# Patient Record
Sex: FEMALE | Race: WHITE | NOT HISPANIC OR LATINO
[De-identification: names, ages, dates, MRNs, and addresses within clinical notes are randomized per-mention and may not be internally consistent; named-entity substitution may affect disease eponyms.]

---

## 2017-06-10 ENCOUNTER — APPOINTMENT (OUTPATIENT)
Dept: MRI IMAGING | Facility: HOSPITAL | Age: 82
End: 2017-06-10

## 2017-06-10 ENCOUNTER — OUTPATIENT (OUTPATIENT)
Dept: OUTPATIENT SERVICES | Facility: HOSPITAL | Age: 82
LOS: 1 days | End: 2017-06-10
Payer: MEDICARE

## 2017-06-10 DIAGNOSIS — M43.16 SPONDYLOLISTHESIS, LUMBAR REGION: ICD-10-CM

## 2017-06-10 DIAGNOSIS — M48.06 SPINAL STENOSIS, LUMBAR REGION: ICD-10-CM

## 2017-06-10 DIAGNOSIS — M51.26 OTHER INTERVERTEBRAL DISC DISPLACEMENT, LUMBAR REGION: ICD-10-CM

## 2017-06-10 DIAGNOSIS — M47.816 SPONDYLOSIS WITHOUT MYELOPATHY OR RADICULOPATHY, LUMBAR REGION: ICD-10-CM

## 2017-06-10 PROCEDURE — 72148 MRI LUMBAR SPINE W/O DYE: CPT

## 2017-06-15 ENCOUNTER — OUTPATIENT (OUTPATIENT)
Dept: OUTPATIENT SERVICES | Facility: HOSPITAL | Age: 82
LOS: 1 days | End: 2017-06-15
Payer: MEDICARE

## 2017-06-15 DIAGNOSIS — M54.16 RADICULOPATHY, LUMBAR REGION: ICD-10-CM

## 2017-06-15 PROCEDURE — 77003 FLUOROGUIDE FOR SPINE INJECT: CPT

## 2017-06-15 PROCEDURE — 62323 NJX INTERLAMINAR LMBR/SAC: CPT

## 2017-09-11 ENCOUNTER — APPOINTMENT (OUTPATIENT)
Dept: SURGERY | Facility: CLINIC | Age: 82
End: 2017-09-11

## 2018-04-30 ENCOUNTER — APPOINTMENT (OUTPATIENT)
Dept: RADIOLOGY | Facility: HOSPITAL | Age: 83
End: 2018-04-30
Payer: MEDICARE

## 2018-04-30 ENCOUNTER — OUTPATIENT (OUTPATIENT)
Dept: OUTPATIENT SERVICES | Facility: HOSPITAL | Age: 83
LOS: 1 days | End: 2018-04-30
Payer: MEDICARE

## 2018-04-30 DIAGNOSIS — R10.9 UNSPECIFIED ABDOMINAL PAIN: ICD-10-CM

## 2018-04-30 DIAGNOSIS — Z00.8 ENCOUNTER FOR OTHER GENERAL EXAMINATION: ICD-10-CM

## 2018-04-30 DIAGNOSIS — Z96.643 PRESENCE OF ARTIFICIAL HIP JOINT, BILATERAL: ICD-10-CM

## 2018-04-30 PROCEDURE — 72170 X-RAY EXAM OF PELVIS: CPT | Mod: 26

## 2018-04-30 PROCEDURE — 72170 X-RAY EXAM OF PELVIS: CPT

## 2018-04-30 PROCEDURE — 74018 RADEX ABDOMEN 1 VIEW: CPT

## 2018-04-30 PROCEDURE — 74018 RADEX ABDOMEN 1 VIEW: CPT | Mod: 26

## 2019-03-14 ENCOUNTER — OUTPATIENT (OUTPATIENT)
Dept: OUTPATIENT SERVICES | Facility: HOSPITAL | Age: 84
LOS: 1 days | End: 2019-03-14
Payer: COMMERCIAL

## 2019-03-14 ENCOUNTER — APPOINTMENT (OUTPATIENT)
Dept: MRI IMAGING | Facility: CLINIC | Age: 84
End: 2019-03-14

## 2019-03-14 DIAGNOSIS — Z00.8 ENCOUNTER FOR OTHER GENERAL EXAMINATION: ICD-10-CM

## 2019-03-14 PROCEDURE — 72148 MRI LUMBAR SPINE W/O DYE: CPT | Mod: 26

## 2019-03-14 PROCEDURE — 72148 MRI LUMBAR SPINE W/O DYE: CPT

## 2019-04-02 ENCOUNTER — OUTPATIENT (OUTPATIENT)
Dept: OUTPATIENT SERVICES | Facility: HOSPITAL | Age: 84
LOS: 1 days | End: 2019-04-02
Payer: MEDICARE

## 2019-04-02 DIAGNOSIS — M54.16 RADICULOPATHY, LUMBAR REGION: ICD-10-CM

## 2019-04-02 PROCEDURE — 77003 FLUOROGUIDE FOR SPINE INJECT: CPT

## 2019-04-02 PROCEDURE — 62323 NJX INTERLAMINAR LMBR/SAC: CPT

## 2019-05-24 ENCOUNTER — EMERGENCY (EMERGENCY)
Facility: HOSPITAL | Age: 84
LOS: 1 days | Discharge: HOME | End: 2019-05-24
Attending: EMERGENCY MEDICINE | Admitting: EMERGENCY MEDICINE
Payer: MEDICARE

## 2019-05-24 VITALS
TEMPERATURE: 98 F | HEART RATE: 58 BPM | WEIGHT: 143.08 LBS | RESPIRATION RATE: 17 BRPM | OXYGEN SATURATION: 96 % | DIASTOLIC BLOOD PRESSURE: 77 MMHG | SYSTOLIC BLOOD PRESSURE: 143 MMHG

## 2019-05-24 DIAGNOSIS — M25.559 PAIN IN UNSPECIFIED HIP: ICD-10-CM

## 2019-05-24 DIAGNOSIS — Z96.641 PRESENCE OF RIGHT ARTIFICIAL HIP JOINT: Chronic | ICD-10-CM

## 2019-05-24 DIAGNOSIS — Z87.39 PERSONAL HISTORY OF OTHER DISEASES OF THE MUSCULOSKELETAL SYSTEM AND CONNECTIVE TISSUE: Chronic | ICD-10-CM

## 2019-05-24 PROCEDURE — 73502 X-RAY EXAM HIP UNI 2-3 VIEWS: CPT | Mod: 26,RT

## 2019-05-24 PROCEDURE — 73502 X-RAY EXAM HIP UNI 2-3 VIEWS: CPT

## 2019-05-24 PROCEDURE — 99283 EMERGENCY DEPT VISIT LOW MDM: CPT

## 2019-05-24 PROCEDURE — 96372 THER/PROPH/DIAG INJ SC/IM: CPT

## 2019-05-24 PROCEDURE — 99283 EMERGENCY DEPT VISIT LOW MDM: CPT | Mod: 25

## 2019-05-24 RX ORDER — METHOCARBAMOL 500 MG/1
750 TABLET, FILM COATED ORAL ONCE
Refills: 0 | Status: COMPLETED | OUTPATIENT
Start: 2019-05-24 | End: 2019-05-24

## 2019-05-24 RX ORDER — DEXAMETHASONE 0.5 MG/5ML
10 ELIXIR ORAL ONCE
Refills: 0 | Status: COMPLETED | OUTPATIENT
Start: 2019-05-24 | End: 2019-05-24

## 2019-05-24 RX ORDER — METHOCARBAMOL 500 MG/1
1 TABLET, FILM COATED ORAL
Qty: 14 | Refills: 0
Start: 2019-05-24 | End: 2019-05-30

## 2019-05-24 RX ADMIN — METHOCARBAMOL 750 MILLIGRAM(S): 500 TABLET, FILM COATED ORAL at 15:51

## 2019-05-24 RX ADMIN — Medication 10 MILLIGRAM(S): at 15:50

## 2019-05-24 NOTE — ED ADULT NURSE NOTE - CHPI ED NUR SYMPTOMS NEG
no stiffness/no back pain/no bruising/no deformity/no numbness/no weakness/no fever/no abrasion/no tingling

## 2019-05-24 NOTE — ED ADULT TRIAGE NOTE - CHIEF COMPLAINT QUOTE
pt presents to ED with c/o  right hip pain x2 weeks. Pt denies injury to hip and states " I woke up 2 weeks ago and suddenly developed this pain"

## 2019-05-24 NOTE — ED PROVIDER NOTE - PHYSICAL EXAMINATION
AAOx3  Abd: soft, NT/ND, no rebound or guarding, NCVAT  Extremity: +mild right hip TTP, +SLR   Neuro: patient moving all extremity equally, no focal neuro deficits noted  +hip pain with standing AAOx3  Abd: soft, NT/ND, no rebound or guarding, NCVAT  Extremity: +mild right hip TTP, +right SLR, no bruising or rash noted  Neuro: patient moving all extremity equally, no focal neuro deficits noted  +hip pain with standing

## 2019-05-24 NOTE — ED PROVIDER NOTE - OBJECTIVE STATEMENT
84 y/o F with PMH of Afib on Eliquis s/p cardiac ablation, CHF, HLD, spinal stenosis, left breast cancer s/p double mastectomy 86 y/o F with PMH of Afib on Eliquis s/p cardiac ablation, CHF, HLD, spinal stenosis, left breast cancer s/p double mastectomy presents to the ED with c/o sharp right hip pain shooting down the posterior thigh x 2 weeks, worse with ambulation and movement of the hip. Denies fall, direct injury, paresthesias, motor/sensory deficits, bowel/bladder incontinence, n/v, rash, f/c or all other complaints

## 2019-05-24 NOTE — ED ADULT NURSE NOTE - OBJECTIVE STATEMENT
Pt c/o right hip pain x2 weeks worse with ambulation that radiates down the right lateral and posterior leg described as shooting. Pt denies any fall or trauma. Pt states she is s/p hip replacement 15 years ago. Pt also states that imaging showed she had nerve damage on the hip. Pt states her pain management doctor, Dr. Villalobos told her to go to the ED. Pt sees pain management for spinal stenosis and manages pain with Tylenol. Pt states she tool Tylenol 650mg and "half of my friends Vicodin" with no relief. Pt presents with cane and states that she normally ambulates without any assistance.

## 2019-05-24 NOTE — ED ADULT NURSE NOTE - NSIMPLEMENTINTERV_GEN_ALL_ED
Implemented All Universal Safety Interventions:  [place] to call system. Call bell, personal items and telephone within reach. Instruct patient to call for assistance. Room bathroom lighting operational. Non-slip footwear when patient is off stretcher. Physically safe environment: no spills, clutter or unnecessary equipment. Stretcher in lowest position, wheels locked, appropriate side rails in place.

## 2019-05-24 NOTE — ED PROVIDER NOTE - ATTENDING CONTRIBUTION TO CARE
Dao with SAI Orozco. 84 y/o F with PMH of Afib on Eliquis s/p cardiac ablation, CHF, HLD, spinal stenosis, left breast cancer s/p double mastectomy presents to the ED with c/o sharp right hip pain shooting down the posterior thigh x 2 weeks, worse with ambulation and movement of the hip. Denies fall, direct injury, paresthesias, motor/sensory deficits, bowel/bladder incontinence, n/v, rash, f/c or all other complaints. On exam, to the right buttock in the sciatic notch, there is point tenderness. Notable with change in position from seated to standing and vice versa. She has lidocaine patch in place. She took tylenol already. We will supplement meds in the ED (cannot take NSAIDs due to usage of blood thinners) - we will administer decadron and muscle relaxer.   Pt ambulating with more comfort. She is ready for discharge. She walked the ED without difficulty. Her son is here to bring her home. All questions answered.   I performed a face to face bedside interview with patient regarding history of present illness, review of symptoms and past medical history. I completed an independent physical exam.  I have discussed the patient's plan of care with Physician Assistant (PA). I agree with note as stated above, having amended the EMR as needed to reflect my findings.   This includes History of Present Illness, HIV, Past Medical/Surgical/Family/Social History, Allergies and Home Medications, Review of Systems, Physical Exam, and any Progress Notes during the time I functioned as the attending physician for this patient.

## 2019-05-24 NOTE — ED PROVIDER NOTE - PROGRESS NOTE DETAILS
SAI Mcgovern: patient able to ambulate now, reports her pain is improved with meds. she is stable for d/c

## 2019-05-24 NOTE — ED PROVIDER NOTE - NSFOLLOWUPINSTRUCTIONS_ED_ALL_ED_FT
Follow up with your primary care physician within 1 week.    Take the prescribed medications as directed. Continue applying lidocaine patches to your back. Apply warm compresses to the affected area    Stay hydrated    Return to the ER if your symptoms worsen, high fevers, severe pain or for any other medical emergencies

## 2019-05-28 DIAGNOSIS — Z90.13 ACQUIRED ABSENCE OF BILATERAL BREASTS AND NIPPLES: ICD-10-CM

## 2019-05-30 ENCOUNTER — APPOINTMENT (OUTPATIENT)
Dept: ORTHOPEDIC SURGERY | Facility: CLINIC | Age: 84
End: 2019-05-30
Payer: MEDICARE

## 2019-05-30 VITALS — HEIGHT: 63 IN | WEIGHT: 142 LBS | BODY MASS INDEX: 25.16 KG/M2

## 2019-05-30 DIAGNOSIS — M48.062 SPINAL STENOSIS, LUMBAR REGION WITH NEUROGENIC CLAUDICATION: ICD-10-CM

## 2019-05-30 PROCEDURE — 99204 OFFICE O/P NEW MOD 45 MIN: CPT

## 2019-06-12 PROBLEM — I50.9 HEART FAILURE, UNSPECIFIED: Chronic | Status: ACTIVE | Noted: 2019-05-24

## 2019-06-21 ENCOUNTER — OUTPATIENT (OUTPATIENT)
Dept: OUTPATIENT SERVICES | Facility: HOSPITAL | Age: 84
LOS: 1 days | End: 2019-06-21
Payer: MEDICARE

## 2019-06-21 ENCOUNTER — APPOINTMENT (OUTPATIENT)
Dept: MRI IMAGING | Facility: CLINIC | Age: 84
End: 2019-06-21
Payer: MEDICARE

## 2019-06-21 DIAGNOSIS — Z96.641 PRESENCE OF RIGHT ARTIFICIAL HIP JOINT: Chronic | ICD-10-CM

## 2019-06-21 DIAGNOSIS — Z90.13 ACQUIRED ABSENCE OF BILATERAL BREASTS AND NIPPLES: ICD-10-CM

## 2019-06-21 DIAGNOSIS — Z87.39 PERSONAL HISTORY OF OTHER DISEASES OF THE MUSCULOSKELETAL SYSTEM AND CONNECTIVE TISSUE: Chronic | ICD-10-CM

## 2019-06-21 PROCEDURE — C8908: CPT

## 2019-06-21 PROCEDURE — C8937: CPT

## 2019-06-21 PROCEDURE — 77049 MRI BREAST C-+ W/CAD BI: CPT | Mod: 26

## 2019-06-21 PROCEDURE — A9585: CPT

## 2019-07-01 ENCOUNTER — OUTPATIENT (OUTPATIENT)
Dept: OUTPATIENT SERVICES | Facility: HOSPITAL | Age: 84
LOS: 1 days | End: 2019-07-01
Payer: MEDICARE

## 2019-07-01 ENCOUNTER — RESULT REVIEW (OUTPATIENT)
Age: 84
End: 2019-07-01

## 2019-07-01 ENCOUNTER — APPOINTMENT (OUTPATIENT)
Dept: ULTRASOUND IMAGING | Facility: CLINIC | Age: 84
End: 2019-07-01
Payer: MEDICARE

## 2019-07-01 DIAGNOSIS — Z00.8 ENCOUNTER FOR OTHER GENERAL EXAMINATION: ICD-10-CM

## 2019-07-01 DIAGNOSIS — Z96.641 PRESENCE OF RIGHT ARTIFICIAL HIP JOINT: Chronic | ICD-10-CM

## 2019-07-01 DIAGNOSIS — Z87.39 PERSONAL HISTORY OF OTHER DISEASES OF THE MUSCULOSKELETAL SYSTEM AND CONNECTIVE TISSUE: Chronic | ICD-10-CM

## 2019-07-01 PROCEDURE — 20206 BIOPSY MUSCLE PERQ NEEDLE: CPT

## 2019-07-01 PROCEDURE — 88360 TUMOR IMMUNOHISTOCHEM/MANUAL: CPT | Mod: 26

## 2019-07-01 PROCEDURE — 88305 TISSUE EXAM BY PATHOLOGIST: CPT | Mod: 26

## 2019-07-01 PROCEDURE — 77065 DX MAMMO INCL CAD UNI: CPT | Mod: 26,LT

## 2019-07-01 PROCEDURE — 10035 PLMT SFT TISS LOCLZJ DEV 1ST: CPT

## 2019-07-01 PROCEDURE — 19083 BX BREAST 1ST LESION US IMAG: CPT | Mod: LT

## 2019-07-01 PROCEDURE — 88360 TUMOR IMMUNOHISTOCHEM/MANUAL: CPT

## 2019-07-01 PROCEDURE — 76942 ECHO GUIDE FOR BIOPSY: CPT | Mod: XU

## 2019-07-01 PROCEDURE — 77065 DX MAMMO INCL CAD UNI: CPT

## 2019-07-01 PROCEDURE — 88342 IMHCHEM/IMCYTCHM 1ST ANTB: CPT | Mod: 26,59

## 2019-07-01 PROCEDURE — 88305 TISSUE EXAM BY PATHOLOGIST: CPT

## 2019-07-01 PROCEDURE — A4648: CPT

## 2019-07-01 PROCEDURE — 88341 IMHCHEM/IMCYTCHM EA ADD ANTB: CPT

## 2019-07-01 PROCEDURE — 19083 BX BREAST 1ST LESION US IMAG: CPT

## 2019-07-01 PROCEDURE — 88341 IMHCHEM/IMCYTCHM EA ADD ANTB: CPT | Mod: 26,59

## 2019-07-02 ENCOUNTER — TRANSCRIPTION ENCOUNTER (OUTPATIENT)
Age: 84
End: 2019-07-02

## 2019-07-03 LAB — SURGICAL PATHOLOGY STUDY: SIGNIFICANT CHANGE UP

## 2019-07-05 ENCOUNTER — OUTPATIENT (OUTPATIENT)
Dept: OUTPATIENT SERVICES | Facility: HOSPITAL | Age: 84
LOS: 1 days | End: 2019-07-05
Payer: MEDICARE

## 2019-07-05 ENCOUNTER — APPOINTMENT (OUTPATIENT)
Dept: NUCLEAR MEDICINE | Facility: CLINIC | Age: 84
End: 2019-07-05

## 2019-07-05 ENCOUNTER — APPOINTMENT (OUTPATIENT)
Dept: NUCLEAR MEDICINE | Facility: CLINIC | Age: 84
End: 2019-07-05
Payer: MEDICARE

## 2019-07-05 DIAGNOSIS — Z00.8 ENCOUNTER FOR OTHER GENERAL EXAMINATION: ICD-10-CM

## 2019-07-05 DIAGNOSIS — Z87.39 PERSONAL HISTORY OF OTHER DISEASES OF THE MUSCULOSKELETAL SYSTEM AND CONNECTIVE TISSUE: Chronic | ICD-10-CM

## 2019-07-05 DIAGNOSIS — Z96.641 PRESENCE OF RIGHT ARTIFICIAL HIP JOINT: Chronic | ICD-10-CM

## 2019-07-05 PROCEDURE — 78815 PET IMAGE W/CT SKULL-THIGH: CPT

## 2019-07-05 PROCEDURE — 78815 PET IMAGE W/CT SKULL-THIGH: CPT | Mod: 26,PI

## 2019-07-05 PROCEDURE — A9552: CPT

## 2019-07-08 ENCOUNTER — APPOINTMENT (OUTPATIENT)
Dept: SURGERY | Facility: CLINIC | Age: 84
End: 2019-07-08

## 2019-07-08 ENCOUNTER — APPOINTMENT (OUTPATIENT)
Dept: SURGICAL ONCOLOGY | Facility: CLINIC | Age: 84
End: 2019-07-08
Payer: MEDICARE

## 2019-07-08 VITALS
HEART RATE: 59 BPM | OXYGEN SATURATION: 97 % | BODY MASS INDEX: 24.45 KG/M2 | WEIGHT: 138 LBS | HEIGHT: 63 IN | DIASTOLIC BLOOD PRESSURE: 74 MMHG | SYSTOLIC BLOOD PRESSURE: 134 MMHG

## 2019-07-08 PROCEDURE — 99205 OFFICE O/P NEW HI 60 MIN: CPT

## 2019-07-08 NOTE — CONSULT LETTER
[Dear  ___] : Dear  [unfilled], [Consult Letter:] : I had the pleasure of evaluating your patient, [unfilled]. [Please see my note below.] : Please see my note below. [FreeTextEntry2] : Magan Carey MD [Sincerely,] : Sincerely, [Consult Closing:] : Thank you very much for allowing me to participate in the care of this patient.  If you have any questions, please do not hesitate to contact me. [FreeTextEntry3] : Carlos Pittman M.D.\par

## 2019-07-08 NOTE — HISTORY OF PRESENT ILLNESS
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 She had reconstruction after both mastectomies. She recently had some concern that there was silicone leaking from her left implant and ultimately was referred for an MRI by Dr. Magan Carey's office. A suspicious mass was noted in the left axilla which was biopsied and identified for differentiated invasive carcinoma with lobular features. The tumor was triple negative. A PET scan was performed which did not definitively identify any metastatic disease. The mass measures approximately 5 cm by MRI.

## 2019-07-08 NOTE — PHYSICAL EXAM
[Normal] : supple, no neck mass and thyroid not enlarged [Normal Supraclavicular Lymph Nodes] : normal supraclavicular lymph nodes [Normal Axillary Lymph Nodes] : normal axillary lymph nodes [Normal] : oriented to person, place and time, with appropriate affect [FreeTextEntry1] : GM was present [de-identified] : Normal S1, S2. Regular Rate and rhythm [de-identified] : Status post bilateral mastectomy with reconstruction. No evidence of recurrence on the anterior chest wall of the implant. Left axillary tail however has approximately 4 cm vaguely bordered mass which is partially fixed to the chest wall. No other axillary adenopathy can be discerned.

## 2019-07-08 NOTE — ASSESSMENT
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 Imaging suggests that this may be fixed to the pectoralis muscle of the left chest wall.\par PET scan fails to identify any definitive metastatic disease.\par \par Plan: Will review the imaging studies including the MRI and PET scan.\par \par Patient will also need preoperative medical oncology consultation.\par \par Due to the presence of the implant and the tumor in proximity to the pectoralis covering the implant patient will be referred back to Dr. Carey as well.

## 2019-07-09 ENCOUNTER — OTHER (OUTPATIENT)
Age: 84
End: 2019-07-09

## 2019-07-10 ENCOUNTER — APPOINTMENT (OUTPATIENT)
Dept: PLASTIC SURGERY | Facility: CLINIC | Age: 84
End: 2019-07-10

## 2019-07-11 ENCOUNTER — OUTPATIENT (OUTPATIENT)
Dept: OUTPATIENT SERVICES | Facility: HOSPITAL | Age: 84
LOS: 1 days | Discharge: HOME | End: 2019-07-11

## 2019-07-11 DIAGNOSIS — C50.919 MALIGNANT NEOPLASM OF UNSPECIFIED SITE OF UNSPECIFIED FEMALE BREAST: ICD-10-CM

## 2019-07-11 DIAGNOSIS — Z96.641 PRESENCE OF RIGHT ARTIFICIAL HIP JOINT: Chronic | ICD-10-CM

## 2019-07-11 DIAGNOSIS — Z87.39 PERSONAL HISTORY OF OTHER DISEASES OF THE MUSCULOSKELETAL SYSTEM AND CONNECTIVE TISSUE: Chronic | ICD-10-CM

## 2019-07-16 ENCOUNTER — APPOINTMENT (OUTPATIENT)
Dept: HEMATOLOGY ONCOLOGY | Facility: CLINIC | Age: 84
End: 2019-07-16
Payer: MEDICARE

## 2019-07-16 VITALS
HEIGHT: 61.81 IN | RESPIRATION RATE: 16 BRPM | WEIGHT: 140.21 LBS | BODY MASS INDEX: 25.8 KG/M2 | TEMPERATURE: 98 F | SYSTOLIC BLOOD PRESSURE: 146 MMHG | OXYGEN SATURATION: 97 % | HEART RATE: 59 BPM | DIASTOLIC BLOOD PRESSURE: 78 MMHG

## 2019-07-16 PROCEDURE — 99205 OFFICE O/P NEW HI 60 MIN: CPT

## 2019-07-23 ENCOUNTER — OTHER (OUTPATIENT)
Age: 84
End: 2019-07-23

## 2019-07-24 ENCOUNTER — APPOINTMENT (OUTPATIENT)
Dept: RADIOLOGY | Facility: CLINIC | Age: 84
End: 2019-07-24

## 2019-07-26 ENCOUNTER — OUTPATIENT (OUTPATIENT)
Dept: OUTPATIENT SERVICES | Facility: HOSPITAL | Age: 84
LOS: 1 days | End: 2019-07-26
Payer: MEDICARE

## 2019-07-26 ENCOUNTER — APPOINTMENT (OUTPATIENT)
Dept: RADIOLOGY | Facility: CLINIC | Age: 84
End: 2019-07-26

## 2019-07-26 DIAGNOSIS — Z96.641 PRESENCE OF RIGHT ARTIFICIAL HIP JOINT: Chronic | ICD-10-CM

## 2019-07-26 DIAGNOSIS — Z87.39 PERSONAL HISTORY OF OTHER DISEASES OF THE MUSCULOSKELETAL SYSTEM AND CONNECTIVE TISSUE: Chronic | ICD-10-CM

## 2019-07-26 DIAGNOSIS — Z00.8 ENCOUNTER FOR OTHER GENERAL EXAMINATION: ICD-10-CM

## 2019-07-26 PROCEDURE — 77080 DXA BONE DENSITY AXIAL: CPT

## 2019-07-26 PROCEDURE — 77080 DXA BONE DENSITY AXIAL: CPT | Mod: 26

## 2019-09-26 ENCOUNTER — OUTPATIENT (OUTPATIENT)
Dept: OUTPATIENT SERVICES | Facility: HOSPITAL | Age: 84
LOS: 1 days | Discharge: HOME | End: 2019-09-26

## 2019-09-26 DIAGNOSIS — C50.919 MALIGNANT NEOPLASM OF UNSPECIFIED SITE OF UNSPECIFIED FEMALE BREAST: ICD-10-CM

## 2019-09-26 DIAGNOSIS — Z87.39 PERSONAL HISTORY OF OTHER DISEASES OF THE MUSCULOSKELETAL SYSTEM AND CONNECTIVE TISSUE: Chronic | ICD-10-CM

## 2019-09-26 DIAGNOSIS — Z96.641 PRESENCE OF RIGHT ARTIFICIAL HIP JOINT: Chronic | ICD-10-CM

## 2019-09-27 ENCOUNTER — APPOINTMENT (OUTPATIENT)
Dept: HEMATOLOGY ONCOLOGY | Facility: CLINIC | Age: 84
End: 2019-09-27
Payer: MEDICARE

## 2019-09-27 VITALS
HEART RATE: 65 BPM | TEMPERATURE: 97.7 F | OXYGEN SATURATION: 96 % | BODY MASS INDEX: 26.09 KG/M2 | DIASTOLIC BLOOD PRESSURE: 66 MMHG | RESPIRATION RATE: 16 BRPM | SYSTOLIC BLOOD PRESSURE: 153 MMHG | WEIGHT: 141.75 LBS

## 2019-09-27 PROCEDURE — 99214 OFFICE O/P EST MOD 30 MIN: CPT

## 2019-10-14 NOTE — HISTORY OF PRESENT ILLNESS
[Date: ____________] : Patient's last distress assessment performed on [unfilled]. [0 - No Distress] : Distress Level: 0 [de-identified] : The patient has a history of right breast cancer in 1965 for which she underwent what is described as a "Jeanette mastectomy" by Dr. Olivo at [facility] with "0/19 axillary lymph nodes" per patient's verbal report and followed expectantly.  She was well until 2008 at which time she was found to have a left breast multifocal DCIS per her description for which she underwent a left modified radical mastectomy at [facility] under the care Dr. Yu with a finding of "3 spots of DCIS, and a sentinel lymph node negative" per patient's verbal report; I do not have a copy of that report for my review.  The patient reports having undergone bilateral breast reconstruction at the same time to include what she describes as right "right cadaver tissue" in addition to an implant in the right breast, as well as a left breast implant.\par \par She was then well until approximately 1-2/19 when she describes transient pinching on her left reconstructed breast, centrally and predominantly to the lateral aspect.  She chose to follow this and then start to question whether she was "losing volume."  Through a friend/networking, she contacted Dr. Magan Carey who recommended a bilateral breast MRI, which was performed at [facility] on June 21, 2019 with a finding of a suspicious 5 cm irregular mass in the left axilla as well as further suspicious lymphadenopathy seen posterior to the mass with second-look ultrasound and core biopsy recommended as well as a PET-CT scan.  The patient went on to have an ultrasound of the left axilla with an ultrasound-guided core biopsy, with a finding of a heterogeneous 4.5 cm mass seen corresponding to the finding seen on the breast MRI with abnormal lymph nodes seen posterior to the mass measuring 8 mm with an ultrasound-guided core biopsy on that same date demonstrating invasive poorly differentiated mammary carcinoma with lobular phenotype, Viridiana score 8/9, with invasive tumor measuring at least 8 mm on the core biopsy specimen, ER negative, CT negative, HER-2/jeni negative, and a comment that "no focal residual lymphoid like tissue (no germinal center noted) identified; the lesion may represent a completely effaced lymph node or brisk lymphocytic response to tumor; clinical pathologic-radiologic correlation recommended”.  The patient then went on to see Dr. Carlos Pittman and had a PET-CT scan performed on July 5, 2019 with a finding of a mildly avid focus in the left thyroid lobe extending towards the isthmus, a 1.3 cm non-avid left thyroid lobe nodule; in the left axilla, there was left axillary soft tissue mass with irregular margins containing a biopsy clip measuring 3 x 2 cm with several adjacent subcentimeter left axillary lymph nodes measuring up to 1 cm; bilateral hip arthroplasties with heterogeneous FDG avidity adjacent to the proximal portions bilaterally on the right with an SUV of 13.8 on the left and SUV of 9.4 with a comment, this area is limited in evaluation due to beam hardening artifact; there were degenerative changes in the lower spine with mild areas of FDG avidity, multilevel, non-FDG avid compression fracture of L4 unchanged since 06/2017.\par \par I saw her in initial consultation on 716/19 and subsequently had requested a review of the pathology and it turned our it was ER+ / CT neg. We reviewed her scans at tumor board and the group agreed it was unresectable at this time. I called the pt and informed her of the above change in the pathology and that I recommended neoadjuvant anti-estrogen therapy with an aromatase inhibitor such as anastrozle. She agreed to proceed and started taking anastrozole in the very first days of 7/19. \par  [de-identified] : Here for f/u.\par \par In the interim she was seen at [facility] by a medical oncologist for a second opinion (someone who apparently knew me well but she  did not remember her  name) who agreed with this approach per pt. I do not have a copy of that report for my review. She notes a good appetite, stable weigh and excellent performance status. She denies any anastrozole related side effects. She believes the left axilla has improved.

## 2019-10-14 NOTE — PHYSICAL EXAM
[Fully active, able to carry on all pre-disease performance without restriction] : Status 0 - Fully active, able to carry on all pre-disease performance without restriction [Normal] : affect appropriate [de-identified] : B/L breasts s/p MRM with recnstruction, no masses. b/L ax neg

## 2019-11-13 ENCOUNTER — OUTPATIENT (OUTPATIENT)
Dept: OUTPATIENT SERVICES | Facility: HOSPITAL | Age: 84
LOS: 1 days | Discharge: HOME | End: 2019-11-13

## 2019-11-13 DIAGNOSIS — C50.919 MALIGNANT NEOPLASM OF UNSPECIFIED SITE OF UNSPECIFIED FEMALE BREAST: ICD-10-CM

## 2019-11-13 DIAGNOSIS — Z87.39 PERSONAL HISTORY OF OTHER DISEASES OF THE MUSCULOSKELETAL SYSTEM AND CONNECTIVE TISSUE: Chronic | ICD-10-CM

## 2019-11-13 DIAGNOSIS — Z96.641 PRESENCE OF RIGHT ARTIFICIAL HIP JOINT: Chronic | ICD-10-CM

## 2019-11-20 ENCOUNTER — APPOINTMENT (OUTPATIENT)
Dept: INFUSION THERAPY | Facility: HOSPITAL | Age: 84
End: 2019-11-20

## 2019-11-20 ENCOUNTER — APPOINTMENT (OUTPATIENT)
Dept: HEMATOLOGY ONCOLOGY | Facility: CLINIC | Age: 84
End: 2019-11-20
Payer: MEDICARE

## 2019-11-20 VITALS
TEMPERATURE: 97.6 F | WEIGHT: 141.98 LBS | DIASTOLIC BLOOD PRESSURE: 70 MMHG | BODY MASS INDEX: 26.13 KG/M2 | OXYGEN SATURATION: 98 % | SYSTOLIC BLOOD PRESSURE: 121 MMHG | RESPIRATION RATE: 16 BRPM | HEART RATE: 66 BPM

## 2019-11-20 PROCEDURE — 99214 OFFICE O/P EST MOD 30 MIN: CPT

## 2019-11-21 NOTE — HISTORY OF PRESENT ILLNESS
[Date: ____________] : Patient's last distress assessment performed on [unfilled]. [0 - No Distress] : Distress Level: 0 [de-identified] : The patient has a history of right breast cancer in 1965 for which she underwent what is described as a "Jeanette mastectomy" by Dr. Olivo at [facility] with "0/19 axillary lymph nodes" per patient's verbal report and followed expectantly.  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 Yu with a finding of "3 spots of DCIS, and a sentinel lymph node negative" per patient's verbal report; I do not have a copy of that report for my review.  The patient reports having undergone bilateral breast reconstruction at the same time to include what she describes as right "right cadaver tissue" in addition to an implant in the right breast, as well as a left breast implant.\par \par She was then well until approximately 1-2/19 when she describes transient pinching on her left reconstructed breast, centrally and predominantly to the lateral aspect.  She chose to follow this and then start to question whether she was "losing volume."  Through a friend/networking, she contacted Dr. Magan Carey who recommended a bilateral breast MRI, which was performed at [facility] on June 21, 2019 with a finding of a suspicious 5 cm irregular mass in the left axilla as well as further suspicious lymphadenopathy seen posterior to the mass with second-look ultrasound and core biopsy recommended as well as a PET-CT scan.  The patient went on to have an ultrasound of the left axilla with an ultrasound-guided core biopsy, with a finding of a heterogeneous 4.5 cm mass seen corresponding to the finding seen on the breast MRI with abnormal lymph nodes seen posterior to the mass measuring 8 mm with an ultrasound-guided core biopsy on that same date demonstrating invasive poorly differentiated mammary carcinoma with lobular phenotype, Viridiana score 8/9, with invasive tumor measuring at least 8 mm on the core biopsy specimen, ER negative, OR negative, HER-2/jeni negative, and a comment that "no focal residual lymphoid like tissue (no germinal center noted) identified; the lesion may represent a completely effaced lymph node or brisk lymphocytic response to tumor; clinical pathologic-radiologic correlation recommended”.  The patient then went on to see Dr. Carlos Pittman and had a PET-CT scan performed on July 5, 2019 with a finding of a mildly avid focus in the left thyroid lobe extending towards the isthmus, a 1.3 cm non-avid left thyroid lobe nodule; in the left axilla, there was left axillary soft tissue mass with irregular margins containing a biopsy clip measuring 3 x 2 cm with several adjacent subcentimeter left axillary lymph nodes measuring up to 1 cm; bilateral hip arthroplasties with heterogeneous FDG avidity adjacent to the proximal portions bilaterally on the right with an SUV of 13.8 on the left and SUV of 9.4 with a comment, this area is limited in evaluation due to beam hardening artifact; there were degenerative changes in the lower spine with mild areas of FDG avidity, multilevel, non-FDG avid compression fracture of L4 unchanged since 06/2017.\par \par I saw her in initial consultation on 716/19 and subsequently had requested a review of the pathology and it turned our it was ER+ / OR neg. We reviewed her scans at tumor board and the group agreed it was unresectable at this time. I called the pt and informed her of the above change in the pathology and that I recommended neoadjuvant anti-estrogen therapy with an aromatase inhibitor such as anastrozole. She agreed to proceed and started taking anastrozole in the very first days of 7/19. \par  [de-identified] : Here for f/u.\par \par She notes a good appetite, stable weigh and excellent performance status. She denies any anastrozole related side effects. She believes the left axilla has improved.

## 2019-11-21 NOTE — PHYSICAL EXAM
[Fully active, able to carry on all pre-disease performance without restriction] : Status 0 - Fully active, able to carry on all pre-disease performance without restriction [Normal] : affect appropriate [de-identified] : B/L breasts s/p MRM with recnstruction, no masses.r axill a neg. Left ax with vague density medially softer and with insdistinct margins

## 2019-11-27 ENCOUNTER — APPOINTMENT (OUTPATIENT)
Dept: ULTRASOUND IMAGING | Facility: HOSPITAL | Age: 84
End: 2019-11-27
Payer: MEDICARE

## 2019-11-27 ENCOUNTER — OUTPATIENT (OUTPATIENT)
Dept: OUTPATIENT SERVICES | Facility: HOSPITAL | Age: 84
LOS: 1 days | End: 2019-11-27
Payer: MEDICARE

## 2019-11-27 DIAGNOSIS — Z00.8 ENCOUNTER FOR OTHER GENERAL EXAMINATION: ICD-10-CM

## 2019-11-27 DIAGNOSIS — Z96.641 PRESENCE OF RIGHT ARTIFICIAL HIP JOINT: Chronic | ICD-10-CM

## 2019-11-27 DIAGNOSIS — Z87.39 PERSONAL HISTORY OF OTHER DISEASES OF THE MUSCULOSKELETAL SYSTEM AND CONNECTIVE TISSUE: Chronic | ICD-10-CM

## 2019-11-27 PROCEDURE — 76642 ULTRASOUND BREAST LIMITED: CPT

## 2019-11-27 PROCEDURE — 76642 ULTRASOUND BREAST LIMITED: CPT | Mod: 26,LT

## 2020-02-26 ENCOUNTER — OUTPATIENT (OUTPATIENT)
Dept: OUTPATIENT SERVICES | Facility: HOSPITAL | Age: 85
LOS: 1 days | Discharge: HOME | End: 2020-02-26

## 2020-02-26 DIAGNOSIS — Z87.39 PERSONAL HISTORY OF OTHER DISEASES OF THE MUSCULOSKELETAL SYSTEM AND CONNECTIVE TISSUE: Chronic | ICD-10-CM

## 2020-02-26 DIAGNOSIS — Z96.641 PRESENCE OF RIGHT ARTIFICIAL HIP JOINT: Chronic | ICD-10-CM

## 2020-02-26 DIAGNOSIS — C50.919 MALIGNANT NEOPLASM OF UNSPECIFIED SITE OF UNSPECIFIED FEMALE BREAST: ICD-10-CM

## 2020-02-28 ENCOUNTER — APPOINTMENT (OUTPATIENT)
Dept: MRI IMAGING | Facility: HOSPITAL | Age: 85
End: 2020-02-28

## 2020-03-04 ENCOUNTER — APPOINTMENT (OUTPATIENT)
Dept: HEMATOLOGY ONCOLOGY | Facility: CLINIC | Age: 85
End: 2020-03-04
Payer: MEDICARE

## 2020-03-04 VITALS
TEMPERATURE: 97.9 F | WEIGHT: 145.48 LBS | HEART RATE: 61 BPM | DIASTOLIC BLOOD PRESSURE: 71 MMHG | OXYGEN SATURATION: 97 % | SYSTOLIC BLOOD PRESSURE: 152 MMHG | BODY MASS INDEX: 26.78 KG/M2 | RESPIRATION RATE: 18 BRPM

## 2020-03-04 PROCEDURE — 99214 OFFICE O/P EST MOD 30 MIN: CPT

## 2020-03-05 NOTE — PHYSICAL EXAM
[Fully active, able to carry on all pre-disease performance without restriction] : Status 0 - Fully active, able to carry on all pre-disease performance without restriction [Normal] : affect appropriate [de-identified] : B/L breasts s/p MRM with recnstruction, no masses.r axilla neg. Left ax neg

## 2020-03-05 NOTE — HISTORY OF PRESENT ILLNESS
[Date: ____________] : Patient's last distress assessment performed on [unfilled]. [0 - No Distress] : Distress Level: 0 [de-identified] : The patient has a history of right breast cancer in 1965 for which she underwent what is described as a "[place]ette mastectomy" by Dr. Olivo at [facility] with "0/19 axillary lymph nodes" per patient's verbal report and followed expectantly.  She was well until 2008 at which time she was found to have a left breast multifocal DCIS per her description for which she underwent a left modified radical mastectomy at [facility] under the care Dr. Yu with a finding of "3 spots of DCIS, and a sentinel lymph node negative" per patient's verbal report; I do not have a copy of that report for my review.  The patient reports having undergone bilateral breast reconstruction at the same time to include what she describes as right "right cadaver tissue" in addition to an implant in the right breast, as well as a left breast implant.\par \par She was then well until approximately 1-2/19 when she describes transient pinching on her left reconstructed breast, centrally and predominantly to the lateral aspect.  She chose to follow this and then start to question whether she was "losing volume."  Through a friend/networking, she contacted Dr. Magan Carey who recommended a bilateral breast MRI, which was performed at [facility] on June 21, 2019 with a finding of a suspicious 5 cm irregular mass in the left axilla as well as further suspicious lymphadenopathy seen posterior to the mass with second-look ultrasound and core biopsy recommended as well as a PET-CT scan.  The patient went on to have an ultrasound of the left axilla with an ultrasound-guided core biopsy, with a finding of a heterogeneous 4.5 cm mass seen corresponding to the finding seen on the breast MRI with abnormal lymph nodes seen posterior to the mass measuring 8 mm with an ultrasound-guided core biopsy on that same date demonstrating invasive poorly differentiated mammary carcinoma with lobular phenotype, Viridiana score 8/9, with invasive tumor measuring at least 8 mm on the core biopsy specimen, ER negative, AK negative, HER-2/jeni negative, and a comment that "no focal residual lymphoid like tissue (no germinal center noted) identified; the lesion may represent a completely effaced lymph node or brisk lymphocytic response to tumor; clinical pathologic-radiologic correlation recommended”.  The patient then went on to see Dr. Carlos Pittman and had a PET-CT scan performed on July 5, 2019 with a finding of a mildly avid focus in the left thyroid lobe extending towards the isthmus, a 1.3 cm non-avid left thyroid lobe nodule; in the left axilla, there was left axillary soft tissue mass with irregular margins containing a biopsy clip measuring 3 x 2 cm with several adjacent subcentimeter left axillary lymph nodes measuring up to 1 cm; bilateral hip arthroplasties with heterogeneous FDG avidity adjacent to the proximal portions bilaterally on the right with an SUV of 13.8 on the left and SUV of 9.4 with a comment, this area is limited in evaluation due to beam hardening artifact; there were degenerative changes in the lower spine with mild areas of FDG avidity, multilevel, non-FDG avid compression fracture of L4 unchanged since 06/2017.\par \par I saw her in initial consultation on 716/19 and subsequently had requested a review of the pathology and it turned our it was ER+ / AK neg. We reviewed her scans at tumor board and the group agreed it was unresectable at this time. I called the pt and informed her of the above change in the pathology and that I recommended neoadjuvant anti-estrogen therapy with an aromatase inhibitor such as anastrozole. She agreed to proceed and started taking anastrozole in the very first days of 7/19. \par  [de-identified] : Seen in 11/19. Had evidence of response in her left ax mass. \par \par Today informed me she stopped taking anstrozole end 8/19 secondary to concerns re bone toxicity. She has been seeing an "MD PhD" on [place] who has been infusing "ozone therapy in an electrolyte bag" IV, B17, other "anti-inflammatory herbs". She declined to provide his name as " Rather not say as he does not want people looking over his back". She denies any specific toxicity, notes a good appetite stable weight and excellent performance status. She notes she has been responding.  \par \par

## 2020-07-06 ENCOUNTER — APPOINTMENT (OUTPATIENT)
Dept: SURGICAL ONCOLOGY | Facility: CLINIC | Age: 85
End: 2020-07-06
Payer: MEDICARE

## 2020-07-06 VITALS
WEIGHT: 142 LBS | SYSTOLIC BLOOD PRESSURE: 147 MMHG | HEART RATE: 58 BPM | DIASTOLIC BLOOD PRESSURE: 88 MMHG | BODY MASS INDEX: 26.13 KG/M2 | HEIGHT: 62 IN | OXYGEN SATURATION: 94 %

## 2020-07-06 VITALS — TEMPERATURE: 97 F

## 2020-07-06 PROCEDURE — 99214 OFFICE O/P EST MOD 30 MIN: CPT

## 2020-07-06 NOTE — ASSESSMENT
[FreeTextEntry1] : Impression:\par Status post bilateral mastectomy with reconstruction. \par No evidence of recurrence on the anterior chest wall of bilateral implants. \par No suspicious lesions on exam. \par  \par \par Plan:\par Breast MRI with contrast to evaluate  axillary recurrence and response\par RTO pending above\par

## 2020-07-06 NOTE — ADDENDUM
[FreeTextEntry1] : I, Charles Mart, acted soley as a scribe for Dr. Carlos Pittman on this date 07/06/2020.

## 2020-07-06 NOTE — PHYSICAL EXAM
[Normal Supraclavicular Lymph Nodes] : normal supraclavicular lymph nodes [Normal] : grossly intact [Normal Axillary Lymph Nodes] : normal axillary lymph nodes [FreeTextEntry1] : ICharles was present for the physical exam.  [de-identified] : Normal S1, S2. Regular rate and rhythm.  [de-identified] : Status post bilateral mastectomy. No evidence of recurrence on the anterior chest wall of both implants. No other axillary adenopathy can be discerned. [de-identified] : Clear breath sounds bilaterally, normal respiratory effort.

## 2020-07-27 ENCOUNTER — OUTPATIENT (OUTPATIENT)
Dept: OUTPATIENT SERVICES | Facility: HOSPITAL | Age: 85
LOS: 1 days | End: 2020-07-27
Payer: MEDICARE

## 2020-07-27 ENCOUNTER — RESULT REVIEW (OUTPATIENT)
Age: 85
End: 2020-07-27

## 2020-07-27 ENCOUNTER — APPOINTMENT (OUTPATIENT)
Dept: MRI IMAGING | Facility: CLINIC | Age: 85
End: 2020-07-27
Payer: MEDICARE

## 2020-07-27 DIAGNOSIS — R59.0 LOCALIZED ENLARGED LYMPH NODES: ICD-10-CM

## 2020-07-27 DIAGNOSIS — Z87.39 PERSONAL HISTORY OF OTHER DISEASES OF THE MUSCULOSKELETAL SYSTEM AND CONNECTIVE TISSUE: Chronic | ICD-10-CM

## 2020-07-27 DIAGNOSIS — Z96.641 PRESENCE OF RIGHT ARTIFICIAL HIP JOINT: Chronic | ICD-10-CM

## 2020-07-27 PROCEDURE — 77049 MRI BREAST C-+ W/CAD BI: CPT | Mod: 26

## 2020-07-27 PROCEDURE — A9585: CPT

## 2020-07-27 PROCEDURE — C8908: CPT

## 2020-07-27 PROCEDURE — C8937: CPT

## 2020-07-31 ENCOUNTER — OUTPATIENT (OUTPATIENT)
Dept: OUTPATIENT SERVICES | Facility: HOSPITAL | Age: 85
LOS: 1 days | Discharge: HOME | End: 2020-07-31

## 2020-07-31 DIAGNOSIS — Z96.641 PRESENCE OF RIGHT ARTIFICIAL HIP JOINT: Chronic | ICD-10-CM

## 2020-07-31 DIAGNOSIS — C50.919 MALIGNANT NEOPLASM OF UNSPECIFIED SITE OF UNSPECIFIED FEMALE BREAST: ICD-10-CM

## 2020-07-31 DIAGNOSIS — Z87.39 PERSONAL HISTORY OF OTHER DISEASES OF THE MUSCULOSKELETAL SYSTEM AND CONNECTIVE TISSUE: Chronic | ICD-10-CM

## 2020-08-02 ENCOUNTER — RECORD ABSTRACTING (OUTPATIENT)
Age: 85
End: 2020-08-02

## 2020-08-02 NOTE — HISTORY OF PRESENT ILLNESS
[FreeTextEntry1] : Thank you for referring this 86-year-old\par \par (History of atrial fibrillation on Eliquis)\par \par TTE (7/25/2019) showed mild LVH, mildly enlarged LA; mild MR; mild-moderate TR, mild PI.

## 2020-08-04 ENCOUNTER — APPOINTMENT (OUTPATIENT)
Dept: HEMATOLOGY ONCOLOGY | Facility: CLINIC | Age: 85
End: 2020-08-04
Payer: MEDICARE

## 2020-08-04 VITALS
HEART RATE: 61 BPM | OXYGEN SATURATION: 97 % | BODY MASS INDEX: 26.45 KG/M2 | WEIGHT: 144.62 LBS | DIASTOLIC BLOOD PRESSURE: 76 MMHG | SYSTOLIC BLOOD PRESSURE: 151 MMHG | RESPIRATION RATE: 16 BRPM | TEMPERATURE: 98.1 F

## 2020-08-04 PROCEDURE — 99215 OFFICE O/P EST HI 40 MIN: CPT

## 2020-08-04 RX ORDER — LOSARTAN POTASSIUM 50 MG/1
50 TABLET, FILM COATED ORAL
Refills: 0 | Status: DISCONTINUED | COMMUNITY
End: 2020-08-04

## 2020-08-05 NOTE — HISTORY OF PRESENT ILLNESS
[Date: ____________] : Patient's last distress assessment performed on [unfilled]. [0 - No Distress] : Distress Level: 0 [de-identified] : The patient has a history of right breast cancer in 1965 for which she underwent what is described as a "Jeanette mastectomy" by Dr. Olivo at [facility] with "0/19 axillary lymph nodes" per patient's verbal report and followed expectantly.  She was well until 2008 at which time she was found to have a left breast multifocal DCIS per her description for which she underwent a left modified radical mastectomy at [facility] under the care Dr. Yu with a finding of "3 spots of DCIS, and a sentinel lymph node negative" per patient's verbal report; I do not have a copy of that report for my review.  The patient reports having undergone bilateral breast reconstruction at the same time to include what she describes as right "right cadaver tissue" in addition to an implant in the right breast, as well as a left breast implant.\par \par She was then well until approximately 1-2/19 when she describes transient pinching on her left reconstructed breast, centrally and predominantly to the lateral aspect.  She chose to follow this and then start to question whether she was "losing volume."  Through a friend/networking, she contacted Dr. Magan Carey who recommended a bilateral breast MRI, which was performed at [facility] on June 21, 2019 with a finding of a suspicious 5 cm irregular mass in the left axilla as well as further suspicious lymphadenopathy seen posterior to the mass with second-look ultrasound and core biopsy recommended as well as a PET-CT scan.  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  The patient then went on to see Dr. Carlos Pittman and had a PET-CT scan performed on July 5, 2019 with a finding of a mildly avid focus in the left thyroid lobe extending towards the isthmus, a 1.3 cm non-avid left thyroid lobe nodule; in the left axilla, there was left axillary soft tissue mass with irregular margins containing a biopsy clip measuring 3 x 2 cm with several adjacent subcentimeter left axillary lymph nodes measuring up to 1 cm; bilateral hip arthroplasties with heterogeneous FDG avidity adjacent to the proximal portions bilaterally on the right with an SUV of 13.8 on the left and SUV of 9.4 with a comment, this area is limited in evaluation due to beam hardening artifact; there were degenerative changes in the lower spine with mild areas of FDG avidity, multilevel, non-FDG avid compression fracture of L4 unchanged since 06/2017.\par \par I saw her in initial consultation on 716/19 and subsequently had requested a review of the pathology and it turned our it was ER+ / GA neg. We reviewed her scans at tumor board and the group agreed it was unresectable at this time. I called the pt and informed her of the above change in the pathology and that I recommended neoadjuvant anti-estrogen therapy with an aromatase inhibitor such as anastrozole. She agreed to proceed and started taking anastrozole in the very first days of 7/19. \par \par Seen in 11/19. Had evidence of response in her left ax mass. \par \par Seen 3/20 and she informed me she stopped taking anstrozole end 8/19 secondary to concerns re bone toxicity. She had been seeing an "MD PhD" on [place] who had been infusing "ozone therapy in an electrolyte bag" IV, B17, other anti-inflammatory herbs. She declined to provide his name as " rather not say as he does not want people looking over his back". On exam she had what seemed like a further response. I recommended she re-consult / see Dr. Pittman to consider potential surgery vs XRT as primary local treatment. I advised that as she has been under the care of another physician that she follow up with him. I offered her to see me  as needed in the future. She was agreeable.\par \par In the interim she saw Dr. Pittman 7/6/20. WHen questioned why she did not do so sooner she did not have an answer to provide. Dr Pittman sent her for a B/L breast MRI 7/27/20 which showed :\par \par In the left axilla, again noted is an irregularly-shaped enhancing mass compatible with the known recurrent malignancy. Susceptibility artifact is noted within the mass, a biopsy marker. Overall, the mass measures approximately 6.9 cm AP by 3.9 TV x 6.2 cm CC. The mass appears more draped along the implant with extension along its superior lateral margin. The previously noted additional axillary lymph nodes medial to the mass appear relatively stable in size.\par \par No right axillary lymphadenopathy. No internal mammary lymphadenopathy.\par \par IMPRESSION:\par 1.  The biopsy-proven recurrent malignancy in the left axillary region has increased in size since the prior breast MRI and prior ultrasound, with maximal dimension now measuring 6.9 cm AP.\par 2.  No other suspicious enhancement in either breast.\par 3.  Silicone implants are intact.\par \par The pt now reports she stopped getting ozone therapy in 3/20. \par \par She otherwise notes a good appetite stable weight and excellent performance status. \par  [de-identified] : Seen today for further evaluation and treatment recommendations. \par \par TodayShe denies any specific toxicity, notes a good appetite stable weight and excellent performance status. She notes she has been responding.  \par \par

## 2020-08-05 NOTE — PHYSICAL EXAM
[Fully active, able to carry on all pre-disease performance without restriction] : Status 0 - Fully active, able to carry on all pre-disease performance without restriction [Normal] : affect appropriate [de-identified] : B/L breasts s/p MRM with reconstruction, no masses.R axilla neg. Left ax with an approx 5 cm palp mass fixed to adjacent chest wall with vague thickening extending in LUOQ immed adjacent to not  dto the implant

## 2020-08-12 ENCOUNTER — OUTPATIENT (OUTPATIENT)
Dept: OUTPATIENT SERVICES | Facility: HOSPITAL | Age: 85
LOS: 1 days | End: 2020-08-12
Payer: MEDICARE

## 2020-08-12 ENCOUNTER — APPOINTMENT (OUTPATIENT)
Dept: NUCLEAR MEDICINE | Facility: CLINIC | Age: 85
End: 2020-08-12
Payer: MEDICARE

## 2020-08-12 DIAGNOSIS — Z96.641 PRESENCE OF RIGHT ARTIFICIAL HIP JOINT: Chronic | ICD-10-CM

## 2020-08-12 DIAGNOSIS — C50.919 MALIGNANT NEOPLASM OF UNSPECIFIED SITE OF UNSPECIFIED FEMALE BREAST: ICD-10-CM

## 2020-08-12 DIAGNOSIS — Z87.39 PERSONAL HISTORY OF OTHER DISEASES OF THE MUSCULOSKELETAL SYSTEM AND CONNECTIVE TISSUE: Chronic | ICD-10-CM

## 2020-08-12 PROCEDURE — 78815 PET IMAGE W/CT SKULL-THIGH: CPT | Mod: 26,PS

## 2020-08-12 PROCEDURE — A9552: CPT

## 2020-08-12 PROCEDURE — 78815 PET IMAGE W/CT SKULL-THIGH: CPT

## 2020-08-29 NOTE — HISTORY OF PRESENT ILLNESS
[de-identified] : Patient is an 86-year-old woman who underwent a [place] mastectomy in 1965 for an invasive cancer and subsequent to that in 2008 patient underwent a second mastectomy for ductal carcinoma in situ. She had reconstruction after both mastectomies. She had some concern that there was silicone leaking from her left implant and ultimately was referred for an MRI by Dr. Magan Caery's office. A suspicious mass was noted in the left axilla which was biopsied and identified for differentiated invasive carcinoma with lobular features. The tumor was triple negative. A PET scan was performed which did not definitively identify any metastatic disease. The mass measures approximately 5 cm by MRI.  She had a PET-CT scan performed on July 5, 2019 with a finding of a mildly avid focus in the left thyroid lobe extending towards the isthmus, a 1.3 cm non-avid left thyroid lobe nodule; in the left axilla, there was left axillary soft tissue mass with irregular margins containing a biopsy clip measuring 3 x 2 cm with several adjacent subcentimeter left axillary lymph nodes measuring up to 1 cm.   Review of the pathology found that her tumor was ER+ / NH neg.  Review of her scans and discussion at tumor board determined it was unresectable at the time. Neoadjuvant anti-estrogen therapy with an aromatase inhibitor anastrozole was recommended by Dr. Valentin at which time she agreed to proceed in July 2019.  She ceased use of the medication in Aug 2019 and has began seeing another specialist for med/onc and is utilizing ozone therapy.\par \par Today the pt was without complaints. Denies palpable breast masses, nipple discharge, skin changes, inversion or breast pain. Denies constitutional symptoms. 
Opt out

## 2020-10-10 ENCOUNTER — OUTPATIENT (OUTPATIENT)
Dept: OUTPATIENT SERVICES | Facility: HOSPITAL | Age: 85
LOS: 1 days | Discharge: HOME | End: 2020-10-10

## 2020-10-10 DIAGNOSIS — Z87.39 PERSONAL HISTORY OF OTHER DISEASES OF THE MUSCULOSKELETAL SYSTEM AND CONNECTIVE TISSUE: Chronic | ICD-10-CM

## 2020-10-10 DIAGNOSIS — Z96.641 PRESENCE OF RIGHT ARTIFICIAL HIP JOINT: Chronic | ICD-10-CM

## 2020-10-10 DIAGNOSIS — C50.919 MALIGNANT NEOPLASM OF UNSPECIFIED SITE OF UNSPECIFIED FEMALE BREAST: ICD-10-CM

## 2020-10-14 ENCOUNTER — APPOINTMENT (OUTPATIENT)
Dept: HEMATOLOGY ONCOLOGY | Facility: CLINIC | Age: 85
End: 2020-10-14
Payer: MEDICARE

## 2020-10-14 ENCOUNTER — RESULT REVIEW (OUTPATIENT)
Age: 85
End: 2020-10-14

## 2020-10-14 VITALS
TEMPERATURE: 98.2 F | RESPIRATION RATE: 15 BRPM | HEIGHT: 61.97 IN | SYSTOLIC BLOOD PRESSURE: 137 MMHG | BODY MASS INDEX: 26.57 KG/M2 | WEIGHT: 144.4 LBS | HEART RATE: 61 BPM | OXYGEN SATURATION: 97 % | DIASTOLIC BLOOD PRESSURE: 77 MMHG

## 2020-10-14 LAB
BASOPHILS # BLD AUTO: 0.04 K/UL — SIGNIFICANT CHANGE UP (ref 0–0.2)
BASOPHILS NFR BLD AUTO: 0.4 % — SIGNIFICANT CHANGE UP (ref 0–2)
EOSINOPHIL # BLD AUTO: 0.17 K/UL — SIGNIFICANT CHANGE UP (ref 0–0.5)
EOSINOPHIL NFR BLD AUTO: 1.9 % — SIGNIFICANT CHANGE UP (ref 0–6)
HCT VFR BLD CALC: 33.4 % — LOW (ref 34.5–45)
HGB BLD-MCNC: 10.8 G/DL — LOW (ref 11.5–15.5)
IMM GRANULOCYTES NFR BLD AUTO: 0.3 % — SIGNIFICANT CHANGE UP (ref 0–1.5)
LYMPHOCYTES # BLD AUTO: 1.41 K/UL — SIGNIFICANT CHANGE UP (ref 1–3.3)
LYMPHOCYTES # BLD AUTO: 15.6 % — SIGNIFICANT CHANGE UP (ref 13–44)
MCHC RBC-ENTMCNC: 27.6 PG — SIGNIFICANT CHANGE UP (ref 27–34)
MCHC RBC-ENTMCNC: 32.3 G/DL — SIGNIFICANT CHANGE UP (ref 32–36)
MCV RBC AUTO: 85.4 FL — SIGNIFICANT CHANGE UP (ref 80–100)
MONOCYTES # BLD AUTO: 0.54 K/UL — SIGNIFICANT CHANGE UP (ref 0–0.9)
MONOCYTES NFR BLD AUTO: 6 % — SIGNIFICANT CHANGE UP (ref 2–14)
NEUTROPHILS # BLD AUTO: 6.87 K/UL — SIGNIFICANT CHANGE UP (ref 1.8–7.4)
NEUTROPHILS NFR BLD AUTO: 75.8 % — SIGNIFICANT CHANGE UP (ref 43–77)
NRBC # BLD: 0 /100 WBCS — SIGNIFICANT CHANGE UP (ref 0–0)
PLATELET # BLD AUTO: 216 K/UL — SIGNIFICANT CHANGE UP (ref 150–400)
RBC # BLD: 3.91 M/UL — SIGNIFICANT CHANGE UP (ref 3.8–5.2)
RBC # FLD: 16.7 % — HIGH (ref 10.3–14.5)
WBC # BLD: 9.06 K/UL — SIGNIFICANT CHANGE UP (ref 3.8–10.5)
WBC # FLD AUTO: 9.06 K/UL — SIGNIFICANT CHANGE UP (ref 3.8–10.5)

## 2020-10-14 PROCEDURE — 99214 OFFICE O/P EST MOD 30 MIN: CPT

## 2020-10-15 LAB
ALBUMIN SERPL ELPH-MCNC: 4.3 G/DL
ALP BLD-CCNC: 68 U/L
ALT SERPL-CCNC: 26 U/L
ANION GAP SERPL CALC-SCNC: 11 MMOL/L
AST SERPL-CCNC: 29 U/L
BILIRUB SERPL-MCNC: 0.3 MG/DL
BUN SERPL-MCNC: 33 MG/DL
CALCIUM SERPL-MCNC: 9.2 MG/DL
CHLORIDE SERPL-SCNC: 107 MMOL/L
CO2 SERPL-SCNC: 23 MMOL/L
CREAT SERPL-MCNC: 0.96 MG/DL
GLUCOSE SERPL-MCNC: 88 MG/DL
POTASSIUM SERPL-SCNC: 4.7 MMOL/L
PROT SERPL-MCNC: 6.8 G/DL
SODIUM SERPL-SCNC: 142 MMOL/L

## 2020-10-16 ENCOUNTER — APPOINTMENT (OUTPATIENT)
Dept: NEUROLOGY | Facility: CLINIC | Age: 85
End: 2020-10-16

## 2020-10-19 ENCOUNTER — APPOINTMENT (OUTPATIENT)
Dept: FAMILY MEDICINE | Facility: CLINIC | Age: 85
End: 2020-10-19
Payer: MEDICARE

## 2020-10-19 VITALS
DIASTOLIC BLOOD PRESSURE: 65 MMHG | RESPIRATION RATE: 20 BRPM | BODY MASS INDEX: 25.95 KG/M2 | HEART RATE: 68 BPM | HEIGHT: 62 IN | WEIGHT: 141 LBS | SYSTOLIC BLOOD PRESSURE: 130 MMHG

## 2020-10-19 DIAGNOSIS — Z00.00 ENCOUNTER FOR GENERAL ADULT MEDICAL EXAMINATION W/OUT ABNORMAL FINDINGS: ICD-10-CM

## 2020-10-19 PROCEDURE — G0008: CPT

## 2020-10-19 PROCEDURE — 90686 IIV4 VACC NO PRSV 0.5 ML IM: CPT

## 2020-10-19 PROCEDURE — 99205 OFFICE O/P NEW HI 60 MIN: CPT | Mod: 25

## 2020-10-19 NOTE — HISTORY OF PRESENT ILLNESS
[FreeTextEntry1] : Requests initial visit [de-identified] : Presents for initial visit to establish this office as PCP.  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  Reviewed history and medication - note following with Heme-Onc and Cardiology (Dr. Kingston Mcgee).  Reviewed screening and immunizations - due for current flu vaccine - pt is in agreement.

## 2020-10-19 NOTE — COUNSELING
[de-identified] : Healthy eating and activities [Good understanding] : Patient has a good understanding of lifestyle changes and steps needed to achieve self management goal [None] : None

## 2020-10-19 NOTE — HEALTH RISK ASSESSMENT
[Yes] : Yes [Monthly or less (1 pt)] : Monthly or less (1 point) [Never (0 pts)] : Never (0 points) [1 or 2 (0 pts)] : 1 or 2 (0 points) [No] : In the past 12 months have you used drugs other than those required for medical reasons? No [No falls in past year] : Patient reported no falls in the past year [0] : 2) Feeling down, depressed, or hopeless: Not at all (0) [Fully functional (bathing, dressing, toileting, transferring, walking, feeding)] : Fully functional (bathing, dressing, toileting, transferring, walking, feeding) [Fully functional (using the telephone, shopping, preparing meals, housekeeping, doing laundry, using] : Fully functional and needs no help or supervision to perform IADLs (using the telephone, shopping, preparing meals, housekeeping, doing laundry, using transportation, managing medications and managing finances) [With Patient/Caregiver] : With Patient/Caregiver [] : No [YearQuit] : 1975 [Audit-CScore] : 1 [AdvancecareDate] : 10/20 [FreeTextEntry4] : to check records

## 2020-10-19 NOTE — ASSESSMENT
[FreeTextEntry1] : Initial visit reveals patient to be hemodynamically stable with acceptable BP\par Cardiac status stable with no evidence of decompensation\par Lab profiles drawn in office and sent\par Flu vaccine given L deltoid

## 2020-10-19 NOTE — PHYSICAL EXAM
[No Acute Distress] : no acute distress [Normal Posterior Cervical Nodes] : no posterior cervical lymphadenopathy [Normal Anterior Cervical Nodes] : no anterior cervical lymphadenopathy [Normal] : no rash [Coordination Grossly Intact] : coordination grossly intact [No Focal Deficits] : no focal deficits [Normal Gait] : normal gait [Alert and Oriented x3] : oriented to person, place, and time

## 2020-10-20 LAB
ALBUMIN SERPL ELPH-MCNC: 4.2 G/DL
ALP BLD-CCNC: 71 U/L
ALT SERPL-CCNC: 29 U/L
ANION GAP SERPL CALC-SCNC: 13 MMOL/L
AST SERPL-CCNC: 36 U/L
BASOPHILS # BLD AUTO: 0.05 K/UL
BASOPHILS NFR BLD AUTO: 0.7 %
BILIRUB SERPL-MCNC: 0.2 MG/DL
BUN SERPL-MCNC: 37 MG/DL
CALCIUM SERPL-MCNC: 9.7 MG/DL
CHLORIDE SERPL-SCNC: 107 MMOL/L
CHOLEST SERPL-MCNC: 264 MG/DL
CHOLEST/HDLC SERPL: 2.8 RATIO
CO2 SERPL-SCNC: 23 MMOL/L
CREAT SERPL-MCNC: 1.14 MG/DL
EOSINOPHIL # BLD AUTO: 0.17 K/UL
EOSINOPHIL NFR BLD AUTO: 2.3 %
ESTIMATED AVERAGE GLUCOSE: 111 MG/DL
FOLATE SERPL-MCNC: >20 NG/ML
GLUCOSE SERPL-MCNC: 128 MG/DL
HBA1C MFR BLD HPLC: 5.5 %
HCT VFR BLD CALC: 33.7 %
HDLC SERPL-MCNC: 95 MG/DL
HGB BLD-MCNC: 10.3 G/DL
IMM GRANULOCYTES NFR BLD AUTO: 0.3 %
LDLC SERPL CALC-MCNC: 149 MG/DL
LYMPHOCYTES # BLD AUTO: 1.2 K/UL
LYMPHOCYTES NFR BLD AUTO: 16 %
MAN DIFF?: NORMAL
MCHC RBC-ENTMCNC: 27.3 PG
MCHC RBC-ENTMCNC: 30.6 GM/DL
MCV RBC AUTO: 89.4 FL
MONOCYTES # BLD AUTO: 0.45 K/UL
MONOCYTES NFR BLD AUTO: 6 %
NEUTROPHILS # BLD AUTO: 5.59 K/UL
NEUTROPHILS NFR BLD AUTO: 74.7 %
PLATELET # BLD AUTO: 242 K/UL
POTASSIUM SERPL-SCNC: 4.6 MMOL/L
PROT SERPL-MCNC: 6.6 G/DL
RBC # BLD: 3.77 M/UL
RBC # FLD: 17.1 %
SODIUM SERPL-SCNC: 143 MMOL/L
T4 FREE SERPL-MCNC: 1.2 NG/DL
TRIGL SERPL-MCNC: 101 MG/DL
TSH SERPL-ACNC: 0.63 UIU/ML
VIT B12 SERPL-MCNC: >2000 PG/ML
WBC # FLD AUTO: 7.48 K/UL

## 2020-10-26 ENCOUNTER — OUTPATIENT (OUTPATIENT)
Dept: OUTPATIENT SERVICES | Facility: HOSPITAL | Age: 85
LOS: 1 days | End: 2020-10-26
Payer: MEDICARE

## 2020-10-26 ENCOUNTER — RESULT REVIEW (OUTPATIENT)
Age: 85
End: 2020-10-26

## 2020-10-26 ENCOUNTER — APPOINTMENT (OUTPATIENT)
Dept: MRI IMAGING | Facility: CLINIC | Age: 85
End: 2020-10-26
Payer: MEDICARE

## 2020-10-26 DIAGNOSIS — Z87.39 PERSONAL HISTORY OF OTHER DISEASES OF THE MUSCULOSKELETAL SYSTEM AND CONNECTIVE TISSUE: Chronic | ICD-10-CM

## 2020-10-26 DIAGNOSIS — C50.919 MALIGNANT NEOPLASM OF UNSPECIFIED SITE OF UNSPECIFIED FEMALE BREAST: ICD-10-CM

## 2020-10-26 DIAGNOSIS — Z96.641 PRESENCE OF RIGHT ARTIFICIAL HIP JOINT: Chronic | ICD-10-CM

## 2020-10-26 PROCEDURE — 77049 MRI BREAST C-+ W/CAD BI: CPT | Mod: 26

## 2020-10-26 PROCEDURE — C8908: CPT

## 2020-10-26 PROCEDURE — A9585: CPT

## 2020-10-26 PROCEDURE — C8937: CPT

## 2020-11-06 ENCOUNTER — APPOINTMENT (OUTPATIENT)
Dept: NUCLEAR MEDICINE | Facility: CLINIC | Age: 85
End: 2020-11-06
Payer: MEDICARE

## 2020-11-06 ENCOUNTER — OUTPATIENT (OUTPATIENT)
Dept: OUTPATIENT SERVICES | Facility: HOSPITAL | Age: 85
LOS: 1 days | End: 2020-11-06
Payer: MEDICARE

## 2020-11-06 DIAGNOSIS — Z96.641 PRESENCE OF RIGHT ARTIFICIAL HIP JOINT: Chronic | ICD-10-CM

## 2020-11-06 DIAGNOSIS — Z87.39 PERSONAL HISTORY OF OTHER DISEASES OF THE MUSCULOSKELETAL SYSTEM AND CONNECTIVE TISSUE: Chronic | ICD-10-CM

## 2020-11-06 DIAGNOSIS — C50.919 MALIGNANT NEOPLASM OF UNSPECIFIED SITE OF UNSPECIFIED FEMALE BREAST: ICD-10-CM

## 2020-11-06 PROCEDURE — 78815 PET IMAGE W/CT SKULL-THIGH: CPT

## 2020-11-06 PROCEDURE — A9552: CPT

## 2020-11-06 PROCEDURE — 78815 PET IMAGE W/CT SKULL-THIGH: CPT | Mod: 26,PS

## 2020-11-16 ENCOUNTER — OUTPATIENT (OUTPATIENT)
Dept: OUTPATIENT SERVICES | Facility: HOSPITAL | Age: 85
LOS: 1 days | Discharge: HOME | End: 2020-11-16

## 2020-11-16 ENCOUNTER — LABORATORY RESULT (OUTPATIENT)
Age: 85
End: 2020-11-16

## 2020-11-16 ENCOUNTER — APPOINTMENT (OUTPATIENT)
Dept: INFUSION THERAPY | Facility: HOSPITAL | Age: 85
End: 2020-11-16

## 2020-11-16 DIAGNOSIS — C50.919 MALIGNANT NEOPLASM OF UNSPECIFIED SITE OF UNSPECIFIED FEMALE BREAST: ICD-10-CM

## 2020-11-16 DIAGNOSIS — Z87.39 PERSONAL HISTORY OF OTHER DISEASES OF THE MUSCULOSKELETAL SYSTEM AND CONNECTIVE TISSUE: Chronic | ICD-10-CM

## 2020-11-16 DIAGNOSIS — Z96.641 PRESENCE OF RIGHT ARTIFICIAL HIP JOINT: Chronic | ICD-10-CM

## 2020-11-24 ENCOUNTER — NON-APPOINTMENT (OUTPATIENT)
Age: 85
End: 2020-11-24

## 2020-11-24 ENCOUNTER — APPOINTMENT (OUTPATIENT)
Dept: INFUSION THERAPY | Facility: HOSPITAL | Age: 85
End: 2020-11-24

## 2020-11-24 ENCOUNTER — APPOINTMENT (OUTPATIENT)
Dept: HEMATOLOGY ONCOLOGY | Facility: CLINIC | Age: 85
End: 2020-11-24
Payer: MEDICARE

## 2020-11-24 VITALS
DIASTOLIC BLOOD PRESSURE: 61 MMHG | RESPIRATION RATE: 17 BRPM | SYSTOLIC BLOOD PRESSURE: 119 MMHG | OXYGEN SATURATION: 98 % | WEIGHT: 144.4 LBS | TEMPERATURE: 96.9 F | HEART RATE: 69 BPM | HEIGHT: 61.89 IN | BODY MASS INDEX: 26.57 KG/M2

## 2020-11-24 PROCEDURE — 99214 OFFICE O/P EST MOD 30 MIN: CPT

## 2020-11-25 NOTE — PHYSICAL EXAM
[Restricted in physically strenuous activity but ambulatory and able to carry out work of a light or sedentary nature] : Status 1- Restricted in physically strenuous activity but ambulatory and able to carry out work of a light or sedentary nature, e.g., light house work, office work [Normal] : affect appropriate [de-identified] : B/L breasts s/p MRM with reconstruction, Rt; no palpable masses in breast or axilla. Left: no palpable masses in breast or axilla

## 2020-11-25 NOTE — HISTORY OF PRESENT ILLNESS
[Date: ____________] : Patient's last distress assessment performed on [unfilled]. [0 - No Distress] : Distress Level: 0 [de-identified] : The patient has a history of right breast cancer in 1965 for which she underwent what is described as a "Jeanette mastectomy" by Dr. Olivo at [facility] with "0/19 axillary lymph nodes" per patient's verbal report and followed expectantly.  She was well until 2008 at which time she was found to have a left breast multifocal DCIS per her description for which she underwent a left modified radical mastectomy at [facility] under the care Dr. Yu with a finding of "3 spots of DCIS, and a sentinel lymph node negative" per patient's verbal report; I do not have a copy of that report for my review.  The patient reports having undergone bilateral breast reconstruction at the same time to include what she describes as right "right cadaver tissue" in addition to an implant in the right breast, as well as a left breast implant.\par \par She was then well until approximately 1-2/19 when she describes transient pinching on her left reconstructed breast, centrally and predominantly to the lateral aspect.  She chose to follow this and then start to question whether she was "losing volume."  Through a friend/networking, she contacted Dr. Magan Carey who recommended a bilateral breast MRI, which was performed at [facility] on June 21, 2019 with a finding of a suspicious 5 cm irregular mass in the left axilla as well as further suspicious lymphadenopathy seen posterior to the mass with second-look ultrasound and core biopsy recommended as well as a PET-CT scan.  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  The patient then went on to see Dr. Carlos Pittman and had a PET-CT scan performed on July 5, 2019 with a finding of a mildly avid focus in the left thyroid lobe extending towards the isthmus, a 1.3 cm non-avid left thyroid lobe nodule; in the left axilla, there was left axillary soft tissue mass with irregular margins containing a biopsy clip measuring 3 x 2 cm with several adjacent subcentimeter left axillary lymph nodes measuring up to 1 cm; bilateral hip arthroplasties with heterogeneous FDG avidity adjacent to the proximal portions bilaterally on the right with an SUV of 13.8 on the left and SUV of 9.4 with a comment, this area is limited in evaluation due to beam hardening artifact; there were degenerative changes in the lower spine with mild areas of FDG avidity, multilevel, non-FDG avid compression fracture of L4 unchanged since 06/2017.\par \par I saw her in initial consultation on 716/19 and subsequently had requested a review of the pathology and it turned our it was ER+ / CA neg. We reviewed her scans at tumor board and the group agreed it was unresectable at this time. I called the pt and informed her of the above change in the pathology and that I recommended neoadjuvant anti-estrogen therapy with an aromatase inhibitor such as anastrozole. She agreed to proceed and started taking anastrozole in the very first days of 7/19. \par \par Seen in 11/19. Had evidence of response in her left ax mass. \par \par Seen 3/20 and she informed me she stopped taking anstrozole end 8/19 secondary to concerns re bone toxicity. She had been seeing an "MD PhD" on [place] who had been infusing "ozone therapy in an electrolyte bag" IV, B17, other anti-inflammatory herbs. She declined to provide his name as " rather not say as he does not want people looking over his back". On exam she had what seemed like a further response. I recommended she re-consult / see Dr. Pittman to consider potential surgery vs XRT as primary local treatment. I advised that as she has been under the care of another physician that she follow up with him. I offered her to see me  as needed in the future. She was agreeable.\par \par In the interim she saw Dr. Pittman 7/6/20. WHen questioned why she did not do so sooner she did not have an answer to provide. Dr Pittman sent her for a B/L breast MRI 7/27/20 which showed :\par \par In the left axilla, again noted is an irregularly-shaped enhancing mass compatible with the known recurrent malignancy. Susceptibility artifact is noted within the mass, a biopsy marker. Overall, the mass measures approximately 6.9 cm AP by 3.9 TV x 6.2 cm CC. The mass appears more draped along the implant with extension along its superior lateral margin. The previously noted additional axillary lymph nodes medial to the mass appear relatively stable in size.\par \par No right axillary lymphadenopathy. No internal mammary lymphadenopathy.\par \par IMPRESSION:\par 1.  The biopsy-proven recurrent malignancy in the left axillary region has increased in size since the prior breast MRI and prior ultrasound, with maximal dimension now measuring 6.9 cm AP.\par 2.  No other suspicious enhancement in either breast.\par 3.  Silicone implants are intact.\par \par The pt now reports she stopped getting ozone therapy in 3/20. \par \par She progressed after she stopped anastrozole and ozone therapy. \par Restarted anastrozole in early August as well as ozone/vitamin/mineral therapy. \par \par Repeat MRI 10/27/20 revealed POD in L breast / axillary / CW mass. PET CT did not show any distant mets. \par  [de-identified] : Interim scans showed POD in the L breast / axillary / CW mass and PET CT w/o any distant mets. \par \par I spoke with the pt by phone in the interim and recommended switching to fulvestrant. Here today to start it.\par \par Cont to deny all complaints noting a good appetite stable weight and excellent performance status.  \par \par MRI breast 10/26/20\par IMPRESSION: Patient is status post bilateral mastectomy with silicone implant reconstruction. Silicone implants are intact.\par Continued enlargement of the recurrent biopsy-proven left axillary recurrent carcinoma biopsied in 2019 -extending to the lateral aspect of the left silicone implant(  post systemic therapy) -the mass now measuring 7.4 cm on the current study  larger compared to measurement of 6.9 cm on prior MR dated 7/27/2020.\par The mass was also reported to be larger on PET/CT dated 8/12/2020.\par New left axillary lymph node was reported on the PET CT study and there was enlargement of the soft tissue mass in the left axilla.\par Mass is significantly larger compared to prior measurement of approximately 5 cm on 6/24/2019.\par \par PET CT 11/6/20\par Since PET/CT August 12, 2020:\par \par 1.  Overall unchanged size and FDG avidity of left axillary soft tissue mass and adjacent left axillary lymph node consistent with known recurrent disease.\par \par 2.  Bilateral periprosthetic hip joint FDG avidity, decreased on the right and unchanged on the left. Suggest further evaluation to evaluate for loosening or infection if this is a clinical concern.\par \par 3.  Unchanged FDG avid left thyroid nodule.\par \par 4.  New trace pericardial effusion.\par

## 2020-12-07 ENCOUNTER — APPOINTMENT (OUTPATIENT)
Dept: FAMILY MEDICINE | Facility: CLINIC | Age: 85
End: 2020-12-07
Payer: MEDICARE

## 2020-12-07 VITALS
SYSTOLIC BLOOD PRESSURE: 138 MMHG | BODY MASS INDEX: 26.68 KG/M2 | HEART RATE: 68 BPM | DIASTOLIC BLOOD PRESSURE: 75 MMHG | WEIGHT: 145 LBS | HEIGHT: 61.89 IN | RESPIRATION RATE: 20 BRPM

## 2020-12-07 DIAGNOSIS — L30.9 DERMATITIS, UNSPECIFIED: ICD-10-CM

## 2020-12-07 DIAGNOSIS — I48.92 UNSPECIFIED ATRIAL FLUTTER: ICD-10-CM

## 2020-12-07 PROCEDURE — 99214 OFFICE O/P EST MOD 30 MIN: CPT

## 2020-12-07 RX ORDER — ANASTROZOLE TABLETS 1 MG/1
1 TABLET ORAL DAILY
Qty: 90 | Refills: 3 | Status: DISCONTINUED | COMMUNITY
Start: 2020-08-14 | End: 2020-12-07

## 2020-12-07 NOTE — PHYSICAL EXAM
[No Acute Distress] : no acute distress [Normal] : normal rate, regular rhythm, normal S1 and S2 and no murmur heard [No Edema] : there was no peripheral edema [Soft] : abdomen soft [Non Tender] : non-tender [Muscle Spasms, Bilateral] : bilateral muscle spasms [Coordination Grossly Intact] : coordination grossly intact [No Focal Deficits] : no focal deficits [Normal Gait] : normal gait [Alert and Oriented x3] : oriented to person, place, and time

## 2020-12-07 NOTE — ASSESSMENT
[FreeTextEntry1] : Hemodynamically stable\par Cardiac status stable with rhythm clinically regular\par Suboptimal BP control - will stop Nifedipine and start Lisinopril and observe - plan F/U 2 weeks\par Musculoskeletal findings consistent with history - continue judicious use of medication

## 2020-12-07 NOTE — HISTORY OF PRESENT ILLNESS
[de-identified] : Presents for BP check and general follow-up.  Reviewed prior MRI studies demonstrating significant spinal stenosis -  uses Percocet very sparingly to maintain daily functioning - renewed at pt's request.  Also has noted her BP to be elevated -  has been on Nifedipine for an extended period of time - does not appear to be as effective as in the past;  had an adverse reaction to increasing this medication in the past.  Otherwise denies CP, palpitations.

## 2020-12-08 ENCOUNTER — APPOINTMENT (OUTPATIENT)
Dept: INFUSION THERAPY | Facility: HOSPITAL | Age: 85
End: 2020-12-08

## 2020-12-14 RX ORDER — LISINOPRIL 20 MG/1
20 TABLET ORAL
Qty: 90 | Refills: 3 | Status: DISCONTINUED | COMMUNITY
Start: 2020-12-07 | End: 2020-12-14

## 2020-12-18 ENCOUNTER — OUTPATIENT (OUTPATIENT)
Dept: OUTPATIENT SERVICES | Facility: HOSPITAL | Age: 85
LOS: 1 days | Discharge: HOME | End: 2020-12-18

## 2020-12-18 DIAGNOSIS — C50.919 MALIGNANT NEOPLASM OF UNSPECIFIED SITE OF UNSPECIFIED FEMALE BREAST: ICD-10-CM

## 2020-12-18 DIAGNOSIS — Z96.641 PRESENCE OF RIGHT ARTIFICIAL HIP JOINT: Chronic | ICD-10-CM

## 2020-12-18 DIAGNOSIS — Z87.39 PERSONAL HISTORY OF OTHER DISEASES OF THE MUSCULOSKELETAL SYSTEM AND CONNECTIVE TISSUE: Chronic | ICD-10-CM

## 2020-12-22 ENCOUNTER — APPOINTMENT (OUTPATIENT)
Dept: HEMATOLOGY ONCOLOGY | Facility: CLINIC | Age: 85
End: 2020-12-22
Payer: MEDICARE

## 2020-12-22 ENCOUNTER — APPOINTMENT (OUTPATIENT)
Dept: INFUSION THERAPY | Facility: HOSPITAL | Age: 85
End: 2020-12-22

## 2020-12-22 VITALS
SYSTOLIC BLOOD PRESSURE: 150 MMHG | HEIGHT: 61.89 IN | WEIGHT: 145.51 LBS | HEART RATE: 67 BPM | RESPIRATION RATE: 18 BRPM | BODY MASS INDEX: 26.78 KG/M2 | DIASTOLIC BLOOD PRESSURE: 75 MMHG | TEMPERATURE: 97.2 F | OXYGEN SATURATION: 98 %

## 2020-12-22 PROCEDURE — 99214 OFFICE O/P EST MOD 30 MIN: CPT

## 2020-12-22 RX ORDER — HYDROCHLOROTHIAZIDE 25 MG/1
25 TABLET ORAL
Qty: 30 | Refills: 0 | Status: DISCONTINUED | COMMUNITY
Start: 2020-07-17

## 2020-12-22 RX ORDER — AMOXICILLIN 500 MG/1
500 CAPSULE ORAL
Qty: 40 | Refills: 0 | Status: DISCONTINUED | COMMUNITY
Start: 2020-07-09

## 2020-12-22 RX ORDER — NIFEDIPINE 30 MG/1
30 TABLET, FILM COATED, EXTENDED RELEASE ORAL
Qty: 180 | Refills: 0 | Status: DISCONTINUED | COMMUNITY
Start: 2020-10-01

## 2020-12-23 NOTE — HISTORY OF PRESENT ILLNESS
[Date: ____________] : Patient's last distress assessment performed on [unfilled]. [0 - No Distress] : Distress Level: 0 [de-identified] : The patient has a history of right breast cancer in 1965 for which she underwent what is described as a "Jeanette mastectomy" by Dr. Olivo at [facility] with "0/19 axillary lymph nodes" per patient's verbal report and followed expectantly.  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 Yu with a finding of "3 spots of DCIS, and a sentinel lymph node negative" per patient's verbal report; I do not have a copy of that report for my review.  The patient reports having undergone bilateral breast reconstruction at the same time to include what she describes as right "right cadaver tissue" in addition to an implant in the right breast, as well as a left breast implant.\par \par She was then well until approximately 1-2/19 when she describes transient pinching on her left reconstructed breast, centrally and predominantly to the lateral aspect.  She chose to follow this and then start to question whether she was "losing volume."  Through a friend/networking, she contacted Dr. Magan Carey who recommended a bilateral breast MRI, which was performed at [facility] on June 21, 2019 with a finding of a suspicious 5 cm irregular mass in the left axilla as well as further suspicious lymphadenopathy seen posterior to the mass with second-look ultrasound and core biopsy recommended as well as a PET-CT scan.  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  The patient then went on to see Dr. Carlos Pittman and had a PET-CT scan performed on July 5, 2019 with a finding of a mildly avid focus in the left thyroid lobe extending towards the isthmus, a 1.3 cm non-avid left thyroid lobe nodule; in the left axilla, there was left axillary soft tissue mass with irregular margins containing a biopsy clip measuring 3 x 2 cm with several adjacent subcentimeter left axillary lymph nodes measuring up to 1 cm; bilateral hip arthroplasties with heterogeneous FDG avidity adjacent to the proximal portions bilaterally on the right with an SUV of 13.8 on the left and SUV of 9.4 with a comment, this area is limited in evaluation due to beam hardening artifact; there were degenerative changes in the lower spine with mild areas of FDG avidity, multilevel, non-FDG avid compression fracture of L4 unchanged since 06/2017.\par \par I saw her in initial consultation on 716/19 and subsequently had requested a review of the pathology and it turned our it was ER+ / NJ neg. We reviewed her scans at tumor board and the group agreed it was unresectable at this time. I called the pt and informed her of the above change in the pathology and that I recommended neoadjuvant anti-estrogen therapy with an aromatase inhibitor such as anastrozole. She agreed to proceed and started taking anastrozole in the very first days of 7/19. \par \par Seen in 11/19. Had evidence of response in her left ax mass. \par \par Seen 3/20 and she informed me she stopped taking anstrozole end 8/19 secondary to concerns re bone toxicity. She had been seeing an "MD PhD" on [place] who had been infusing "ozone therapy in an electrolyte bag" IV, B17, other anti-inflammatory herbs. She declined to provide his name as " rather not say as he does not want people looking over his back". On exam she had what seemed like a further response. I recommended she re-consult / see Dr. Pittman to consider potential surgery vs XRT as primary local treatment. I advised that as she has been under the care of another physician that she follow up with him. I offered her to see me  as needed in the future. She was agreeable.\par \par In the interim she saw Dr. Pittman 7/6/20. WHen questioned why she did not do so sooner she did not have an answer to provide. Dr Pittman sent her for a B/L breast MRI 7/27/20 which showed :\par \par In the left axilla, again noted is an irregularly-shaped enhancing mass compatible with the known recurrent malignancy. Susceptibility artifact is noted within the mass, a biopsy marker. Overall, the mass measures approximately 6.9 cm AP by 3.9 TV x 6.2 cm CC. The mass appears more draped along the implant with extension along its superior lateral margin. The previously noted additional axillary lymph nodes medial to the mass appear relatively stable in size.\par \par No right axillary lymphadenopathy. No internal mammary lymphadenopathy.\par \par IMPRESSION:\par 1.  The biopsy-proven recurrent malignancy in the left axillary region has increased in size since the prior breast MRI and prior ultrasound, with maximal dimension now measuring 6.9 cm AP.\par 2.  No other suspicious enhancement in either breast.\par 3.  Silicone implants are intact.\par \par The pt now reports she stopped getting ozone therapy in 3/20. \par \par She progressed after she stopped anastrozole and ozone therapy. \par Restarted anastrozole in early August as well as ozone/vitamin/mineral therapy. \par \par Repeat MRI 10/27/20 revealed POD in L breast / axillary / CW mass. PET CT did not show any distant mets. \par  [de-identified] : Started fulvestrant on 11/24/20.\par \par She denies any treatment relate side effects. \par \par Cont to deny all complaints noting a good appetite stable weight and excellent performance status.  \par \par In the interim her BP increased and her PCP is managing / changing her meds. She is asking whether fulvestrant can be responsible and I noted shh has had similar elevated BPs before fulvestrant as well, so less likely.\par \par MRI breast 10/26/20\par IMPRESSION: Patient is status post bilateral mastectomy with silicone implant reconstruction. Silicone implants are intact.\par Continued enlargement of the recurrent biopsy-proven left axillary recurrent carcinoma biopsied in 2019 -extending to the lateral aspect of the left silicone implant(  post systemic therapy) -the mass now measuring 7.4 cm on the current study  larger compared to measurement of 6.9 cm on prior MR dated 7/27/2020.\par The mass was also reported to be larger on PET/CT dated 8/12/2020.\par New left axillary lymph node was reported on the PET CT study and there was enlargement of the soft tissue mass in the left axilla.\par Mass is significantly larger compared to prior measurement of approximately 5 cm on 6/24/2019.\par \par PET CT 11/6/20\par Since PET/CT August 12, 2020:\par \par 1.  Overall unchanged size and FDG avidity of left axillary soft tissue mass and adjacent left axillary lymph node consistent with known recurrent disease.\par \par 2.  Bilateral periprosthetic hip joint FDG avidity, decreased on the right and unchanged on the left. Suggest further evaluation to evaluate for loosening or infection if this is a clinical concern.\par \par 3.  Unchanged FDG avid left thyroid nodule.\par \par 4.  New trace pericardial effusion.\par

## 2020-12-23 NOTE — PHYSICAL EXAM
[Restricted in physically strenuous activity but ambulatory and able to carry out work of a light or sedentary nature] : Status 1- Restricted in physically strenuous activity but ambulatory and able to carry out work of a light or sedentary nature, e.g., light house work, office work [Normal] : affect appropriate [de-identified] : B/L breasts s/p MRM with reconstruction, Rt; no palpable masses in breast or axilla. Left: no palpable masses in breast or axilla

## 2021-01-05 ENCOUNTER — APPOINTMENT (OUTPATIENT)
Dept: FAMILY MEDICINE | Facility: CLINIC | Age: 86
End: 2021-01-05
Payer: MEDICARE

## 2021-01-05 VITALS — HEART RATE: 68 BPM | DIASTOLIC BLOOD PRESSURE: 70 MMHG | SYSTOLIC BLOOD PRESSURE: 142 MMHG | RESPIRATION RATE: 20 BRPM

## 2021-01-05 PROCEDURE — 99213 OFFICE O/P EST LOW 20 MIN: CPT

## 2021-01-05 NOTE — ASSESSMENT
[FreeTextEntry1] : BP remains suboptimal - will increase Amlodipine to 10mg daily; plan F/U 2-3 weeks

## 2021-01-05 NOTE — PHYSICAL EXAM
[No Acute Distress] : no acute distress [Supple] : supple [No Edema] : there was no peripheral edema [Normal] : no carotid or abdominal bruits heard, no varicosities, pedal pulses are present, no peripheral edema, no extremity clubbing or cyanosis and no palpable aorta [Soft] : abdomen soft [Non Tender] : non-tender [No Focal Deficits] : no focal deficits [Alert and Oriented x3] : oriented to person, place, and time

## 2021-01-05 NOTE — HISTORY OF PRESENT ILLNESS
[de-identified] : Presents for BP re-check.  Appears to be tolerating Amlodipine (unable to tolerate Lisinopril) but BPs remain in the 150/80 - range.

## 2021-01-13 ENCOUNTER — OUTPATIENT (OUTPATIENT)
Dept: OUTPATIENT SERVICES | Facility: HOSPITAL | Age: 86
LOS: 1 days | Discharge: HOME | End: 2021-01-13

## 2021-01-13 DIAGNOSIS — Z87.39 PERSONAL HISTORY OF OTHER DISEASES OF THE MUSCULOSKELETAL SYSTEM AND CONNECTIVE TISSUE: Chronic | ICD-10-CM

## 2021-01-13 DIAGNOSIS — C50.919 MALIGNANT NEOPLASM OF UNSPECIFIED SITE OF UNSPECIFIED FEMALE BREAST: ICD-10-CM

## 2021-01-13 DIAGNOSIS — Z96.641 PRESENCE OF RIGHT ARTIFICIAL HIP JOINT: Chronic | ICD-10-CM

## 2021-01-19 ENCOUNTER — APPOINTMENT (OUTPATIENT)
Dept: HEMATOLOGY ONCOLOGY | Facility: CLINIC | Age: 86
End: 2021-01-19
Payer: MEDICARE

## 2021-01-19 ENCOUNTER — APPOINTMENT (OUTPATIENT)
Dept: INFUSION THERAPY | Facility: HOSPITAL | Age: 86
End: 2021-01-19

## 2021-01-19 VITALS
TEMPERATURE: 96.8 F | WEIGHT: 150.55 LBS | HEART RATE: 61 BPM | BODY MASS INDEX: 27.64 KG/M2 | DIASTOLIC BLOOD PRESSURE: 71 MMHG | SYSTOLIC BLOOD PRESSURE: 149 MMHG | OXYGEN SATURATION: 95 % | RESPIRATION RATE: 17 BRPM

## 2021-01-19 PROCEDURE — 99213 OFFICE O/P EST LOW 20 MIN: CPT

## 2021-01-19 RX ORDER — FLUTICASONE FUROATE AND VILANTEROL TRIFENATATE 50; 25 UG/1; UG/1
POWDER RESPIRATORY (INHALATION)
Refills: 0 | Status: DISCONTINUED | COMMUNITY
End: 2021-01-19

## 2021-01-19 RX ORDER — MECLIZINE HYDROCHLORIDE 25 MG/1
25 TABLET ORAL 3 TIMES DAILY
Qty: 30 | Refills: 1 | Status: DISCONTINUED | COMMUNITY
Start: 2020-09-01 | End: 2021-01-19

## 2021-01-21 NOTE — PHYSICAL EXAM
[Restricted in physically strenuous activity but ambulatory and able to carry out work of a light or sedentary nature] : Status 1- Restricted in physically strenuous activity but ambulatory and able to carry out work of a light or sedentary nature, e.g., light house work, office work [Normal] : affect appropriate [de-identified] : B/L breasts s/p MRM with reconstruction, Rt; no palpable masses in breast or axilla. Left: no palpable masses in breast, however ,vague "puffinesS" in the LOQ without any discrete palpable masses.

## 2021-01-21 NOTE — HISTORY OF PRESENT ILLNESS
[Date: ____________] : Patient's last distress assessment performed on [unfilled]. [0 - No Distress] : Distress Level: 0 [de-identified] : The patient has a history of right breast cancer in 1965 for which she underwent what is described as a "Jeanette mastectomy" by Dr. Olivo at [facility] with "0/19 axillary lymph nodes" per patient's verbal report and followed expectantly.  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 Yu with a finding of "3 spots of DCIS, and a sentinel lymph node negative" per patient's verbal report; I do not have a copy of that report for my review.  The patient reports having undergone bilateral breast reconstruction at the same time to include what she describes as right "right cadaver tissue" in addition to an implant in the right breast, as well as a left breast implant.\par \par She was then well until approximately 1-2/19 when she describes transient pinching on her left reconstructed breast, centrally and predominantly to the lateral aspect.  She chose to follow this and then start to question whether she was "losing volume."  Through a friend/networking, she contacted Dr. Magan Carey who recommended a bilateral breast MRI, which was performed at [facility] on June 21, 2019 with a finding of a suspicious 5 cm irregular mass in the left axilla as well as further suspicious lymphadenopathy seen posterior to the mass with second-look ultrasound and core biopsy recommended as well as a PET-CT scan.  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  The patient then went on to see Dr. Carlos Pittman and had a PET-CT scan performed on July 5, 2019 with a finding of a mildly avid focus in the left thyroid lobe extending towards the isthmus, a 1.3 cm non-avid left thyroid lobe nodule; in the left axilla, there was left axillary soft tissue mass with irregular margins containing a biopsy clip measuring 3 x 2 cm with several adjacent subcentimeter left axillary lymph nodes measuring up to 1 cm; bilateral hip arthroplasties with heterogeneous FDG avidity adjacent to the proximal portions bilaterally on the right with an SUV of 13.8 on the left and SUV of 9.4 with a comment, this area is limited in evaluation due to beam hardening artifact; there were degenerative changes in the lower spine with mild areas of FDG avidity, multilevel, non-FDG avid compression fracture of L4 unchanged since 06/2017.\par \par I saw her in initial consultation on 716/19 and subsequently had requested a review of the pathology and it turned our it was ER+ / SD neg. We reviewed her scans at tumor board and the group agreed it was unresectable at this time. I called the pt and informed her of the above change in the pathology and that I recommended neoadjuvant anti-estrogen therapy with an aromatase inhibitor such as anastrozole. She agreed to proceed and started taking anastrozole in the very first days of 7/19. \par \par Seen in 11/19. Had evidence of response in her left ax mass. \par \par Seen 3/20 and she informed me she stopped taking anstrozole end 8/19 secondary to concerns re bone toxicity. She had been seeing an "MD PhD" on [place] who had been infusing "ozone therapy in an electrolyte bag" IV, B17, other anti-inflammatory herbs. She declined to provide his name as " rather not say as he does not want people looking over his back". On exam she had what seemed like a further response. I recommended she re-consult / see Dr. Pittman to consider potential surgery vs XRT as primary local treatment. I advised that as she has been under the care of another physician that she follow up with him. I offered her to see me  as needed in the future. She was agreeable.\par \par In the interim she saw Dr. Pittman 7/6/20. WHen questioned why she did not do so sooner she did not have an answer to provide. Dr Pittman sent her for a B/L breast MRI 7/27/20 which showed :\par \par In the left axilla, again noted is an irregularly-shaped enhancing mass compatible with the known recurrent malignancy. Susceptibility artifact is noted within the mass, a biopsy marker. Overall, the mass measures approximately 6.9 cm AP by 3.9 TV x 6.2 cm CC. The mass appears more draped along the implant with extension along its superior lateral margin. The previously noted additional axillary lymph nodes medial to the mass appear relatively stable in size.\par \par No right axillary lymphadenopathy. No internal mammary lymphadenopathy.\par \par IMPRESSION:\par 1.  The biopsy-proven recurrent malignancy in the left axillary region has increased in size since the prior breast MRI and prior ultrasound, with maximal dimension now measuring 6.9 cm AP.\par 2.  No other suspicious enhancement in either breast.\par 3.  Silicone implants are intact.\par \par The pt now reports she stopped getting ozone therapy in 3/20. \par \par She progressed after she stopped anastrozole and ozone therapy. \par Restarted anastrozole in early August as well as ozone/vitamin/mineral therapy. \par \par Repeat MRI 10/27/20 revealed POD in L breast / axillary / CW mass. PET CT did not show any distant mets. \par  [de-identified] : Started fulvestrant on 11/24/20.\par Tolerating treatment well without any treatment related side effects.\par Notes a good appetite, stable weight and an excellent performance status.\par \par \par MRI breast 10/26/20\par IMPRESSION: Patient is status post bilateral mastectomy with silicone implant reconstruction. Silicone implants are intact.\par Continued enlargement of the recurrent biopsy-proven left axillary recurrent carcinoma biopsied in 2019 -extending to the lateral aspect of the left silicone implant(  post systemic therapy) -the mass now measuring 7.4 cm on the current study  larger compared to measurement of 6.9 cm on prior MR dated 7/27/2020.\par The mass was also reported to be larger on PET/CT dated 8/12/2020.\par New left axillary lymph node was reported on the PET CT study and there was enlargement of the soft tissue mass in the left axilla.\par Mass is significantly larger compared to prior measurement of approximately 5 cm on 6/24/2019.\par \par PET CT 11/6/20\par Since PET/CT August 12, 2020:\par \par 1.  Overall unchanged size and FDG avidity of left axillary soft tissue mass and adjacent left axillary lymph node consistent with known recurrent disease.\par \par 2.  Bilateral periprosthetic hip joint FDG avidity, decreased on the right and unchanged on the left. Suggest further evaluation to evaluate for loosening or infection if this is a clinical concern.\par \par 3.  Unchanged FDG avid left thyroid nodule.\par \par 4.  New trace pericardial effusion.\par

## 2021-01-25 ENCOUNTER — APPOINTMENT (OUTPATIENT)
Dept: FAMILY MEDICINE | Facility: CLINIC | Age: 86
End: 2021-01-25
Payer: MEDICARE

## 2021-01-25 VITALS — RESPIRATION RATE: 20 BRPM | DIASTOLIC BLOOD PRESSURE: 68 MMHG | SYSTOLIC BLOOD PRESSURE: 132 MMHG | HEART RATE: 68 BPM

## 2021-01-25 PROCEDURE — 99213 OFFICE O/P EST LOW 20 MIN: CPT

## 2021-01-25 RX ORDER — AMLODIPINE BESYLATE 5 MG/1
5 TABLET ORAL
Qty: 30 | Refills: 0 | Status: DISCONTINUED | COMMUNITY
Start: 2021-01-10 | End: 2021-01-25

## 2021-02-01 ENCOUNTER — RX RENEWAL (OUTPATIENT)
Age: 86
End: 2021-02-01

## 2021-02-05 NOTE — HISTORY OF PRESENT ILLNESS
[Date: ____________] : Patient's last distress assessment performed on [unfilled]. [0 - No Distress] : Distress Level: 0 [de-identified] : The patient has a history of right breast cancer in 1965 for which she underwent what is described as a "Jeanette mastectomy" by Dr. Olivo at [facility] with "0/19 axillary lymph nodes" per patient's verbal report and followed expectantly.  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 Yu with a finding of "3 spots of DCIS, and a sentinel lymph node negative" per patient's verbal report; I do not have a copy of that report for my review.  The patient reports having undergone bilateral breast reconstruction at the same time to include what she describes as right "right cadaver tissue" in addition to an implant in the right breast, as well as a left breast implant.\par \par She was then well until approximately 1-2/19 when she describes transient pinching on her left reconstructed breast, centrally and predominantly to the lateral aspect.  She chose to follow this and then start to question whether she was "losing volume."  Through a friend/networking, she contacted Dr. Magan Carey who recommended a bilateral breast MRI, which was performed at [facility] on June 21, 2019 with a finding of a suspicious 5 cm irregular mass in the left axilla as well as further suspicious lymphadenopathy seen posterior to the mass with second-look ultrasound and core biopsy recommended as well as a PET-CT scan.  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  The patient then went on to see Dr. Carlos Pittman and had a PET-CT scan performed on July 5, 2019 with a finding of a mildly avid focus in the left thyroid lobe extending towards the isthmus, a 1.3 cm non-avid left thyroid lobe nodule; in the left axilla, there was left axillary soft tissue mass with irregular margins containing a biopsy clip measuring 3 x 2 cm with several adjacent subcentimeter left axillary lymph nodes measuring up to 1 cm; bilateral hip arthroplasties with heterogeneous FDG avidity adjacent to the proximal portions bilaterally on the right with an SUV of 13.8 on the left and SUV of 9.4 with a comment, this area is limited in evaluation due to beam hardening artifact; there were degenerative changes in the lower spine with mild areas of FDG avidity, multilevel, non-FDG avid compression fracture of L4 unchanged since 06/2017.\par \par I saw her in initial consultation on 716/19 and subsequently had requested a review of the pathology and it turned our it was ER+ / WI neg. We reviewed her scans at tumor board and the group agreed it was unresectable at this time. I called the pt and informed her of the above change in the pathology and that I recommended neoadjuvant anti-estrogen therapy with an aromatase inhibitor such as anastrozole. She agreed to proceed and started taking anastrozole in the very first days of 7/19. \par \par Seen in 11/19. Had evidence of response in her left ax mass. \par \par Seen 3/20 and she informed me she stopped taking anstrozole end 8/19 secondary to concerns re bone toxicity. She had been seeing an "MD PhD" on [place] who had been infusing "ozone therapy in an electrolyte bag" IV, B17, other anti-inflammatory herbs. She declined to provide his name as " rather not say as he does not want people looking over his back". On exam she had what seemed like a further response. I recommended she re-consult / see Dr. Pittman to consider potential surgery vs XRT as primary local treatment. I advised that as she has been under the care of another physician that she follow up with him. I offered her to see me  as needed in the future. She was agreeable.\par \par In the interim she saw Dr. Pitmtan 7/6/20. WHen questioned why she did not do so sooner she did not have an answer to provide. Dr Pittman sent her for a B/L breast MRI 7/27/20 which showed :\par \par In the left axilla, again noted is an irregularly-shaped enhancing mass compatible with the known recurrent malignancy. Susceptibility artifact is noted within the mass, a biopsy marker. Overall, the mass measures approximately 6.9 cm AP by 3.9 TV x 6.2 cm CC. The mass appears more draped along the implant with extension along its superior lateral margin. The previously noted additional axillary lymph nodes medial to the mass appear relatively stable in size.\par \par No right axillary lymphadenopathy. No internal mammary lymphadenopathy.\par \par IMPRESSION:\par 1.  The biopsy-proven recurrent malignancy in the left axillary region has increased in size since the prior breast MRI and prior ultrasound, with maximal dimension now measuring 6.9 cm AP.\par 2.  No other suspicious enhancement in either breast.\par 3.  Silicone implants are intact.\par \par The pt now reports she stopped getting ozone therapy in 3/20. \par \par She progressed after she stopped anastrozole and ozone therapy. \par Restarted anastrozole in early August as well as ozon/vitamin/mineral therapy. \par  [de-identified] : Seen today for further evaluation and treatment recommendations. She restarted anastrozole as well as ozon/mineral/vitamin infusion in early August.\par She reported that she had dizziness for about 2 weeks after she started anastrozole but it resolved now. \par Today, She denies dizziness/lightheadedness, headache, pain out of ordinal, appettie change, weight loss, nausea/vomiting, or fever/chills. \par Feels fine. \par \par

## 2021-02-05 NOTE — PHYSICAL EXAM
[Normal] : grossly intact [Restricted in physically strenuous activity but ambulatory and able to carry out work of a light or sedentary nature] : Status 1- Restricted in physically strenuous activity but ambulatory and able to carry out work of a light or sedentary nature, e.g., light house work, office work [de-identified] : B/L breasts s/p MRM with reconstruction, Rt; no palpable masses in breast or axilla. Left: no palpable masses in breast or axilla

## 2021-02-10 ENCOUNTER — OUTPATIENT (OUTPATIENT)
Dept: OUTPATIENT SERVICES | Facility: HOSPITAL | Age: 86
LOS: 1 days | Discharge: HOME | End: 2021-02-10

## 2021-02-10 DIAGNOSIS — Z87.39 PERSONAL HISTORY OF OTHER DISEASES OF THE MUSCULOSKELETAL SYSTEM AND CONNECTIVE TISSUE: Chronic | ICD-10-CM

## 2021-02-10 DIAGNOSIS — C50.919 MALIGNANT NEOPLASM OF UNSPECIFIED SITE OF UNSPECIFIED FEMALE BREAST: ICD-10-CM

## 2021-02-10 DIAGNOSIS — Z96.641 PRESENCE OF RIGHT ARTIFICIAL HIP JOINT: Chronic | ICD-10-CM

## 2021-02-16 ENCOUNTER — APPOINTMENT (OUTPATIENT)
Dept: INFUSION THERAPY | Facility: HOSPITAL | Age: 86
End: 2021-02-16

## 2021-03-11 ENCOUNTER — OUTPATIENT (OUTPATIENT)
Dept: OUTPATIENT SERVICES | Facility: HOSPITAL | Age: 86
LOS: 1 days | End: 2021-03-11
Payer: MEDICARE

## 2021-03-11 ENCOUNTER — APPOINTMENT (OUTPATIENT)
Dept: RADIOLOGY | Facility: HOSPITAL | Age: 86
End: 2021-03-11
Payer: MEDICARE

## 2021-03-11 ENCOUNTER — OUTPATIENT (OUTPATIENT)
Dept: OUTPATIENT SERVICES | Facility: HOSPITAL | Age: 86
LOS: 1 days | Discharge: HOME | End: 2021-03-11

## 2021-03-11 DIAGNOSIS — Z96.641 PRESENCE OF RIGHT ARTIFICIAL HIP JOINT: Chronic | ICD-10-CM

## 2021-03-11 DIAGNOSIS — C50.919 MALIGNANT NEOPLASM OF UNSPECIFIED SITE OF UNSPECIFIED FEMALE BREAST: ICD-10-CM

## 2021-03-11 DIAGNOSIS — Z87.39 PERSONAL HISTORY OF OTHER DISEASES OF THE MUSCULOSKELETAL SYSTEM AND CONNECTIVE TISSUE: Chronic | ICD-10-CM

## 2021-03-11 DIAGNOSIS — Z00.8 ENCOUNTER FOR OTHER GENERAL EXAMINATION: ICD-10-CM

## 2021-03-11 PROCEDURE — 73562 X-RAY EXAM OF KNEE 3: CPT | Mod: 26,RT

## 2021-03-11 PROCEDURE — 73562 X-RAY EXAM OF KNEE 3: CPT

## 2021-03-16 ENCOUNTER — APPOINTMENT (OUTPATIENT)
Dept: HEMATOLOGY ONCOLOGY | Facility: CLINIC | Age: 86
End: 2021-03-16
Payer: MEDICARE

## 2021-03-16 ENCOUNTER — APPOINTMENT (OUTPATIENT)
Dept: INFUSION THERAPY | Facility: HOSPITAL | Age: 86
End: 2021-03-16

## 2021-03-16 VITALS
RESPIRATION RATE: 17 BRPM | HEART RATE: 72 BPM | HEIGHT: 61.89 IN | DIASTOLIC BLOOD PRESSURE: 72 MMHG | TEMPERATURE: 97 F | SYSTOLIC BLOOD PRESSURE: 158 MMHG | OXYGEN SATURATION: 98 % | BODY MASS INDEX: 26.88 KG/M2 | WEIGHT: 146.06 LBS

## 2021-03-16 DIAGNOSIS — M54.9 DORSALGIA, UNSPECIFIED: ICD-10-CM

## 2021-03-16 PROCEDURE — 99214 OFFICE O/P EST MOD 30 MIN: CPT

## 2021-03-18 PROBLEM — M54.9 UPPER BACK PAIN: Status: ACTIVE | Noted: 2021-03-18

## 2021-03-18 NOTE — PHYSICAL EXAM
[Restricted in physically strenuous activity but ambulatory and able to carry out work of a light or sedentary nature] : Status 1- Restricted in physically strenuous activity but ambulatory and able to carry out work of a light or sedentary nature, e.g., light house work, office work [Normal] : affect appropriate [de-identified] : B/L breasts s/p MRM with reconstruction, Rt; no palpable masses in breast or axilla. Left: no palpable masses in breast, however ,vague "puffines" in the LOQ without any discrete palpable masses. B/L ax neg

## 2021-03-18 NOTE — HISTORY OF PRESENT ILLNESS
[Date: ____________] : Patient's last distress assessment performed on [unfilled]. [0 - No Distress] : Distress Level: 0 [de-identified] : The patient has a history of right breast cancer in 1965 for which she underwent what is described as a "Jeanette mastectomy" by Dr. Olivo at [facility] with "0/19 axillary lymph nodes" per patient's verbal report and followed expectantly.  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 Yu with a finding of "3 spots of DCIS, and a sentinel lymph node negative" per patient's verbal report; I do not have a copy of that report for my review.  The patient reports having undergone bilateral breast reconstruction at the same time to include what she describes as right "right cadaver tissue" in addition to an implant in the right breast, as well as a left breast implant.\par \par She was then well until approximately 1-2/19 when she describes transient pinching on her left reconstructed breast, centrally and predominantly to the lateral aspect.  She chose to follow this and then start to question whether she was "losing volume."  Through a friend/networking, she contacted Dr. Magan Carey who recommended a bilateral breast MRI, which was performed at [facility] on June 21, 2019 with a finding of a suspicious 5 cm irregular mass in the left axilla as well as further suspicious lymphadenopathy seen posterior to the mass with second-look ultrasound and core biopsy recommended as well as a PET-CT scan.  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  The patient then went on to see Dr. Carlos Pittman and had a PET-CT scan performed on July 5, 2019 with a finding of a mildly avid focus in the left thyroid lobe extending towards the isthmus, a 1.3 cm non-avid left thyroid lobe nodule; in the left axilla, there was left axillary soft tissue mass with irregular margins containing a biopsy clip measuring 3 x 2 cm with several adjacent subcentimeter left axillary lymph nodes measuring up to 1 cm; bilateral hip arthroplasties with heterogeneous FDG avidity adjacent to the proximal portions bilaterally on the right with an SUV of 13.8 on the left and SUV of 9.4 with a comment, this area is limited in evaluation due to beam hardening artifact; there were degenerative changes in the lower spine with mild areas of FDG avidity, multilevel, non-FDG avid compression fracture of L4 unchanged since 06/2017.\par \par I saw her in initial consultation on 716/19 and subsequently had requested a review of the pathology and it turned our it was ER+ / WI neg. We reviewed her scans at tumor board and the group agreed it was unresectable at this time. I called the pt and informed her of the above change in the pathology and that I recommended neoadjuvant anti-estrogen therapy with an aromatase inhibitor such as anastrozole. She agreed to proceed and started taking anastrozole in the very first days of 7/19. \par \par Seen in 11/19. Had evidence of response in her left ax mass. \par \par Seen 3/20 and she informed me she stopped taking anstrozole end 8/19 secondary to concerns re bone toxicity. She had been seeing an "MD PhD" on [place] who had been infusing "ozone therapy in an electrolyte bag" IV, B17, other anti-inflammatory herbs. She declined to provide his name as " rather not say as he does not want people looking over his back". On exam she had what seemed like a further response. I recommended she re-consult / see Dr. Pittman to consider potential surgery vs XRT as primary local treatment. I advised that as she has been under the care of another physician that she follow up with him. I offered her to see me  as needed in the future. She was agreeable.\par \par In the interim she saw Dr. Pittman 7/6/20. WHen questioned why she did not do so sooner she did not have an answer to provide. Dr Pittman sent her for a B/L breast MRI 7/27/20 which showed :\par \par In the left axilla, again noted is an irregularly-shaped enhancing mass compatible with the known recurrent malignancy. Susceptibility artifact is noted within the mass, a biopsy marker. Overall, the mass measures approximately 6.9 cm AP by 3.9 TV x 6.2 cm CC. The mass appears more draped along the implant with extension along its superior lateral margin. The previously noted additional axillary lymph nodes medial to the mass appear relatively stable in size.\par \par No right axillary lymphadenopathy. No internal mammary lymphadenopathy.\par \par IMPRESSION:\par 1.  The biopsy-proven recurrent malignancy in the left axillary region has increased in size since the prior breast MRI and prior ultrasound, with maximal dimension now measuring 6.9 cm AP.\par 2.  No other suspicious enhancement in either breast.\par 3.  Silicone implants are intact.\par \par The pt now reports she stopped getting ozone therapy in 3/20. \par \par She progressed after she stopped anastrozole and ozone therapy. \par Restarted anastrozole in early August as well as ozone/vitamin/mineral therapy. \par \par Repeat MRI 10/27/20 revealed POD in L breast / axillary / CW mass. PET CT did not show any distant mets. \par  [de-identified] : Started fulvestrant on 11/24/20.\par \par In the interim pt c/o "pain in all my joints and upper back" for the past 4 weeks. Saw her PCP and referred to Dr. Villalobos (pain management) who provided gabapentin. She has a h/o spinal stenosis of her lower back with chronic low grade pain there w/o change but now has upper back pain.  She also describes cramping of her hands and feet. She notes that she is not sure if this is fulvestrant related or whether it can be from amlodipine which she started in 12/20. \par \par \par MRI breast 10/26/20\par IMPRESSION: Patient is status post bilateral mastectomy with silicone implant reconstruction. Silicone implants are intact.\par Continued enlargement of the recurrent biopsy-proven left axillary recurrent carcinoma biopsied in 2019 -extending to the lateral aspect of the left silicone implant(  post systemic therapy) -the mass now measuring 7.4 cm on the current study  larger compared to measurement of 6.9 cm on prior MR dated 7/27/2020.\par The mass was also reported to be larger on PET/CT dated 8/12/2020.\par New left axillary lymph node was reported on the PET CT study and there was enlargement of the soft tissue mass in the left axilla.\par Mass is significantly larger compared to prior measurement of approximately 5 cm on 6/24/2019.\par \par PET CT 11/6/20\par Since PET/CT August 12, 2020:\par \par 1.  Overall unchanged size and FDG avidity of left axillary soft tissue mass and adjacent left axillary lymph node consistent with known recurrent disease.\par \par 2.  Bilateral periprosthetic hip joint FDG avidity, decreased on the right and unchanged on the left. Suggest further evaluation to evaluate for loosening or infection if this is a clinical concern.\par \par 3.  Unchanged FDG avid left thyroid nodule.\par \par 4.  New trace pericardial effusion.\par

## 2021-03-18 NOTE — REASON FOR VISIT
[Follow-Up Visit] : a follow-up [Pre-Treatment Visit] : a pre-treatment [FreeTextEntry2] : Breast Cancer

## 2021-03-18 NOTE — REVIEW OF SYSTEMS
[Negative] : Endocrine [FreeTextEntry2] : as above [FreeTextEntry9] : as above [de-identified] : as above

## 2021-03-20 ENCOUNTER — RESULT REVIEW (OUTPATIENT)
Age: 86
End: 2021-03-20

## 2021-03-29 ENCOUNTER — OUTPATIENT (OUTPATIENT)
Dept: OUTPATIENT SERVICES | Facility: HOSPITAL | Age: 86
LOS: 1 days | End: 2021-03-29
Payer: MEDICARE

## 2021-03-29 ENCOUNTER — APPOINTMENT (OUTPATIENT)
Dept: MRI IMAGING | Facility: IMAGING CENTER | Age: 86
End: 2021-03-29
Payer: MEDICARE

## 2021-03-29 ENCOUNTER — APPOINTMENT (OUTPATIENT)
Dept: CT IMAGING | Facility: IMAGING CENTER | Age: 86
End: 2021-03-29
Payer: MEDICARE

## 2021-03-29 DIAGNOSIS — C50.919 MALIGNANT NEOPLASM OF UNSPECIFIED SITE OF UNSPECIFIED FEMALE BREAST: ICD-10-CM

## 2021-03-29 DIAGNOSIS — Z87.39 PERSONAL HISTORY OF OTHER DISEASES OF THE MUSCULOSKELETAL SYSTEM AND CONNECTIVE TISSUE: Chronic | ICD-10-CM

## 2021-03-29 DIAGNOSIS — Z96.641 PRESENCE OF RIGHT ARTIFICIAL HIP JOINT: Chronic | ICD-10-CM

## 2021-03-29 PROCEDURE — 74177 CT ABD & PELVIS W/CONTRAST: CPT | Mod: 26,MG

## 2021-03-29 PROCEDURE — 71260 CT THORAX DX C+: CPT | Mod: 26,MG

## 2021-03-29 PROCEDURE — G1004: CPT

## 2021-03-29 PROCEDURE — 71260 CT THORAX DX C+: CPT

## 2021-03-29 PROCEDURE — 77049 MRI BREAST C-+ W/CAD BI: CPT | Mod: 26,MG

## 2021-03-29 PROCEDURE — C8937: CPT

## 2021-03-29 PROCEDURE — 74177 CT ABD & PELVIS W/CONTRAST: CPT

## 2021-03-29 PROCEDURE — A9585: CPT

## 2021-03-29 PROCEDURE — C8908: CPT

## 2021-04-01 ENCOUNTER — RESULT REVIEW (OUTPATIENT)
Age: 86
End: 2021-04-01

## 2021-04-01 ENCOUNTER — APPOINTMENT (OUTPATIENT)
Dept: NUCLEAR MEDICINE | Facility: IMAGING CENTER | Age: 86
End: 2021-04-01
Payer: MEDICARE

## 2021-04-01 ENCOUNTER — OUTPATIENT (OUTPATIENT)
Dept: OUTPATIENT SERVICES | Facility: HOSPITAL | Age: 86
LOS: 1 days | End: 2021-04-01
Payer: MEDICARE

## 2021-04-01 DIAGNOSIS — C50.919 MALIGNANT NEOPLASM OF UNSPECIFIED SITE OF UNSPECIFIED FEMALE BREAST: ICD-10-CM

## 2021-04-01 DIAGNOSIS — Z87.39 PERSONAL HISTORY OF OTHER DISEASES OF THE MUSCULOSKELETAL SYSTEM AND CONNECTIVE TISSUE: Chronic | ICD-10-CM

## 2021-04-01 DIAGNOSIS — M25.50 PAIN IN UNSPECIFIED JOINT: ICD-10-CM

## 2021-04-01 DIAGNOSIS — Z96.641 PRESENCE OF RIGHT ARTIFICIAL HIP JOINT: Chronic | ICD-10-CM

## 2021-04-01 PROCEDURE — G1004: CPT

## 2021-04-01 PROCEDURE — A9561: CPT

## 2021-04-01 PROCEDURE — 78830 RP LOCLZJ TUM SPECT W/CT 1: CPT

## 2021-04-01 PROCEDURE — 78306 BONE IMAGING WHOLE BODY: CPT | Mod: 26,MG

## 2021-04-01 PROCEDURE — 78830 RP LOCLZJ TUM SPECT W/CT 1: CPT | Mod: 26

## 2021-04-01 PROCEDURE — 78306 BONE IMAGING WHOLE BODY: CPT

## 2021-04-05 ENCOUNTER — APPOINTMENT (OUTPATIENT)
Dept: RHEUMATOLOGY | Facility: CLINIC | Age: 86
End: 2021-04-05

## 2021-04-06 ENCOUNTER — APPOINTMENT (OUTPATIENT)
Dept: INFUSION THERAPY | Facility: HOSPITAL | Age: 86
End: 2021-04-06

## 2021-04-06 ENCOUNTER — APPOINTMENT (OUTPATIENT)
Dept: HEMATOLOGY ONCOLOGY | Facility: CLINIC | Age: 86
End: 2021-04-06
Payer: MEDICARE

## 2021-04-06 VITALS
RESPIRATION RATE: 17 BRPM | WEIGHT: 146.39 LBS | HEART RATE: 68 BPM | OXYGEN SATURATION: 97 % | TEMPERATURE: 97.7 F | DIASTOLIC BLOOD PRESSURE: 75 MMHG | SYSTOLIC BLOOD PRESSURE: 158 MMHG | HEIGHT: 61.89 IN | BODY MASS INDEX: 26.94 KG/M2

## 2021-04-06 PROCEDURE — 99214 OFFICE O/P EST MOD 30 MIN: CPT

## 2021-04-06 RX ORDER — GABAPENTIN 100 MG/1
100 CAPSULE ORAL
Qty: 200 | Refills: 0 | Status: DISCONTINUED | COMMUNITY
Start: 2021-03-12 | End: 2021-04-06

## 2021-04-06 NOTE — HISTORY OF PRESENT ILLNESS
[Date: ____________] : Patient's last distress assessment performed on [unfilled]. [0 - No Distress] : Distress Level: 0 [de-identified] : The patient has a history of right breast cancer in 1965 for which she underwent what is described as a "Jeanette mastectomy" by Dr. Olivo at [facility] with "0/19 axillary lymph nodes" per patient's verbal report and followed expectantly.  She was well until 2008 at which time she was found to have a left breast multifocal DCIS per her description for which she underwent a left modified radical mastectomy at [facility] [place] under the care Dr. Yu with a finding of "3 spots of DCIS, and a sentinel lymph node negative" per patient's verbal report; I do not have a copy of that report for my review.  The patient reports having undergone bilateral breast reconstruction at the same time to include what she describes as right "right cadaver tissue" in addition to an implant in the right breast, as well as a left breast implant.\par \par She was then well until approximately 1-2/19 when she describes transient pinching on her left reconstructed breast, centrally and predominantly to the lateral aspect.  She chose to follow this and then start to question whether she was "losing volume."  Through a friend/networking, she contacted Dr. Magan Carey who recommended a bilateral breast MRI, which was performed at [facility] on June 21, 2019 with a finding of a suspicious 5 cm irregular mass in the left axilla as well as further suspicious lymphadenopathy seen posterior to the mass with second-look ultrasound and core biopsy recommended as well as a PET-CT scan.  The patient went on to have an ultrasound of the left axilla with an ultrasound-guided core biopsy, with a finding of a heterogeneous 4.5 cm mass seen corresponding to the finding seen on the breast MRI with abnormal lymph nodes seen posterior to the mass measuring 8 mm with an ultrasound-guided core biopsy on that same date demonstrating invasive poorly differentiated mammary carcinoma with lobular phenotype, Viridiana score 8/9, with invasive tumor measuring at least 8 mm on the core biopsy specimen, ER negative, VT negative, HER-2/jeni negative, and a comment that "no focal residual lymphoid like tissue (no germinal center noted) identified; the lesion may represent a completely effaced lymph node or brisk lymphocytic response to tumor; clinical pathologic-radiologic correlation recommended”.  The patient then went on to see Dr. Carlos Pittman and had a PET-CT scan performed on July 5, 2019 with a finding of a mildly avid focus in the left thyroid lobe extending towards the isthmus, a 1.3 cm non-avid left thyroid lobe nodule; in the left axilla, there was left axillary soft tissue mass with irregular margins containing a biopsy clip measuring 3 x 2 cm with several adjacent subcentimeter left axillary lymph nodes measuring up to 1 cm; bilateral hip arthroplasties with heterogeneous FDG avidity adjacent to the proximal portions bilaterally on the right with an SUV of 13.8 on the left and SUV of 9.4 with a comment, this area is limited in evaluation due to beam hardening artifact; there were degenerative changes in the lower spine with mild areas of FDG avidity, multilevel, non-FDG avid compression fracture of L4 unchanged since 06/2017.\par \par I saw her in initial consultation on 716/19 and subsequently had requested a review of the pathology and it turned our it was ER+ / VT neg. We reviewed her scans at tumor board and the group agreed it was unresectable at this time. I called the pt and informed her of the above change in the pathology and that I recommended neoadjuvant anti-estrogen therapy with an aromatase inhibitor such as anastrozole. She agreed to proceed and started taking anastrozole in the very first days of 7/19. \par \par Seen in 11/19. Had evidence of response in her left ax mass. \par \par Seen 3/20 and she informed me she stopped taking anstrozole end 8/19 secondary to concerns re bone toxicity. She had been seeing an "MD PhD" on [place] who had been infusing "ozone therapy in an electrolyte bag" IV, B17, other anti-inflammatory herbs. She declined to provide his name as " rather not say as he does not want people looking over his back". On exam she had what seemed like a further response. I recommended she re-consult / see Dr. Pittman to consider potential surgery vs XRT as primary local treatment. I advised that as she has been under the care of another physician that she follow up with him. I offered her to see me  as needed in the future. She was agreeable.\par \par In the interim she saw Dr. Pittman 7/6/20. WHen questioned why she did not do so sooner she did not have an answer to provide. Dr Pittman sent her for a B/L breast MRI 7/27/20 which showed :\par \par In the left axilla, again noted is an irregularly-shaped enhancing mass compatible with the known recurrent malignancy. Susceptibility artifact is noted within the mass, a biopsy marker. Overall, the mass measures approximately 6.9 cm AP by 3.9 TV x 6.2 cm CC. The mass appears more draped along the implant with extension along its superior lateral margin. The previously noted additional axillary lymph nodes medial to the mass appear relatively stable in size.\par \par No right axillary lymphadenopathy. No internal mammary lymphadenopathy.\par \par IMPRESSION:\par 1.  The biopsy-proven recurrent malignancy in the left axillary region has increased in size since the prior breast MRI and prior ultrasound, with maximal dimension now measuring 6.9 cm AP.\par 2.  No other suspicious enhancement in either breast.\par 3.  Silicone implants are intact.\par \par The pt now reports she stopped getting ozone therapy in 3/20. \par \par She progressed after she stopped anastrozole and ozone therapy. \par Restarted anastrozole in early August as well as ozone/vitamin/mineral therapy. \par \par Repeat MRI 10/27/20 revealed POD in L breast / axillary / CW mass. PET CT did not show any distant mets. \par  [de-identified] : Started fulvestrant on 11/24/20.\par \par  is here for a f/up and a pre-treatment visit. At the time of her last visit she had c/o significant generalized arthralgias. we had discussed obtaining f/up scans (which she has had, results noted below), and potentially holding the fulvestrant if the arthralgias are bieng caused by the drug. In the interim she has followed up with her rhuematologist, gabapentin was discontinued and she was started on prednisone 5mg daily on 3/31/21 with a significant relief in symptoms.\par No other issues. Notes a good appetite, stable weight and a stable performance status.\par \par Breast MR 3/29/21\par Stable left axillary mass and enlarged left axillary lymph node\par CT CAP 3/31/21\par  An ill-defined left axillary mass adjacent to a biopsy clip is again noted, measuring approximately 3.4 x 3.4 cm, previously 3.6 x 3.3 cm on 11/6/2020. An adjacent left axillary lymph node measures 1.5 x 1.2 cm (2:16), previously 1.4 x 1.2 cm. Multiple hypodense nodules in both lobes of the thyroid gland and in the thyroid isthmus, similar in appearance to the prior PET/CT dated 11/6/2020. 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

## 2021-04-06 NOTE — REVIEW OF SYSTEMS
[Negative] : Endocrine [FreeTextEntry2] : as above [FreeTextEntry9] : as above [de-identified] : as above

## 2021-04-13 ENCOUNTER — APPOINTMENT (OUTPATIENT)
Dept: INFUSION THERAPY | Facility: HOSPITAL | Age: 86
End: 2021-04-13

## 2021-04-17 ENCOUNTER — INPATIENT (INPATIENT)
Facility: HOSPITAL | Age: 86
LOS: 0 days | Discharge: HOME | DRG: 293 | End: 2021-04-18
Attending: INTERNAL MEDICINE | Admitting: INTERNAL MEDICINE
Payer: COMMERCIAL

## 2021-04-17 VITALS
SYSTOLIC BLOOD PRESSURE: 150 MMHG | RESPIRATION RATE: 24 BRPM | TEMPERATURE: 98 F | DIASTOLIC BLOOD PRESSURE: 70 MMHG | HEIGHT: 61 IN | HEART RATE: 72 BPM | WEIGHT: 139.99 LBS | OXYGEN SATURATION: 94 %

## 2021-04-17 DIAGNOSIS — A41.9 SEPSIS, UNSPECIFIED ORGANISM: ICD-10-CM

## 2021-04-17 DIAGNOSIS — Z96.641 PRESENCE OF RIGHT ARTIFICIAL HIP JOINT: Chronic | ICD-10-CM

## 2021-04-17 DIAGNOSIS — Z98.890 OTHER SPECIFIED POSTPROCEDURAL STATES: Chronic | ICD-10-CM

## 2021-04-17 DIAGNOSIS — Z96.649 PRESENCE OF UNSPECIFIED ARTIFICIAL HIP JOINT: Chronic | ICD-10-CM

## 2021-04-17 DIAGNOSIS — Z87.39 PERSONAL HISTORY OF OTHER DISEASES OF THE MUSCULOSKELETAL SYSTEM AND CONNECTIVE TISSUE: Chronic | ICD-10-CM

## 2021-04-17 DIAGNOSIS — Z90.49 ACQUIRED ABSENCE OF OTHER SPECIFIED PARTS OF DIGESTIVE TRACT: Chronic | ICD-10-CM

## 2021-04-17 LAB
ALBUMIN SERPL ELPH-MCNC: 3 G/DL — LOW (ref 3.3–5)
ALBUMIN SERPL ELPH-MCNC: 3 G/DL — LOW (ref 3.3–5)
ALP SERPL-CCNC: 76 U/L — SIGNIFICANT CHANGE UP (ref 40–120)
ALP SERPL-CCNC: 82 U/L — SIGNIFICANT CHANGE UP (ref 40–120)
ALT FLD-CCNC: 28 U/L — SIGNIFICANT CHANGE UP (ref 10–45)
ALT FLD-CCNC: 31 U/L — SIGNIFICANT CHANGE UP (ref 10–45)
ANION GAP SERPL CALC-SCNC: 12 MMOL/L — SIGNIFICANT CHANGE UP (ref 5–17)
ANION GAP SERPL CALC-SCNC: 9 MMOL/L — SIGNIFICANT CHANGE UP (ref 5–17)
APPEARANCE UR: ABNORMAL
APTT BLD: 25.4 SEC — LOW (ref 27.5–35.5)
AST SERPL-CCNC: 22 U/L — SIGNIFICANT CHANGE UP (ref 10–40)
AST SERPL-CCNC: 31 U/L — SIGNIFICANT CHANGE UP (ref 10–40)
BASOPHILS # BLD AUTO: 0.03 K/UL — SIGNIFICANT CHANGE UP (ref 0–0.2)
BASOPHILS # BLD AUTO: 0.04 K/UL — SIGNIFICANT CHANGE UP (ref 0–0.2)
BASOPHILS NFR BLD AUTO: 0.2 % — SIGNIFICANT CHANGE UP (ref 0–2)
BASOPHILS NFR BLD AUTO: 0.3 % — SIGNIFICANT CHANGE UP (ref 0–2)
BILIRUB SERPL-MCNC: 0.4 MG/DL — SIGNIFICANT CHANGE UP (ref 0.2–1.2)
BILIRUB SERPL-MCNC: 0.5 MG/DL — SIGNIFICANT CHANGE UP (ref 0.2–1.2)
BILIRUB UR-MCNC: NEGATIVE — SIGNIFICANT CHANGE UP
BUN SERPL-MCNC: 34 MG/DL — HIGH (ref 7–23)
BUN SERPL-MCNC: 38 MG/DL — HIGH (ref 7–23)
CALCIUM SERPL-MCNC: 8.7 MG/DL — SIGNIFICANT CHANGE UP (ref 8.4–10.5)
CALCIUM SERPL-MCNC: 9.3 MG/DL — SIGNIFICANT CHANGE UP (ref 8.4–10.5)
CHLORIDE SERPL-SCNC: 105 MMOL/L — SIGNIFICANT CHANGE UP (ref 96–108)
CHLORIDE SERPL-SCNC: 105 MMOL/L — SIGNIFICANT CHANGE UP (ref 96–108)
CK SERPL-CCNC: 75 U/L — SIGNIFICANT CHANGE UP (ref 25–170)
CK SERPL-CCNC: 89 U/L — SIGNIFICANT CHANGE UP (ref 25–170)
CO2 SERPL-SCNC: 24 MMOL/L — SIGNIFICANT CHANGE UP (ref 22–31)
CO2 SERPL-SCNC: 25 MMOL/L — SIGNIFICANT CHANGE UP (ref 22–31)
COLOR SPEC: YELLOW — SIGNIFICANT CHANGE UP
CREAT SERPL-MCNC: 1.17 MG/DL — SIGNIFICANT CHANGE UP (ref 0.5–1.3)
CREAT SERPL-MCNC: 1.26 MG/DL — SIGNIFICANT CHANGE UP (ref 0.5–1.3)
CRP SERPL-MCNC: 39 MG/L — HIGH
DIFF PNL FLD: ABNORMAL
EOSINOPHIL # BLD AUTO: 0.09 K/UL — SIGNIFICANT CHANGE UP (ref 0–0.5)
EOSINOPHIL # BLD AUTO: 0.32 K/UL — SIGNIFICANT CHANGE UP (ref 0–0.5)
EOSINOPHIL NFR BLD AUTO: 0.6 % — SIGNIFICANT CHANGE UP (ref 0–6)
EOSINOPHIL NFR BLD AUTO: 2 % — SIGNIFICANT CHANGE UP (ref 0–6)
EPI CELLS # UR: SIGNIFICANT CHANGE UP
GLUCOSE SERPL-MCNC: 117 MG/DL — HIGH (ref 70–99)
GLUCOSE SERPL-MCNC: 120 MG/DL — HIGH (ref 70–99)
GLUCOSE UR QL: NEGATIVE — SIGNIFICANT CHANGE UP
HCT VFR BLD CALC: 29.8 % — LOW (ref 34.5–45)
HCT VFR BLD CALC: 32.4 % — LOW (ref 34.5–45)
HGB BLD-MCNC: 10.3 G/DL — LOW (ref 11.5–15.5)
HGB BLD-MCNC: 9.8 G/DL — LOW (ref 11.5–15.5)
IMM GRANULOCYTES NFR BLD AUTO: 0.5 % — SIGNIFICANT CHANGE UP (ref 0–1.5)
IMM GRANULOCYTES NFR BLD AUTO: 0.6 % — SIGNIFICANT CHANGE UP (ref 0–1.5)
INR BLD: 1.01 RATIO — SIGNIFICANT CHANGE UP (ref 0.88–1.16)
KETONES UR-MCNC: NEGATIVE — SIGNIFICANT CHANGE UP
LACTATE SERPL-SCNC: 1.6 MMOL/L — SIGNIFICANT CHANGE UP (ref 0.7–2)
LEUKOCYTE ESTERASE UR-ACNC: NEGATIVE — SIGNIFICANT CHANGE UP
LIDOCAIN IGE QN: 106 U/L — SIGNIFICANT CHANGE UP (ref 73–393)
LYMPHOCYTES # BLD AUTO: 0.8 K/UL — LOW (ref 1–3.3)
LYMPHOCYTES # BLD AUTO: 1.15 K/UL — SIGNIFICANT CHANGE UP (ref 1–3.3)
LYMPHOCYTES # BLD AUTO: 5.1 % — LOW (ref 13–44)
LYMPHOCYTES # BLD AUTO: 8.1 % — LOW (ref 13–44)
MAGNESIUM SERPL-MCNC: 2.1 MG/DL — SIGNIFICANT CHANGE UP (ref 1.6–2.6)
MCHC RBC-ENTMCNC: 27.7 PG — SIGNIFICANT CHANGE UP (ref 27–34)
MCHC RBC-ENTMCNC: 27.8 PG — SIGNIFICANT CHANGE UP (ref 27–34)
MCHC RBC-ENTMCNC: 31.8 GM/DL — LOW (ref 32–36)
MCHC RBC-ENTMCNC: 32.9 GM/DL — SIGNIFICANT CHANGE UP (ref 32–36)
MCV RBC AUTO: 84.4 FL — SIGNIFICANT CHANGE UP (ref 80–100)
MCV RBC AUTO: 87.1 FL — SIGNIFICANT CHANGE UP (ref 80–100)
MONOCYTES # BLD AUTO: 0.45 K/UL — SIGNIFICANT CHANGE UP (ref 0–0.9)
MONOCYTES # BLD AUTO: 0.46 K/UL — SIGNIFICANT CHANGE UP (ref 0–0.9)
MONOCYTES NFR BLD AUTO: 2.9 % — SIGNIFICANT CHANGE UP (ref 2–14)
MONOCYTES NFR BLD AUTO: 3.2 % — SIGNIFICANT CHANGE UP (ref 2–14)
NEUTROPHILS # BLD AUTO: 12.37 K/UL — HIGH (ref 1.8–7.4)
NEUTROPHILS # BLD AUTO: 13.96 K/UL — HIGH (ref 1.8–7.4)
NEUTROPHILS NFR BLD AUTO: 87.4 % — HIGH (ref 43–77)
NEUTROPHILS NFR BLD AUTO: 89.1 % — HIGH (ref 43–77)
NITRITE UR-MCNC: NEGATIVE — SIGNIFICANT CHANGE UP
NRBC # BLD: 0 /100 WBCS — SIGNIFICANT CHANGE UP (ref 0–0)
NRBC # BLD: 0 /100 WBCS — SIGNIFICANT CHANGE UP (ref 0–0)
NT-PROBNP SERPL-SCNC: 1000 PG/ML — HIGH (ref 0–300)
NT-PROBNP SERPL-SCNC: 1325 PG/ML — HIGH (ref 0–300)
PH UR: 5 — SIGNIFICANT CHANGE UP (ref 5–8)
PLATELET # BLD AUTO: 197 K/UL — SIGNIFICANT CHANGE UP (ref 150–400)
PLATELET # BLD AUTO: 207 K/UL — SIGNIFICANT CHANGE UP (ref 150–400)
POTASSIUM SERPL-MCNC: 3.8 MMOL/L — SIGNIFICANT CHANGE UP (ref 3.5–5.3)
POTASSIUM SERPL-MCNC: 4.2 MMOL/L — SIGNIFICANT CHANGE UP (ref 3.5–5.3)
POTASSIUM SERPL-SCNC: 3.8 MMOL/L — SIGNIFICANT CHANGE UP (ref 3.5–5.3)
POTASSIUM SERPL-SCNC: 4.2 MMOL/L — SIGNIFICANT CHANGE UP (ref 3.5–5.3)
PROCALCITONIN SERPL-MCNC: 0.04 NG/ML — SIGNIFICANT CHANGE UP
PROCALCITONIN SERPL-MCNC: 0.18 NG/ML — HIGH
PROT SERPL-MCNC: 6.7 G/DL — SIGNIFICANT CHANGE UP (ref 6–8.3)
PROT SERPL-MCNC: 6.7 G/DL — SIGNIFICANT CHANGE UP (ref 6–8.3)
PROT UR-MCNC: 100
PROTHROM AB SERPL-ACNC: 12.2 SEC — SIGNIFICANT CHANGE UP (ref 10.6–13.6)
RBC # BLD: 3.53 M/UL — LOW (ref 3.8–5.2)
RBC # BLD: 3.72 M/UL — LOW (ref 3.8–5.2)
RBC # FLD: 17.1 % — HIGH (ref 10.3–14.5)
RBC # FLD: 17.1 % — HIGH (ref 10.3–14.5)
RBC CASTS # UR COMP ASSIST: SIGNIFICANT CHANGE UP /HPF (ref 0–4)
SARS-COV-2 RNA SPEC QL NAA+PROBE: SIGNIFICANT CHANGE UP
SODIUM SERPL-SCNC: 139 MMOL/L — SIGNIFICANT CHANGE UP (ref 135–145)
SODIUM SERPL-SCNC: 141 MMOL/L — SIGNIFICANT CHANGE UP (ref 135–145)
SP GR SPEC: 1.01 — SIGNIFICANT CHANGE UP (ref 1.01–1.02)
TROPONIN I SERPL-MCNC: 0.02 NG/ML — SIGNIFICANT CHANGE UP (ref 0.02–0.06)
TROPONIN I SERPL-MCNC: 0.04 NG/ML — SIGNIFICANT CHANGE UP (ref 0.02–0.06)
TROPONIN I SERPL-MCNC: <.017 NG/ML — LOW (ref 0.02–0.06)
TSH SERPL-MCNC: 0.19 UIU/ML — LOW (ref 0.27–4.2)
UROBILINOGEN FLD QL: NEGATIVE — SIGNIFICANT CHANGE UP
WBC # BLD: 14.16 K/UL — HIGH (ref 3.8–10.5)
WBC # BLD: 15.67 K/UL — HIGH (ref 3.8–10.5)
WBC # FLD AUTO: 14.16 K/UL — HIGH (ref 3.8–10.5)
WBC # FLD AUTO: 15.67 K/UL — HIGH (ref 3.8–10.5)
WBC UR QL: SIGNIFICANT CHANGE UP /HPF (ref 0–5)

## 2021-04-17 PROCEDURE — 99222 1ST HOSP IP/OBS MODERATE 55: CPT

## 2021-04-17 PROCEDURE — 93010 ELECTROCARDIOGRAM REPORT: CPT

## 2021-04-17 PROCEDURE — 99223 1ST HOSP IP/OBS HIGH 75: CPT

## 2021-04-17 PROCEDURE — 93306 TTE W/DOPPLER COMPLETE: CPT | Mod: 26

## 2021-04-17 PROCEDURE — 71275 CT ANGIOGRAPHY CHEST: CPT | Mod: 26,MA

## 2021-04-17 PROCEDURE — 73562 X-RAY EXAM OF KNEE 3: CPT | Mod: 26,LT

## 2021-04-17 PROCEDURE — 99285 EMERGENCY DEPT VISIT HI MDM: CPT | Mod: CS

## 2021-04-17 PROCEDURE — 71045 X-RAY EXAM CHEST 1 VIEW: CPT | Mod: 26

## 2021-04-17 RX ORDER — ACETAMINOPHEN 500 MG
650 TABLET ORAL EVERY 6 HOURS
Refills: 0 | Status: DISCONTINUED | OUTPATIENT
Start: 2021-04-17 | End: 2021-04-17

## 2021-04-17 RX ORDER — APIXABAN 2.5 MG/1
2.5 TABLET, FILM COATED ORAL EVERY 12 HOURS
Refills: 0 | Status: DISCONTINUED | OUTPATIENT
Start: 2021-04-17 | End: 2021-04-18

## 2021-04-17 RX ORDER — TRAMADOL HYDROCHLORIDE 50 MG/1
50 TABLET ORAL EVERY 6 HOURS
Refills: 0 | Status: DISCONTINUED | OUTPATIENT
Start: 2021-04-17 | End: 2021-04-18

## 2021-04-17 RX ORDER — ASPIRIN/CALCIUM CARB/MAGNESIUM 324 MG
162 TABLET ORAL ONCE
Refills: 0 | Status: COMPLETED | OUTPATIENT
Start: 2021-04-17 | End: 2021-04-17

## 2021-04-17 RX ORDER — AMLODIPINE BESYLATE 2.5 MG/1
10 TABLET ORAL DAILY
Refills: 0 | Status: DISCONTINUED | OUTPATIENT
Start: 2021-04-17 | End: 2021-04-18

## 2021-04-17 RX ORDER — CEFTRIAXONE 500 MG/1
1000 INJECTION, POWDER, FOR SOLUTION INTRAMUSCULAR; INTRAVENOUS ONCE
Refills: 0 | Status: COMPLETED | OUTPATIENT
Start: 2021-04-17 | End: 2021-04-17

## 2021-04-17 RX ORDER — AZITHROMYCIN 500 MG/1
500 TABLET, FILM COATED ORAL ONCE
Refills: 0 | Status: COMPLETED | OUTPATIENT
Start: 2021-04-17 | End: 2021-04-17

## 2021-04-17 RX ORDER — FUROSEMIDE 40 MG
20 TABLET ORAL ONCE
Refills: 0 | Status: COMPLETED | OUTPATIENT
Start: 2021-04-17 | End: 2021-04-17

## 2021-04-17 RX ORDER — ASPIRIN/CALCIUM CARB/MAGNESIUM 324 MG
81 TABLET ORAL DAILY
Refills: 0 | Status: DISCONTINUED | OUTPATIENT
Start: 2021-04-17 | End: 2021-04-18

## 2021-04-17 RX ORDER — TRAMADOL HYDROCHLORIDE 50 MG/1
25 TABLET ORAL EVERY 6 HOURS
Refills: 0 | Status: DISCONTINUED | OUTPATIENT
Start: 2021-04-17 | End: 2021-04-18

## 2021-04-17 RX ORDER — ACETAMINOPHEN 500 MG
975 TABLET ORAL EVERY 8 HOURS
Refills: 0 | Status: DISCONTINUED | OUTPATIENT
Start: 2021-04-17 | End: 2021-04-18

## 2021-04-17 RX ORDER — FUROSEMIDE 40 MG
20 TABLET ORAL DAILY
Refills: 0 | Status: DISCONTINUED | OUTPATIENT
Start: 2021-04-18 | End: 2021-04-18

## 2021-04-17 RX ORDER — PANTOPRAZOLE SODIUM 20 MG/1
40 TABLET, DELAYED RELEASE ORAL
Refills: 0 | Status: DISCONTINUED | OUTPATIENT
Start: 2021-04-17 | End: 2021-04-18

## 2021-04-17 RX ADMIN — Medication 81 MILLIGRAM(S): at 12:09

## 2021-04-17 RX ADMIN — CEFTRIAXONE 1000 MILLIGRAM(S): 500 INJECTION, POWDER, FOR SOLUTION INTRAMUSCULAR; INTRAVENOUS at 03:00

## 2021-04-17 RX ADMIN — Medication 5 MILLIGRAM(S): at 17:51

## 2021-04-17 RX ADMIN — Medication 20 MILLIGRAM(S): at 02:13

## 2021-04-17 RX ADMIN — APIXABAN 2.5 MILLIGRAM(S): 2.5 TABLET, FILM COATED ORAL at 05:58

## 2021-04-17 RX ADMIN — AMLODIPINE BESYLATE 10 MILLIGRAM(S): 2.5 TABLET ORAL at 05:58

## 2021-04-17 RX ADMIN — PANTOPRAZOLE SODIUM 40 MILLIGRAM(S): 20 TABLET, DELAYED RELEASE ORAL at 06:01

## 2021-04-17 RX ADMIN — Medication 975 MILLIGRAM(S): at 22:30

## 2021-04-17 RX ADMIN — Medication 162 MILLIGRAM(S): at 01:15

## 2021-04-17 RX ADMIN — APIXABAN 2.5 MILLIGRAM(S): 2.5 TABLET, FILM COATED ORAL at 17:51

## 2021-04-17 RX ADMIN — Medication 5 MILLIGRAM(S): at 05:57

## 2021-04-17 RX ADMIN — AZITHROMYCIN 255 MILLIGRAM(S): 500 TABLET, FILM COATED ORAL at 03:23

## 2021-04-17 RX ADMIN — CEFTRIAXONE 100 MILLIGRAM(S): 500 INJECTION, POWDER, FOR SOLUTION INTRAMUSCULAR; INTRAVENOUS at 02:13

## 2021-04-17 NOTE — ED PROVIDER NOTE - PMH
CHF (congestive heart failure)    Malignant neoplasm of female breast, unspecified estrogen receptor status, unspecified laterality, unspecified site of breast

## 2021-04-17 NOTE — PATIENT PROFILE ADULT - NS PRO AD PATIENT TYPE ON CHART
Do Not Resuscitate (DNR)
Comment: -definitely improved at least 75%....bx showed PIH w/o indication as to trigger/etio...? ddx includes mild contact derm/ LPPig....
Detail Level: Detailed

## 2021-04-17 NOTE — CONSULT NOTE ADULT - ASSESSMENT
iMP    elderly woman with chf due to diastolic dysfunction    could be exacerbated by prednisone    suggest    continue iv lasix 40 mg today  repeat cxr tomw    dc O2 and repeat O2 SAT with ambulation 1/2 hour later    should be on lasix chronically for the time being    would continue amlodipine for now although consideration could be given by her primary cardiologist to switch to ace or arb

## 2021-04-17 NOTE — H&P ADULT - ASSESSMENT
87F hx of HTN, HLD, afib s/p ablation 2016, CHF, hx of breast cancer s/p remote bl mastectomy with recurrent breast cancer in L axilla pw SOB, CP, rigors with acute CHF    #CHF with chest pain  s/p asa and IV Lasix in ED.   CTA chest neg for PE.   trend trops, serial EKGs, ECHO, cardiology consult.   cont asa, eliquis and amlodipine for now.   had 'hives' with all the statins in the past.   weights, strict I/Os    #Recurrent breast cancer  cont Prednisone 5mg bid as part of tmt for 'side effects of Fulvestrant'    #DVT/GI proph  on ELiquis, PPI.    #GOC  Pt wants at least '3mins of CPR' and wants intubation for any reversible condition.     Wants friend Roma Jama [phone] to be updated daily.  87F hx of HTN, HLD, afib s/p ablation 2016, CHF, hx of breast cancer s/p remote bl mastectomy with recurrent breast cancer in L axilla pw SOB, CP, rigors with acute CHF    #CHF with chest pain  s/p asa and IV Lasix in ED.   CTA chest neg for PE.   trend trops, serial EKGs, ECHO, cardiology consult.   cont asa, eliquis and amlodipine for now.   had 'hives' with all the statins in the past.   weights, strict I/Os    #Leukocytosis  s/p Ceftriaxone and azithromycin in ED  hold additional antibxs for now as no evidence for PNA or UTI with neg procalcitonin.   may be sec to steroid use.     #Recurrent breast cancer  cont Prednisone 5mg bid as part of tmt for 'side effects of Fulvestrant'    #DVT/GI proph  on ELiquis, PPI.    #GOC  Pt wants at least '3mins of CPR' and wants intubation for any reversible condition.     Wants friend Roma Jama [phone] to be updated daily.  87F hx of HTN, HLD, afib s/p ablation 2016, CHF, hx of breast cancer s/p remote bl mastectomy with recurrent breast cancer in L axilla pw SOB, CP, rigors with acute CHF    #CHF with chest pain  s/p asa and IV Lasix in ED.   CTA chest neg for PE.   trend trops, serial EKGs, ECHO, cardiology consult.   cont asa, eliquis and amlodipine for now.   had 'hives' with all the statins in the past.   weights, strict I/Os    #Leukocytosis  s/p Ceftriaxone and azithromycin in ED  hold additional antibxs for now as no evidence for PNA or UTI with neg procalcitonin.   may be sec to steroid use.     #L knee pain   follow up official XRay report for L knee and if neg PT consult.     #Recurrent breast cancer  cont Prednisone 5mg bid as part of tmt for 'side effects of Fulvestrant'    #DVT/GI proph  on ELiquis, PPI.    #GOC  Pt wants at least '3mins of CPR' and wants intubation for any reversible condition.     Wants friend Roma Jama [phone] to be updated daily.

## 2021-04-17 NOTE — ED ADULT NURSE NOTE - PSH
History of hip replacement, total, right    History of spinal stenosis     H/O lithotripsy    History of appendectomy  age 18 as per patient  History of hip replacement, total, right    History of spinal stenosis

## 2021-04-17 NOTE — CONSULT NOTE ADULT - SUBJECTIVE AND OBJECTIVE BOX
Chief Complaint:  sob    HPI:87 yr old woman with hx of locally metastatic breast ca htn hld hx of chf remote hx of smoking who presents after a fall with sob and rigorss . patients regular cardiologist is dr john sousa . she states that he has told her she has no significant blocked arteries but has chf. she has been on amlodipine and eliquis     PMH:   CHF (congestive heart failure)    Malignant neoplasm of female breast, unspecified estrogen receptor status, unspecified laterality, unspecified site of breast    Atrial fibrillation, unspecified type    Kidney stone      PSH:   History of hip replacement, total, right    History of spinal stenosis    H/O lithotripsy    History of appendectomy    Status post THR (total hip replacement)      Family History:  FAMILY HISTORY:  Family history of CHF (congestive heart failure)        Social History:  Smokinppd x25 yrs stopped over 20 yrs ago  Alcohol:rare  Drugs:no    Allergies:  LAI-2 inhibitors (Unknown)  Lipitor (Unknown)  Originally Entered as [Unknown] reaction to [RS] (Unknown)  statins (Unknown)  sulfa drugs (Unknown)  Zocor (Unknown)      Medications:  acetaminophen   Tablet .. 650 milliGRAM(s) Oral every 6 hours PRN  amLODIPine   Tablet 10 milliGRAM(s) Oral daily  apixaban 2.5 milliGRAM(s) Oral every 12 hours  aspirin  chewable 81 milliGRAM(s) Oral daily  pantoprazole    Tablet 40 milliGRAM(s) Oral before breakfast  predniSONE   Tablet 5 milliGRAM(s) Oral two times a day      REVIEW OF SYSTEMS:  CONSTITUTIONAL: No fever, weight loss, or fatigue  EYES: No eye pain, visual disturbances, or discharge  ENMT:  No difficulty hearing, tinnitus, vertigo; No sinus or throat pain  NECK: No pain or stiffness  BREASTS: No pain, masses, or nipple discharge  RESPIRATORY: see hpi  CARDIOVASCULAR: see hpi  GASTROINTESTINAL: No abdominal or epigastric pain. No nausea, vomiting, or hematemesis; No diarrhea or constipation. No melena or hematochezia.  GENITOURINARY: No dysuria, frequency, hematuria, or incontinence  NEUROLOGICAL: No headaches, memory loss, loss of strength, numbness, or tremors  SKIN: No itching, burning, rashes, or lesions   LYMPH NODES: No enlarged glands  ENDOCRINE: No heat or cold intolerance; No hair loss  MUSCULOSKELETAL: No joint pain or swelling; No muscle, back, or extremity pain  PSYCHIATRIC: No depression, anxiety, mood swings, or difficulty sleeping  HEME/LYMPH: No easy bruising, or bleeding gums  ALLERY AND IMMUNOLOGIC: No hives or eczema    Physical Exam:  T(C): 36.7 (21 @ 08:32), Max: 36.9 (21 @ 00:36)  HR: 60 (21 @ 08:32) (60 - 72)  BP: 146/76 (21 @ 08:32) (122/74 - 167/76)  RR: 18 (21 @ 08:32) (18 - 26)  SpO2: 96% (21 @ 08:32) (92% - 96%)  Wt(kg): --    GENERAL: NAD, well-groomed, well-developed  HEAD:  Atraumatic, Normocephalic  EYES: EOMI, conjunctiva and sclera clear  ENT: Moist mucous membranes,  NECK: Supple, No JVD, no bruits  CHEST/LUNG: Clear to percussion bilaterally; No rales, rhonchi, wheezing, or rubs  HEART: 2/6 late systolic murmur  ABDOMEN: Soft, Nontender, Nondistended; Bowel sounds present  EXTREMITIES:  2+ Peripheral Pulses, No clubbing, cyanosis, or edema  SKIN: No rashes or lesions  NERVOUS SYSTEM:  Alert & Oriented X3, Good concentration; Motor Strength 5/5 B/L upper and lower extremities; DTRs 2+ intact and symmetric    Cardiovascular Diagnostic Testing:  ECG:normal    Labs:                        9.8    15.67 )-----------( 197      ( 2021 06:00 )             29.8         141  |  105  |  34<H>  ----------------------------<  117<H>  4.2   |  24  |  1.26    Ca    8.7      2021 06:00  Mg     2.1         TPro  6.7  /  Alb  3.0<L>  /  TBili  0.5  /  DBili  x   /  AST  22  /  ALT  28  /  AlkPhos  76  -    PT/INR - ( 2021 01:10 )   PT: 12.2 sec;   INR: 1.01 ratio         PTT - ( 2021 01:10 )  PTT:25.4 sec  CARDIAC MARKERS ( 2021 06:00 )  .036 ng/mL / x     / 89 U/L / x     / x      CARDIAC MARKERS ( 2021 01:10 )  .024 ng/mL / x     / x     / x     / x          Serum Pro-Brain Natriuretic Peptide: 1325 pg/mL ( @ 06:00)  Serum Pro-Brain Natriuretic Peptide: 1000 pg/mL ( @ 01:10)    cxr-appearance of bilateral congestion    echo VERY SEVERE LVH NL EF          Imaging:

## 2021-04-17 NOTE — ED PROVIDER NOTE - SECONDARY DIAGNOSIS.
Pneumonia of right middle lobe due to infectious organism Congestive heart failure, unspecified HF chronicity, unspecified heart failure type

## 2021-04-17 NOTE — ED ADULT NURSE NOTE - PMH
CHF (congestive heart failure)    Malignant neoplasm of female breast, unspecified estrogen receptor status, unspecified laterality, unspecified site of breast     Atrial fibrillation, unspecified type  history of ablation in 2016; a fib gone a per patient  CHF (congestive heart failure)    Kidney stone    Malignant neoplasm of female breast, unspecified estrogen receptor status, unspecified laterality, unspecified site of breast

## 2021-04-17 NOTE — ED PROVIDER NOTE - CARE PLAN
Principal Discharge DX:	Sepsis  Secondary Diagnosis:	Pneumonia of right middle lobe due to infectious organism  Secondary Diagnosis:	Congestive heart failure, unspecified HF chronicity, unspecified heart failure type   Principal Discharge DX:	Acute on chronic congestive heart failure, unspecified heart failure type  Secondary Diagnosis:	Pneumonia of right middle lobe due to infectious organism

## 2021-04-17 NOTE — ED PROVIDER NOTE - OBJECTIVE STATEMENT
pt with pmh breast ca, CHF, c/o SOB, moderate, started about 11pm tonight, assoc c chills and shaking and nausea. denies fever.  assoc c mid chest pain, 2/10, pressure.  no cough, rhinorrhea, sore throat. took tylenol 650mg po at ~11pm.  pt tripped and fell 5pm today and landed on left knee, c/o severe L knee pain after. denies hurting chest/torso. did not hit head. no HA, neck pain.  had 2nd pfizer covid vaccine 2 wk ago.

## 2021-04-17 NOTE — H&P ADULT - NSICDXPASTSURGICALHX_GEN_ALL_CORE_FT
PAST SURGICAL HISTORY:  H/O lithotripsy     History of appendectomy age 18 as per patient    History of spinal stenosis cervical and lumbar    Status post THR (total hip replacement) bilateral

## 2021-04-17 NOTE — H&P ADULT - NSHPPHYSICALEXAM_GEN_ALL_CORE
Vital Signs Last 24 Hrs  T(C): 36.9 (17 Apr 2021 00:36), Max: 36.9 (17 Apr 2021 00:36)  T(F): 98.5 (17 Apr 2021 00:36), Max: 98.5 (17 Apr 2021 00:36)  HR: 72 (17 Apr 2021 00:36) (72 - 72)  BP: 150/70 (17 Apr 2021 00:36) (150/70 - 150/70)  BP(mean): --  RR: 24 (17 Apr 2021 00:36) (24 - 24)  SpO2: 94% (17 Apr 2021 00:36) (94% - 94%)  Daily Height in cm: 154.94 (17 Apr 2021 00:36)    Daily   CAPILLARY BLOOD GLUCOSE        I&O's Summary      GENERAL: NAD  HEAD:  Normocephalic  EYES: EOMI, PERRLA, conjunctiva and sclera clear  ENMT: No tonsillar erythema, exudates, or enlargement; Moist mucous membranes, No lesions  NECK: Supple, No JVD, no bruit, normal thyroid  NERVOUS SYSTEM:  Alert & Oriented X3, Good concentration; grossly  Motor Strength 5/5 B/L upper and lower extremities; DTRs 2+ intact and symmetric  CHEST/LUNG: trace crackles at bases, no rhonchi, wheezing, or rubs  HEART: Regular rate and rhythm; No murmurs, rubs, or gallops  ABDOMEN: Soft, Nontender, Nondistended; Bowel sounds present  EXTREMITIES:  2+ Peripheral Pulses, No clubbing, cyanosis, or edema. L anterior knee with mild abrasion and also some bruising and swelling in the infrapatellar region that is tender. decreased L knee flexion sec to pain. no overt effusion  LYMPH: No lymphadenopathy noted  SKIN: No rashes or lesions

## 2021-04-17 NOTE — ED ADULT NURSE NOTE - OBJECTIVE STATEMENT
Pt states she fell at 5:30pm, landed on left knee, hx of left knee replacement. No obvious sign of deformity. Left knee abrasion with mild redness. Pain is 10/10, worsens with ambulation.    Pt states she started to feel shaky and short of breath. Pt concerned.

## 2021-04-17 NOTE — ED PROVIDER NOTE - CARDIAC, MLM
Normal rate, regular rhythm.  Heart sounds S1, S2.  No murmurs, rubs or gallops. 2+ rad pulses. no leg edema

## 2021-04-17 NOTE — ED PROVIDER NOTE - CLINICAL SUMMARY MEDICAL DECISION MAKING FREE TEXT BOX
88 yo F c acute chills, sob, chest pain. mild hypoxia SpO2 88-93 ra.  partial ddx: CHF, ACS, PNA, PE, covid.  afebrile here but pt had tylenol 11pm (2hr ago).  will check labs, cxr, cta chest. give supplemental o2, asa. reassess 86 yo F h/o breast ca, chf, with acute chills, sob, chest pain. mild hypoxia SpO2 88-93 ra.  partial ddx: CHF, ACS, PNA, PE, covid.  afebrile here but pt had tylenol 11pm (2hr ago).  will check labs, cxr, cta chest. give supplemental o2, asa. reassess.  fall with L knee pain today. will check xr knee r/o fx, fall does not seem related to chills/sob.  no chest trauma.     pt c wbc 14, tachypnea.  cxr with some vasc congestion and possible RML infiltrate.  will treat for pna.  sepsis fluids held due to clinical acute chf.

## 2021-04-17 NOTE — H&P ADULT - HISTORY OF PRESENT ILLNESS
87F hx of HTN, HLD, afib s/p ablation 2016, CHF, hx of breast cancer s/p remote bl mastectomy with recurrent breast cancer in L axilla on [place] with side effect of diffuse joint pains which has resolved after starting on Prednisone 5mg bid this past month. She is presenting with SOB, CP. Pt related s/p mechanical fall in a friend's driveway earlier in the evening and landed on L knee (s/p LTKR) was subsequently able to bear weight on it but with significant discomfort. No other injuries. Around 10 PM while in bed developed acute onset of rigors associated with significant dyspnea and mild chest pressure 2/10. Initially felt it was secondary to 'reaction' to the persistent L knee pain but when sxs persisted, asked a friend to drive her to ED. Denied any recent fevers, chills, cough, NVD or dysuria.  In ED, afebrile P:72 BP: 150/70 sat 88-94% on RA. Labs: WBC: 14.2  BUN/cr: 38/1.17. lact: neg trop: 0.024, BNP: 1000 UA neg.  CTA chest with mild pulmonary edema and neg for PE.     s/p Pfz COVID vaccine x 2  Reported CHF has not been an issue s/p ablation. no hx of CAD/MI/CVA

## 2021-04-17 NOTE — ED ADULT NURSE NOTE - NSIMPLEMENTINTERV_GEN_ALL_ED
Implemented All Fall with Harm Risk Interventions:  [place] to call system. Call bell, personal items and telephone within reach. Instruct patient to call for assistance. Room bathroom lighting operational. Non-slip footwear when patient is off stretcher. Physically safe environment: no spills, clutter or unnecessary equipment. Stretcher in lowest position, wheels locked, appropriate side rails in place. Provide visual cue, wrist band, yellow gown, etc. Monitor gait and stability. Monitor for mental status changes and reorient to person, place, and time. Review medications for side effects contributing to fall risk. Reinforce activity limits and safety measures with patient and family. Provide visual clues: red socks.

## 2021-04-17 NOTE — ED PROVIDER NOTE - MUSCULOSKELETAL, MLM
no c/t/L spine ttp. pelvis stable. FROM bilat hips s pain. L knee no gross deformity. mild ant ttp with mild abrasions. pain c attempted flexion. 2+ dp. nl distal sensation and strength

## 2021-04-17 NOTE — H&P ADULT - NSICDXPASTMEDICALHX_GEN_ALL_CORE_FT
PAST MEDICAL HISTORY:  Atrial fibrillation, unspecified type history of ablation in 2016; a fib gone a per patient    CHF (congestive heart failure)     Kidney stone     Malignant neoplasm of female breast, unspecified estrogen receptor status, unspecified laterality, unspecified site of breast

## 2021-04-17 NOTE — H&P ADULT - NSHPREVIEWOFSYSTEMS_GEN_ALL_CORE
CONSTITUTIONAL: No fever, weight loss, or fatigue  EYES: No eye pain, visual disturbances, or discharge  ENMT:  No difficulty hearing, tinnitus, vertigo; No sinus or throat pain  NECK: No pain or stiffness  RESPIRATORY: No cough, wheezing, chills or hemoptysis; ++ shortness of breath  CARDIOVASCULAR: ++ chest pain, palpitations, dizziness, or leg swelling  GASTROINTESTINAL: No abdominal or epigastric pain. No nausea, vomiting, or hematemesis; No diarrhea or constipation. No melena or hematochezia.  GENITOURINARY: No dysuria, frequency, hematuria, or incontinence  NEUROLOGICAL: No headaches, memory loss, loss of strength, numbness, or tremors  SKIN: No itching, burning, rashes, or lesions   LYMPH NODES: No enlarged glands  ENDOCRINE: No heat or cold intolerance; No hair loss  MUSCULOSKELETAL: No joint pain or swelling; No muscle, back, or extremity pain  PSYCHIATRIC: No depression, anxiety, mood swings, or difficulty sleeping  HEME/LYMPH: No easy bruising, or bleeding gums  ALLERY AND IMMUNOLOGIC: No hives or eczema

## 2021-04-17 NOTE — CHART NOTE - NSCHARTNOTEFT_GEN_A_CORE
seen and examined  reported left knee pain. SOB getting better. no CP/palpitation/dizziness/cough/fever/chills. all other ROS neg.   exam: b/l basal crackles. 2/6 SM. no edema or leg TP                          9.8    15.67 )-----------( 197      ( 17 Apr 2021 06:00 )             29.8       04-17    141  |  105  |  34<H>  ----------------------------<  117<H>  4.2   |  24  |  1.26    Ca    8.7      17 Apr 2021 06:00  Mg     2.1     04-17    TPro  6.7  /  Alb  3.0<L>  /  TBili  0.5  /  DBili  x   /  AST  22  /  ALT  28  /  AlkPhos  76  04-17    CARDIAC MARKERS ( 17 Apr 2021 11:05 )  x     / x     / 75 U/L / x     / x      CARDIAC MARKERS ( 17 Apr 2021 06:00 )  .036 ng/mL / x     / 89 U/L / x     / x      CARDIAC MARKERS ( 17 Apr 2021 01:10 )  .024 ng/mL / x     / x     / x     / x            Procalcitonin, Serum (04.17.21 @ 06:00)    Procalcitonin, Serum: 0.18: Procalcitonin (PCT) Interpretation (ng/mL) - Diagnosis of systemic  bacterial infection/sepsis  PCT < 0.5: Systemic infection (sepsis) is not likely and risk for  progression to severe systemic infection is low. Local bacterial  infection is possible. If early sepsis is suspected clinically, PCT  should be re-assessed in 6-24 hours.  PCT >/= 0.5 but < 2.0: Systemic infection (sepsis) is possible, but other  conditions are known to elevate PCT as well. Moderate risk for  progression to severe systemic infection. The patient should be closely  monitored both clinically and by re-assessing PCT within 6-24 hours.  PCT >/= 2.0 but < 10.0: Systemic infection (sepsis) is likely, unless  other causes are known. High risk of progression to severe systemic  infection (severe sepsis/septic shock).  PCT >/= 10.0: Important systemic inflammatory response, almost  exclusively due to severe bacterial sepsis or septic shock. High  likelihood of severe sepsis or septic shock. ng/mL    < from: CT Angio Chest w/ IV Cont (04.17.21 @ 02:15) >    IMPRESSION:  No pulmonary embolism.    New interlobular septal thickening and patchy groundglass opacities likely representing mild pulmonary edema.    Mild increased size mediastinal lymph nodes as above.    < end of copied text >    a/p:  SOb due to CHF, acute on chronic diastolic.: c/w lasix. I and O. daily weight  Knee pain: f/u xray report. apparently does not looks like fracture. pain meds  c/w other rx  f/u TTE  possible DC tomorrow if cardio clears

## 2021-04-17 NOTE — H&P ADULT - NSHPLABSRESULTS_GEN_ALL_CORE
10.3   14.16 )-----------( 207      ( 2021 01:10 )             32.4           139  |  105  |  38<H>  ----------------------------<  120<H>  3.8   |  25  |  1.17    Ca    9.3      2021 01:10    TPro  6.7  /  Alb  3.0<L>  /  TBili  0.4  /  DBili  x   /  AST  31  /  ALT  31  /  AlkPhos  82      Lactate, Blood: 1.6 mmol/L ( @ 01:10)       LIVER FUNCTIONS - ( 2021 01:10 )  Alb: 3.0 g/dL / Pro: 6.7 g/dL / ALK PHOS: 82 U/L / ALT: 31 U/L / AST: 31 U/L / GGT: x             Lipase, Serum: 106 U/L (21 @ 01:10)    PT/INR - ( 2021 01:10 )   PT: 12.2 sec;   INR: 1.01 ratio         PTT - ( 2021 01:10 )  PTT:25.4 sec    CARDIAC MARKERS ( 2021 01:10 )  .024 ng/mL / x     / x     / x     / x          Serum Pro-Brain Natriuretic Peptide: 1000 pg/mL (21 @ 01:10)    Urinalysis Basic - ( 2021 02:30 )    Color: Yellow / Appearance: Slightly Turbid / S.015 / pH: x  Gluc: x / Ketone: Negative  / Bili: Negative / Urobili: Negative   Blood: x / Protein: 100 / Nitrite: Negative   Leuk Esterase: Negative / RBC: 0-4 /HPF / WBC 0-2 /HPF   Sq Epi: x / Non Sq Epi: Neg.-Few / Bacteria: x        EKG: NSR at 71bpm, LAD, no acute ST changes.       CXR: wet read some congestive changes     < from: CT Angio Chest w/ IV Cont (21 @ 02:15) >    IMPRESSION:  No pulmonary embolism.    New interlobular septal thickening and patchy groundglass opacities likely representing mild pulmonary edema.    Mild increased size mediastinal lymph nodes as above.    < end of copied text >

## 2021-04-18 ENCOUNTER — TRANSCRIPTION ENCOUNTER (OUTPATIENT)
Age: 86
End: 2021-04-18

## 2021-04-18 VITALS
OXYGEN SATURATION: 91 % | DIASTOLIC BLOOD PRESSURE: 68 MMHG | SYSTOLIC BLOOD PRESSURE: 120 MMHG | TEMPERATURE: 98 F | RESPIRATION RATE: 16 BRPM | HEART RATE: 63 BPM

## 2021-04-18 LAB
ANION GAP SERPL CALC-SCNC: 12 MMOL/L — SIGNIFICANT CHANGE UP (ref 5–17)
BASOPHILS # BLD AUTO: 0.05 K/UL — SIGNIFICANT CHANGE UP (ref 0–0.2)
BASOPHILS NFR BLD AUTO: 0.5 % — SIGNIFICANT CHANGE UP (ref 0–2)
BUN SERPL-MCNC: 25 MG/DL — HIGH (ref 7–23)
CALCIUM SERPL-MCNC: 9.2 MG/DL — SIGNIFICANT CHANGE UP (ref 8.4–10.5)
CHLORIDE SERPL-SCNC: 108 MMOL/L — SIGNIFICANT CHANGE UP (ref 96–108)
CO2 SERPL-SCNC: 23 MMOL/L — SIGNIFICANT CHANGE UP (ref 22–31)
COVID-19 SPIKE DOMAIN AB INTERP: POSITIVE
COVID-19 SPIKE DOMAIN ANTIBODY RESULT: >250 U/ML — HIGH
CREAT SERPL-MCNC: 1.08 MG/DL — SIGNIFICANT CHANGE UP (ref 0.5–1.3)
CULTURE RESULTS: SIGNIFICANT CHANGE UP
EOSINOPHIL # BLD AUTO: 0.6 K/UL — HIGH (ref 0–0.5)
EOSINOPHIL NFR BLD AUTO: 5.7 % — SIGNIFICANT CHANGE UP (ref 0–6)
GLUCOSE SERPL-MCNC: 107 MG/DL — HIGH (ref 70–99)
HCT VFR BLD CALC: 33.2 % — LOW (ref 34.5–45)
HGB BLD-MCNC: 10.7 G/DL — LOW (ref 11.5–15.5)
IMM GRANULOCYTES NFR BLD AUTO: 0.5 % — SIGNIFICANT CHANGE UP (ref 0–1.5)
LYMPHOCYTES # BLD AUTO: 1 K/UL — SIGNIFICANT CHANGE UP (ref 1–3.3)
LYMPHOCYTES # BLD AUTO: 9.5 % — LOW (ref 13–44)
MCHC RBC-ENTMCNC: 27.6 PG — SIGNIFICANT CHANGE UP (ref 27–34)
MCHC RBC-ENTMCNC: 32.2 GM/DL — SIGNIFICANT CHANGE UP (ref 32–36)
MCV RBC AUTO: 85.8 FL — SIGNIFICANT CHANGE UP (ref 80–100)
MONOCYTES # BLD AUTO: 0.35 K/UL — SIGNIFICANT CHANGE UP (ref 0–0.9)
MONOCYTES NFR BLD AUTO: 3.3 % — SIGNIFICANT CHANGE UP (ref 2–14)
NEUTROPHILS # BLD AUTO: 8.51 K/UL — HIGH (ref 1.8–7.4)
NEUTROPHILS NFR BLD AUTO: 80.5 % — HIGH (ref 43–77)
NRBC # BLD: 0 /100 WBCS — SIGNIFICANT CHANGE UP (ref 0–0)
PLATELET # BLD AUTO: 183 K/UL — SIGNIFICANT CHANGE UP (ref 150–400)
POTASSIUM SERPL-MCNC: 4.2 MMOL/L — SIGNIFICANT CHANGE UP (ref 3.5–5.3)
POTASSIUM SERPL-SCNC: 4.2 MMOL/L — SIGNIFICANT CHANGE UP (ref 3.5–5.3)
RBC # BLD: 3.87 M/UL — SIGNIFICANT CHANGE UP (ref 3.8–5.2)
RBC # FLD: 17 % — HIGH (ref 10.3–14.5)
SARS-COV-2 IGG+IGM SERPL QL IA: >250 U/ML — HIGH
SARS-COV-2 IGG+IGM SERPL QL IA: POSITIVE
SODIUM SERPL-SCNC: 143 MMOL/L — SIGNIFICANT CHANGE UP (ref 135–145)
SPECIMEN SOURCE: SIGNIFICANT CHANGE UP
WBC # BLD: 10.56 K/UL — HIGH (ref 3.8–10.5)
WBC # FLD AUTO: 10.56 K/UL — HIGH (ref 3.8–10.5)

## 2021-04-18 PROCEDURE — 83690 ASSAY OF LIPASE: CPT

## 2021-04-18 PROCEDURE — 36415 COLL VENOUS BLD VENIPUNCTURE: CPT

## 2021-04-18 PROCEDURE — 99232 SBSQ HOSP IP/OBS MODERATE 35: CPT

## 2021-04-18 PROCEDURE — 71045 X-RAY EXAM CHEST 1 VIEW: CPT

## 2021-04-18 PROCEDURE — 82550 ASSAY OF CK (CPK): CPT

## 2021-04-18 PROCEDURE — 83735 ASSAY OF MAGNESIUM: CPT

## 2021-04-18 PROCEDURE — 80048 BASIC METABOLIC PNL TOTAL CA: CPT

## 2021-04-18 PROCEDURE — 83605 ASSAY OF LACTIC ACID: CPT

## 2021-04-18 PROCEDURE — 84484 ASSAY OF TROPONIN QUANT: CPT

## 2021-04-18 PROCEDURE — 80053 COMPREHEN METABOLIC PANEL: CPT

## 2021-04-18 PROCEDURE — 85025 COMPLETE CBC W/AUTO DIFF WBC: CPT

## 2021-04-18 PROCEDURE — 85730 THROMBOPLASTIN TIME PARTIAL: CPT

## 2021-04-18 PROCEDURE — 87635 SARS-COV-2 COVID-19 AMP PRB: CPT

## 2021-04-18 PROCEDURE — 99285 EMERGENCY DEPT VISIT HI MDM: CPT | Mod: 25

## 2021-04-18 PROCEDURE — 71045 X-RAY EXAM CHEST 1 VIEW: CPT | Mod: 26

## 2021-04-18 PROCEDURE — 71275 CT ANGIOGRAPHY CHEST: CPT

## 2021-04-18 PROCEDURE — 99239 HOSP IP/OBS DSCHRG MGMT >30: CPT

## 2021-04-18 PROCEDURE — 96365 THER/PROPH/DIAG IV INF INIT: CPT

## 2021-04-18 PROCEDURE — 84443 ASSAY THYROID STIM HORMONE: CPT

## 2021-04-18 PROCEDURE — 86140 C-REACTIVE PROTEIN: CPT

## 2021-04-18 PROCEDURE — 85610 PROTHROMBIN TIME: CPT

## 2021-04-18 PROCEDURE — 93306 TTE W/DOPPLER COMPLETE: CPT

## 2021-04-18 PROCEDURE — 87040 BLOOD CULTURE FOR BACTERIA: CPT

## 2021-04-18 PROCEDURE — 93005 ELECTROCARDIOGRAM TRACING: CPT

## 2021-04-18 PROCEDURE — 86769 SARS-COV-2 COVID-19 ANTIBODY: CPT

## 2021-04-18 PROCEDURE — 96375 TX/PRO/DX INJ NEW DRUG ADDON: CPT

## 2021-04-18 PROCEDURE — 83880 ASSAY OF NATRIURETIC PEPTIDE: CPT

## 2021-04-18 PROCEDURE — 73562 X-RAY EXAM OF KNEE 3: CPT

## 2021-04-18 PROCEDURE — 84145 PROCALCITONIN (PCT): CPT

## 2021-04-18 PROCEDURE — 87086 URINE CULTURE/COLONY COUNT: CPT

## 2021-04-18 PROCEDURE — 81001 URINALYSIS AUTO W/SCOPE: CPT

## 2021-04-18 RX ORDER — FULVESTRANT 50 MG/ML
0 INJECTION INTRAMUSCULAR
Qty: 0 | Refills: 0 | DISCHARGE

## 2021-04-18 RX ORDER — ACETAMINOPHEN 500 MG
3 TABLET ORAL
Qty: 0 | Refills: 0 | DISCHARGE
Start: 2021-04-18

## 2021-04-18 RX ORDER — PANTOPRAZOLE SODIUM 20 MG/1
1 TABLET, DELAYED RELEASE ORAL
Qty: 30 | Refills: 0
Start: 2021-04-18 | End: 2021-05-17

## 2021-04-18 RX ORDER — LIDOCAINE 4 G/100G
1 CREAM TOPICAL
Qty: 10 | Refills: 0
Start: 2021-04-18

## 2021-04-18 RX ORDER — TRAMADOL HYDROCHLORIDE 50 MG/1
0.5 TABLET ORAL
Qty: 12 | Refills: 0
Start: 2021-04-18

## 2021-04-18 RX ORDER — FUROSEMIDE 40 MG
1 TABLET ORAL
Qty: 30 | Refills: 0
Start: 2021-04-18 | End: 2021-05-17

## 2021-04-18 RX ADMIN — PANTOPRAZOLE SODIUM 40 MILLIGRAM(S): 20 TABLET, DELAYED RELEASE ORAL at 06:49

## 2021-04-18 RX ADMIN — Medication 975 MILLIGRAM(S): at 15:06

## 2021-04-18 RX ADMIN — APIXABAN 2.5 MILLIGRAM(S): 2.5 TABLET, FILM COATED ORAL at 06:48

## 2021-04-18 RX ADMIN — Medication 20 MILLIGRAM(S): at 06:49

## 2021-04-18 RX ADMIN — Medication 975 MILLIGRAM(S): at 06:49

## 2021-04-18 RX ADMIN — AMLODIPINE BESYLATE 10 MILLIGRAM(S): 2.5 TABLET ORAL at 06:49

## 2021-04-18 RX ADMIN — Medication 5 MILLIGRAM(S): at 06:49

## 2021-04-18 RX ADMIN — Medication 81 MILLIGRAM(S): at 12:14

## 2021-04-18 NOTE — DISCHARGE NOTE PROVIDER - CARE PROVIDER_API CALL
Angelo Pang)  Medicine  [address], [address]  [place] [address]  Phone: [phone]  Fax: [phone]  Follow Up Time:     Aren Foley)  Cardiology; Internal Medicine  [address], [address]  [place] [address]  Phone: [phone]  Fax: [phone]  Follow Up Time:

## 2021-04-18 NOTE — PROGRESS NOTE ADULT - SUBJECTIVE AND OBJECTIVE BOX
Follow up for chf  SUBJ: patient feeling well. no sob with exertion  PMH  CHF (congestive heart failure)    Malignant neoplasm of female breast, unspecified estrogen receptor status, unspecified laterality, unspecified site of breast    Atrial fibrillation, unspecified type    Kidney stone        MEDICATIONS  (STANDING):  acetaminophen   Tablet .. 975 milliGRAM(s) Oral every 8 hours  amLODIPine   Tablet 10 milliGRAM(s) Oral daily  apixaban 2.5 milliGRAM(s) Oral every 12 hours  aspirin  chewable 81 milliGRAM(s) Oral daily  furosemide   Injectable 20 milliGRAM(s) IV Push daily  pantoprazole    Tablet 40 milliGRAM(s) Oral before breakfast  predniSONE   Tablet 5 milliGRAM(s) Oral two times a day    MEDICATIONS  (PRN):  traMADol 25 milliGRAM(s) Oral every 6 hours PRN Moderate Pain (4 - 6)  traMADol 50 milliGRAM(s) Oral every 6 hours PRN Severe Pain (7 - 10)        PHYSICAL EXAM:  Vital Signs Last 24 Hrs  T(C): 36.7 (18 Apr 2021 07:52), Max: 37.2 (17 Apr 2021 19:26)  T(F): 98 (18 Apr 2021 07:52), Max: 98.9 (17 Apr 2021 19:26)  HR: 61 (18 Apr 2021 07:52) (61 - 70)  BP: 117/67 (18 Apr 2021 07:52) (117/67 - 151/76)  BP(mean): --  RR: 17 (18 Apr 2021 07:52) (16 - 18)  SpO2: 94% (18 Apr 2021 09:15) (93% - 97%)    GENERAL: NAD, well-groomed, well-developed  HEAD:  Atraumatic, Normocephalic  EYES: EOMI, PERRLA, conjunctiva and sclera clear  ENT: Moist mucous membranes,  NECK: Supple, No JVD, no bruits  CHEST/LUNG: Clear to percussion bilaterally; No rales, rhonchi, wheezing, or rubs  HEART: Regular rate and rhythm; No murmurs, rubs, or gallops PMI non displaced.  ABDOMEN: Soft, Nontender, Nondistended; Bowel sounds present  EXTREMITIES:  2+ Peripheral Pulses, No clubbing, cyanosis, or edema  SKIN: No rashes or lesions  NERVOUS SYSTEM:  Alert & Oriented X3, Good concentration; Motor Strength 5/5 B/L upper and lower extremities; DTRs 2+ intact and symmetric      TELEMETRY:rsr        LABS:                        10.7   10.56 )-----------( 183      ( 18 Apr 2021 05:45 )             33.2     04-18    143  |  108  |  25<H>  ----------------------------<  107<H>  4.2   |  23  |  1.08    Ca    9.2      18 Apr 2021 05:45  Mg     2.1     04-17    TPro  6.7  /  Alb  3.0<L>  /  TBili  0.5  /  DBili  x   /  AST  22  /  ALT  28  /  AlkPhos  76  04-17    CARDIAC MARKERS ( 17 Apr 2021 11:05 )  <.017 ng/mL / x     / 75 U/L / x     / x      CARDIAC MARKERS ( 17 Apr 2021 06:00 )  .036 ng/mL / x     / 89 U/L / x     / x      CARDIAC MARKERS ( 17 Apr 2021 01:10 )  .024 ng/mL / x     / x     / x     / x          PT/INR - ( 17 Apr 2021 01:10 )   PT: 12.2 sec;   INR: 1.01 ratio         PTT - ( 17 Apr 2021 01:10 )  PTT:25.4 sec    I&O's Summary    BNP    RADIOLOGY & ADDITIONAL STUDIES:cxr- mild improvement in congestive picture          
Medicine Progress Note    Patient is a 87y old  Female who presents with a chief complaint of chest pain, SOB (2021 11:20)      SUBJECTIVE / OVERNIGHT EVENTS:  no complaints  wants to go home  SOB better    ADDITIONAL REVIEW OF SYSTEMS:  no cough/fever/chills/CP/sob/palpitation/dizziness/ abd pain/nausea/vomiting/diarrhea/constipation/headaches. all other ROS neg    MEDICATIONS  (STANDING):  acetaminophen   Tablet .. 975 milliGRAM(s) Oral every 8 hours  amLODIPine   Tablet 10 milliGRAM(s) Oral daily  apixaban 2.5 milliGRAM(s) Oral every 12 hours  aspirin  chewable 81 milliGRAM(s) Oral daily  furosemide   Injectable 20 milliGRAM(s) IV Push daily  pantoprazole    Tablet 40 milliGRAM(s) Oral before breakfast  predniSONE   Tablet 5 milliGRAM(s) Oral two times a day    MEDICATIONS  (PRN):  traMADol 25 milliGRAM(s) Oral every 6 hours PRN Moderate Pain (4 - 6)  traMADol 50 milliGRAM(s) Oral every 6 hours PRN Severe Pain (7 - 10)    CAPILLARY BLOOD GLUCOSE        I&O's Summary      PHYSICAL EXAM:  Vital Signs Last 24 Hrs  T(C): 36.7 (2021 07:52), Max: 37.2 (2021 19:26)  T(F): 98 (2021 07:52), Max: 98.9 (2021 19:26)  HR: 61 (2021 07:52) (61 - 70)  BP: 117/67 (2021 07:52) (117/67 - 151/76)  BP(mean): --  RR: 17 (2021 07:52) (16 - 18)  SpO2: 95% (2021 07:52) (93% - 97%)    GENERAL: Not in distress. Alert    HEENT:  MMM  NECK: Supple.    CARDIOVASCULAR: RRR S1, S2. No murmur/rubs/gallop  LUNGS: BLAE+, no rales, no wheezing, no rhonchi.    ABDOMEN: ND. Soft,  NT, no guarding / rebound / rigidity. BS normoactive. No CVA tenderness.    EXTREMITIES: no edema. no leg or calf TP.  SKIN: no rash.  NEUROLOGIC: AAO*3. grossly intact  PSYCHIATRIC: Calm.  No agitation.    LABS:                        10.7   10.56 )-----------( 183      ( 2021 05:45 )             33.2     04-18    143  |  108  |  25<H>  ----------------------------<  107<H>  4.2   |  23  |  1.08    Ca    9.2      2021 05:45  Mg     2.1     -17    TPro  6.7  /  Alb  3.0<L>  /  TBili  0.5  /  DBili  x   /  AST  22  /  ALT  28  /  AlkPhos  76  04-17    PT/INR - ( 2021 01:10 )   PT: 12.2 sec;   INR: 1.01 ratio         PTT - ( 2021 01:10 )  PTT:25.4 sec  CARDIAC MARKERS ( 2021 11:05 )  <.017 ng/mL / x     / 75 U/L / x     / x      CARDIAC MARKERS ( 2021 06:00 )  .036 ng/mL / x     / 89 U/L / x     / x      CARDIAC MARKERS ( 2021 01:10 )  .024 ng/mL / x     / x     / x     / x          Urinalysis Basic - ( 2021 02:30 )    Color: Yellow / Appearance: Slightly Turbid / S.015 / pH: x  Gluc: x / Ketone: Negative  / Bili: Negative / Urobili: Negative   Blood: x / Protein: 100 / Nitrite: Negative   Leuk Esterase: Negative / RBC: 0-4 /HPF / WBC 0-2 /HPF   Sq Epi: x / Non Sq Epi: Neg.-Few / Bacteria: x        Culture - Blood (collected 2021 01:10)  Source: .Blood Blood  Preliminary Report (2021 05:00):    No growth to date.    Culture - Blood (collected 2021 01:10)  Source: .Blood Blood  Preliminary Report (2021 05:00):    No growth to date.      COVID-19 PCR: NotDetec (2021 01:10)      RADIOLOGY & ADDITIONAL TESTS:  Imaging from Last 24 Hours:    < from: Xray Knee 3 Views, Left (21 @ 01:57) >  MPRESSION:  Knee prosthetic components in proper anatomical alignment.  No fracture, dislocation or joint effusion.    < end of copied text >      < from: CT Angio Chest w/ IV Cont (21 @ 02:15) >  IMPRESSION:  No pulmonary embolism.    New interlobular septal thickening and patchy groundglass opacities likely representing mild pulmonary edema.    Mild increased size mediastinal lymph nodes as above.    < end of copied text >      Electrocardiogram/QTc Interval:    COORDINATION OF CARE:  Care Discussed with Consultants/Other Providers:

## 2021-04-18 NOTE — DISCHARGE NOTE PROVIDER - NSDCFUADDAPPT_GEN_ALL_CORE_FT
We will call on Monday to make you a hospital follow-up appointment with Dr. Pang [phone] and will call you with the date and time.

## 2021-04-18 NOTE — DISCHARGE NOTE PROVIDER - HOSPITAL COURSE
87F hx of HTN, HLD, afib s/p ablation 2016, CHF, hx of breast cancer s/p remote bl mastectomy with recurrent breast cancer in L axilla pw SOB, CP, rigors with acute CHF    #CHF with chest pain  s/p asa and IV Lasix in ED.   CTA chest neg for PE.   trend trops, serial EKGs neg  ECHO, severe LVH. normal systolic function  cardiology consulted. suggested to stay one more day for further diuresis IV but patient wants to leave. will follow up with her cardiologist OP  cont asa, eliquis and amlodipine for now. DC with PO lasix. risk/benefit/alt discussed  had 'hives' with all the statins in the past.   weights, strict I/Os  CXr this am: [ prelim]: congestion. slightly better compared to 4/17. clinically better. O2 sat on RA during ambulation 91%. at rest 92-93%    #Leukocytosis  s/p Ceftriaxone and azithromycin in ED  hold additional antibxs for now as no evidence for PNA or UTI with neg procalcitonin.   may be sec to steroid use.     #L knee pain   xray knee: prosthesis intact. no fracture  - seen by PT: no skilled PT need    #Recurrent breast cancer  cont Prednisone 5mg bid as part of tmt for 'side effects of Fulvestrant'    #? sleep apnoea  - patient snores and work of breathing slightly increased during deep sleep. f/u with PCP and get sleep study to r/o sleep apnoea. patient was on O2 while sleeping in hospital. will check nocturnal O2 sat on RA during RA if stays    #DVT/GI proph  on ELiquis, PPI.    #GOC  Pt wants at least '3mins of CPR' and wants intubation for any reversible condition.     Dispo: cardiology cleared for DC with OP follow up as patient does not want to stay    updated friend Roma Jama [phone]    plan of care discussed with the patient. return precautions discussed. verbalized understanding.     Time spent 60 min    code: FUll    PCP:  Dr. Pang [ informed]

## 2021-04-18 NOTE — DISCHARGE NOTE NURSING/CASE MANAGEMENT/SOCIAL WORK - PATIENT PORTAL LINK FT
You can access the FollowMyHealth Patient Portal offered by [facility] by registering at the following website: [email or web address]. By joining [facility]’s FollowMyHealth portal, you will also be able to view your health information using other applications (apps) compatible with our system.

## 2021-04-18 NOTE — DISCHARGE NOTE NURSING/CASE MANAGEMENT/SOCIAL WORK - NSDCFUADDAPPT_GEN_ALL_CORE_FT
We will call on Monday to make you a hospital follow-up appointment with Dr. Pang [phone] and will call you with the date and time.

## 2021-04-18 NOTE — DISCHARGE NOTE PROVIDER - NSDCMRMEDTOKEN_GEN_ALL_CORE_FT
acetaminophen 325 mg oral tablet: 3 tab(s) orally every 8 hours  amLODIPine 10 mg oral tablet: 1 tab(s) orally once a day  calcium citrate: 50mg twice a day-liquid form  Co Q-10 100 mg oral capsule: 1 cap(s) orally once a day  Eliquis 2.5 mg oral tablet: 1 tab(s) orally 2 times a day  Lasix 40 mg oral tablet: 1 tab(s) orally once a day   Lidoderm 5% topical film: Apply topically to affected area [ knee] once a day   pantoprazole 40 mg oral delayed release tablet: 1 tab(s) orally once a day (before a meal)  predniSONE 5 mg oral tablet: orally 2 times a day  Probiotic Formula oral capsule: 1 cap(s) orally once a day  traMADol 50 mg oral tablet: 0.5-1 tab(s) orally every 8 hours as needed for knee pain MDD:3  Vitamin C: orally once a day

## 2021-04-18 NOTE — PHYSICAL THERAPY INITIAL EVALUATION ADULT - ADDITIONAL COMMENTS
pt reports she lives alone, completely independent with ADLs and mobility, no assistive devices, 1 step to enter home, none inside, has a son who lives nearby and checks in often

## 2021-04-18 NOTE — PHYSICAL THERAPY INITIAL EVALUATION ADULT - PERTINENT HX OF CURRENT PROBLEM, REHAB EVAL
pt is an 87 year old female with hx breast CA recurrent CA to left axilla, side effect of medications included diffuse joint pain, presenting with SOB, CP, s/p mechanical fall in friends driveway landing on her left knee, later that night developed acute onset of rigors with dyspnea and chest pressure, CTA (-) PE, left knee xrays (-)

## 2021-04-18 NOTE — DISCHARGE NOTE PROVIDER - NSDCFUSCHEDAPPT_GEN_ALL_CORE_FT
ROGE SAINI ; 05/04/2021 ; NPP [facility]  ROGE SAINI ; 05/04/2021 ; [facility]  ROGE SAINI ; 06/01/2021 ; [facility]  ROGE SAINI ; 06/01/2021 ; [facility]  ROGE SAINI ; 06/29/2021 ; [facility]  ROGE SAINI ; 06/29/2021 ; [facility]

## 2021-04-18 NOTE — DISCHARGE NOTE PROVIDER - NSDCCPCAREPLAN_GEN_ALL_CORE_FT
PRINCIPAL DISCHARGE DIAGNOSIS  Diagnosis: Acute on chronic congestive heart failure, unspecified heart failure type  Assessment and Plan of Treatment: diastolic CHF  continue lasix. water restriciton 1 liter per day including food, drinks. see  take weight daily, call cardiology office if weight gain more than 2 IB in 24 hrs or more than 5 IB in 1 week. lasix can cause electrolytes abnormality and kidney dysfunction. please have your electrolytes level [ Ksodium, potassium, creatinine] checked by primary MD in 2-3 days. see cardiology in 1 week. avoid strenuous activity including but not limited to gardening, heavy lifting until cleared by cardiology. prednisone can cause water retention and aggravtes heart failure. please discuss with your doctor tomorrow whether you can stop prednisone.      SECONDARY DISCHARGE DIAGNOSES  Diagnosis: Chest pain  Assessment and Plan of Treatment: all cardiac work up negative. see cardiology in 1 week. return to ER fi worsening or persistent chest pain, shortness or breath, worsenign leg swellign or any other concerning symptoms.    Diagnosis: At risk for sleep apnea  Assessment and Plan of Treatment: laboured breathing and snoring during deep sleep. could be due to CHF/central apnoea. please see primary MD and get sleep study to rule out sleep apnoe as it can have bad effect on heart.

## 2021-04-18 NOTE — PROGRESS NOTE ADULT - ASSESSMENT
Imp  Diastolic chf improved clinically and mild improvement radiographically      Patient advised to stay one more day but does not wish to do so    She is aware of the risks and benefits
87F hx of HTN, HLD, afib s/p ablation 2016, CHF, hx of breast cancer s/p remote bl mastectomy with recurrent breast cancer in L axilla pw SOB, CP, rigors with acute CHF    #CHF with chest pain  s/p asa and IV Lasix in ED.   CTA chest neg for PE.   trend trops, serial EKGs neg  ECHO, severe LVH. normal systolic function  cardiology consulted. suggested to stay one more day for further diuresis IV but patient wants to leave. will follow up with her cardiologist OP  cont asa, eliquis and amlodipine for now. DC with PO lasix. risk/benefit/alt discussed  had 'hives' with all the statins in the past.   weights, strict I/Os  CXr this am: [ prelim]: congestion. slightly better compared to 4/17. clinically better. O2 sat on RA during ambulation 91%. at rest 92-93%    #Leukocytosis  s/p Ceftriaxone and azithromycin in ED  hold additional antibxs for now as no evidence for PNA or UTI with neg procalcitonin.   may be sec to steroid use.     #L knee pain   xray knee: prosthesis intact. no fracture  - seen by PT: no skilled PT need    #Recurrent breast cancer  cont Prednisone 5mg bid as part of tmt for 'side effects of Fulvestrant'    #? sleep apnoea  - patient snores and work of breathing slightly increased during deep sleep. f/u with PCP and get sleep study to r/o sleep apnoea. patient was on O2 while sleeping in hospital. will check nocturnal O2 sat on RA during RA if stays    #DVT/GI proph  on ELiquis, PPI.    #GOC  Pt wants at least '3mins of CPR' and wants intubation for any reversible condition.     Dispo: possible DC today if cardio clears and patient does not want to stay    Wants friend Roma Jama [phone] to be updated daily.

## 2021-04-19 ENCOUNTER — NON-APPOINTMENT (OUTPATIENT)
Age: 86
End: 2021-04-19

## 2021-04-19 PROBLEM — C50.919 MALIGNANT NEOPLASM OF UNSPECIFIED SITE OF UNSPECIFIED FEMALE BREAST: Chronic | Status: ACTIVE | Noted: 2021-04-17

## 2021-04-19 PROBLEM — I48.91 UNSPECIFIED ATRIAL FIBRILLATION: Chronic | Status: ACTIVE | Noted: 2021-04-17

## 2021-04-19 PROBLEM — N20.0 CALCULUS OF KIDNEY: Chronic | Status: ACTIVE | Noted: 2021-04-17

## 2021-04-20 ENCOUNTER — APPOINTMENT (OUTPATIENT)
Dept: CARE COORDINATION | Facility: HOME HEALTH | Age: 86
End: 2021-04-20
Payer: MEDICARE

## 2021-04-20 VITALS
SYSTOLIC BLOOD PRESSURE: 118 MMHG | DIASTOLIC BLOOD PRESSURE: 60 MMHG | RESPIRATION RATE: 18 BRPM | HEART RATE: 66 BPM | OXYGEN SATURATION: 96 %

## 2021-04-20 PROCEDURE — 99496 TRANSJ CARE MGMT HIGH F2F 7D: CPT

## 2021-04-20 RX ORDER — ACETAMINOPHEN 325 MG/1
325 TABLET ORAL EVERY 8 HOURS
Refills: 0 | Status: ACTIVE | COMMUNITY
Start: 2021-04-20

## 2021-04-20 RX ORDER — CHOLECALCIFEROL (VITAMIN D3) 25 MCG
TABLET ORAL
Refills: 0 | Status: DISCONTINUED | COMMUNITY
End: 2021-04-20

## 2021-04-21 ENCOUNTER — APPOINTMENT (OUTPATIENT)
Dept: FAMILY MEDICINE | Facility: CLINIC | Age: 86
End: 2021-04-21
Payer: MEDICARE

## 2021-04-21 VITALS
SYSTOLIC BLOOD PRESSURE: 122 MMHG | HEART RATE: 68 BPM | WEIGHT: 146 LBS | HEIGHT: 62 IN | DIASTOLIC BLOOD PRESSURE: 75 MMHG | RESPIRATION RATE: 20 BRPM | BODY MASS INDEX: 26.87 KG/M2

## 2021-04-21 PROCEDURE — 36415 COLL VENOUS BLD VENIPUNCTURE: CPT

## 2021-04-21 PROCEDURE — 99496 TRANSJ CARE MGMT HIGH F2F 7D: CPT | Mod: 25

## 2021-04-21 NOTE — ASSESSMENT
[FreeTextEntry1] : S/P exacerbation of CHF; pt hemodynamically stable at this encounter, with no evidence of decompensation\par

## 2021-04-21 NOTE — PHYSICAL EXAM
[No Acute Distress] : no acute distress [Hepatojugular Reflux] : no sustained hepatojugular reflux [No Edema] : there was no peripheral edema [Normal] : soft, non-tender, non-distended, no masses palpated, no HSM and normal bowel sounds [Normal Posterior Cervical Nodes] : no posterior cervical lymphadenopathy [Normal Anterior Cervical Nodes] : no anterior cervical lymphadenopathy [Coordination Grossly Intact] : coordination grossly intact [No Focal Deficits] : no focal deficits [Normal Gait] : normal gait [Alert and Oriented x3] : oriented to person, place, and time

## 2021-04-22 LAB
ALBUMIN SERPL ELPH-MCNC: 3.9 G/DL
ALP BLD-CCNC: 71 U/L
ALT SERPL-CCNC: 20 U/L
ANION GAP SERPL CALC-SCNC: 15 MMOL/L
AST SERPL-CCNC: 29 U/L
BASOPHILS # BLD AUTO: 0.05 K/UL
BASOPHILS NFR BLD AUTO: 0.6 %
BILIRUB SERPL-MCNC: 0.3 MG/DL
BUN SERPL-MCNC: 47 MG/DL
CALCIUM SERPL-MCNC: 9.5 MG/DL
CHLORIDE SERPL-SCNC: 103 MMOL/L
CO2 SERPL-SCNC: 23 MMOL/L
CREAT SERPL-MCNC: 1.38 MG/DL
CULTURE RESULTS: SIGNIFICANT CHANGE UP
CULTURE RESULTS: SIGNIFICANT CHANGE UP
EOSINOPHIL # BLD AUTO: 0.17 K/UL
EOSINOPHIL NFR BLD AUTO: 2 %
GLUCOSE SERPL-MCNC: 109 MG/DL
HCT VFR BLD CALC: 33.8 %
HGB BLD-MCNC: 10.1 G/DL
IMM GRANULOCYTES NFR BLD AUTO: 0.6 %
LYMPHOCYTES # BLD AUTO: 0.79 K/UL
LYMPHOCYTES NFR BLD AUTO: 9.4 %
MAN DIFF?: NORMAL
MCHC RBC-ENTMCNC: 27.2 PG
MCHC RBC-ENTMCNC: 29.9 GM/DL
MCV RBC AUTO: 91.1 FL
MONOCYTES # BLD AUTO: 0.41 K/UL
MONOCYTES NFR BLD AUTO: 4.9 %
NEUTROPHILS # BLD AUTO: 6.89 K/UL
NEUTROPHILS NFR BLD AUTO: 82.5 %
PLATELET # BLD AUTO: 254 K/UL
POTASSIUM SERPL-SCNC: 4.6 MMOL/L
PROT SERPL-MCNC: 6.7 G/DL
RBC # BLD: 3.71 M/UL
RBC # FLD: 17.3 %
SODIUM SERPL-SCNC: 140 MMOL/L
SPECIMEN SOURCE: SIGNIFICANT CHANGE UP
SPECIMEN SOURCE: SIGNIFICANT CHANGE UP
WBC # FLD AUTO: 8.36 K/UL

## 2021-04-23 ENCOUNTER — APPOINTMENT (OUTPATIENT)
Dept: HEMATOLOGY ONCOLOGY | Facility: CLINIC | Age: 86
End: 2021-04-23
Payer: MEDICARE

## 2021-04-23 DIAGNOSIS — M25.50 PAIN IN UNSPECIFIED JOINT: ICD-10-CM

## 2021-04-23 PROCEDURE — 99441: CPT | Mod: 95

## 2021-04-24 ENCOUNTER — RESULT REVIEW (OUTPATIENT)
Age: 86
End: 2021-04-24

## 2021-04-26 ENCOUNTER — OUTPATIENT (OUTPATIENT)
Dept: OUTPATIENT SERVICES | Facility: HOSPITAL | Age: 86
LOS: 1 days | End: 2021-04-26
Payer: MEDICARE

## 2021-04-26 ENCOUNTER — APPOINTMENT (OUTPATIENT)
Dept: MRI IMAGING | Facility: IMAGING CENTER | Age: 86
End: 2021-04-26
Payer: MEDICARE

## 2021-04-26 DIAGNOSIS — Z98.890 OTHER SPECIFIED POSTPROCEDURAL STATES: Chronic | ICD-10-CM

## 2021-04-26 DIAGNOSIS — Z96.649 PRESENCE OF UNSPECIFIED ARTIFICIAL HIP JOINT: Chronic | ICD-10-CM

## 2021-04-26 DIAGNOSIS — Z90.49 ACQUIRED ABSENCE OF OTHER SPECIFIED PARTS OF DIGESTIVE TRACT: Chronic | ICD-10-CM

## 2021-04-26 DIAGNOSIS — Z87.39 PERSONAL HISTORY OF OTHER DISEASES OF THE MUSCULOSKELETAL SYSTEM AND CONNECTIVE TISSUE: Chronic | ICD-10-CM

## 2021-04-26 DIAGNOSIS — C50.919 MALIGNANT NEOPLASM OF UNSPECIFIED SITE OF UNSPECIFIED FEMALE BREAST: ICD-10-CM

## 2021-04-26 PROCEDURE — C8908: CPT

## 2021-04-26 PROCEDURE — C8937: CPT

## 2021-04-26 PROCEDURE — G1004: CPT

## 2021-04-26 PROCEDURE — 77049 MRI BREAST C-+ W/CAD BI: CPT | Mod: 26,MG

## 2021-04-26 PROCEDURE — A9585: CPT

## 2021-04-27 NOTE — REVIEW OF SYSTEMS
[Dyspnea on Exertion] : dyspnea on exertion [Negative] : Psychiatric [de-identified] : h/o intermittent vertigo

## 2021-04-27 NOTE — PLAN
[FreeTextEntry1] : A/P:\par \par 1. CHF\par s/p hospitalization for CHF exacerbation, s/p diuresis, with some improvement in her CXR. Recommended she stay an additional day, patient refused. \par Sent home on lasix 40mg daily (new med)\par F/U with cardiology Dr. Velarde and PCP Dr. Pang.\par Advise:\par Enforced with patient need for daily weights. Advised to call for an increase of greater than 2 lbs in a day or 5 pounds in a week.\par Adhere to low salt diet and educated patient on foods that should be  avoided such as processed or fast food.\par Limit fluids to 1 liter a day which is 4-5 glasses.\par Continue medications as ordered. \par Call for any worsening symptoms.\par \par 2. AFib:\par s/p ablation in 2016\par Con't eliquis\par Advise: Call for CP, palpitations, dizziness, SOB\par \par 3. Arthalgias\par 2/2 fulvestrant for recurrent BCA; improved on prednisone BID\par Tylenol, tramadol prn.\par \par 4. Recurrent Breast Cancer:\par Care per ONC.\par \par 5. L knee pain s/p fall:\par tylenol, tramadol prn\par

## 2021-04-27 NOTE — HISTORY OF PRESENT ILLNESS
[Post-hospitalization from ___ Hospital] : Post-hospitalization from [unfilled] Hospital [Admitted on: ___] : The patient was admitted on [unfilled] [Discharged on ___] : discharged on [unfilled] [Discharge Med List] : discharge medication list [Med Reconciliation] : medication reconciliation has been completed [Patient Contacted By: ____] : and contacted by [unfilled] [FreeTextEntry2] [facility]\par Discharge Note Provider [Charted Location:  TELE 0226 W1] [Authored: 18-Apr-2021 12:53]- for Visit: [id number], Complete, Entered, Signed in Full, General\par \par \par  Hospital Course:\par Discharge Date	18-Apr-2021\par Admission Date	17-Apr-2021 02:26\par Reason for Admission	chest pain, SOB\par Hospital Course	\par 87F hx of HTN, HLD, afib s/p ablation 2016, CHF, hx of breast cancer s/p remote bl mastectomy with recurrent breast cancer in L axilla pw SOB, CP, rigors with acute CHF\par \par #CHF with chest pain\par s/p asa and IV Lasix in ED. \par CTA chest neg for PE. \par trend trops, serial EKGs neg\par ECHO, severe LVH. normal systolic function\par cardiology consulted. suggested to stay one more day for further diuresis IV but patient wants to leave. will follow up with her cardiologist OP\par cont asa, eliquis and amlodipine for now. DC with PO lasix. risk/benefit/alt discussed\par had 'hives' with all the statins in the past. \par weights, strict I/Os\par CXr this am: [ prelim]: congestion. slightly better compared to 4/17. clinically better. O2 sat on RA during ambulation 91%. at rest 92-93%\par \par #Leukocytosis\par s/p Ceftriaxone and azithromycin in ED\par hold additional antibxs for now as no evidence for PNA or UTI with neg procalcitonin. \par may be sec to steroid use. \par \par #L knee pain \par xray knee: prosthesis intact. no fracture\par - seen by PT: no skilled PT need\par \par #Recurrent breast cancer\par cont Prednisone 5mg bid as part of tmt for 'side effects of Fulvestrant'\par \par #? sleep apnoea\par - patient snores and work of breathing slightly increased during deep sleep. f/u with PCP and get sleep study to r/o sleep apnoea. patient was on O2 while sleeping in hospital. will check nocturnal O2 sat on RA during RA if stays\par \par #DVT/GI proph\par on ELiquis, PPI.\par \par #GOC\par Pt wants at least '3mins of CPR' and wants intubation for any reversible condition. \par \par Dispo: cardiology cleared for DC with OP follow up as patient does not want to stay\par \par updated friend Roma Jama [phone]\par \par plan of care discussed with the patient. return precautions discussed. verbalized understanding. \par \par Time spent 60 min\par \par code: FUll\par \par PCP:  Dr. Pang [ informed]\par \par  Med Reconciliation:\par Medication Reconciliation Status	Admission Reconciliation is Completed\par Discharge Reconciliation is Completed\par Discharge Medications	acetaminophen 325 mg oral tablet: 3 tab(s) orally every 8 hours\par amLODIPine 10 mg oral tablet: 1 tab(s) orally once a day\par calcium citrate: 50mg twice a day-liquid form\par Co Q-10 100 mg oral capsule: 1 cap(s) orally once a day\par Eliquis 2.5 mg oral tablet: 1 tab(s) orally 2 times a day\par Lasix 40 mg oral tablet: 1 tab(s) orally once a day \par Lidoderm 5% topical film: Apply topically to affected area [ knee] once a day \par pantoprazole 40 mg oral delayed release tablet: 1 tab(s) orally once a day (before a meal)\par predniSONE 5 mg oral tablet: orally 2 times a day\par Probiotic Formula oral capsule: 1 cap(s) orally once a day\par traMADol 50 mg oral tablet: 0.5-1 tab(s) orally every 8 hours as needed for knee pain MDD:3\par Vitamin C: orally once a day\par ,\par ,\par \par  Care Plan/Procedures:\par Goal(s)	To get better and follow your care plan as instructed.\par Discharge Diagnoses, Assessment and Plan of Treatment	PRINCIPAL DISCHARGE DIAGNOSIS\par Diagnosis: Acute on chronic congestive heart failure, unspecified heart failure type\par Assessment and Plan of Treatment: diastolic CHF\par continue lasix. water restriciton 1 liter per day including food, drinks. see  take weight daily, call cardiology office if weight gain more than 2 IB in 24 hrs or more than 5 IB in 1 week. lasix can cause electrolytes abnormality and kidney dysfunction. please have your electrolytes level [ Ksodium, potassium, creatinine] checked by primary MD in 2-3 days. see cardiology in 1 week. avoid strenuous activity including but not limited to gardening, heavy lifting until cleared by cardiology. prednisone can cause water retention and aggravtes heart failure. please discuss with your doctor tomorrow whether you can stop prednisone.\par \par \par SECONDARY DISCHARGE DIAGNOSES\par Diagnosis: Chest pain\par Assessment and Plan of Treatment: all cardiac work up negative. see cardiology in 1 week. return to ER fi worsening or persistent chest pain, shortness or breath, worsenign leg swellign or any other concerning symptoms.\par \par Diagnosis: At risk for sleep apnea\par Assessment and Plan of Treatment: laboured breathing and snoring during deep sleep. could be due to CHF/central apnoea. please see primary MD and get sleep study to rule out sleep apnoe as it can have bad effect on heart.\par \par  Follow Up:\par Care Providers for Follow up (PCP/Outpatient Provider)	Angelo Pang)\[facility] Medicine  [facility]\par [address] [place] [address], [address]\[place] [address]\par Phone: [phone]\par Fax: [phone]\par Follow Up Time: \par \par Aren Foley)\[facility] Cardiology; Internal Medicine\par [address] [place] [address], [address]\[place] [address]\par Phone: [phone]\par Fax: [phone]\par Follow Up Time:\par Patient's Scheduled Appointments	ROGE SAINI ; 05/04/2021 ; [facility]\par ROGE SAINI ; 05/04/2021 ; [facility]\par ROGE SAINI ; 06/01/2021 ; NP [facility] ROGE SAINI ; 06/01/2021 ; [facility]\par ROGE SAINI ; 06/29/2021 ; NP [facility] ROGE SAINI ; 06/29/2021 ; [facility]\par Additional Scheduled Appointments	We will call on Monday to make you a hospital follow-up appointment with Dr. Pang [phone] and will call you with the date and time.\par Diet Instructions	DASH/TLC\par low fat\par Activity	No heavy lifting/straining\par Additional Instructions	fall precautions\par \par  Quality Measures:\par Does the patient have difficulty running errands alone like visiting a doctor’s office or shopping?	No\par Does the patient have difficulty climbing stairs?	No\par Patient Condition	Stable\par Hospice Patient	No\par Cognition: The patient has	No difficulties\par Does the patient have a principal diagnosis of ischemic stroke, hemorrhagic stroke, or TIA?	No\par Does the patient have a principal diagnosis of Acute Myocardial Infarction?	No\par Has the patient had a Percutaneous Coronary Intervention?	No\par Tobacco Usage Within the Last Year	No\par \par  Document Complete:\par Care Provider Seen in Hospital	Shelley Lai\par Physician Section Complete	This document is complete and the patient is ready for discharge.\par For questions about your prescriptions, please call:	[phone]\par Is this contact telephone number correct?	Yes\par \par \par \par Electronic Signatures:\par Shelley Lai)  (Signed 18-Apr-2021 13:39)\par 	Authored: Hospital Course, Med Reconciliation, Care Plan/Procedures, Follow Up, Quality Measures, Document Complete\par \par \par Last Updated: 18-Apr-2021 13:39 by Shelley Lai)\par \par \par

## 2021-04-27 NOTE — PHYSICAL EXAM
[No Acute Distress] : no acute distress [Well Nourished] : well nourished [Well Developed] : well developed [Well-Appearing] : well-appearing [Normal Sclera/Conjunctiva] : normal sclera/conjunctiva [PERRL] : pupils equal round and reactive to light [Normal Outer Ear/Nose] : the outer ears and nose were normal in appearance [Normal Oropharynx] : the oropharynx was normal [Supple] : supple [No Respiratory Distress] : no respiratory distress  [Clear to Auscultation] : lungs were clear to auscultation bilaterally [No Accessory Muscle Use] : no accessory muscle use [Normal Rate] : normal rate  [Regular Rhythm] : with a regular rhythm [Normal S1, S2] : normal S1 and S2 [Pedal Pulses Present] : the pedal pulses are present [No Edema] : there was no peripheral edema [No Extremity Clubbing/Cyanosis] : no extremity clubbing/cyanosis [Soft] : abdomen soft [Non Tender] : non-tender [Non-distended] : non-distended [Normal Bowel Sounds] : normal bowel sounds [No Spinal Tenderness] : no spinal tenderness [No Rash] : no rash [Normal Gait] : normal gait [Coordination Grossly Intact] : coordination grossly intact [No Focal Deficits] : no focal deficits [Normal Affect] : the affect was normal [Alert and Oriented x3] : oriented to person, place, and time [Normal Insight/Judgement] : insight and judgment were intact [de-identified] : o [de-identified] : L Knee mild edema s/p fall, resolving ecchymosis

## 2021-04-27 NOTE — ASSESSMENT
[FreeTextEntry1] : Pt is being seen after discharge home from [facility]. She was admitted on 04/17/2021 for CHF/CP and discharged home on 04/18/2021. \par \par CC:\par Pt is seen at home s/p recent admission for CHF. \par HPI:\par Pt Is a 88 y/o male enrolled in the [facility] program seen at home s/p a recent admission for CHF. Pt was contacted by TCM and med rec was done within 48 hours of DC.\par \par PMH: HTN, HLD, afib s/p ablation 2016, CHF, hx of breast cancer s/p remote bl mastectomy with recurrent breast cancer in L axilla on Fulvestrant with side effect of diffuse joint pains which has resolved after starting on Prednisone 5mg bid this past month.\par \par Presented to ED with c/o SOB, CP. Pt related s/p mechanical fall in a friend's driveway earlier in the evening and landed on L knee (s/p L TKR) was subsequently able to bear weight on it but with significant discomfort. No other injuries. Around 10 PM while in bed she developed acute onset of rigors associated with significant dyspnea and mild chest pressure 2/10. Initially felt it was secondary to 'reaction' to the persistent L knee pain but when sxs persisted, asked a friend to drive her to ED. Denied any recent fevers, chills, cough, NVD or dysuria.  In ED, afebrile P:72 BP: 150/70 sat 88-94% on RA. Labs: WBC: 14.2  BUN/cr: 38/1.17. lact: neg trop: 0.024, BNP: 1000 UA neg.  CTA chest with mild pulmonary edema and neg for PE. \par s/p Pfz COVID vaccine x 2\par Reported CHF has not been an issue s/p ablation. no hx of CAD/MI/CVA\par \par Upon examination A&O x 3 NAD. + [place] when answered door, recovered after resting a few minutes. Denies CP, SOB, headache, abd discomfort or difficulty with bowel or bladder. Reports feeling a little off balance and is using her cane, has h/o vertigo, had the same yesterday took a meclizine with some relief. States it is better than yesterday as is her breathing. Wt stable at 137 was 140 before hospitalization, wt stable, no s/s fluid overload, SOB is improving. No reports of palpitations since ablation for her AFib. Following up with onc for her recurrent breast CA. She has f/u appt with Dr. Pang tomorrow at noon. No HC services in the home

## 2021-04-29 ENCOUNTER — OUTPATIENT (OUTPATIENT)
Dept: OUTPATIENT SERVICES | Facility: HOSPITAL | Age: 86
LOS: 1 days | Discharge: HOME | End: 2021-04-29

## 2021-04-29 ENCOUNTER — APPOINTMENT (OUTPATIENT)
Dept: HEMATOLOGY ONCOLOGY | Facility: CLINIC | Age: 86
End: 2021-04-29
Payer: MEDICARE

## 2021-04-29 VITALS
SYSTOLIC BLOOD PRESSURE: 149 MMHG | HEART RATE: 80 BPM | OXYGEN SATURATION: 96 % | HEIGHT: 62.01 IN | BODY MASS INDEX: 26.92 KG/M2 | RESPIRATION RATE: 17 BRPM | DIASTOLIC BLOOD PRESSURE: 75 MMHG | TEMPERATURE: 97.7 F | WEIGHT: 148.15 LBS

## 2021-04-29 DIAGNOSIS — Z98.890 OTHER SPECIFIED POSTPROCEDURAL STATES: Chronic | ICD-10-CM

## 2021-04-29 DIAGNOSIS — Z90.49 ACQUIRED ABSENCE OF OTHER SPECIFIED PARTS OF DIGESTIVE TRACT: Chronic | ICD-10-CM

## 2021-04-29 DIAGNOSIS — C50.919 MALIGNANT NEOPLASM OF UNSPECIFIED SITE OF UNSPECIFIED FEMALE BREAST: ICD-10-CM

## 2021-04-29 DIAGNOSIS — L03.90 CELLULITIS, UNSPECIFIED: ICD-10-CM

## 2021-04-29 DIAGNOSIS — Z96.649 PRESENCE OF UNSPECIFIED ARTIFICIAL HIP JOINT: Chronic | ICD-10-CM

## 2021-04-29 DIAGNOSIS — Z87.39 PERSONAL HISTORY OF OTHER DISEASES OF THE MUSCULOSKELETAL SYSTEM AND CONNECTIVE TISSUE: Chronic | ICD-10-CM

## 2021-04-29 PROCEDURE — 99214 OFFICE O/P EST MOD 30 MIN: CPT

## 2021-05-04 ENCOUNTER — APPOINTMENT (OUTPATIENT)
Dept: HEMATOLOGY ONCOLOGY | Facility: CLINIC | Age: 86
End: 2021-05-04

## 2021-05-04 ENCOUNTER — APPOINTMENT (OUTPATIENT)
Dept: INFUSION THERAPY | Facility: HOSPITAL | Age: 86
End: 2021-05-04

## 2021-05-19 ENCOUNTER — EMERGENCY (EMERGENCY)
Facility: HOSPITAL | Age: 86
LOS: 1 days | Discharge: HOME | End: 2021-05-19
Attending: EMERGENCY MEDICINE | Admitting: EMERGENCY MEDICINE
Payer: MEDICARE

## 2021-05-19 VITALS
WEIGHT: 139.99 LBS | HEART RATE: 83 BPM | HEIGHT: 61 IN | SYSTOLIC BLOOD PRESSURE: 171 MMHG | RESPIRATION RATE: 18 BRPM | TEMPERATURE: 97 F | DIASTOLIC BLOOD PRESSURE: 69 MMHG | OXYGEN SATURATION: 98 %

## 2021-05-19 VITALS — TEMPERATURE: 99 F

## 2021-05-19 DIAGNOSIS — Z96.649 PRESENCE OF UNSPECIFIED ARTIFICIAL HIP JOINT: Chronic | ICD-10-CM

## 2021-05-19 DIAGNOSIS — Z98.890 OTHER SPECIFIED POSTPROCEDURAL STATES: Chronic | ICD-10-CM

## 2021-05-19 DIAGNOSIS — Z90.49 ACQUIRED ABSENCE OF OTHER SPECIFIED PARTS OF DIGESTIVE TRACT: Chronic | ICD-10-CM

## 2021-05-19 DIAGNOSIS — Z87.39 PERSONAL HISTORY OF OTHER DISEASES OF THE MUSCULOSKELETAL SYSTEM AND CONNECTIVE TISSUE: Chronic | ICD-10-CM

## 2021-05-19 LAB
ALBUMIN SERPL ELPH-MCNC: 3.2 G/DL — LOW (ref 3.3–5)
ALP SERPL-CCNC: 73 U/L — SIGNIFICANT CHANGE UP (ref 40–120)
ALT FLD-CCNC: 32 U/L — SIGNIFICANT CHANGE UP (ref 10–45)
ANION GAP SERPL CALC-SCNC: 11 MMOL/L — SIGNIFICANT CHANGE UP (ref 5–17)
APPEARANCE UR: CLEAR — SIGNIFICANT CHANGE UP
AST SERPL-CCNC: 48 U/L — HIGH (ref 10–40)
BASOPHILS # BLD AUTO: 0.03 K/UL — SIGNIFICANT CHANGE UP (ref 0–0.2)
BASOPHILS NFR BLD AUTO: 0.3 % — SIGNIFICANT CHANGE UP (ref 0–2)
BILIRUB SERPL-MCNC: 0.7 MG/DL — SIGNIFICANT CHANGE UP (ref 0.2–1.2)
BILIRUB UR-MCNC: NEGATIVE — SIGNIFICANT CHANGE UP
BUN SERPL-MCNC: 28 MG/DL — HIGH (ref 7–23)
CALCIUM SERPL-MCNC: 9.3 MG/DL — SIGNIFICANT CHANGE UP (ref 8.4–10.5)
CHLORIDE SERPL-SCNC: 108 MMOL/L — SIGNIFICANT CHANGE UP (ref 96–108)
CO2 SERPL-SCNC: 23 MMOL/L — SIGNIFICANT CHANGE UP (ref 22–31)
COLOR SPEC: YELLOW — SIGNIFICANT CHANGE UP
CREAT SERPL-MCNC: 1.03 MG/DL — SIGNIFICANT CHANGE UP (ref 0.5–1.3)
DIFF PNL FLD: NEGATIVE — SIGNIFICANT CHANGE UP
EOSINOPHIL # BLD AUTO: 0.12 K/UL — SIGNIFICANT CHANGE UP (ref 0–0.5)
EOSINOPHIL NFR BLD AUTO: 1.3 % — SIGNIFICANT CHANGE UP (ref 0–6)
GLUCOSE SERPL-MCNC: 95 MG/DL — SIGNIFICANT CHANGE UP (ref 70–99)
GLUCOSE UR QL: NEGATIVE — SIGNIFICANT CHANGE UP
HCT VFR BLD CALC: 31.6 % — LOW (ref 34.5–45)
HGB BLD-MCNC: 10.2 G/DL — LOW (ref 11.5–15.5)
IMM GRANULOCYTES NFR BLD AUTO: 0.3 % — SIGNIFICANT CHANGE UP (ref 0–1.5)
KETONES UR-MCNC: NEGATIVE — SIGNIFICANT CHANGE UP
LACTATE SERPL-SCNC: 1.5 MMOL/L — SIGNIFICANT CHANGE UP (ref 0.7–2)
LEUKOCYTE ESTERASE UR-ACNC: NEGATIVE — SIGNIFICANT CHANGE UP
LYMPHOCYTES # BLD AUTO: 0.9 K/UL — LOW (ref 1–3.3)
LYMPHOCYTES # BLD AUTO: 9.6 % — LOW (ref 13–44)
MCHC RBC-ENTMCNC: 28.5 PG — SIGNIFICANT CHANGE UP (ref 27–34)
MCHC RBC-ENTMCNC: 32.3 GM/DL — SIGNIFICANT CHANGE UP (ref 32–36)
MCV RBC AUTO: 88.3 FL — SIGNIFICANT CHANGE UP (ref 80–100)
MONOCYTES # BLD AUTO: 0.46 K/UL — SIGNIFICANT CHANGE UP (ref 0–0.9)
MONOCYTES NFR BLD AUTO: 4.9 % — SIGNIFICANT CHANGE UP (ref 2–14)
NEUTROPHILS # BLD AUTO: 7.84 K/UL — HIGH (ref 1.8–7.4)
NEUTROPHILS NFR BLD AUTO: 83.6 % — HIGH (ref 43–77)
NITRITE UR-MCNC: NEGATIVE — SIGNIFICANT CHANGE UP
NRBC # BLD: 0 /100 WBCS — SIGNIFICANT CHANGE UP (ref 0–0)
PH UR: 6 — SIGNIFICANT CHANGE UP (ref 5–8)
PLATELET # BLD AUTO: 210 K/UL — SIGNIFICANT CHANGE UP (ref 150–400)
POTASSIUM SERPL-MCNC: 4.8 MMOL/L — SIGNIFICANT CHANGE UP (ref 3.5–5.3)
POTASSIUM SERPL-SCNC: 4.8 MMOL/L — SIGNIFICANT CHANGE UP (ref 3.5–5.3)
PROT SERPL-MCNC: 6.9 G/DL — SIGNIFICANT CHANGE UP (ref 6–8.3)
PROT UR-MCNC: 30 MG/DL
RBC # BLD: 3.58 M/UL — LOW (ref 3.8–5.2)
RBC # FLD: 17.8 % — HIGH (ref 10.3–14.5)
SODIUM SERPL-SCNC: 142 MMOL/L — SIGNIFICANT CHANGE UP (ref 135–145)
SP GR SPEC: 1.01 — SIGNIFICANT CHANGE UP (ref 1.01–1.02)
UROBILINOGEN FLD QL: NEGATIVE — SIGNIFICANT CHANGE UP
WBC # BLD: 9.38 K/UL — SIGNIFICANT CHANGE UP (ref 3.8–10.5)
WBC # FLD AUTO: 9.38 K/UL — SIGNIFICANT CHANGE UP (ref 3.8–10.5)

## 2021-05-19 PROCEDURE — 36415 COLL VENOUS BLD VENIPUNCTURE: CPT

## 2021-05-19 PROCEDURE — 83605 ASSAY OF LACTIC ACID: CPT

## 2021-05-19 PROCEDURE — 99284 EMERGENCY DEPT VISIT MOD MDM: CPT

## 2021-05-19 PROCEDURE — 81001 URINALYSIS AUTO W/SCOPE: CPT

## 2021-05-19 PROCEDURE — 85025 COMPLETE CBC W/AUTO DIFF WBC: CPT

## 2021-05-19 PROCEDURE — 71045 X-RAY EXAM CHEST 1 VIEW: CPT

## 2021-05-19 PROCEDURE — 71045 X-RAY EXAM CHEST 1 VIEW: CPT | Mod: 26

## 2021-05-19 PROCEDURE — 87086 URINE CULTURE/COLONY COUNT: CPT

## 2021-05-19 PROCEDURE — 74177 CT ABD & PELVIS W/CONTRAST: CPT | Mod: 26,MA

## 2021-05-19 PROCEDURE — 99284 EMERGENCY DEPT VISIT MOD MDM: CPT | Mod: 25

## 2021-05-19 PROCEDURE — 80053 COMPREHEN METABOLIC PANEL: CPT

## 2021-05-19 PROCEDURE — 74177 CT ABD & PELVIS W/CONTRAST: CPT

## 2021-05-19 PROCEDURE — 87040 BLOOD CULTURE FOR BACTERIA: CPT

## 2021-05-19 PROCEDURE — 96360 HYDRATION IV INFUSION INIT: CPT

## 2021-05-19 RX ORDER — ACETAMINOPHEN 500 MG
650 TABLET ORAL ONCE
Refills: 0 | Status: COMPLETED | OUTPATIENT
Start: 2021-05-19 | End: 2021-05-19

## 2021-05-19 RX ORDER — SODIUM CHLORIDE 9 MG/ML
500 INJECTION INTRAMUSCULAR; INTRAVENOUS; SUBCUTANEOUS ONCE
Refills: 0 | Status: COMPLETED | OUTPATIENT
Start: 2021-05-19 | End: 2021-05-19

## 2021-05-19 RX ADMIN — Medication 650 MILLIGRAM(S): at 11:55

## 2021-05-19 RX ADMIN — SODIUM CHLORIDE 500 MILLILITER(S): 9 INJECTION INTRAMUSCULAR; INTRAVENOUS; SUBCUTANEOUS at 11:40

## 2021-05-19 RX ADMIN — SODIUM CHLORIDE 500 MILLILITER(S): 9 INJECTION INTRAMUSCULAR; INTRAVENOUS; SUBCUTANEOUS at 10:40

## 2021-05-19 NOTE — ED PROVIDER NOTE - OBJECTIVE STATEMENT
87F h/o HTN, HLD, Afib s/p ablation in 2016 on Eliquis, CHF, h/o breast ca, appendectomy in the past, p/w 2 day of 1 day of RLQ pain, chills. No nausea, vomiting, fevers, dysuria, hematuria, constipation or diarrhea.

## 2021-05-19 NOTE — ED PROVIDER NOTE - NSFOLLOWUPINSTRUCTIONS_ED_ALL_ED_FT
-Follow up with your doctor in 1-2 days. You have some kidney cysts that they can follow up on  -tylenol for pain  -Return to the ED for worsening pain, fever, vomiting, inability to walk or any concerns  **.  Abdominal Pain    AMBULATORY CARE:    Abdominal pain can be dull, achy, or sharp. You may have pain in one area of your abdomen, or in your entire abdomen. Your pain may be caused by a condition such as constipation, food sensitivity or poisoning, infection, or a blockage. Abdominal pain can also be from a hernia, appendicitis, or an ulcer. Liver, gallbladder, or kidney conditions can also cause abdominal pain. The cause of your abdominal pain may be unknown.    Seek care immediately if:   •You have new chest pain or shortness of breath.      •You have pulsing pain in your upper abdomen or lower back that suddenly becomes constant.      •Your pain is in the right lower abdominal area and worsens with movement.       •You have a fever over 100.4°F (38°C) or shaking chills.       •You are vomiting and cannot keep food or liquids down.       •Your pain does not improve or gets worse over the next 8 to 12 hours.       •You see blood in your vomit or bowel movements, or they look black and tarry.       •Your skin or the whites of your eyes turn yellow.       •You are a woman and have a large amount of vaginal bleeding that is not your monthly period.       Contact your healthcare provider if:   •You have pain in your lower back.       •You are a man and have pain in your testicles.      •You have pain when you urinate.       •You have questions or concerns about your condition or care.      Treatment for abdominal pain may include medicine to calm your stomach, prevent vomiting, or decrease pain.    Follow up with your healthcare provider as directed: Write down your questions so you remember to ask them during your visits.

## 2021-05-19 NOTE — ED PROVIDER NOTE - CLINICAL SUMMARY MEDICAL DECISION MAKING FREE TEXT BOX
Pt with PMHx as above p/w RLQ pain, will check rectal temp, labs, UA, CT a/p, reassess. Pt does not meet sepsis criteria at this time, will hold IVF given h/o CHF Pt with PMHx as above p/w RLQ pain, will check rectal temp, labs, UA, CT a/p, reassess. Pt does not meet sepsis criteria at this time, will hold IVF given h/o CHF    On reassessment pt doing well, results explained, pt states she is aware of lung scarring due to prior PNA

## 2021-05-19 NOTE — ED ADULT NURSE NOTE - PSH
H/O lithotripsy    History of appendectomy  age 18 as per patient  History of spinal stenosis  cervical and lumbar  Status post THR (total hip replacement)  bilateral

## 2021-05-19 NOTE — ED ADULT NURSE NOTE - PMH
Atrial fibrillation, unspecified type  history of ablation in 2016; a fib gone a per patient  CHF (congestive heart failure)    Kidney stone    Malignant neoplasm of female breast, unspecified estrogen receptor status, unspecified laterality, unspecified site of breast

## 2021-05-19 NOTE — ED PROVIDER NOTE - NSFOLLOWUPCLINICS_GEN_ALL_ED_FT
[facility]  Geriatrics  [address]  [place] [address]  Phone: [phone]  Fax:

## 2021-05-19 NOTE — ED ADULT NURSE NOTE - OBJECTIVE STATEMENT
Pt presents to ED from home for right lower quadrant pain since yesterday that radiates down the front of her right leg. Denies any injury to the area. Denies any n/v/d.

## 2021-05-19 NOTE — ED PROVIDER NOTE - PATIENT PORTAL LINK FT
You can access the FollowMyHealth Patient Portal offered by [facility] [place] by registering at the following website: [email or web address]. By joining "[employer]’s FollowMyHealth portal, you will also be able to view your health information using other applications (apps) compatible with our system.

## 2021-05-20 LAB
CULTURE RESULTS: SIGNIFICANT CHANGE UP
SPECIMEN SOURCE: SIGNIFICANT CHANGE UP

## 2021-05-24 LAB
CULTURE RESULTS: SIGNIFICANT CHANGE UP
CULTURE RESULTS: SIGNIFICANT CHANGE UP
SPECIMEN SOURCE: SIGNIFICANT CHANGE UP
SPECIMEN SOURCE: SIGNIFICANT CHANGE UP

## 2021-05-28 ENCOUNTER — OUTPATIENT (OUTPATIENT)
Dept: OUTPATIENT SERVICES | Facility: HOSPITAL | Age: 86
LOS: 1 days | Discharge: HOME | End: 2021-05-28

## 2021-05-28 DIAGNOSIS — Z98.890 OTHER SPECIFIED POSTPROCEDURAL STATES: Chronic | ICD-10-CM

## 2021-05-28 DIAGNOSIS — Z90.49 ACQUIRED ABSENCE OF OTHER SPECIFIED PARTS OF DIGESTIVE TRACT: Chronic | ICD-10-CM

## 2021-05-28 DIAGNOSIS — Z96.649 PRESENCE OF UNSPECIFIED ARTIFICIAL HIP JOINT: Chronic | ICD-10-CM

## 2021-05-28 DIAGNOSIS — C50.919 MALIGNANT NEOPLASM OF UNSPECIFIED SITE OF UNSPECIFIED FEMALE BREAST: ICD-10-CM

## 2021-05-28 DIAGNOSIS — Z87.39 PERSONAL HISTORY OF OTHER DISEASES OF THE MUSCULOSKELETAL SYSTEM AND CONNECTIVE TISSUE: Chronic | ICD-10-CM

## 2021-05-30 PROBLEM — L03.90 CELLULITIS: Status: ACTIVE | Noted: 2021-05-06

## 2021-05-30 NOTE — END OF VISIT
[Time Spent: ___ minutes] : I have spent [unfilled] minutes of time on the encounter. [FreeTextEntry3] : Seen & d.w .\par Patient being seen as per physician's primary plan of care.\par

## 2021-05-30 NOTE — HISTORY OF PRESENT ILLNESS
[Date: ____________] : Patient's last distress assessment performed on [unfilled]. [0 - No Distress] : Distress Level: 0 [de-identified] : The patient has a history of right breast cancer in 1965 for which she underwent what is described as a "Jeanette mastectomy" by Dr. Olivo at [facility] with "0/19 axillary lymph nodes" per patient's verbal report and followed expectantly.  She was well until 2008 at which time she was found to have a left breast multifocal DCIS per her description for which she underwent a left modified radical mastectomy at [facility] under the care Dr. Yu with a finding of "3 spots of DCIS, and a sentinel lymph node negative" per patient's verbal report; I do not have a copy of that report for my review.  The patient reports having undergone bilateral breast reconstruction at the same time to include what she describes as right "right cadaver tissue" in addition to an implant in the right breast, as well as a left breast implant.\par \par She was then well until approximately 1-2/19 when she describes transient pinching on her left reconstructed breast, centrally and predominantly to the lateral aspect.  She chose to follow this and then start to question whether she was "losing volume."  Through a friend/networking, she contacted Dr. Magan Carey who recommended a bilateral breast MRI, which was performed at [facility] on June 21, 2019 with a finding of a suspicious 5 cm irregular mass in the left axilla as well as further suspicious lymphadenopathy seen posterior to the mass with second-look ultrasound and core biopsy recommended as well as a PET-CT scan.  The patient went on to have an ultrasound of the left axilla with an ultrasound-guided core biopsy, with a finding of a heterogeneous 4.5 cm mass seen corresponding to the finding seen on the breast MRI with abnormal lymph nodes seen posterior to the mass measuring 8 mm with an ultrasound-guided core biopsy on that same date demonstrating invasive poorly differentiated mammary carcinoma with lobular phenotype, Viridiana score 8/9, with invasive tumor measuring at least 8 mm on the core biopsy specimen, ER negative, [place] negative, HER-2/jeni negative, and a comment that "no focal residual lymphoid like tissue (no germinal center noted) identified; the lesion may represent a completely effaced lymph node or brisk lymphocytic response to tumor; clinical pathologic-radiologic correlation recommended”.  The patient then went on to see Dr. Carlos Pittman and had a PET-CT scan performed on July 5, 2019 with a finding of a mildly avid focus in the left thyroid lobe extending towards the isthmus, a 1.3 cm non-avid left thyroid lobe nodule; in the left axilla, there was left axillary soft tissue mass with irregular margins containing a biopsy clip measuring 3 x 2 cm with several adjacent subcentimeter left axillary lymph nodes measuring up to 1 cm; bilateral hip arthroplasties with heterogeneous FDG avidity adjacent to the proximal portions bilaterally on the right with an SUV of 13.8 on the left and SUV of 9.4 with a comment, this area is limited in evaluation due to beam hardening artifact; there were degenerative changes in the lower spine with mild areas of FDG avidity, multilevel, non-FDG avid compression fracture of L4 unchanged since 06/2017.\par \par I saw her in initial consultation on 716/19 and subsequently had requested a review of the pathology and it turned our it was ER+ / [place] neg. We reviewed her scans at tumor board and the group agreed it was unresectable at this time. I called the pt and informed her of the above change in the pathology and that I recommended neoadjuvant anti-estrogen therapy with an aromatase inhibitor such as anastrozole. She agreed to proceed and started taking anastrozole in the very first days of 7/19. \par \par Seen in 11/19. Had evidence of response in her left ax mass. \par \par Seen 3/20 and she informed me she stopped taking anstrozole end 8/19 secondary to concerns re bone toxicity. She had been seeing an "MD PhD" on [place] who had been infusing "ozone therapy in an electrolyte bag" IV, B17, other anti-inflammatory herbs. She declined to provide his name as " rather not say as he does not want people looking over his back". On exam she had what seemed like a further response. I recommended she re-consult / see Dr. Pittman to consider potential surgery vs XRT as primary local treatment. I advised that as she has been under the care of another physician that she follow up with him. I offered her to see me  as needed in the future. She was agreeable.\par \par In the interim she saw Dr. Pittman 7/6/20. WHen questioned why she did not do so sooner she did not have an answer to provide. Dr Pittman sent her for a B/L breast MRI 7/27/20 which showed :\par \par In the left axilla, again noted is an irregularly-shaped enhancing mass compatible with the known recurrent malignancy. Susceptibility artifact is noted within the mass, a biopsy marker. Overall, the mass measures approximately 6.9 cm AP by 3.9 TV x 6.2 cm CC. The mass appears more draped along the implant with extension along its superior lateral margin. The previously noted additional axillary lymph nodes medial to the mass appear relatively stable in size.\par \par No right axillary lymphadenopathy. No internal mammary lymphadenopathy.\par \par IMPRESSION:\par 1.  The biopsy-proven recurrent malignancy in the left axillary region has increased in size since the prior breast MRI and prior ultrasound, with maximal dimension now measuring 6.9 cm AP.\par 2.  No other suspicious enhancement in either breast.\par 3.  Silicone implants are intact.\par \par The pt now reports she stopped getting ozone therapy in 3/20. \par \par She progressed after she stopped anastrozole and ozone therapy. \par Restarted anastrozole in early August as well as ozone/vitamin/mineral therapy. \par \par Repeat MRI 10/27/20 revealed POD in L breast / axillary / CW mass. PET CT did not show any distant mets. \par  [de-identified] : Started fulvestrant on 11/24/20.\par \par Patient with a history of breast cancer, local axillary recurrence in 11/2020 now on fulvestrant.\par Had b/l breast MRi in 3/2021, intact implants, and with stable disease. Admitted to [facility] for CHF exacerbation\par 4/17-4/18/21. in the last 7 days she has noticed that the L breast has changed in shape with inc discomfort. \par D/w case with ,  via email. Recommendation is to repeat breast MRi. Breast MRI repeated with stable findings. Symptoms continued to persist, she started on Amoxicillin 500mg  bid, which she had at her home, and presents today for an urgent follow up. Symptoms have significantly improved over the course of the last few days. Denies any fever or chills.\par \par Breast MR 3/29/21\par Stable left axillary mass and enlarged left axillary lymph node\par CT CAP 3/31/21\par  An ill-defined left axillary mass adjacent to a biopsy clip is again noted, measuring approximately 3.4 x 3.4 cm, previously 3.6 x 3.3 cm on 11/6/2020. An adjacent left axillary lymph node measures 1.5 x 1.2 cm (2:16), previously 1.4 x 1.2 cm. Multiple hypodense nodules in both lobes of the thyroid gland and in the thyroid isthmus, similar in appearance to the prior PET/CT dated 11/6/2020. 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

## 2021-05-30 NOTE — REVIEW OF SYSTEMS
[Negative] : Endocrine [FreeTextEntry2] : as above [FreeTextEntry9] : as above [de-identified] : as above [de-identified] : as above

## 2021-05-30 NOTE — PHYSICAL EXAM
[Restricted in physically strenuous activity but ambulatory and able to carry out work of a light or sedentary nature] : Status 1- Restricted in physically strenuous activity but ambulatory and able to carry out work of a light or sedentary nature, e.g., light house work, office work [Normal] : affect appropriate [de-identified] : + min erythema overlying the reconstructed L breast, non tender, no fluctuance or drainage. No warmth [de-identified] : noted int eh breast section

## 2021-06-01 ENCOUNTER — APPOINTMENT (OUTPATIENT)
Dept: INFUSION THERAPY | Facility: HOSPITAL | Age: 86
End: 2021-06-01

## 2021-06-01 ENCOUNTER — APPOINTMENT (OUTPATIENT)
Dept: HEMATOLOGY ONCOLOGY | Facility: CLINIC | Age: 86
End: 2021-06-01
Payer: MEDICARE

## 2021-06-01 VITALS
TEMPERATURE: 96.8 F | HEIGHT: 61.1 IN | BODY MASS INDEX: 27.6 KG/M2 | SYSTOLIC BLOOD PRESSURE: 139 MMHG | OXYGEN SATURATION: 98 % | WEIGHT: 146.16 LBS | DIASTOLIC BLOOD PRESSURE: 70 MMHG | HEART RATE: 57 BPM | RESPIRATION RATE: 15 BRPM

## 2021-06-01 PROCEDURE — 99214 OFFICE O/P EST MOD 30 MIN: CPT

## 2021-06-01 RX ORDER — AMOXICILLIN 500 MG/1
500 CAPSULE ORAL EVERY 8 HOURS
Qty: 10 | Refills: 0 | Status: DISCONTINUED | COMMUNITY
Start: 2021-04-29 | End: 2021-06-01

## 2021-06-01 RX ORDER — PANTOPRAZOLE 40 MG/1
40 TABLET, DELAYED RELEASE ORAL
Refills: 0 | Status: DISCONTINUED | COMMUNITY
Start: 2021-04-19 | End: 2021-06-01

## 2021-06-01 RX ORDER — TRAMADOL HYDROCHLORIDE 50 MG/1
50 TABLET, COATED ORAL EVERY 8 HOURS
Refills: 0 | Status: DISCONTINUED | COMMUNITY
Start: 2021-04-20 | End: 2021-06-01

## 2021-06-04 NOTE — HISTORY OF PRESENT ILLNESS
[Date: ____________] : Patient's last distress assessment performed on [unfilled]. [0 - No Distress] : Distress Level: 0 [de-identified] : The patient has a history of right breast cancer in 1965 for which she underwent what is described as a "Jeanette mastectomy" by Dr. Olivo at [facility] with "0/19 axillary lymph nodes" per patient's verbal report and followed expectantly.  She was well until 2008 at which time she was found to have a left breast multifocal DCIS per her description for which she underwent a left modified radical mastectomy at [facility] under the care Dr. Yu with a finding of "3 spots of DCIS, and a sentinel lymph node negative" per patient's verbal report; I do not have a copy of that report for my review.  The patient reports having undergone bilateral breast reconstruction at the same time to include what she describes as right "right cadaver tissue" in addition to an implant in the right breast, as well as a left breast implant.\par \par She was then well until approximately 1-2/19 when she describes transient pinching on her left reconstructed breast, centrally and predominantly to the lateral aspect.  She chose to follow this and then start to question whether she was "losing volume."  Through a friend/networking, she contacted Dr. Magan Carey who recommended a bilateral breast MRI, which was performed at [facility] on June 21, 2019 with a finding of a suspicious 5 cm irregular mass in the left axilla as well as further suspicious lymphadenopathy seen posterior to the mass with second-look ultrasound and core biopsy recommended as well as a PET-CT scan.  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  The patient then went on to see Dr. Carlos Pittman and had a PET-CT scan performed on July 5, 2019 with a finding of a mildly avid focus in the left thyroid lobe extending towards the isthmus, a 1.3 cm non-avid left thyroid lobe nodule; in the left axilla, there was left axillary soft tissue mass with irregular margins containing a biopsy clip measuring 3 x 2 cm with several adjacent subcentimeter left axillary lymph nodes measuring up to 1 cm; bilateral hip arthroplasties with heterogeneous FDG avidity adjacent to the proximal portions bilaterally on the right with an SUV of 13.8 on the left and SUV of 9.4 with a comment, this area is limited in evaluation due to beam hardening artifact; there were degenerative changes in the lower spine with mild areas of FDG avidity, multilevel, non-FDG avid compression fracture of L4 unchanged since 06/2017.\par \par I saw her in initial consultation on 716/19 and subsequently had requested a review of the pathology and it turned our it was ER+ / [place] neg. We reviewed her scans at tumor board and the group agreed it was unresectable at this time. I called the pt and informed her of the above change in the pathology and that I recommended neoadjuvant anti-estrogen therapy with an aromatase inhibitor such as anastrozole. She agreed to proceed and started taking anastrozole in the very first days of 7/19. \par \par Seen in 11/19. Had evidence of response in her left ax mass. \par \par Seen 3/20 and she informed me she stopped taking anstrozole end 8/19 secondary to concerns re bone toxicity. She had been seeing an "MD PhD" on [place] who had been infusing "ozone therapy in an electrolyte bag" IV, B17, other anti-inflammatory herbs. She declined to provide his name as " rather not say as he does not want people looking over his back". On exam she had what seemed like a further response. I recommended she re-consult / see Dr. Pittman to consider potential surgery vs XRT as primary local treatment. I advised that as she has been under the care of another physician that she follow up with him. I offered her to see me  as needed in the future. She was agreeable.\par \par In the interim she saw Dr. Pittman 7/6/20. WHen questioned why she did not do so sooner she did not have an answer to provide. Dr Pittman sent her for a B/L breast MRI 7/27/20 which showed :\par \par In the left axilla, again noted is an irregularly-shaped enhancing mass compatible with the known recurrent malignancy. Susceptibility artifact is noted within the mass, a biopsy marker. Overall, the mass measures approximately 6.9 cm AP by 3.9 TV x 6.2 cm CC. The mass appears more draped along the implant with extension along its superior lateral margin. The previously noted additional axillary lymph nodes medial to the mass appear relatively stable in size.\par \par No right axillary lymphadenopathy. No internal mammary lymphadenopathy.\par \par IMPRESSION:\par 1.  The biopsy-proven recurrent malignancy in the left axillary region has increased in size since the prior breast MRI and prior ultrasound, with maximal dimension now measuring 6.9 cm AP.\par 2.  No other suspicious enhancement in either breast.\par 3.  Silicone implants are intact.\par \par The pt now reports she stopped getting ozone therapy in 3/20. \par \par She progressed after she stopped anastrozole and ozone therapy. \par Restarted anastrozole in early August as well as ozone/vitamin/mineral therapy. \par \par Repeat MRI 10/27/20 revealed POD in L breast / axillary / CW mass. PET CT did not show any distant mets. \par  [de-identified] : Started fulvestrant on 11/24/20.\par \par Patient with a history of breast cancer, local axillary recurrence in 11/2020 now on fulvestrant.\par \par Had b/l breast MRi in 3/2021, intact implants, and with stable disease. Admitted to [facility] for CHF exacerbation 4/17-4/18/21. in the last 7 days she has noticed that the L breast has changed in shape with inc discomfort. D/w case with ,  via email. Recommendation was to repeat breast MRi. Breast MRI repeated with stable findings. Symptoms continued to persist, she started on Amoxicillin 500mg  bid, which she had at her home, and seen 4/29/21 with significantly improved over the course of the prior few days.  Today she remembered that she had her first Covid 19 vaccine in the L arm approx 3 days earlier of note.\par \par She had recent increased [place] and was seen by Dr Kingston Lee (cardiologist)  yesterday an dwas advised to restart taking furosemide  wich she has - some improvement by today but not back to her baseline. \par \par Her L breast pain has resolved / not recurred. \par \par She denies all new complaints, noting a good appetite, stable wt and performance status limited only by baseline [place].     \par \par Breast MR 3/29/21\par Stable left axillary mass and enlarged left axillary lymph node\par \par CT CAP 3/31/21\par  An ill-defined left axillary mass adjacent to a biopsy clip is again noted, measuring approximately 3.4 x 3.4 cm, previously 3.6 x 3.3 cm on 11/6/2020. An adjacent left axillary lymph node measures 1.5 x 1.2 cm (2:16), previously 1.4 x 1.2 cm. Multiple hypodense nodules in both lobes of the thyroid gland and in the thyroid isthmus, similar in appearance to the prior PET/CT dated 11/6/2020. 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

## 2021-06-04 NOTE — PHYSICAL EXAM
[Restricted in physically strenuous activity but ambulatory and able to carry out work of a light or sedentary nature] : Status 1- Restricted in physically strenuous activity but ambulatory and able to carry out work of a light or sedentary nature, e.g., light house work, office work [Normal] : affect appropriate [de-identified] : B/L breasts s/p MRM with reconstruction, Rt; no palpable masses in breast or axilla. Left: no palpable masses in breast, however ,vague "puffiness" in the LOQ without any discrete palpable masses an dw/o change. \par

## 2021-06-04 NOTE — REVIEW OF SYSTEMS
[Negative] : Endocrine [FreeTextEntry2] : as above [FreeTextEntry5] : as above [FreeTextEntry6] : as above [FreeTextEntry9] : as above

## 2021-06-24 ENCOUNTER — OUTPATIENT (OUTPATIENT)
Dept: OUTPATIENT SERVICES | Facility: HOSPITAL | Age: 86
LOS: 1 days | Discharge: HOME | End: 2021-06-24

## 2021-06-24 DIAGNOSIS — Z98.890 OTHER SPECIFIED POSTPROCEDURAL STATES: Chronic | ICD-10-CM

## 2021-06-24 DIAGNOSIS — Z96.649 PRESENCE OF UNSPECIFIED ARTIFICIAL HIP JOINT: Chronic | ICD-10-CM

## 2021-06-24 DIAGNOSIS — C50.919 MALIGNANT NEOPLASM OF UNSPECIFIED SITE OF UNSPECIFIED FEMALE BREAST: ICD-10-CM

## 2021-06-24 DIAGNOSIS — Z90.49 ACQUIRED ABSENCE OF OTHER SPECIFIED PARTS OF DIGESTIVE TRACT: Chronic | ICD-10-CM

## 2021-06-24 DIAGNOSIS — Z87.39 PERSONAL HISTORY OF OTHER DISEASES OF THE MUSCULOSKELETAL SYSTEM AND CONNECTIVE TISSUE: Chronic | ICD-10-CM

## 2021-06-29 ENCOUNTER — APPOINTMENT (OUTPATIENT)
Dept: HEMATOLOGY ONCOLOGY | Facility: CLINIC | Age: 86
End: 2021-06-29
Payer: MEDICARE

## 2021-06-29 ENCOUNTER — APPOINTMENT (OUTPATIENT)
Dept: INFUSION THERAPY | Facility: HOSPITAL | Age: 86
End: 2021-06-29

## 2021-06-29 VITALS
SYSTOLIC BLOOD PRESSURE: 127 MMHG | BODY MASS INDEX: 27.26 KG/M2 | RESPIRATION RATE: 16 BRPM | TEMPERATURE: 97 F | DIASTOLIC BLOOD PRESSURE: 68 MMHG | WEIGHT: 144.4 LBS | HEIGHT: 61.1 IN | OXYGEN SATURATION: 98 % | HEART RATE: 64 BPM

## 2021-06-29 DIAGNOSIS — M25.561 PAIN IN RIGHT KNEE: ICD-10-CM

## 2021-06-29 PROCEDURE — 99214 OFFICE O/P EST MOD 30 MIN: CPT

## 2021-06-29 NOTE — END OF VISIT
[Time Spent: ___ minutes] : I have spent [unfilled] minutes of time on the encounter. [FreeTextEntry3] : Patient being seen as per physician's primary plan of care.\par

## 2021-06-29 NOTE — PHYSICAL EXAM
[Restricted in physically strenuous activity but ambulatory and able to carry out work of a light or sedentary nature] : Status 1- Restricted in physically strenuous activity but ambulatory and able to carry out work of a light or sedentary nature, e.g., light house work, office work [Normal] : affect appropriate [de-identified] : B/L breasts s/p MRM with reconstruction, Rt; no palpable masses in breast or axilla. Left: no palpable masses in breast, however ,vague "puffiness" in the LOQ without any discrete palpable masses an dw/o change. \par

## 2021-06-29 NOTE — HISTORY OF PRESENT ILLNESS
[Date: ____________] : Patient's last distress assessment performed on [unfilled]. [0 - No Distress] : Distress Level: 0 [de-identified] : The patient has a history of right breast cancer in 1965 for which she underwent what is described as a "Jeanette mastectomy" by Dr. Olivo at [facility] with "0/19 axillary lymph nodes" per patient's verbal report and followed expectantly.  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 Yu with a finding of "3 spots of DCIS, and a sentinel lymph node negative" per patient's verbal report; I do not have a copy of that report for my review.  The patient reports having undergone bilateral breast reconstruction at the same time to include what she describes as right "right cadaver tissue" in addition to an implant in the right breast, as well as a left breast implant.\par \par She was then well until approximately 1-2/19 when she describes transient pinching on her left reconstructed breast, centrally and predominantly to the lateral aspect.  She chose to follow this and then start to question whether she was "losing volume."  Through a friend/networking, she contacted Dr. Magan Carey who recommended a bilateral breast MRI, which was performed at [facility] on June 21, 2019 with a finding of a suspicious 5 cm irregular mass in the left axilla as well as further suspicious lymphadenopathy seen posterior to the mass with second-look ultrasound and core biopsy recommended as well as a PET-CT scan.  The patient went on to have an ultrasound of the left axilla with an ultrasound-guided core biopsy, with a finding of a heterogeneous 4.5 cm mass seen corresponding to the finding seen on the breast MRI with abnormal lymph nodes seen posterior to the mass measuring 8 mm with an ultrasound-guided core biopsy on that same date demonstrating invasive poorly differentiated mammary carcinoma with lobular phenotype, Viridiana score 8/9, with invasive tumor measuring at least 8 mm on the core biopsy specimen, ER negative, AK negative, HER-2/jeni negative, and a comment that "no focal residual lymphoid like tissue (no germinal center noted) identified; the lesion may represent a completely effaced lymph node or brisk lymphocytic response to tumor; clinical pathologic-radiologic correlation recommended”.  The patient then went on to see Dr. Carlos Pittman and had a PET-CT scan performed on July 5, 2019 with a finding of a mildly avid focus in the left thyroid lobe extending towards the isthmus, a 1.3 cm non-avid left thyroid lobe nodule; in the left axilla, there was left axillary soft tissue mass with irregular margins containing a biopsy clip measuring 3 x 2 cm with several adjacent subcentimeter left axillary lymph nodes measuring up to 1 cm; bilateral hip arthroplasties with heterogeneous FDG avidity adjacent to the proximal portions bilaterally on the right with an SUV of 13.8 on the left and SUV of 9.4 with a comment, this area is limited in evaluation due to beam hardening artifact; there were degenerative changes in the lower spine with mild areas of FDG avidity, multilevel, non-FDG avid compression fracture of L4 unchanged since 06/2017.\par \par I saw her in initial consultation on 716/19 and subsequently had requested a review of the pathology and it turned our it was ER+ / AK neg. We reviewed her scans at tumor board and the group agreed it was unresectable at this time. I called the pt and informed her of the above change in the pathology and that I recommended neoadjuvant anti-estrogen therapy with an aromatase inhibitor such as anastrozole. She agreed to proceed and started taking anastrozole in the very first days of 7/19. \par \par Seen in 11/19. Had evidence of response in her left ax mass. \par \par Seen 3/20 and she informed me she stopped taking anstrozole end 8/19 secondary to concerns re bone toxicity. She had been seeing an "MD PhD" on [place] who had been infusing "ozone therapy in an electrolyte bag" IV, B17, other anti-inflammatory herbs. She declined to provide his name as " rather not say as he does not want people looking over his back". On exam she had what seemed like a further response. I recommended she re-consult / see Dr. Pittman to consider potential surgery vs XRT as primary local treatment. I advised that as she has been under the care of another physician that she follow up with him. I offered her to see me  as needed in the future. She was agreeable.\par \par In the interim she saw Dr. Pittman 7/6/20. WHen questioned why she did not do so sooner she did not have an answer to provide. Dr Pittman sent her for a B/L breast MRI 7/27/20 which showed :\par \par In the left axilla, again noted is an irregularly-shaped enhancing mass compatible with the known recurrent malignancy. Susceptibility artifact is noted within the mass, a biopsy marker. Overall, the mass measures approximately 6.9 cm AP by 3.9 TV x 6.2 cm CC. The mass appears more draped along the implant with extension along its superior lateral margin. The previously noted additional axillary lymph nodes medial to the mass appear relatively stable in size.\par \par No right axillary lymphadenopathy. No internal mammary lymphadenopathy.\par \par IMPRESSION:\par 1.  The biopsy-proven recurrent malignancy in the left axillary region has increased in size since the prior breast MRI and prior ultrasound, with maximal dimension now measuring 6.9 cm AP.\par 2.  No other suspicious enhancement in either breast.\par 3.  Silicone implants are intact.\par \par The pt now reports she stopped getting ozone therapy in 3/20. \par \par She progressed after she stopped anastrozole and ozone therapy. \par Restarted anastrozole in early August as well as ozone/vitamin/mineral therapy. \par \par Repeat MRI 10/27/20 revealed POD in L breast / axillary / CW mass. PET CT did not show any distant mets. \par  [de-identified] : Started fulvestrant on 11/24/20.\par Patient with a history of breast cancer, local axillary recurrence in 11/2020 now on fulvestrant.\par \par Tolerating fulvestrant well.\par Progressively worsening R knee pain, now using a rolling walker for ambulation. She has an appointment to see her rheumatologist this afternoon for what appears to be ?hyaluronic acid injection in her R knee.\par Other than the knee pain, she feels well. Notes a good appetite, stable weight and a stable performance status.\par \par Breast MR 3/29/21\par Stable left axillary mass and enlarged left axillary lymph node\par \par CT CAP 3/31/21\par  An ill-defined left axillary mass adjacent to a biopsy clip is again noted, measuring approximately 3.4 x 3.4 cm, previously 3.6 x 3.3 cm on 11/6/2020. An adjacent left axillary lymph node measures 1.5 x 1.2 cm (2:16), previously 1.4 x 1.2 cm. Multiple hypodense nodules in both lobes of the thyroid gland and in the thyroid isthmus, similar in appearance to the prior PET/CT dated 11/6/2020. 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

## 2021-07-12 ENCOUNTER — OUTPATIENT (OUTPATIENT)
Dept: OUTPATIENT SERVICES | Facility: HOSPITAL | Age: 86
LOS: 1 days | End: 2021-07-12
Payer: MEDICARE

## 2021-07-12 ENCOUNTER — APPOINTMENT (OUTPATIENT)
Dept: MRI IMAGING | Facility: HOSPITAL | Age: 86
End: 2021-07-12
Payer: MEDICARE

## 2021-07-12 DIAGNOSIS — Z00.8 ENCOUNTER FOR OTHER GENERAL EXAMINATION: ICD-10-CM

## 2021-07-12 DIAGNOSIS — Z87.39 PERSONAL HISTORY OF OTHER DISEASES OF THE MUSCULOSKELETAL SYSTEM AND CONNECTIVE TISSUE: Chronic | ICD-10-CM

## 2021-07-12 DIAGNOSIS — Z96.649 PRESENCE OF UNSPECIFIED ARTIFICIAL HIP JOINT: Chronic | ICD-10-CM

## 2021-07-12 DIAGNOSIS — Z98.890 OTHER SPECIFIED POSTPROCEDURAL STATES: Chronic | ICD-10-CM

## 2021-07-12 DIAGNOSIS — M23.321 OTHER MENISCUS DERANGEMENTS, POSTERIOR HORN OF MEDIAL MENISCUS, RIGHT KNEE: ICD-10-CM

## 2021-07-12 DIAGNOSIS — Z90.49 ACQUIRED ABSENCE OF OTHER SPECIFIED PARTS OF DIGESTIVE TRACT: Chronic | ICD-10-CM

## 2021-07-12 DIAGNOSIS — M17.11 UNILATERAL PRIMARY OSTEOARTHRITIS, RIGHT KNEE: ICD-10-CM

## 2021-07-12 DIAGNOSIS — M84.461A PATHOLOGICAL FRACTURE, RIGHT TIBIA, INITIAL ENCOUNTER FOR FRACTURE: ICD-10-CM

## 2021-07-12 PROCEDURE — 73721 MRI JNT OF LWR EXTRE W/O DYE: CPT | Mod: 26,RT,MH

## 2021-07-12 PROCEDURE — 73721 MRI JNT OF LWR EXTRE W/O DYE: CPT | Mod: MH

## 2021-07-24 ENCOUNTER — OUTPATIENT (OUTPATIENT)
Dept: OUTPATIENT SERVICES | Facility: HOSPITAL | Age: 86
LOS: 1 days | Discharge: HOME | End: 2021-07-24

## 2021-07-24 DIAGNOSIS — Z98.890 OTHER SPECIFIED POSTPROCEDURAL STATES: Chronic | ICD-10-CM

## 2021-07-24 DIAGNOSIS — Z87.39 PERSONAL HISTORY OF OTHER DISEASES OF THE MUSCULOSKELETAL SYSTEM AND CONNECTIVE TISSUE: Chronic | ICD-10-CM

## 2021-07-24 DIAGNOSIS — C50.919 MALIGNANT NEOPLASM OF UNSPECIFIED SITE OF UNSPECIFIED FEMALE BREAST: ICD-10-CM

## 2021-07-24 DIAGNOSIS — Z90.49 ACQUIRED ABSENCE OF OTHER SPECIFIED PARTS OF DIGESTIVE TRACT: Chronic | ICD-10-CM

## 2021-07-24 DIAGNOSIS — Z96.649 PRESENCE OF UNSPECIFIED ARTIFICIAL HIP JOINT: Chronic | ICD-10-CM

## 2021-07-27 ENCOUNTER — APPOINTMENT (OUTPATIENT)
Dept: INFUSION THERAPY | Facility: HOSPITAL | Age: 86
End: 2021-07-27

## 2021-07-29 ENCOUNTER — APPOINTMENT (OUTPATIENT)
Dept: ORTHOPEDIC SURGERY | Facility: CLINIC | Age: 86
End: 2021-07-29
Payer: MEDICARE

## 2021-07-29 ENCOUNTER — NON-APPOINTMENT (OUTPATIENT)
Age: 86
End: 2021-07-29

## 2021-07-29 VITALS — HEIGHT: 62 IN | BODY MASS INDEX: 25.76 KG/M2 | WEIGHT: 140 LBS

## 2021-07-29 PROCEDURE — 99214 OFFICE O/P EST MOD 30 MIN: CPT

## 2021-07-29 NOTE — DISCUSSION/SUMMARY
[de-identified] : The underlying pathophysiology was reviewed in great detail with the patient as well as the various treatment options, including ice, analgesics, NSAIDs, Physical therapy, steroid injections.\par \par MRI of the right knee was reviewed and discussed in great detail today. \par \par A prescription was provided for [place] Calcitonin. Patient was advised to spray one spray in each nostril, alternating nostrils each day for one month. Patient must consult with her oncologist for clearance prior to starting medication. \par \par A right knee medial  brace provided in the clinic today. Brace was fitted and patient was instructed on proper application and removal of brace.\par \par Activity modifications and restrictions were discussed. \par \par A home exercise sheet was given and discussed with the patient to follow\par \par I advised f/u with a bone endocrinologist for treatment of osteoporosis\par \par FU 4-6 weeks for repeat xrays of the right knee. \par \par All questions were answered, all alternatives discussed and the patient is in complete agreement with that plan. Follow-up appointment as instructed. Any issues and the patient will call or come in sooner.\par

## 2021-07-29 NOTE — HISTORY OF PRESENT ILLNESS
[de-identified] : ROGE SAINI is a 87 year female presenting to the office complaining of right knee pain. She  presents to the office ambulating with a cane. Patient reports pain began on 04/16/2021. She tripped and fell, landing on her right knee. She notes the pain was tolerable but slowly increased overtime. She saw her PCP who gave her a corticosteroid injection noting no relief in symptoms. She then underwent a MRI of the right knee on 07/12/2021 at [facility], [place] revealing Insufficiency fracture of the proximal medial tibial metaphysis.  Patient notes continued pain at this time. The patient describes the pain as a dull aching, and occasionally sharp pain localized to the medial aspect of her right knee that is intermittent in nature. Her   symptoms are exacerbated with any weightbearing on the leg.  Pain is alleviated with rest.  Patient denies instability, catching or locking of the knee. Patient is taking Tylenol for pain relief with mild to moderate  relief in symptoms. Of note patient has breast cancer. \par Patient denies any other complaints at this time.

## 2021-07-29 NOTE — CONSULT LETTER
[Dear  ___] : Dear  [unfilled], [Consult Letter:] : I had the pleasure of evaluating your patient, [unfilled]. [Please see my note below.] : Please see my note below. [Consult Closing:] : Thank you very much for allowing me to participate in the care of this patient.  If you have any questions, please do not hesitate to contact me. [Sincerely,] : Sincerely, [FreeTextEntry3] : Dr. Carlo Keller \par \par

## 2021-07-29 NOTE — PHYSICAL EXAM
[de-identified] : Right Lower Extremity\par o Knee :\par ¦ Inspection/Palpation : moderate medial tibial plateau tenderness to palpation, no swelling, no deformity\par ¦ Range of Motion : 0 - 125 degrees, no crepitus\par ¦ Stability : no valgus or varus instability present on provocative testing, Lachman’s Test (-)\par ¦ Strength : hip flexion 4/5 with pain. \par o Muscle Bulk : normal muscle bulk present\par o Skin : no erythema, no ecchymosis\par o Sensation : sensation to pin intact\par o Vascular Exam : no edema, no cyanosis, dorsalis pedis artery pulse 2+, posterior tibial artery pulse 2+\par \par Left Lower Extremity\par o Knee :\par ¦ Inspection/Palpation : no tenderness to palpation, no swelling, no deformity\par ¦ Range of Motion : 0 -130 degrees, no crepitus\par ¦ Stability : no valgus or varus instability present on provocative testing, Lachman’s Test (-)\par ¦ Strength : hip flexion 5/5\par o Muscle Bulk : normal muscle bulk present\par o Skin : no erythema, no ecchymosis\par o Sensation : sensation to pin intact\par o Vascular Exam : no edema, no cyanosis, dorsalis pedis artery pulse 2+, posterior tibial artery pulse 2+  [de-identified] : Patient comes to today's visit with outside imaging already performed. I reviewed the images in detail with the patient and discussed the findings as highlighted below.\par \par o MRI of the right knee performed on 07/12/2021 at [facility]: Impression: \par ¦ Insufficiency fracture of the proximal medial tibial metaphysis. 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 Tearing and degeneration of the body/posterior horn of the medial meniscus.\par

## 2021-08-19 ENCOUNTER — NON-APPOINTMENT (OUTPATIENT)
Age: 86
End: 2021-08-19

## 2021-08-20 ENCOUNTER — APPOINTMENT (OUTPATIENT)
Dept: HEMATOLOGY ONCOLOGY | Facility: CLINIC | Age: 86
End: 2021-08-20
Payer: MEDICARE

## 2021-08-20 PROCEDURE — 99212 OFFICE O/P EST SF 10 MIN: CPT | Mod: 95

## 2021-08-20 NOTE — HISTORY OF PRESENT ILLNESS
[Date: ____________] : Patient's last distress assessment performed on [unfilled]. [0 - No Distress] : Distress Level: 0 [de-identified] : The patient has a history of right breast cancer in 1965 for which she underwent what is described as a "Jeanette mastectomy" by Dr. Olivo at [facility] with "0/19 axillary lymph nodes" per patient's verbal report and followed expectantly.  She was well until 2008 at which time she was found to have a left breast multifocal DCIS per her description for which she underwent a left modified radical mastectomy at [facility] under the care Dr. Yu with a finding of "3 spots of DCIS, and a sentinel lymph node negative" per patient's verbal report; I do not have a copy of that report for my review.  The patient reports having undergone bilateral breast reconstruction at the same time to include what she describes as right "right cadaver tissue" in addition to an implant in the right breast, as well as a left breast implant.\par \par She was then well until approximately 1-2/19 when she describes transient pinching on her left reconstructed breast, centrally and predominantly to the lateral aspect.  She chose to follow this and then start to question whether she was "losing volume."  Through a friend/networking, she contacted Dr. Magan Carey who recommended a bilateral breast MRI, which was performed at [facility] on June 21, 2019 with a finding of a suspicious 5 cm irregular mass in the left axilla as well as further suspicious lymphadenopathy seen posterior to the mass with second-look ultrasound and core biopsy recommended as well as a PET-CT scan.  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  The patient then went on to see Dr. Carlos Pittman and had a PET-CT scan performed on July 5, 2019 with a finding of a mildly avid focus in the left thyroid lobe extending towards the isthmus, a 1.3 cm non-avid left thyroid lobe nodule; in the left axilla, there was left axillary soft tissue mass with irregular margins containing a biopsy clip measuring 3 x 2 cm with several adjacent subcentimeter left axillary lymph nodes measuring up to 1 cm; bilateral hip arthroplasties with heterogeneous FDG avidity adjacent to the proximal portions bilaterally on the right with an SUV of 13.8 on the left and SUV of 9.4 with a comment, this area is limited in evaluation due to beam hardening artifact; there were degenerative changes in the lower spine with mild areas of FDG avidity, multilevel, non-FDG avid compression fracture of L4 unchanged since 06/2017.\par \par I saw her in initial consultation on 716/19 and subsequently had requested a review of the pathology and it turned our it was ER+ / WA neg. We reviewed her scans at tumor board and the group agreed it was unresectable at this time. I called the pt and informed her of the above change in the pathology and that I recommended neoadjuvant anti-estrogen therapy with an aromatase inhibitor such as anastrozole. She agreed to proceed and started taking anastrozole in the very first days of 7/19. \par \par Seen in 11/19. Had evidence of response in her left ax mass. \par \par Seen 3/20 and she informed me she stopped taking anstrozole end 8/19 secondary to concerns re bone toxicity. She had been seeing an "MD PhD" on [place] who had been infusing "ozone therapy in an electrolyte bag" IV, B17, other anti-inflammatory herbs. She declined to provide his name as " rather not say as he does not want people looking over his back". On exam she had what seemed like a further response. I recommended she re-consult / see Dr. Pittman to consider potential surgery vs XRT as primary local treatment. I advised that as she has been under the care of another physician that she follow up with him. I offered her to see me  as needed in the future. She was agreeable.\par \par In the interim she saw Dr. Pittman 7/6/20. WHen questioned why she did not do so sooner she did not have an answer to provide. Dr Pittman sent her for a B/L breast MRI 7/27/20 which showed :\par \par In the left axilla, again noted is an irregularly-shaped enhancing mass compatible with the known recurrent malignancy. Susceptibility artifact is noted within the mass, a biopsy marker. Overall, the mass measures approximately 6.9 cm AP by 3.9 TV x 6.2 cm CC. The mass appears more draped along the implant with extension along its superior lateral margin. The previously noted additional axillary lymph nodes medial to the mass appear relatively stable in size.\par \par No right axillary lymphadenopathy. No internal mammary lymphadenopathy.\par \par IMPRESSION:\par 1.  The biopsy-proven recurrent malignancy in the left axillary region has increased in size since the prior breast MRI and prior ultrasound, with maximal dimension now measuring 6.9 cm AP.\par 2.  No other suspicious enhancement in either breast.\par 3.  Silicone implants are intact.\par \par The pt now reports she stopped getting ozone therapy in 3/20. \par \par She progressed after she stopped anastrozole and ozone therapy. \par Restarted anastrozole in early August as well as ozone/vitamin/mineral therapy. \par \par Repeat MRI 10/27/20 revealed POD in L breast / axillary / CW mass. PET CT did not show any distant mets. \par  [de-identified] : Started fulvestrant on 11/24/20.\par Patient with a history of breast cancer, local axillary recurrence in 11/2020 now on fulvestrant.\par \par Presents today c/o of discomfort, feeling "hot" in the superior portion of her L breast. She had symptoms with progressive cellulitis after her last covid vaccine requiring a course of antibiotics.  Present symptoms began on wednesday after receiving the Covid-19 booster. \par Tolerating fulvestrant well.\par Notes a good appetite, stable weight and a stable performance status.\par \par Breast MR 3/29/21\par Stable left axillary mass and enlarged left axillary lymph node\par \par CT CAP 3/31/21\par  An ill-defined left axillary mass adjacent to a biopsy clip is again noted, measuring approximately 3.4 x 3.4 cm, previously 3.6 x 3.3 cm on 11/6/2020. An adjacent left axillary lymph node measures 1.5 x 1.2 cm (2:16), previously 1.4 x 1.2 cm. Multiple hypodense nodules in both lobes of the thyroid gland and in the thyroid isthmus, similar in appearance to the prior PET/CT dated 11/6/2020. 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

## 2021-08-20 NOTE — END OF VISIT
[Time Spent: ___ minutes] : I have spent [unfilled] minutes of time on the encounter. [FreeTextEntry3] : Patient being seen as per physician's primary plan of care.\par d/w , covering MD for

## 2021-08-20 NOTE — PHYSICAL EXAM
[Restricted in physically strenuous activity but ambulatory and able to carry out work of a light or sedentary nature] : Status 1- Restricted in physically strenuous activity but ambulatory and able to carry out work of a light or sedentary nature, e.g., light house work, office work [Normal] : affect appropriate [de-identified] : breathing comfortabl [de-identified] : There appears to be some edema in the LUQ of the L breast, with min erythema

## 2021-08-20 NOTE — REASON FOR VISIT
[Follow-Up Visit] : a follow-up [Urgent Visit] : an urgent  [Home] : at home, [unfilled] , at the time of the visit. [Medical Office: ([facility])___] : at the medical office located in  [Verbal consent obtained from patient] : the patient, [unfilled] [FreeTextEntry2] : Breast Cancer

## 2021-08-21 ENCOUNTER — OUTPATIENT (OUTPATIENT)
Dept: OUTPATIENT SERVICES | Facility: HOSPITAL | Age: 86
LOS: 1 days | Discharge: HOME | End: 2021-08-21

## 2021-08-21 DIAGNOSIS — Z96.649 PRESENCE OF UNSPECIFIED ARTIFICIAL HIP JOINT: Chronic | ICD-10-CM

## 2021-08-21 DIAGNOSIS — Z98.890 OTHER SPECIFIED POSTPROCEDURAL STATES: Chronic | ICD-10-CM

## 2021-08-21 DIAGNOSIS — Z90.49 ACQUIRED ABSENCE OF OTHER SPECIFIED PARTS OF DIGESTIVE TRACT: Chronic | ICD-10-CM

## 2021-08-21 DIAGNOSIS — C50.919 MALIGNANT NEOPLASM OF UNSPECIFIED SITE OF UNSPECIFIED FEMALE BREAST: ICD-10-CM

## 2021-08-21 DIAGNOSIS — Z87.39 PERSONAL HISTORY OF OTHER DISEASES OF THE MUSCULOSKELETAL SYSTEM AND CONNECTIVE TISSUE: Chronic | ICD-10-CM

## 2021-08-24 ENCOUNTER — APPOINTMENT (OUTPATIENT)
Dept: HEMATOLOGY ONCOLOGY | Facility: CLINIC | Age: 86
End: 2021-08-24
Payer: MEDICARE

## 2021-08-24 ENCOUNTER — APPOINTMENT (OUTPATIENT)
Dept: INFUSION THERAPY | Facility: HOSPITAL | Age: 86
End: 2021-08-24

## 2021-08-24 VITALS
RESPIRATION RATE: 16 BRPM | HEIGHT: 62.01 IN | SYSTOLIC BLOOD PRESSURE: 128 MMHG | OXYGEN SATURATION: 99 % | BODY MASS INDEX: 25.84 KG/M2 | TEMPERATURE: 96.4 F | DIASTOLIC BLOOD PRESSURE: 70 MMHG | WEIGHT: 142.2 LBS | HEART RATE: 76 BPM

## 2021-08-24 DIAGNOSIS — Z71.89 OTHER SPECIFIED COUNSELING: ICD-10-CM

## 2021-08-24 PROCEDURE — 99214 OFFICE O/P EST MOD 30 MIN: CPT

## 2021-08-26 PROBLEM — Z71.89 GOALS OF CARE, COUNSELING/DISCUSSION: Status: ACTIVE | Noted: 2020-11-25

## 2021-08-26 NOTE — HISTORY OF PRESENT ILLNESS
[Date: ____________] : Patient's last distress assessment performed on [unfilled]. [0 - No Distress] : Distress Level: 0 [de-identified] : The patient has a history of right breast cancer in 1965 for which she underwent what is described as a "Jeanette mastectomy" by Dr. Olivo at [facility] with "0/19 axillary lymph nodes" per patient's verbal report and followed expectantly.  She was well until 2008 at which time she was found to have a left breast multifocal DCIS per her description for which she underwent a left modified radical mastectomy at [facility] under the care Dr. Yu with a finding of "3 spots of DCIS, and a sentinel lymph node negative" per patient's verbal report; I do not have a copy of that report for my review.  The patient reports having undergone bilateral breast reconstruction at the same time to include what she describes as right "right cadaver tissue" in addition to an implant in the right breast, as well as a left breast implant.\par \par She was then well until approximately 1-2/19 when she describes transient pinching on her left reconstructed breast, centrally and predominantly to the lateral aspect.  She chose to follow this and then start to question whether she was "losing volume."  Through a friend/networking, she contacted Dr. Magan Carey who recommended a bilateral breast MRI, which was performed at [facility] on June 21, 2019 with a finding of a suspicious 5 cm irregular mass in the left axilla as well as further suspicious lymphadenopathy seen posterior to the mass with second-look ultrasound and core biopsy recommended as well as a PET-CT scan.  The patient went on to have an ultrasound of the left axilla with an ultrasound-guided core biopsy, with a finding of a heterogeneous 4.5 cm mass seen corresponding to the finding seen on the breast MRI with abnormal lymph nodes seen posterior to the mass measuring 8 mm with an ultrasound-guided core biopsy on that same date demonstrating invasive poorly differentiated mammary carcinoma with lobular phenotype, Viridiana score 8/9, with invasive tumor measuring at least 8 mm on the core biopsy specimen, ER negative, ID negative, HER-2/jeni negative, and a comment that "no focal residual lymphoid like tissue (no germinal center noted) identified; the lesion may represent a completely effaced lymph node or brisk lymphocytic response to tumor; clinical pathologic-radiologic correlation recommended”.  The patient then went on to see Dr. Carlos Pittman and had a PET-CT scan performed on July 5, 2019 with a finding of a mildly avid focus in the left thyroid lobe extending towards the isthmus, a 1.3 cm non-avid left thyroid lobe nodule; in the left axilla, there was left axillary soft tissue mass with irregular margins containing a biopsy clip measuring 3 x 2 cm with several adjacent subcentimeter left axillary lymph nodes measuring up to 1 cm; bilateral hip arthroplasties with heterogeneous FDG avidity adjacent to the proximal portions bilaterally on the right with an SUV of 13.8 on the left and SUV of 9.4 with a comment, this area is limited in evaluation due to beam hardening artifact; there were degenerative changes in the lower spine with mild areas of FDG avidity, multilevel, non-FDG avid compression fracture of L4 unchanged since 06/2017.\par \par I saw her in initial consultation on 716/19 and subsequently had requested a review of the pathology and it turned our it was ER+ / ID neg. We reviewed her scans at tumor board and the group agreed it was unresectable at this time. I called the pt and informed her of the above change in the pathology and that I recommended neoadjuvant anti-estrogen therapy with an aromatase inhibitor such as anastrozole. She agreed to proceed and started taking anastrozole in the very first days of 7/19. \par \par Seen in 11/19. Had evidence of response in her left ax mass. \par \par Seen 3/20 and she informed me she stopped taking anstrozole end 8/19 secondary to concerns re bone toxicity. She had been seeing an "MD PhD" on [place] who had been infusing "ozone therapy in an electrolyte bag" IV, B17, other anti-inflammatory herbs. She declined to provide his name as " rather not say as he does not want people looking over his back". On exam she had what seemed like a further response. I recommended she re-consult / see Dr. Pittman to consider potential surgery vs XRT as primary local treatment. I advised that as she has been under the care of another physician that she follow up with him. I offered her to see me  as needed in the future. She was agreeable.\par \par In the interim she saw Dr. Pittman 7/6/20. WHen questioned why she did not do so sooner she did not have an answer to provide. Dr Pittman sent her for a B/L breast MRI 7/27/20 which showed :\par \par In the left axilla, again noted is an irregularly-shaped enhancing mass compatible with the known recurrent malignancy. Susceptibility artifact is noted within the mass, a biopsy marker. Overall, the mass measures approximately 6.9 cm AP by 3.9 TV x 6.2 cm CC. The mass appears more draped along the implant with extension along its superior lateral margin. The previously noted additional axillary lymph nodes medial to the mass appear relatively stable in size.\par \par No right axillary lymphadenopathy. No internal mammary lymphadenopathy.\par \par IMPRESSION:\par 1.  The biopsy-proven recurrent malignancy in the left axillary region has increased in size since the prior breast MRI and prior ultrasound, with maximal dimension now measuring 6.9 cm AP.\par 2.  No other suspicious enhancement in either breast.\par 3.  Silicone implants are intact.\par \par The pt now reports she stopped getting ozone therapy in 3/20. \par \par She progressed after she stopped anastrozole and ozone therapy. \par Restarted anastrozole in early August as well as ozone/vitamin/mineral therapy. \par \par Repeat MRI 10/27/20 revealed POD in L breast / axillary / CW mass. PET CT did not show any distant mets. \par  [de-identified] : Started fulvestrant on 11/24/20.\par Patient with a history of breast cancer, local axillary recurrence in 11/2020 now on fulvestrant.\par \par She denies all complaints noting a good appetite stable weight and excellent performance status.\par \par In 4/21 had c/o sharp pain L breast - on retrospect this was likely from a Covid vaccine shot - has resolved in the interim\par  \par CT CAP 5/19/21\par IMPRESSION:\par Lung base ground glass opacities, possibly infectious or inflammatory.\par Colonic diverticulosis without CT evidence of acute diverticulitis.\par Left renal pelvis calculus (1.8 cm) and mild left hydronephrosis, similar to prior.\par \par MRI breast 4/26/21\par IMPRESSION:\par *Markedly limited exam.\par \par 1.  No evidence of implant rupture.\par 2.  The known left axillary malignancy and adjacent lymph nodes cannot be evaluated on this exam, due to extensive artifact in the region.\par \par \par Breast MR 3/29/21\par Stable left axillary mass and enlarged left axillary lymph node\par \par CT CAP 3/31/21\par  An ill-defined left axillary mass adjacent to a biopsy clip is again noted, measuring approximately 3.4 x 3.4 cm, previously 3.6 x 3.3 cm on 11/6/2020. An adjacent left axillary lymph node measures 1.5 x 1.2 cm (2:16), previously 1.4 x 1.2 cm. Multiple hypodense nodules in both lobes of the thyroid gland and in the thyroid isthmus, similar in appearance to the prior PET/CT dated 11/6/2020. 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

## 2021-08-26 NOTE — PHYSICAL EXAM
[Restricted in physically strenuous activity but ambulatory and able to carry out work of a light or sedentary nature] : Status 1- Restricted in physically strenuous activity but ambulatory and able to carry out work of a light or sedentary nature, e.g., light house work, office work [Normal] : affect appropriate [de-identified] : B/L breasts s/p MRM with reconstruction, Rt; no palpable masses in breast or axilla. Left: no palpable masses in breast, however ,vague "puffiness" in the LOQ without any discrete palpable masses an dw/o change. \par

## 2021-09-09 ENCOUNTER — APPOINTMENT (OUTPATIENT)
Dept: ORTHOPEDIC SURGERY | Facility: CLINIC | Age: 86
End: 2021-09-09
Payer: MEDICARE

## 2021-09-09 PROCEDURE — 99213 OFFICE O/P EST LOW 20 MIN: CPT | Mod: 25

## 2021-09-09 PROCEDURE — 20610 DRAIN/INJ JOINT/BURSA W/O US: CPT | Mod: RT

## 2021-09-09 PROCEDURE — 73564 X-RAY EXAM KNEE 4 OR MORE: CPT | Mod: RT

## 2021-09-09 NOTE — DISCUSSION/SUMMARY
[de-identified] : The underlying pathophysiology was reviewed in great detail with the patient as well as the various treatment options, including ice, analgesics, NSAIDs, Physical therapy, steroid injections.\par \par The patient elected to receive a Monovisc injection into her  right knee today and tolerated it well. I instructed the patient on ROM exercises, and told them to take it easy. The use of ice and rest was reviewed with the patient. The patient may resume activities in a couple of days. I reminded the patient that it takes 4 to 6 weeks after the Monovisc injection to feel symptom relief \par  \par Continue use of right knee medial  brace. \par \par Activity modifications and restrictions were discussed. \par \par A home exercise sheet was given and discussed with the patient to follow\par \par I advised f/u with a bone endocrinologist for treatment of osteoporosis\par \par She may return in 6 months for another series of gel injections as per insurance guidelines. A cortisone injection may be administered during the interim period if she cannot tolerate such a wait. \par \par All questions were answered, all alternatives discussed and the patient is in complete agreement with that plan. Follow-up appointment as instructed. Any issues and the patient will call or come in sooner.\par

## 2021-09-09 NOTE — HISTORY OF PRESENT ILLNESS
[de-identified] : ROGE SAINI is a 87 year female presenting to the office complaining of right knee pain. She  presents to the office ambulating with a cane. Patient reports pain began on 04/16/2021. She tripped and fell, landing on her right knee. She notes the pain was tolerable but slowly increased overtime. She saw her PCP who gave her a corticosteroid injection noting no relief in symptoms. She then underwent a MRI of the right knee on 07/12/2021 at [facility] revealing Insufficiency fracture of the proximal medial tibial metaphysis.  Patient notes continued pain at this time. The patient describes the pain as a dull aching, and occasionally sharp pain localized to the medial aspect of her right knee that is intermittent in nature. Her   symptoms are exacerbated with any weightbearing on the leg.  Pain is alleviated with rest.  Patient denies instability, catching or locking of the knee. Patient is taking Tylenol for pain relief with mild to moderate  relief in symptoms. Of note patient has breast cancer. \par Patient denies any other complaints at this time.

## 2021-09-09 NOTE — PROCEDURE
[de-identified] : At this point I recommended a therapeutic injection and under sterile precautions an injection of 4 cc of Monovisc (Lot: [id number], Expiration: 02/2023 ), was placed into the joint of the RIGHT knee after 15 cc of clear yellow synovial fluid was aspirated without complication\par \par \par

## 2021-09-09 NOTE — PHYSICAL EXAM
[de-identified] : Right Lower Extremity\par o Knee :\par ¦ Inspection/Palpation : mild medial tibial plateau tenderness to palpation, moderate swelling with 2+ effusion, no deformity\par ¦ Range of Motion : 0 - 125 degrees, no crepitus\par ¦ Stability : no valgus or varus instability present on provocative testing, Lachman’s Test (-)\par ¦ Strength : hip flexion 4/5 with pain. \par o Muscle Bulk : normal muscle bulk present\par o Skin : no erythema, no ecchymosis\par o Sensation : sensation to pin intact\par o Vascular Exam : no edema, no cyanosis, dorsalis pedis artery pulse 2+, posterior tibial artery pulse 2+\par \par Left Lower Extremity\par o Knee :\par ¦ Inspection/Palpation : no tenderness to palpation, no swelling, no deformity\par ¦ Range of Motion : 0 -130 degrees, no crepitus\par ¦ Stability : no valgus or varus instability present on provocative testing, Lachman’s Test (-)\par ¦ Strength : hip flexion 5/5\par o Muscle Bulk : normal muscle bulk present\par o Skin : no erythema, no ecchymosis\par o Sensation : sensation to pin intact\par o Vascular Exam : no edema, no cyanosis, dorsalis pedis artery pulse 2+, posterior tibial artery pulse 2+  [de-identified] : o RIGHT Knee : AP, lateral, and oblique view of the knee were obtained, there are no soft tissue abnormalities, well healed  Insufficiency fracture of the proximal medial tibial metaphysis,  alignment is normal, 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 I reviewed the images in detail with the patient and discussed the findings as highlighted below.\par \par o MRI of the right knee performed on 07/12/2021 at [facility]: Impression: \par ¦ Insufficiency fracture of the proximal medial tibial metaphysis. 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 Tearing and degeneration of the body/posterior horn of the medial meniscus.\par

## 2021-09-16 ENCOUNTER — APPOINTMENT (OUTPATIENT)
Dept: RADIOLOGY | Facility: HOSPITAL | Age: 86
End: 2021-09-16
Payer: MEDICARE

## 2021-09-16 ENCOUNTER — APPOINTMENT (OUTPATIENT)
Dept: ULTRASOUND IMAGING | Facility: HOSPITAL | Age: 86
End: 2021-09-16
Payer: MEDICARE

## 2021-09-16 ENCOUNTER — OUTPATIENT (OUTPATIENT)
Dept: OUTPATIENT SERVICES | Facility: HOSPITAL | Age: 86
LOS: 1 days | End: 2021-09-16
Payer: MEDICARE

## 2021-09-16 DIAGNOSIS — Z96.649 PRESENCE OF UNSPECIFIED ARTIFICIAL HIP JOINT: Chronic | ICD-10-CM

## 2021-09-16 DIAGNOSIS — Z00.8 ENCOUNTER FOR OTHER GENERAL EXAMINATION: ICD-10-CM

## 2021-09-16 DIAGNOSIS — Z90.49 ACQUIRED ABSENCE OF OTHER SPECIFIED PARTS OF DIGESTIVE TRACT: Chronic | ICD-10-CM

## 2021-09-16 DIAGNOSIS — Z98.890 OTHER SPECIFIED POSTPROCEDURAL STATES: Chronic | ICD-10-CM

## 2021-09-16 DIAGNOSIS — Z87.39 PERSONAL HISTORY OF OTHER DISEASES OF THE MUSCULOSKELETAL SYSTEM AND CONNECTIVE TISSUE: Chronic | ICD-10-CM

## 2021-09-16 PROCEDURE — 76882 US LMTD JT/FCL EVL NVASC XTR: CPT | Mod: 26,RT

## 2021-09-16 PROCEDURE — 77080 DXA BONE DENSITY AXIAL: CPT | Mod: 26

## 2021-09-16 PROCEDURE — 76882 US LMTD JT/FCL EVL NVASC XTR: CPT

## 2021-09-16 PROCEDURE — 77080 DXA BONE DENSITY AXIAL: CPT

## 2021-09-18 ENCOUNTER — OUTPATIENT (OUTPATIENT)
Dept: OUTPATIENT SERVICES | Facility: HOSPITAL | Age: 86
LOS: 1 days | Discharge: HOME | End: 2021-09-18

## 2021-09-18 DIAGNOSIS — Z90.49 ACQUIRED ABSENCE OF OTHER SPECIFIED PARTS OF DIGESTIVE TRACT: Chronic | ICD-10-CM

## 2021-09-18 DIAGNOSIS — C50.919 MALIGNANT NEOPLASM OF UNSPECIFIED SITE OF UNSPECIFIED FEMALE BREAST: ICD-10-CM

## 2021-09-18 DIAGNOSIS — Z87.39 PERSONAL HISTORY OF OTHER DISEASES OF THE MUSCULOSKELETAL SYSTEM AND CONNECTIVE TISSUE: Chronic | ICD-10-CM

## 2021-09-18 DIAGNOSIS — Z98.890 OTHER SPECIFIED POSTPROCEDURAL STATES: Chronic | ICD-10-CM

## 2021-09-18 DIAGNOSIS — Z96.649 PRESENCE OF UNSPECIFIED ARTIFICIAL HIP JOINT: Chronic | ICD-10-CM

## 2021-09-21 ENCOUNTER — APPOINTMENT (OUTPATIENT)
Dept: INFUSION THERAPY | Facility: HOSPITAL | Age: 86
End: 2021-09-21

## 2021-10-13 ENCOUNTER — EMERGENCY (EMERGENCY)
Facility: HOSPITAL | Age: 86
LOS: 1 days | Discharge: HOME | End: 2021-10-13
Attending: EMERGENCY MEDICINE | Admitting: EMERGENCY MEDICINE
Payer: MEDICARE

## 2021-10-13 VITALS
OXYGEN SATURATION: 95 % | HEART RATE: 58 BPM | SYSTOLIC BLOOD PRESSURE: 148 MMHG | RESPIRATION RATE: 18 BRPM | TEMPERATURE: 98 F | HEIGHT: 61 IN | DIASTOLIC BLOOD PRESSURE: 75 MMHG | WEIGHT: 141.98 LBS

## 2021-10-13 DIAGNOSIS — Z90.49 ACQUIRED ABSENCE OF OTHER SPECIFIED PARTS OF DIGESTIVE TRACT: Chronic | ICD-10-CM

## 2021-10-13 DIAGNOSIS — Z96.649 PRESENCE OF UNSPECIFIED ARTIFICIAL HIP JOINT: Chronic | ICD-10-CM

## 2021-10-13 DIAGNOSIS — Z87.39 PERSONAL HISTORY OF OTHER DISEASES OF THE MUSCULOSKELETAL SYSTEM AND CONNECTIVE TISSUE: Chronic | ICD-10-CM

## 2021-10-13 DIAGNOSIS — Z98.890 OTHER SPECIFIED POSTPROCEDURAL STATES: Chronic | ICD-10-CM

## 2021-10-13 PROCEDURE — 99283 EMERGENCY DEPT VISIT LOW MDM: CPT | Mod: 25

## 2021-10-13 PROCEDURE — 30901 CONTROL OF NOSEBLEED: CPT

## 2021-10-13 PROCEDURE — 30901 CONTROL OF NOSEBLEED: CPT | Mod: LT

## 2021-10-13 RX ORDER — AMLODIPINE BESYLATE 2.5 MG/1
1 TABLET ORAL
Qty: 0 | Refills: 0 | DISCHARGE

## 2021-10-13 NOTE — ED PROVIDER NOTE - OBJECTIVE STATEMENT
87F on eliquis states that she awoke with nose bleeding. She stopped it by holding pressure and when back to sleep. Then it restarted while in her sleep because she awoke in a bed of blood. It stopped on its own but concerns her that it rebled. No h/o epistaxis. No lightheadedness or dizziness. She has brought up a clot through clearing her mucous. No nausea or blood in stool. No chest pain or SOB. No trauma.

## 2021-10-13 NOTE — ED ADULT NURSE NOTE - CHIEF COMPLAINT QUOTE
Patient presents to ED from home s/p nose bleed at 5:30am and 8:00am. Patient not currently bleeding in triage. Patient is on Eliquis.

## 2021-10-13 NOTE — ED ADULT NURSE NOTE - OBJECTIVE STATEMENT
pt reports 2 nose bleeds this am. woke from sleeping with blood in the bed, reports bleeding for about 25 minutes. Controlled at present.

## 2021-10-13 NOTE — ED ADULT TRIAGE NOTE - CHIEF COMPLAINT QUOTE
Patient presents to ED from home s/p nose bleed at 5:30am and 8:00am. Patient not currently bleeding in triage. Patient is on Eliquis. Patient presents to ED from home s/p nose bleed at 5:30am and 8:00am. Patient not currently bleeding in triage. Patient is on Eliquis.  ISAR NEGATIVE

## 2021-10-13 NOTE — ED PROVIDER NOTE - PHYSICAL EXAMINATION
Left nostril with area where the bleeding occurred noticeable. No current active bleeding. Right nostril normal.   oropharynx normal.   Pt color is normal. Well appearing.

## 2021-10-13 NOTE — ED PROVIDER NOTE - PATIENT PORTAL LINK FT
You can access the FollowMyHealth Patient Portal offered by [facility] by registering at the following website: [email or web address]. By joining [employer]’s FollowMyHealth portal, you will also be able to view your health information using other applications (apps) compatible with our system.

## 2021-10-16 ENCOUNTER — EMERGENCY (EMERGENCY)
Facility: HOSPITAL | Age: 86
LOS: 1 days | Discharge: HOME | End: 2021-10-16
Attending: EMERGENCY MEDICINE | Admitting: EMERGENCY MEDICINE
Payer: MEDICARE

## 2021-10-16 VITALS
HEIGHT: 61 IN | WEIGHT: 141.98 LBS | OXYGEN SATURATION: 99 % | RESPIRATION RATE: 16 BRPM | SYSTOLIC BLOOD PRESSURE: 119 MMHG | TEMPERATURE: 97 F | HEART RATE: 62 BPM | DIASTOLIC BLOOD PRESSURE: 56 MMHG

## 2021-10-16 DIAGNOSIS — Z96.649 PRESENCE OF UNSPECIFIED ARTIFICIAL HIP JOINT: Chronic | ICD-10-CM

## 2021-10-16 DIAGNOSIS — Z87.39 PERSONAL HISTORY OF OTHER DISEASES OF THE MUSCULOSKELETAL SYSTEM AND CONNECTIVE TISSUE: Chronic | ICD-10-CM

## 2021-10-16 DIAGNOSIS — Z98.890 OTHER SPECIFIED POSTPROCEDURAL STATES: Chronic | ICD-10-CM

## 2021-10-16 DIAGNOSIS — Z90.49 ACQUIRED ABSENCE OF OTHER SPECIFIED PARTS OF DIGESTIVE TRACT: Chronic | ICD-10-CM

## 2021-10-16 LAB
HCT VFR BLD CALC: 24.4 % — LOW (ref 34.5–45)
HGB BLD-MCNC: 7.7 G/DL — LOW (ref 11.5–15.5)
MCHC RBC-ENTMCNC: 28.7 PG — SIGNIFICANT CHANGE UP (ref 27–34)
MCHC RBC-ENTMCNC: 31.6 GM/DL — LOW (ref 32–36)
MCV RBC AUTO: 91 FL — SIGNIFICANT CHANGE UP (ref 80–100)
NRBC # BLD: 0 /100 WBCS — SIGNIFICANT CHANGE UP (ref 0–0)
PLATELET # BLD AUTO: 201 K/UL — SIGNIFICANT CHANGE UP (ref 150–400)
RBC # BLD: 2.68 M/UL — LOW (ref 3.8–5.2)
RBC # FLD: 16.5 % — HIGH (ref 10.3–14.5)
WBC # BLD: 8.08 K/UL — SIGNIFICANT CHANGE UP (ref 3.8–10.5)
WBC # FLD AUTO: 8.08 K/UL — SIGNIFICANT CHANGE UP (ref 3.8–10.5)

## 2021-10-16 PROCEDURE — 99284 EMERGENCY DEPT VISIT MOD MDM: CPT

## 2021-10-16 NOTE — ED ADULT NURSE NOTE - OBJECTIVE STATEMENT
P [place] alert, came to the ER via EMS due to nose bleeding. Pt was here 2 days ago for the same thing. Pt takes Eliquis for A Fib.

## 2021-10-16 NOTE — ED ADULT NURSE NOTE - CHIEF COMPLAINT QUOTE
My nose is bleeding, I am on Eliquis for A Fib. I was here two days ago for the same" (ISAR Negative)

## 2021-10-16 NOTE — ED ADULT TRIAGE NOTE - CHIEF COMPLAINT QUOTE
My nose is bleeding, I am on Eliquis for A Fib. I was here two days ago for the same" My nose is bleeding, I am on Eliquis for A Fib. I was here two days ago for the same" (ISAR Negative)

## 2021-10-16 NOTE — CHART NOTE - NSCHARTNOTEFT_GEN_A_CORE
SW called patient to discuss and assist with scheduling follow up care.  Patient presented to ED on 10/13/21 for nose bleeds.  Patient reports she is feeling OK but has had a couple of nose bleeds since.  Patient reports she spoke with Dr Pang on the phone and he is aware of her nose bleeds/ED visit.  Patient denied any other safety concerns or need for SW assistance.

## 2021-10-17 VITALS
RESPIRATION RATE: 20 BRPM | TEMPERATURE: 98 F | OXYGEN SATURATION: 97 % | HEART RATE: 62 BPM | SYSTOLIC BLOOD PRESSURE: 168 MMHG | DIASTOLIC BLOOD PRESSURE: 82 MMHG

## 2021-10-17 LAB
ABO RH CONFIRMATION: SIGNIFICANT CHANGE UP
BLD GP AB SCN SERPL QL: SIGNIFICANT CHANGE UP
HCT VFR BLD CALC: 24 % — LOW (ref 34.5–45)
HGB BLD-MCNC: 7.3 G/DL — LOW (ref 11.5–15.5)
MCHC RBC-ENTMCNC: 27.7 PG — SIGNIFICANT CHANGE UP (ref 27–34)
MCHC RBC-ENTMCNC: 30.4 GM/DL — LOW (ref 32–36)
MCV RBC AUTO: 90.9 FL — SIGNIFICANT CHANGE UP (ref 80–100)
NRBC # BLD: 0 /100 WBCS — SIGNIFICANT CHANGE UP (ref 0–0)
PLATELET # BLD AUTO: 204 K/UL — SIGNIFICANT CHANGE UP (ref 150–400)
RBC # BLD: 2.64 M/UL — LOW (ref 3.8–5.2)
RBC # FLD: 16.4 % — HIGH (ref 10.3–14.5)
WBC # BLD: 11.48 K/UL — HIGH (ref 3.8–10.5)
WBC # FLD AUTO: 11.48 K/UL — HIGH (ref 3.8–10.5)

## 2021-10-17 PROCEDURE — 85027 COMPLETE CBC AUTOMATED: CPT

## 2021-10-17 PROCEDURE — 86901 BLOOD TYPING SEROLOGIC RH(D): CPT

## 2021-10-17 PROCEDURE — 36430 TRANSFUSION BLD/BLD COMPNT: CPT

## 2021-10-17 PROCEDURE — 86900 BLOOD TYPING SEROLOGIC ABO: CPT

## 2021-10-17 PROCEDURE — 36415 COLL VENOUS BLD VENIPUNCTURE: CPT

## 2021-10-17 PROCEDURE — 99285 EMERGENCY DEPT VISIT HI MDM: CPT | Mod: 25

## 2021-10-17 PROCEDURE — P9016: CPT

## 2021-10-17 PROCEDURE — 86850 RBC ANTIBODY SCREEN: CPT

## 2021-10-17 PROCEDURE — 86923 COMPATIBILITY TEST ELECTRIC: CPT

## 2021-10-17 NOTE — ED PROVIDER NOTE - PATIENT PORTAL LINK FT
You can access the FollowMyHealth Patient Portal offered by [facility] by registering at the following website: [email or web address]. By joining [employer]’s FollowMyHealth portal, you will also be able to view your health information using other applications (apps) compatible with our system.

## 2021-10-17 NOTE — ED PROVIDER NOTE - PHYSICAL EXAMINATION
Gen: Well appearing in NAD  Head: NC/AT  ENT:  There is no active bleeding.  Dried blood in the B nares  Neck: trachea midline  Resp:  No distress  Ext: no deformities  Neuro:  A&O appears non focal  Skin:  Warm and dry as visualized  Psych:  Normal affect and mood

## 2021-10-17 NOTE — ED PROVIDER NOTE - OBJECTIVE STATEMENT
86 yo on eluqis secondary to previous ablation presenting after a spontaneous nosebleed this evening.  Lasted for about 1 hour prior to EMS arriving.  Currently denies any bleeding.  Had another bleed 3 days ago where shew as cauterized here in the ED.  Nosebleed is associated with some mild lightheadedness as well.  No SOB.

## 2021-10-17 NOTE — ED PROVIDER NOTE - CARE PLAN
1 Principal Discharge DX:	Epistaxis   Principal Discharge DX:	Epistaxis  Secondary Diagnosis:	Anemia due to acute blood loss

## 2021-10-21 NOTE — CHART NOTE - NSCHARTNOTEFT_GEN_A_CORE
SW placed call to pt to discuss and assist with follow up care as needed. Pt presented to ED on 10/16/21 with a nose bleed. Pt reports feeling weak but has not experienced another nose bleed at this time. Pt confirms she has a follow up appointment with Dr. Pang for 10/22/21 and continues to be followed by oncology team. Pt declines further assistance or need for community/home based resources.

## 2021-10-22 ENCOUNTER — APPOINTMENT (OUTPATIENT)
Dept: FAMILY MEDICINE | Facility: CLINIC | Age: 86
End: 2021-10-22
Payer: MEDICARE

## 2021-10-22 VITALS — RESPIRATION RATE: 20 BRPM | HEART RATE: 68 BPM | SYSTOLIC BLOOD PRESSURE: 130 MMHG | DIASTOLIC BLOOD PRESSURE: 70 MMHG

## 2021-10-22 PROCEDURE — 99214 OFFICE O/P EST MOD 30 MIN: CPT | Mod: 25

## 2021-10-22 PROCEDURE — 36415 COLL VENOUS BLD VENIPUNCTURE: CPT

## 2021-10-22 NOTE — PHYSICAL EXAM
[No Acute Distress] : no acute distress [Normal] : normal rate, regular rhythm, normal S1 and S2 and no murmur heard [No Edema] : there was no peripheral edema [Soft] : abdomen soft [Non Tender] : non-tender [No Focal Deficits] : no focal deficits [Alert and Oriented x3] : oriented to person, place, and time [de-identified] : sclerae pale

## 2021-10-22 NOTE — HISTORY OF PRESENT ILLNESS
[de-identified] : Presents for F/U from the ER - reviewed all ER documentation - had presented with an uncontrollable nosebleed which was severe enough to require a transfusion.  Eliquis was held for one day but pt states has re-started per Cardiology.  Denies new nosebleed since discharge from the ER.  Does feel "tired" but denies CP, SOB, palpitations.  Scheduled to see ENT.

## 2021-10-22 NOTE — ASSESSMENT
[FreeTextEntry1] : S/P epistaxis with blood-loss anemia\par H/O atrial fib with cardiac status stable and rhythm clinically regular at this encounter\par Lab profiles drawn in office and sent

## 2021-10-23 ENCOUNTER — OUTPATIENT (OUTPATIENT)
Dept: OUTPATIENT SERVICES | Facility: HOSPITAL | Age: 86
LOS: 1 days | Discharge: HOME | End: 2021-10-23

## 2021-10-23 DIAGNOSIS — Z90.49 ACQUIRED ABSENCE OF OTHER SPECIFIED PARTS OF DIGESTIVE TRACT: Chronic | ICD-10-CM

## 2021-10-23 DIAGNOSIS — C50.919 MALIGNANT NEOPLASM OF UNSPECIFIED SITE OF UNSPECIFIED FEMALE BREAST: ICD-10-CM

## 2021-10-23 DIAGNOSIS — Z87.39 PERSONAL HISTORY OF OTHER DISEASES OF THE MUSCULOSKELETAL SYSTEM AND CONNECTIVE TISSUE: Chronic | ICD-10-CM

## 2021-10-23 DIAGNOSIS — Z96.649 PRESENCE OF UNSPECIFIED ARTIFICIAL HIP JOINT: Chronic | ICD-10-CM

## 2021-10-23 DIAGNOSIS — Z98.890 OTHER SPECIFIED POSTPROCEDURAL STATES: Chronic | ICD-10-CM

## 2021-10-23 LAB
ALBUMIN SERPL ELPH-MCNC: 4.1 G/DL
ALP BLD-CCNC: 68 U/L
ALT SERPL-CCNC: 32 U/L
ANION GAP SERPL CALC-SCNC: 13 MMOL/L
AST SERPL-CCNC: 25 U/L
BASOPHILS # BLD AUTO: 0.03 K/UL
BASOPHILS NFR BLD AUTO: 0.3 %
BILIRUB SERPL-MCNC: 0.2 MG/DL
BUN SERPL-MCNC: 25 MG/DL
CALCIUM SERPL-MCNC: 9.1 MG/DL
CHLORIDE SERPL-SCNC: 105 MMOL/L
CO2 SERPL-SCNC: 22 MMOL/L
CREAT SERPL-MCNC: 1.17 MG/DL
EOSINOPHIL # BLD AUTO: 0.09 K/UL
EOSINOPHIL NFR BLD AUTO: 1 %
GLUCOSE SERPL-MCNC: 105 MG/DL
HCT VFR BLD CALC: 27.6 %
HGB BLD-MCNC: 8.3 G/DL
IMM GRANULOCYTES NFR BLD AUTO: 0.2 %
LYMPHOCYTES # BLD AUTO: 1.07 K/UL
LYMPHOCYTES NFR BLD AUTO: 12.1 %
MAN DIFF?: NORMAL
MCHC RBC-ENTMCNC: 27.7 PG
MCHC RBC-ENTMCNC: 30.1 GM/DL
MCV RBC AUTO: 92 FL
MONOCYTES # BLD AUTO: 0.46 K/UL
MONOCYTES NFR BLD AUTO: 5.2 %
NEUTROPHILS # BLD AUTO: 7.16 K/UL
NEUTROPHILS NFR BLD AUTO: 81.2 %
PLATELET # BLD AUTO: 282 K/UL
POTASSIUM SERPL-SCNC: 4.9 MMOL/L
PROT SERPL-MCNC: 6.4 G/DL
RBC # BLD: 3 M/UL
RBC # FLD: 17.1 %
SODIUM SERPL-SCNC: 140 MMOL/L
WBC # FLD AUTO: 8.83 K/UL

## 2021-10-25 ENCOUNTER — NON-APPOINTMENT (OUTPATIENT)
Age: 86
End: 2021-10-25

## 2021-10-26 ENCOUNTER — APPOINTMENT (OUTPATIENT)
Dept: HEMATOLOGY ONCOLOGY | Facility: CLINIC | Age: 86
End: 2021-10-26

## 2021-10-26 ENCOUNTER — APPOINTMENT (OUTPATIENT)
Dept: INFUSION THERAPY | Facility: HOSPITAL | Age: 86
End: 2021-10-26

## 2021-11-01 ENCOUNTER — APPOINTMENT (OUTPATIENT)
Dept: OTOLARYNGOLOGY | Facility: CLINIC | Age: 86
End: 2021-11-01
Payer: MEDICARE

## 2021-11-01 VITALS
OXYGEN SATURATION: 98 % | HEIGHT: 62 IN | TEMPERATURE: 97.6 F | SYSTOLIC BLOOD PRESSURE: 130 MMHG | HEART RATE: 66 BPM | BODY MASS INDEX: 26.13 KG/M2 | DIASTOLIC BLOOD PRESSURE: 70 MMHG | WEIGHT: 142 LBS

## 2021-11-01 DIAGNOSIS — H91.90 UNSPECIFIED HEARING LOSS, UNSPECIFIED EAR: ICD-10-CM

## 2021-11-01 PROCEDURE — 99204 OFFICE O/P NEW MOD 45 MIN: CPT | Mod: 25

## 2021-11-01 PROCEDURE — 69210 REMOVE IMPACTED EAR WAX UNI: CPT

## 2021-11-01 NOTE — PHYSICAL EXAM
[de-identified] : +cerumen in both ears.  Removed. [] : septum deviated to the right [de-identified] : Small vessel seen in depth of NSD on L anterior septum.   [Midline] : trachea located in midline position [Normal] : palpation of lymph nodes is normal [de-identified] : BELLOR

## 2021-11-01 NOTE — CONSULT LETTER
[Dear  ___] : Dear  [unfilled], [Consult Letter:] : I had the pleasure of evaluating your patient, [unfilled]. [Please see my note below.] : Please see my note below. [Consult Closing:] : Thank you very much for allowing me to participate in the care of this patient.  If you have any questions, please do not hesitate to contact me. [Sincerely,] : Sincerely, [FreeTextEntry2] : Angelo Pang MD ([facility]) [FreeTextEntry3] : Aren Linn MD, FACS\par Chief of Otolaryngology [facility]\par  - Dept. of Otolaryngology\par [facility] [employer]\par \par  [DrMartin  ___] : Dr. WILSON

## 2021-11-01 NOTE — REVIEW OF SYSTEMS
[Hearing Loss] : hearing loss [Nose Bleeds] : nose bleeds [Throat Clearing] : throat clearing [Negative] : Heme/Lymph

## 2021-11-01 NOTE — HISTORY OF PRESENT ILLNESS
[de-identified] : Ms. SAINI is a 87 year female who presents reporting that she had an epistaxis episode in October 13 of which she went to [facility] who controlled the bleed and noted that she should follow up with ENT.  Four days later, she had another epistaxis episode which was heavier than prior.  She returned to the emergency room and was admitted.  She reported needing a transfusion with a hemoglobin of 7.0.  She is on Eliquis 2.5 mg for aortic stenosis and A-fib.  She  being monitored by her Cardiologist at [place].  She was discharged from  the next day and reports no bleed since.

## 2021-11-19 ENCOUNTER — OUTPATIENT (OUTPATIENT)
Dept: OUTPATIENT SERVICES | Facility: HOSPITAL | Age: 86
LOS: 1 days | Discharge: HOME | End: 2021-11-19

## 2021-11-19 DIAGNOSIS — Z87.39 PERSONAL HISTORY OF OTHER DISEASES OF THE MUSCULOSKELETAL SYSTEM AND CONNECTIVE TISSUE: Chronic | ICD-10-CM

## 2021-11-19 DIAGNOSIS — Z90.49 ACQUIRED ABSENCE OF OTHER SPECIFIED PARTS OF DIGESTIVE TRACT: Chronic | ICD-10-CM

## 2021-11-19 DIAGNOSIS — Z98.890 OTHER SPECIFIED POSTPROCEDURAL STATES: Chronic | ICD-10-CM

## 2021-11-19 DIAGNOSIS — Z96.649 PRESENCE OF UNSPECIFIED ARTIFICIAL HIP JOINT: Chronic | ICD-10-CM

## 2021-11-19 DIAGNOSIS — C50.919 MALIGNANT NEOPLASM OF UNSPECIFIED SITE OF UNSPECIFIED FEMALE BREAST: ICD-10-CM

## 2021-11-23 ENCOUNTER — APPOINTMENT (OUTPATIENT)
Dept: INFUSION THERAPY | Facility: HOSPITAL | Age: 86
End: 2021-11-23

## 2021-11-23 ENCOUNTER — APPOINTMENT (OUTPATIENT)
Dept: HEMATOLOGY ONCOLOGY | Facility: CLINIC | Age: 86
End: 2021-11-23
Payer: MEDICARE

## 2021-11-23 VITALS
DIASTOLIC BLOOD PRESSURE: 67 MMHG | WEIGHT: 148.15 LBS | HEART RATE: 65 BPM | RESPIRATION RATE: 17 BRPM | TEMPERATURE: 97.8 F | SYSTOLIC BLOOD PRESSURE: 155 MMHG | OXYGEN SATURATION: 97 % | BODY MASS INDEX: 27.1 KG/M2

## 2021-11-23 PROCEDURE — 99214 OFFICE O/P EST MOD 30 MIN: CPT

## 2021-11-23 RX ORDER — GLUC/MSM/COLGN2/HYAL/ANTIARTH3 375-375-20
TABLET ORAL
Refills: 0 | Status: ACTIVE | COMMUNITY

## 2021-11-24 NOTE — REASON FOR VISIT
[Follow-Up Visit] : a follow-up [Pre-Treatment Visit] : a pre-treatment [Urgent Visit] : an urgent  [FreeTextEntry2] : Breast Cancer

## 2021-11-24 NOTE — HISTORY OF PRESENT ILLNESS
[Date: ____________] : Patient's last distress assessment performed on [unfilled]. [0 - No Distress] : Distress Level: 0 [de-identified] : The patient has a history of right breast cancer in 1965 for which she underwent what is described as a "Jeanette mastectomy" by Dr. Olivo at [facility] with "0/19 axillary lymph nodes" per patient's verbal report and followed expectantly.  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 Yu with a finding of "3 spots of DCIS, and a sentinel lymph node negative" per patient's verbal report; I do not have a copy of that report for my review.  The patient reports having undergone bilateral breast reconstruction at the same time to include what she describes as right "right cadaver tissue" in addition to an implant in the right breast, as well as a left breast implant.\par \par She was then well until approximately 1-2/19 when she describes transient pinching on her left reconstructed breast, centrally and predominantly to the lateral aspect.  She chose to follow this and then start to question whether she was "losing volume."  Through a friend/networking, she contacted Dr. Magan Carey who recommended a bilateral breast MRI, which was performed at [facility] on June 21, 2019 with a finding of a suspicious 5 cm irregular mass in the left axilla as well as further suspicious lymphadenopathy seen posterior to the mass with second-look ultrasound and core biopsy recommended as well as a PET-CT scan.  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  The patient then went on to see Dr. Carlos Pittman and had a PET-CT scan performed on July 5, 2019 with a finding of a mildly avid focus in the left thyroid lobe extending towards the isthmus, a 1.3 cm non-avid left thyroid lobe nodule; in the left axilla, there was left axillary soft tissue mass with irregular margins containing a biopsy clip measuring 3 x 2 cm with several adjacent subcentimeter left axillary lymph nodes measuring up to 1 cm; bilateral hip arthroplasties with heterogeneous FDG avidity adjacent to the proximal portions bilaterally on the right with an SUV of 13.8 on the left and SUV of 9.4 with a comment, this area is limited in evaluation due to beam hardening artifact; there were degenerative changes in the lower spine with mild areas of FDG avidity, multilevel, non-FDG avid compression fracture of L4 unchanged since 06/2017.\par \par I saw her in initial consultation on 716/19 and subsequently had requested a review of the pathology and it turned our it was ER+ / NM neg. We reviewed her scans at tumor board and the group agreed it was unresectable at this time. I called the pt and informed her of the above change in the pathology and that I recommended neoadjuvant anti-estrogen therapy with an aromatase inhibitor such as anastrozole. She agreed to proceed and started taking anastrozole in the very first days of 7/19. \par \par Seen in 11/19. Had evidence of response in her left ax mass. \par \par Seen 3/20 and she informed me she stopped taking anstrozole end 8/19 secondary to concerns re bone toxicity. She had been seeing an "MD PhD" on [place] who had been infusing "ozone therapy in an electrolyte bag" IV, B17, other anti-inflammatory herbs. She declined to provide his name as " rather not say as he does not want people looking over his back". On exam she had what seemed like a further response. I recommended she re-consult / see Dr. Pittman to consider potential surgery vs XRT as primary local treatment. I advised that as she has been under the care of another physician that she follow up with him. I offered her to see me  as needed in the future. She was agreeable.\par \par In the interim she saw Dr. Pittman 7/6/20. WHen questioned why she did not do so sooner she did not have an answer to provide. Dr Pittman sent her for a B/L breast MRI 7/27/20 which showed :\par \par In the left axilla, again noted is an irregularly-shaped enhancing mass compatible with the known recurrent malignancy. Susceptibility artifact is noted within the mass, a biopsy marker. Overall, the mass measures approximately 6.9 cm AP by 3.9 TV x 6.2 cm CC. The mass appears more draped along the implant with extension along its superior lateral margin. The previously noted additional axillary lymph nodes medial to the mass appear relatively stable in size.\par \par No right axillary lymphadenopathy. No internal mammary lymphadenopathy.\par \par IMPRESSION:\par 1.  The biopsy-proven recurrent malignancy in the left axillary region has increased in size since the prior breast MRI and prior ultrasound, with maximal dimension now measuring 6.9 cm AP.\par 2.  No other suspicious enhancement in either breast.\par 3.  Silicone implants are intact.\par \par The pt now reports she stopped getting ozone therapy in 3/20. \par \par She progressed after she stopped anastrozole and ozone therapy. \par Restarted anastrozole in early August as well as ozone/vitamin/mineral therapy. \par \par Repeat MRI 10/27/20 revealed POD in L breast / axillary / CW mass. PET CT did not show any distant mets. \par  [de-identified] : Started fulvestrant on 11/24/20.\par \par Patient with a history of breast cancer, local axillary recurrence in 11/2020 now on fulvestrant.\par \par She denies all complaints noting a good appetite stable weight and excellent performance status.\par \par In interim had a major nose bleed in 10/21 - taken to ED by ambulance and had hgb 7.5 per her report - tsf 1 upRBC. Seen by ENT and no further intervention. Is on Fe supplements with reported gradual increase in hgb. She reports a little more than baseline [place] but this is improving. \par \par US breast  9/16/21\par \par IMPRESSION:\par Relatively stable appearing amorphous mass in the left axilla. 2 lymph nodes in the left axilla are slightly increased in size. Malignancy is possible but recent: Vaccination could produce similar result. Clinical correlation and if indicated 6 month follow-up directed ultrasound might be appropriate.\par \par RECOMMENDATION:  Clinical follow up.\par \par DEXA 9/16/21\par Impression:\par Findings are normal in the lumbar spine and osteoporosis in the left forearm.\par \par \par CT CAP 5/19/21\par IMPRESSION:\par Lung base ground glass opacities, possibly infectious or inflammatory.\par Colonic diverticulosis without CT evidence of acute diverticulitis.\par Left renal pelvis calculus (1.8 cm) and mild left hydronephrosis, similar to prior.\par \par MRI breast 4/26/21\par IMPRESSION:\par *Markedly limited exam.\par \par 1.  No evidence of implant rupture.\par 2.  The known left axillary malignancy and adjacent lymph nodes cannot be evaluated on this exam, due to extensive artifact in the region.\par \par \par Breast MR 3/29/21\par Stable left axillary mass and enlarged left axillary lymph node\par \par CT CAP 3/31/21\par  An ill-defined left axillary mass adjacent to a biopsy clip is again noted, measuring approximately 3.4 x 3.4 cm, previously 3.6 x 3.3 cm on 11/6/2020. An adjacent left axillary lymph node measures 1.5 x 1.2 cm (2:16), previously 1.4 x 1.2 cm. Multiple hypodense nodules in both lobes of the thyroid gland and in the thyroid isthmus, similar in appearance to the prior PET/CT dated 11/6/2020. 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

## 2021-11-24 NOTE — PHYSICAL EXAM
[Restricted in physically strenuous activity but ambulatory and able to carry out work of a light or sedentary nature] : Status 1- Restricted in physically strenuous activity but ambulatory and able to carry out work of a light or sedentary nature, e.g., light house work, office work [Normal] : affect appropriate [de-identified] : B/L breasts s/p MRM with reconstruction, Rt; no palpable masses in breast or axilla. Left: no palpable masses in breast, however ,vague "puffiness" in the LOQ without any discrete palpable masses an dw/o change. \par

## 2021-12-10 ENCOUNTER — APPOINTMENT (OUTPATIENT)
Dept: FAMILY MEDICINE | Facility: CLINIC | Age: 86
End: 2021-12-10
Payer: MEDICARE

## 2021-12-10 VITALS
HEART RATE: 68 BPM | HEIGHT: 62 IN | SYSTOLIC BLOOD PRESSURE: 126 MMHG | BODY MASS INDEX: 27.6 KG/M2 | DIASTOLIC BLOOD PRESSURE: 70 MMHG | WEIGHT: 150 LBS | RESPIRATION RATE: 20 BRPM

## 2021-12-10 PROCEDURE — 36415 COLL VENOUS BLD VENIPUNCTURE: CPT

## 2021-12-10 PROCEDURE — 99214 OFFICE O/P EST MOD 30 MIN: CPT | Mod: 25

## 2021-12-11 LAB
ALBUMIN SERPL ELPH-MCNC: 3.9 G/DL
ALP BLD-CCNC: 59 U/L
ALT SERPL-CCNC: 21 U/L
ANION GAP SERPL CALC-SCNC: 14 MMOL/L
AST SERPL-CCNC: 22 U/L
BASOPHILS # BLD AUTO: 0.03 K/UL
BASOPHILS NFR BLD AUTO: 0.3 %
BILIRUB SERPL-MCNC: 0.3 MG/DL
BUN SERPL-MCNC: 34 MG/DL
CALCIUM SERPL-MCNC: 9.3 MG/DL
CHLORIDE SERPL-SCNC: 106 MMOL/L
CHOLEST SERPL-MCNC: 259 MG/DL
CO2 SERPL-SCNC: 20 MMOL/L
CREAT SERPL-MCNC: 1.13 MG/DL
EOSINOPHIL # BLD AUTO: 0.23 K/UL
EOSINOPHIL NFR BLD AUTO: 2.7 %
FOLATE SERPL-MCNC: 18.3 NG/ML
GLUCOSE SERPL-MCNC: 94 MG/DL
HCT VFR BLD CALC: 31.5 %
HDLC SERPL-MCNC: 98 MG/DL
HGB BLD-MCNC: 9.7 G/DL
IMM GRANULOCYTES NFR BLD AUTO: 0.5 %
LDLC SERPL CALC-MCNC: 142 MG/DL
LYMPHOCYTES # BLD AUTO: 1.02 K/UL
LYMPHOCYTES NFR BLD AUTO: 11.8 %
MAN DIFF?: NORMAL
MCHC RBC-ENTMCNC: 27.6 PG
MCHC RBC-ENTMCNC: 30.8 GM/DL
MCV RBC AUTO: 89.5 FL
MONOCYTES # BLD AUTO: 0.5 K/UL
MONOCYTES NFR BLD AUTO: 5.8 %
NEUTROPHILS # BLD AUTO: 6.81 K/UL
NEUTROPHILS NFR BLD AUTO: 78.9 %
NONHDLC SERPL-MCNC: 162 MG/DL
PLATELET # BLD AUTO: 260 K/UL
POTASSIUM SERPL-SCNC: 4.5 MMOL/L
PROT SERPL-MCNC: 6.4 G/DL
RBC # BLD: 3.52 M/UL
RBC # FLD: 17.2 %
SODIUM SERPL-SCNC: 140 MMOL/L
T4 FREE SERPL-MCNC: 1.2 NG/DL
TRIGL SERPL-MCNC: 99 MG/DL
TSH SERPL-ACNC: 1.06 UIU/ML
VIT B12 SERPL-MCNC: 1274 PG/ML
WBC # FLD AUTO: 8.63 K/UL

## 2021-12-16 ENCOUNTER — APPOINTMENT (OUTPATIENT)
Dept: ORTHOPEDIC SURGERY | Facility: CLINIC | Age: 86
End: 2021-12-16
Payer: MEDICARE

## 2021-12-16 DIAGNOSIS — M84.361D STRESS FRACTURE, RIGHT TIBIA, SUBSEQUENT ENCOUNTER FOR FRACTURE WITH ROUTINE HEALING: ICD-10-CM

## 2021-12-16 DIAGNOSIS — M17.11 UNILATERAL PRIMARY OSTEOARTHRITIS, RIGHT KNEE: ICD-10-CM

## 2021-12-16 DIAGNOSIS — M76.31 ILIOTIBIAL BAND SYNDROME, RIGHT LEG: ICD-10-CM

## 2021-12-16 PROCEDURE — 20610 DRAIN/INJ JOINT/BURSA W/O US: CPT | Mod: RT

## 2021-12-16 PROCEDURE — 99214 OFFICE O/P EST MOD 30 MIN: CPT | Mod: 25

## 2021-12-17 PROBLEM — M17.11 PRIMARY OSTEOARTHRITIS OF RIGHT KNEE: Status: ACTIVE | Noted: 2021-09-09

## 2021-12-17 PROBLEM — M84.361D STRESS FRACTURE OF RIGHT TIBIA WITH ROUTINE HEALING, SUBSEQUENT ENCOUNTER: Status: ACTIVE | Noted: 2021-07-29

## 2021-12-17 NOTE — DISCUSSION/SUMMARY
[de-identified] : The underlying pathophysiology was reviewed in great detail with the patient as well as the various treatment options, including ice, analgesics, NSAIDs, Physical therapy, steroid injections.\par \par Patient received a corticosteroid injection and aspiration of the right knee. \par \par Activity modifications and restrictions were discussed. \par \par A home exercise sheet was given and discussed with the patient to follow\par \par I advised f/u with a bone endocrinologist for treatment of osteoporosis\par \par FU 6 weeks. \par \par All questions were answered, all alternatives discussed and the patient is in complete agreement with that plan. Follow-up appointment as instructed. Any issues and the patient will call or come in sooner.\par

## 2021-12-17 NOTE — PROCEDURE
[de-identified] :  At this point I recommended a therapeutic injection and under sterile precautions an injection of 4 cc 1% lidocaine with 0.5 cc of Kenalog and 0.5 cc of Dexamethasone- was placed into the RIGHT knee joint after 15 cc of clear synovial fluid was aspirated and after several minutes, the patient felt significant relief.\par

## 2021-12-17 NOTE — HISTORY OF PRESENT ILLNESS
[de-identified] : ROGE SAINI is a 87 year female presenting to the office complaining of right knee pain. She  presents to the office ambulating with a cane. Patient reports pain began on 04/16/2021. She tripped and fell, landing on her right knee. She notes the pain was tolerable but slowly increased overtime. She saw her PCP who gave her a corticosteroid injection noting no relief in symptoms. She then underwent a MRI of the right knee on 07/12/2021 at [facility] revealing Insufficiency fracture of the proximal medial tibial metaphysis.  Patient notes continued pain at this time. The patient describes the pain as a dull aching, and occasionally sharp pain localized to the medial aspect of her right knee that is intermittent in nature. Her   symptoms are exacerbated with any weightbearing on the leg.  Pain is alleviated with rest.  Patient denies instability, catching or locking of the knee. Patient is taking Tylenol for pain relief with mild to moderate  relief in symptoms. Of note patient has breast cancer.

## 2021-12-19 ENCOUNTER — OUTPATIENT (OUTPATIENT)
Dept: OUTPATIENT SERVICES | Facility: HOSPITAL | Age: 86
LOS: 1 days | Discharge: HOME | End: 2021-12-19

## 2021-12-19 DIAGNOSIS — Z87.39 PERSONAL HISTORY OF OTHER DISEASES OF THE MUSCULOSKELETAL SYSTEM AND CONNECTIVE TISSUE: Chronic | ICD-10-CM

## 2021-12-19 DIAGNOSIS — C50.919 MALIGNANT NEOPLASM OF UNSPECIFIED SITE OF UNSPECIFIED FEMALE BREAST: ICD-10-CM

## 2021-12-19 DIAGNOSIS — Z98.890 OTHER SPECIFIED POSTPROCEDURAL STATES: Chronic | ICD-10-CM

## 2021-12-19 DIAGNOSIS — Z90.49 ACQUIRED ABSENCE OF OTHER SPECIFIED PARTS OF DIGESTIVE TRACT: Chronic | ICD-10-CM

## 2021-12-19 DIAGNOSIS — Z96.649 PRESENCE OF UNSPECIFIED ARTIFICIAL HIP JOINT: Chronic | ICD-10-CM

## 2021-12-21 ENCOUNTER — APPOINTMENT (OUTPATIENT)
Dept: INFUSION THERAPY | Facility: HOSPITAL | Age: 86
End: 2021-12-21

## 2022-01-04 ENCOUNTER — OUTPATIENT (OUTPATIENT)
Dept: OUTPATIENT SERVICES | Facility: HOSPITAL | Age: 87
LOS: 1 days | End: 2022-01-04
Payer: MEDICARE

## 2022-01-04 ENCOUNTER — APPOINTMENT (OUTPATIENT)
Dept: ULTRASOUND IMAGING | Facility: HOSPITAL | Age: 87
End: 2022-01-04
Payer: MEDICARE

## 2022-01-04 DIAGNOSIS — Z90.49 ACQUIRED ABSENCE OF OTHER SPECIFIED PARTS OF DIGESTIVE TRACT: Chronic | ICD-10-CM

## 2022-01-04 DIAGNOSIS — Z96.649 PRESENCE OF UNSPECIFIED ARTIFICIAL HIP JOINT: Chronic | ICD-10-CM

## 2022-01-04 DIAGNOSIS — Z98.890 OTHER SPECIFIED POSTPROCEDURAL STATES: Chronic | ICD-10-CM

## 2022-01-04 DIAGNOSIS — C50.919 MALIGNANT NEOPLASM OF UNSPECIFIED SITE OF UNSPECIFIED FEMALE BREAST: ICD-10-CM

## 2022-01-04 DIAGNOSIS — Z87.39 PERSONAL HISTORY OF OTHER DISEASES OF THE MUSCULOSKELETAL SYSTEM AND CONNECTIVE TISSUE: Chronic | ICD-10-CM

## 2022-01-04 PROCEDURE — 76882 US LMTD JT/FCL EVL NVASC XTR: CPT | Mod: 26,LT

## 2022-01-04 PROCEDURE — 76882 US LMTD JT/FCL EVL NVASC XTR: CPT

## 2022-01-18 ENCOUNTER — APPOINTMENT (OUTPATIENT)
Dept: INFUSION THERAPY | Facility: HOSPITAL | Age: 87
End: 2022-01-18

## 2022-01-18 ENCOUNTER — APPOINTMENT (OUTPATIENT)
Dept: HEMATOLOGY ONCOLOGY | Facility: CLINIC | Age: 87
End: 2022-01-18
Payer: MEDICARE

## 2022-01-18 VITALS
HEART RATE: 78 BPM | RESPIRATION RATE: 17 BRPM | WEIGHT: 145.51 LBS | SYSTOLIC BLOOD PRESSURE: 144 MMHG | DIASTOLIC BLOOD PRESSURE: 68 MMHG | OXYGEN SATURATION: 98 % | BODY MASS INDEX: 26.44 KG/M2 | TEMPERATURE: 96.4 F | HEIGHT: 62.01 IN

## 2022-01-18 PROCEDURE — 99214 OFFICE O/P EST MOD 30 MIN: CPT

## 2022-01-18 NOTE — PHYSICAL EXAM
[Restricted in physically strenuous activity but ambulatory and able to carry out work of a light or sedentary nature] : Status 1- Restricted in physically strenuous activity but ambulatory and able to carry out work of a light or sedentary nature, e.g., light house work, office work [Normal] : affect appropriate [de-identified] : B/L breasts s/p MRM with reconstruction, Rt; no palpable masses in breast or axilla. Left: no palpable masses in breast, however ,vague "puffiness" in the LOQ without any discrete palpable masses an dw/o change. \par

## 2022-01-18 NOTE — REVIEW OF SYSTEMS
Transfer Note/Medicine Accept Note [Negative] : Endocrine [FreeTextEntry9] : as above [de-identified] : as above

## 2022-01-18 NOTE — HISTORY OF PRESENT ILLNESS
[Date: ____________] : Patient's last distress assessment performed on [unfilled]. [0 - No Distress] : Distress Level: 0 [de-identified] : The patient has a history of right breast cancer in 1965 for which she underwent what is described as a "Jeanette mastectomy" by Dr. Olivo at [facility] with "0/19 axillary lymph nodes" per patient's verbal report and followed expectantly.  She was well until 2008 at which time she was found to have a left breast multifocal DCIS per her description for which she underwent a left modified radical mastectomy at [facility] under the care Dr. Yu with a finding of "3 spots of DCIS, and a sentinel lymph node negative" per patient's verbal report; I do not have a copy of that report for my review.  The patient reports having undergone bilateral breast reconstruction at the same time to include what she describes as right "right cadaver tissue" in addition to an implant in the right breast, as well as a left breast implant.\par \par She was then well until approximately 1-2/19 when she describes transient pinching on her left reconstructed breast, centrally and predominantly to the lateral aspect.  She chose to follow this and then start to question whether she was "losing volume."  Through a friend/networking, she contacted Dr. Magan Carey who recommended a bilateral breast MRI, which was performed at [facility] on June 21, 2019 with a finding of a suspicious 5 cm irregular mass in the left axilla as well as further suspicious lymphadenopathy seen posterior to the mass with second-look ultrasound and core biopsy recommended as well as a PET-CT scan.  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  The patient then went on to see Dr. Carlos Pittman and had a PET-CT scan performed on July 5, 2019 with a finding of a mildly avid focus in the left thyroid lobe extending towards the isthmus, a 1.3 cm non-avid left thyroid lobe nodule; in the left axilla, there was left axillary soft tissue mass with irregular margins containing a biopsy clip measuring 3 x 2 cm with several adjacent subcentimeter left axillary lymph nodes measuring up to 1 cm; bilateral hip arthroplasties with heterogeneous FDG avidity adjacent to the proximal portions bilaterally on the right with an SUV of 13.8 on the left and SUV of 9.4 with a comment, this area is limited in evaluation due to beam hardening artifact; there were degenerative changes in the lower spine with mild areas of FDG avidity, multilevel, non-FDG avid compression fracture of L4 unchanged since 06/2017.\par \par I saw her in initial consultation on 716/19 and subsequently had requested a review of the pathology and it turned our it was ER+ / AL neg. We reviewed her scans at tumor board and the group agreed it was unresectable at this time. I called the pt and informed her of the above change in the pathology and that I recommended neoadjuvant anti-estrogen therapy with an aromatase inhibitor such as anastrozole. She agreed to proceed and started taking anastrozole in the very first days of 7/19. \par \par Seen in 11/19. Had evidence of response in her left ax mass. \par \par Seen 3/20 and she informed me she stopped taking anstrozole end 8/19 secondary to concerns re bone toxicity. She had been seeing an "MD PhD" on [place] who had been infusing "ozone therapy in an electrolyte bag" IV, B17, other anti-inflammatory herbs. She declined to provide his name as " rather not say as he does not want people looking over his back". On exam she had what seemed like a further response. I recommended she re-consult / see Dr. Pittman to consider potential surgery vs XRT as primary local treatment. I advised that as she has been under the care of another physician that she follow up with him. I offered her to see me  as needed in the future. She was agreeable.\par \par In the interim she saw Dr. Pittman 7/6/20. WHen questioned why she did not do so sooner she did not have an answer to provide. Dr Pittman sent her for a B/L breast MRI 7/27/20 which showed :\par \par In the left axilla, again noted is an irregularly-shaped enhancing mass compatible with the known recurrent malignancy. Susceptibility artifact is noted within the mass, a biopsy marker. Overall, the mass measures approximately 6.9 cm AP by 3.9 TV x 6.2 cm CC. The mass appears more draped along the implant with extension along its superior lateral margin. The previously noted additional axillary lymph nodes medial to the mass appear relatively stable in size.\par \par No right axillary lymphadenopathy. No internal mammary lymphadenopathy.\par \par IMPRESSION:\par 1.  The biopsy-proven recurrent malignancy in the left axillary region has increased in size since the prior breast MRI and prior ultrasound, with maximal dimension now measuring 6.9 cm AP.\par 2.  No other suspicious enhancement in either breast.\par 3.  Silicone implants are intact.\par \par The pt now reports she stopped getting ozone therapy in 3/20. \par \par She progressed after she stopped anastrozole and ozone therapy. \par Restarted anastrozole in early August as well as ozone/vitamin/mineral therapy. \par \par Repeat MRI 10/27/20 revealed POD in L breast / axillary / CW mass. PET CT did not show any distant mets. \par  [de-identified] : Started fulvestrant on 11/24/20.\par \par Patient with a history of breast cancer, local axillary recurrence in 11/2020 now on fulvestrant.\par \par She denies any treatment related side effects. 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 This is \par suspicious for an abnormal axillary lymph node.\par \par Also noted in the left axilla is a well-defined 2.1 x 1.9 x 2.5 cm \par hypoechoic mass which is unchanged from prior study dated 9/16/2021. This \par has been biopsy-proven to be invasive poorly differentiated carcinoma \par with lobular phenotype.\par \par Also noted in the left axilla is a stable 1.1 cm hypoechoic mass with no \par discernible fatty hilum suspicious for abnormal axillary lymph node.\par \par \par IMPRESSION:\par \par Stable findings noted in the left axilla when compared to prior study \par dated 9/16/2021.\par \par \par \par US breast  9/16/21\par \par IMPRESSION:\par Relatively stable appearing amorphous mass in the left axilla. 2 lymph nodes in the left axilla are slightly increased in size. Malignancy is possible but recent: Vaccination could produce similar result. Clinical correlation and if indicated 6 month follow-up directed ultrasound might be appropriate.\par \par RECOMMENDATION:  Clinical follow up.\par \par DEXA 9/16/21\par Impression:\par Findings are normal in the lumbar spine and osteoporosis in the left forearm.\par \par \par CT CAP 5/19/21\par IMPRESSION:\par Lung base ground glass opacities, possibly infectious or inflammatory.\par Colonic diverticulosis without CT evidence of acute diverticulitis.\par Left renal pelvis calculus (1.8 cm) and mild left hydronephrosis, similar to prior.\par \par MRI breast 4/26/21\par IMPRESSION:\par *Markedly limited exam.\par \par 1.  No evidence of implant rupture.\par 2.  The known left axillary malignancy and adjacent lymph nodes cannot be evaluated on this exam, due to extensive artifact in the region.\par \par \par Breast MR 3/29/21\par Stable left axillary mass and enlarged left axillary lymph node\par \par CT CAP 3/31/21\par  An ill-defined left axillary mass adjacent to a biopsy clip is again noted, measuring approximately 3.4 x 3.4 cm, previously 3.6 x 3.3 cm on 11/6/2020. An adjacent left axillary lymph node measures 1.5 x 1.2 cm (2:16), previously 1.4 x 1.2 cm. Multiple hypodense nodules in both lobes of the thyroid gland and in the thyroid isthmus, similar in appearance to the prior PET/CT dated 11/6/2020. 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

## 2022-02-04 ENCOUNTER — APPOINTMENT (OUTPATIENT)
Dept: FAMILY MEDICINE | Facility: CLINIC | Age: 87
End: 2022-02-04
Payer: MEDICARE

## 2022-02-04 VITALS
RESPIRATION RATE: 20 BRPM | BODY MASS INDEX: 26.68 KG/M2 | DIASTOLIC BLOOD PRESSURE: 70 MMHG | HEIGHT: 62 IN | HEART RATE: 76 BPM | SYSTOLIC BLOOD PRESSURE: 134 MMHG | WEIGHT: 145 LBS

## 2022-02-04 PROCEDURE — 99214 OFFICE O/P EST MOD 30 MIN: CPT

## 2022-02-04 RX ORDER — AMLODIPINE BESYLATE 10 MG/1
10 TABLET ORAL
Qty: 90 | Refills: 3 | Status: DISCONTINUED | COMMUNITY
Start: 2020-12-14 | End: 2022-02-04

## 2022-02-04 NOTE — ASSESSMENT
[FreeTextEntry1] : BP acceptable at this encounter, but continues to take Amlodipine - pt does need BP control - will stop Amlodipine and try Lisinopril 10mg daily and plan F/U 3 weeks

## 2022-02-04 NOTE — HISTORY OF PRESENT ILLNESS
[de-identified] : Presents for BP check and general follow-up.  States unable to tolerate Amlodipine - states "It makes me full of fluid."  Also pt states BP is intermittently elevated even with the medication.  Otherwise denies CP, SOB, palpitations.

## 2022-02-04 NOTE — PHYSICAL EXAM
[No Acute Distress] : no acute distress [Normal] : normal rate, regular rhythm, normal S1 and S2 and no murmur heard [No Edema] : there was no peripheral edema [Soft] : abdomen soft [Non Tender] : non-tender [Normal Posterior Cervical Nodes] : no posterior cervical lymphadenopathy [Normal Anterior Cervical Nodes] : no anterior cervical lymphadenopathy [No Focal Deficits] : no focal deficits [Alert and Oriented x3] : oriented to person, place, and time

## 2022-02-07 ENCOUNTER — RX RENEWAL (OUTPATIENT)
Age: 87
End: 2022-02-07

## 2022-02-11 ENCOUNTER — OUTPATIENT (OUTPATIENT)
Dept: OUTPATIENT SERVICES | Facility: HOSPITAL | Age: 87
LOS: 1 days | Discharge: HOME | End: 2022-02-11

## 2022-02-11 DIAGNOSIS — Z87.39 PERSONAL HISTORY OF OTHER DISEASES OF THE MUSCULOSKELETAL SYSTEM AND CONNECTIVE TISSUE: Chronic | ICD-10-CM

## 2022-02-11 DIAGNOSIS — Z96.649 PRESENCE OF UNSPECIFIED ARTIFICIAL HIP JOINT: Chronic | ICD-10-CM

## 2022-02-11 DIAGNOSIS — Z98.890 OTHER SPECIFIED POSTPROCEDURAL STATES: Chronic | ICD-10-CM

## 2022-02-11 DIAGNOSIS — C50.919 MALIGNANT NEOPLASM OF UNSPECIFIED SITE OF UNSPECIFIED FEMALE BREAST: ICD-10-CM

## 2022-02-11 DIAGNOSIS — Z90.49 ACQUIRED ABSENCE OF OTHER SPECIFIED PARTS OF DIGESTIVE TRACT: Chronic | ICD-10-CM

## 2022-02-12 ENCOUNTER — TRANSCRIPTION ENCOUNTER (OUTPATIENT)
Age: 87
End: 2022-02-12

## 2022-02-15 ENCOUNTER — APPOINTMENT (OUTPATIENT)
Dept: INFUSION THERAPY | Facility: HOSPITAL | Age: 87
End: 2022-02-15

## 2022-02-18 ENCOUNTER — APPOINTMENT (OUTPATIENT)
Dept: INFUSION THERAPY | Facility: HOSPITAL | Age: 87
End: 2022-02-18

## 2022-02-24 ENCOUNTER — APPOINTMENT (OUTPATIENT)
Dept: FAMILY MEDICINE | Facility: CLINIC | Age: 87
End: 2022-02-24
Payer: MEDICARE

## 2022-02-24 VITALS — DIASTOLIC BLOOD PRESSURE: 80 MMHG | SYSTOLIC BLOOD PRESSURE: 145 MMHG | HEART RATE: 76 BPM | RESPIRATION RATE: 20 BRPM

## 2022-02-24 PROCEDURE — 99213 OFFICE O/P EST LOW 20 MIN: CPT

## 2022-02-24 NOTE — ASSESSMENT
[FreeTextEntry1] : BP remains suboptimal - will cautiously add Amlodipine 2.5mg daily (note pt is prone to side-effects) and plan F/U 2 weeks.

## 2022-02-24 NOTE — HISTORY OF PRESENT ILLNESS
[de-identified] : Presents as F/U to the ER at [facility] - had gone due to persistently elevated BPs.  Reviewed documentation (see scanned documents); note did not change meds.  Pt continues to record BPs to the 170s/90s to 100.

## 2022-03-13 ENCOUNTER — OUTPATIENT (OUTPATIENT)
Dept: OUTPATIENT SERVICES | Facility: HOSPITAL | Age: 87
LOS: 1 days | Discharge: HOME | End: 2022-03-13

## 2022-03-13 DIAGNOSIS — Z98.890 OTHER SPECIFIED POSTPROCEDURAL STATES: Chronic | ICD-10-CM

## 2022-03-13 DIAGNOSIS — Z90.49 ACQUIRED ABSENCE OF OTHER SPECIFIED PARTS OF DIGESTIVE TRACT: Chronic | ICD-10-CM

## 2022-03-13 DIAGNOSIS — C50.919 MALIGNANT NEOPLASM OF UNSPECIFIED SITE OF UNSPECIFIED FEMALE BREAST: ICD-10-CM

## 2022-03-13 DIAGNOSIS — Z96.649 PRESENCE OF UNSPECIFIED ARTIFICIAL HIP JOINT: Chronic | ICD-10-CM

## 2022-03-13 DIAGNOSIS — Z87.39 PERSONAL HISTORY OF OTHER DISEASES OF THE MUSCULOSKELETAL SYSTEM AND CONNECTIVE TISSUE: Chronic | ICD-10-CM

## 2022-03-14 ENCOUNTER — APPOINTMENT (OUTPATIENT)
Dept: FAMILY MEDICINE | Facility: CLINIC | Age: 87
End: 2022-03-14
Payer: MEDICARE

## 2022-03-14 VITALS
WEIGHT: 142 LBS | HEART RATE: 68 BPM | RESPIRATION RATE: 20 BRPM | DIASTOLIC BLOOD PRESSURE: 68 MMHG | SYSTOLIC BLOOD PRESSURE: 130 MMHG | HEIGHT: 62 IN | BODY MASS INDEX: 26.13 KG/M2

## 2022-03-14 DIAGNOSIS — R42 DIZZINESS AND GIDDINESS: ICD-10-CM

## 2022-03-14 PROCEDURE — 99214 OFFICE O/P EST MOD 30 MIN: CPT | Mod: 25

## 2022-03-14 PROCEDURE — 36415 COLL VENOUS BLD VENIPUNCTURE: CPT

## 2022-03-14 NOTE — HISTORY OF PRESENT ILLNESS
[de-identified] : Presents for BP re-check, labs, and general follow-up.  States home BPs seen to be under better control on present medication; does have persistent intermittent "lightheadedness" and "dizziness" described as "spinning."  States did see Cardiology recently and "everything was OK."

## 2022-03-14 NOTE — ASSESSMENT
[FreeTextEntry1] : Hemodynamically stable with acceptable BP\par Cardiac status stable with no evidence of decompensation\par Intermittent vertigo with unremarkable neuro exam - await labs; will order Meclizine for comfort; consider Neuro evaluation\par Lab profiles drawn in office and sent

## 2022-03-15 ENCOUNTER — INPATIENT (INPATIENT)
Facility: HOSPITAL | Age: 87
LOS: 1 days | Discharge: HOME | DRG: 312 | End: 2022-03-17
Attending: STUDENT IN AN ORGANIZED HEALTH CARE EDUCATION/TRAINING PROGRAM | Admitting: HOSPITALIST
Payer: MEDICARE

## 2022-03-15 ENCOUNTER — APPOINTMENT (OUTPATIENT)
Dept: INFUSION THERAPY | Facility: HOSPITAL | Age: 87
End: 2022-03-15

## 2022-03-15 ENCOUNTER — APPOINTMENT (OUTPATIENT)
Dept: HEMATOLOGY ONCOLOGY | Facility: CLINIC | Age: 87
End: 2022-03-15

## 2022-03-15 VITALS
SYSTOLIC BLOOD PRESSURE: 122 MMHG | HEART RATE: 60 BPM | HEIGHT: 61 IN | DIASTOLIC BLOOD PRESSURE: 53 MMHG | OXYGEN SATURATION: 96 % | TEMPERATURE: 98 F | RESPIRATION RATE: 18 BRPM | WEIGHT: 138.01 LBS

## 2022-03-15 DIAGNOSIS — Z87.39 PERSONAL HISTORY OF OTHER DISEASES OF THE MUSCULOSKELETAL SYSTEM AND CONNECTIVE TISSUE: Chronic | ICD-10-CM

## 2022-03-15 DIAGNOSIS — Z95.0 PRESENCE OF CARDIAC PACEMAKER: Chronic | ICD-10-CM

## 2022-03-15 DIAGNOSIS — Z90.49 ACQUIRED ABSENCE OF OTHER SPECIFIED PARTS OF DIGESTIVE TRACT: Chronic | ICD-10-CM

## 2022-03-15 DIAGNOSIS — Z98.890 OTHER SPECIFIED POSTPROCEDURAL STATES: Chronic | ICD-10-CM

## 2022-03-15 DIAGNOSIS — Z96.649 PRESENCE OF UNSPECIFIED ARTIFICIAL HIP JOINT: Chronic | ICD-10-CM

## 2022-03-15 DIAGNOSIS — S90.02XA CONTUSION OF LEFT ANKLE, INITIAL ENCOUNTER: ICD-10-CM

## 2022-03-15 LAB
ALBUMIN SERPL ELPH-MCNC: 3.5 G/DL — SIGNIFICANT CHANGE UP (ref 3.3–5)
ALBUMIN SERPL ELPH-MCNC: 4.1 G/DL
ALP BLD-CCNC: 64 U/L
ALP SERPL-CCNC: 59 U/L — SIGNIFICANT CHANGE UP (ref 40–120)
ALT FLD-CCNC: 24 U/L — SIGNIFICANT CHANGE UP (ref 10–45)
ALT SERPL-CCNC: 17 U/L
ANION GAP SERPL CALC-SCNC: 12 MMOL/L — SIGNIFICANT CHANGE UP (ref 5–17)
ANION GAP SERPL CALC-SCNC: 14 MMOL/L
APTT BLD: 29.4 SEC — SIGNIFICANT CHANGE UP (ref 27.5–35.5)
AST SERPL-CCNC: 20 U/L
AST SERPL-CCNC: 27 U/L — SIGNIFICANT CHANGE UP (ref 10–40)
BASOPHILS # BLD AUTO: 0.04 K/UL — SIGNIFICANT CHANGE UP (ref 0–0.2)
BASOPHILS # BLD AUTO: 0.06 K/UL
BASOPHILS NFR BLD AUTO: 0.4 % — SIGNIFICANT CHANGE UP (ref 0–2)
BASOPHILS NFR BLD AUTO: 0.9 %
BILIRUB SERPL-MCNC: 0.3 MG/DL
BILIRUB SERPL-MCNC: 0.6 MG/DL — SIGNIFICANT CHANGE UP (ref 0.2–1.2)
BUN SERPL-MCNC: 42 MG/DL
BUN SERPL-MCNC: 42 MG/DL — HIGH (ref 7–23)
CALCIUM SERPL-MCNC: 9.1 MG/DL — SIGNIFICANT CHANGE UP (ref 8.4–10.5)
CALCIUM SERPL-MCNC: 9.4 MG/DL
CHLORIDE SERPL-SCNC: 107 MMOL/L
CHLORIDE SERPL-SCNC: 108 MMOL/L — SIGNIFICANT CHANGE UP (ref 96–108)
CHOLEST SERPL-MCNC: 264 MG/DL
CO2 SERPL-SCNC: 19 MMOL/L
CO2 SERPL-SCNC: 22 MMOL/L — SIGNIFICANT CHANGE UP (ref 22–31)
CREAT SERPL-MCNC: 1.14 MG/DL — SIGNIFICANT CHANGE UP (ref 0.5–1.3)
CREAT SERPL-MCNC: 1.26 MG/DL
EGFR: 41 ML/MIN/1.73M2
EGFR: 47 ML/MIN/1.73M2 — LOW
EOSINOPHIL # BLD AUTO: 0.17 K/UL — SIGNIFICANT CHANGE UP (ref 0–0.5)
EOSINOPHIL # BLD AUTO: 0.34 K/UL
EOSINOPHIL NFR BLD AUTO: 1.9 % — SIGNIFICANT CHANGE UP (ref 0–6)
EOSINOPHIL NFR BLD AUTO: 5.1 %
FOLATE SERPL-MCNC: 18.5 NG/ML
GLUCOSE SERPL-MCNC: 86 MG/DL
GLUCOSE SERPL-MCNC: 99 MG/DL — SIGNIFICANT CHANGE UP (ref 70–99)
HCT VFR BLD CALC: 30.5 % — LOW (ref 34.5–45)
HCT VFR BLD CALC: 31.9 %
HDLC SERPL-MCNC: 75 MG/DL
HGB BLD-MCNC: 9.4 G/DL
HGB BLD-MCNC: 9.5 G/DL — LOW (ref 11.5–15.5)
IMM GRANULOCYTES NFR BLD AUTO: 0.2 %
IMM GRANULOCYTES NFR BLD AUTO: 0.2 % — SIGNIFICANT CHANGE UP (ref 0–1.5)
INR BLD: 1.15 RATIO — SIGNIFICANT CHANGE UP (ref 0.88–1.16)
LDLC SERPL CALC-MCNC: 168 MG/DL
LYMPHOCYTES # BLD AUTO: 0.94 K/UL
LYMPHOCYTES # BLD AUTO: 1.05 K/UL — SIGNIFICANT CHANGE UP (ref 1–3.3)
LYMPHOCYTES # BLD AUTO: 11.7 % — LOW (ref 13–44)
LYMPHOCYTES NFR BLD AUTO: 14.2 %
MAN DIFF?: NORMAL
MCHC RBC-ENTMCNC: 27 PG
MCHC RBC-ENTMCNC: 27.7 PG — SIGNIFICANT CHANGE UP (ref 27–34)
MCHC RBC-ENTMCNC: 29.5 GM/DL
MCHC RBC-ENTMCNC: 31.1 GM/DL — LOW (ref 32–36)
MCV RBC AUTO: 88.9 FL — SIGNIFICANT CHANGE UP (ref 80–100)
MCV RBC AUTO: 91.7 FL
MONOCYTES # BLD AUTO: 0.45 K/UL
MONOCYTES # BLD AUTO: 0.56 K/UL — SIGNIFICANT CHANGE UP (ref 0–0.9)
MONOCYTES NFR BLD AUTO: 6.2 % — SIGNIFICANT CHANGE UP (ref 2–14)
MONOCYTES NFR BLD AUTO: 6.8 %
NEUTROPHILS # BLD AUTO: 4.84 K/UL
NEUTROPHILS # BLD AUTO: 7.15 K/UL — SIGNIFICANT CHANGE UP (ref 1.8–7.4)
NEUTROPHILS NFR BLD AUTO: 72.8 %
NEUTROPHILS NFR BLD AUTO: 79.6 % — HIGH (ref 43–77)
NONHDLC SERPL-MCNC: 189 MG/DL
NRBC # BLD: 0 /100 WBCS — SIGNIFICANT CHANGE UP (ref 0–0)
PLATELET # BLD AUTO: 214 K/UL — SIGNIFICANT CHANGE UP (ref 150–400)
PLATELET # BLD AUTO: 237 K/UL
POTASSIUM SERPL-MCNC: 4.2 MMOL/L — SIGNIFICANT CHANGE UP (ref 3.5–5.3)
POTASSIUM SERPL-SCNC: 4.2 MMOL/L — SIGNIFICANT CHANGE UP (ref 3.5–5.3)
POTASSIUM SERPL-SCNC: 4.5 MMOL/L
PROT SERPL-MCNC: 6.9 G/DL
PROT SERPL-MCNC: 7.4 G/DL — SIGNIFICANT CHANGE UP (ref 6–8.3)
PROTHROM AB SERPL-ACNC: 13.4 SEC — SIGNIFICANT CHANGE UP (ref 10.5–13.4)
RBC # BLD: 3.43 M/UL — LOW (ref 3.8–5.2)
RBC # BLD: 3.48 M/UL
RBC # FLD: 16.5 % — HIGH (ref 10.3–14.5)
RBC # FLD: 17.4 %
SARS-COV-2 RNA SPEC QL NAA+PROBE: SIGNIFICANT CHANGE UP
SODIUM SERPL-SCNC: 140 MMOL/L
SODIUM SERPL-SCNC: 142 MMOL/L — SIGNIFICANT CHANGE UP (ref 135–145)
T4 FREE SERPL-MCNC: 1 NG/DL
TRIGL SERPL-MCNC: 104 MG/DL
TROPONIN I, HIGH SENSITIVITY RESULT: 20.3 NG/L — SIGNIFICANT CHANGE UP
TROPONIN I, HIGH SENSITIVITY RESULT: 20.5 NG/L — SIGNIFICANT CHANGE UP
TSH SERPL-ACNC: 0.96 UIU/ML
VIT B12 SERPL-MCNC: >2000 PG/ML
WBC # BLD: 8.99 K/UL — SIGNIFICANT CHANGE UP (ref 3.8–10.5)
WBC # FLD AUTO: 6.64 K/UL
WBC # FLD AUTO: 8.99 K/UL — SIGNIFICANT CHANGE UP (ref 3.8–10.5)

## 2022-03-15 PROCEDURE — 99285 EMERGENCY DEPT VISIT HI MDM: CPT | Mod: FS

## 2022-03-15 PROCEDURE — 71045 X-RAY EXAM CHEST 1 VIEW: CPT | Mod: 26

## 2022-03-15 PROCEDURE — 73630 X-RAY EXAM OF FOOT: CPT | Mod: 26,LT

## 2022-03-15 PROCEDURE — 73610 X-RAY EXAM OF ANKLE: CPT | Mod: 26,LT

## 2022-03-15 PROCEDURE — 99223 1ST HOSP IP/OBS HIGH 75: CPT

## 2022-03-15 PROCEDURE — 93010 ELECTROCARDIOGRAM REPORT: CPT

## 2022-03-15 RX ORDER — APIXABAN 2.5 MG/1
2.5 TABLET, FILM COATED ORAL
Refills: 0 | Status: DISCONTINUED | OUTPATIENT
Start: 2022-03-15 | End: 2022-03-17

## 2022-03-15 RX ORDER — TRAMADOL HYDROCHLORIDE 50 MG/1
25 TABLET ORAL EVERY 6 HOURS
Refills: 0 | Status: DISCONTINUED | OUTPATIENT
Start: 2022-03-15 | End: 2022-03-16

## 2022-03-15 RX ORDER — INFLUENZA VIRUS VACCINE 15; 15; 15; 15 UG/.5ML; UG/.5ML; UG/.5ML; UG/.5ML
0.7 SUSPENSION INTRAMUSCULAR ONCE
Refills: 0 | Status: DISCONTINUED | OUTPATIENT
Start: 2022-03-15 | End: 2022-03-17

## 2022-03-15 RX ORDER — ASCORBIC ACID 60 MG
500 TABLET,CHEWABLE ORAL DAILY
Refills: 0 | Status: DISCONTINUED | OUTPATIENT
Start: 2022-03-15 | End: 2022-03-17

## 2022-03-15 RX ORDER — AMLODIPINE BESYLATE 2.5 MG/1
5 TABLET ORAL DAILY
Refills: 0 | Status: DISCONTINUED | OUTPATIENT
Start: 2022-03-16 | End: 2022-03-17

## 2022-03-15 RX ORDER — SODIUM CHLORIDE 9 MG/ML
500 INJECTION INTRAMUSCULAR; INTRAVENOUS; SUBCUTANEOUS ONCE
Refills: 0 | Status: COMPLETED | OUTPATIENT
Start: 2022-03-15 | End: 2022-03-15

## 2022-03-15 RX ORDER — ACETAMINOPHEN 500 MG
975 TABLET ORAL ONCE
Refills: 0 | Status: COMPLETED | OUTPATIENT
Start: 2022-03-15 | End: 2022-03-15

## 2022-03-15 RX ORDER — ONDANSETRON 8 MG/1
4 TABLET, FILM COATED ORAL EVERY 8 HOURS
Refills: 0 | Status: DISCONTINUED | OUTPATIENT
Start: 2022-03-15 | End: 2022-03-17

## 2022-03-15 RX ORDER — LANOLIN ALCOHOL/MO/W.PET/CERES
3 CREAM (GRAM) TOPICAL AT BEDTIME
Refills: 0 | Status: DISCONTINUED | OUTPATIENT
Start: 2022-03-15 | End: 2022-03-17

## 2022-03-15 RX ORDER — SENNA PLUS 8.6 MG/1
2 TABLET ORAL AT BEDTIME
Refills: 0 | Status: DISCONTINUED | OUTPATIENT
Start: 2022-03-15 | End: 2022-03-17

## 2022-03-15 RX ORDER — MECLIZINE HCL 12.5 MG
12.5 TABLET ORAL THREE TIMES A DAY
Refills: 0 | Status: DISCONTINUED | OUTPATIENT
Start: 2022-03-15 | End: 2022-03-16

## 2022-03-15 RX ORDER — LISINOPRIL 2.5 MG/1
10 TABLET ORAL DAILY
Refills: 0 | Status: DISCONTINUED | OUTPATIENT
Start: 2022-03-16 | End: 2022-03-17

## 2022-03-15 RX ORDER — ACETAMINOPHEN 500 MG
650 TABLET ORAL EVERY 6 HOURS
Refills: 0 | Status: DISCONTINUED | OUTPATIENT
Start: 2022-03-15 | End: 2022-03-17

## 2022-03-15 RX ADMIN — APIXABAN 2.5 MILLIGRAM(S): 2.5 TABLET, FILM COATED ORAL at 17:34

## 2022-03-15 RX ADMIN — Medication 650 MILLIGRAM(S): at 19:49

## 2022-03-15 RX ADMIN — Medication 975 MILLIGRAM(S): at 10:50

## 2022-03-15 RX ADMIN — SODIUM CHLORIDE 1000 MILLILITER(S): 9 INJECTION INTRAMUSCULAR; INTRAVENOUS; SUBCUTANEOUS at 10:50

## 2022-03-15 RX ADMIN — Medication 650 MILLIGRAM(S): at 20:19

## 2022-03-15 RX ADMIN — Medication 3 MILLIGRAM(S): at 21:11

## 2022-03-15 RX ADMIN — SENNA PLUS 2 TABLET(S): 8.6 TABLET ORAL at 21:11

## 2022-03-15 RX ADMIN — SODIUM CHLORIDE 500 MILLILITER(S): 9 INJECTION INTRAMUSCULAR; INTRAVENOUS; SUBCUTANEOUS at 11:50

## 2022-03-15 NOTE — H&P ADULT - NSHPREVIEWOFSYSTEMS_GEN_ALL_CORE
CONSTITUTIONAL: No fever, weight loss, or fatigue  EYES: No eye pain, visual disturbances, or discharge  ENMT:  No difficulty hearing, tinnitus, vertigo; No sinus or throat pain  NECK: No pain or stiffness  RESPIRATORY: No cough, wheezing, chills or hemoptysis; No shortness of breath  CARDIOVASCULAR: + dizziness; No chest pain, palpitations, or leg swelling  GASTROINTESTINAL: No abdominal or epigastric pain. No nausea, vomiting, or hematemesis; No diarrhea or constipation. No melena or hematochezia.  GENITOURINARY: No dysuria, frequency, hematuria, or incontinence  NEUROLOGICAL: No headaches, memory loss, loss of strength, numbness, or tremors  SKIN: No itching, burning, rashes, or lesions   MUSCULOSKELETAL: pain and swelling of left ankle when trying to ambulate  PSYCHIATRIC: No depression, anxiety, mood swings, or difficulty sleeping  ALLERGY AND IMMUNOLOGIC: No hives or eczema    ALL ROS REVIEWED AND NORMAL EXCEPT AS STATED ABOVE

## 2022-03-15 NOTE — ED PROVIDER NOTE - PHYSICAL EXAMINATION
General:     NAD, well-nourished, well-appearing  Eyes: PERRL  Head:     NC/AT, EOMI, dry oral mucosa  Neck:     trachea midline  Lungs:     CTA b/l  CVS:     RRR  Abd:     +BS, s/nt/nd  Ext:   LLE: tenderness lateral malleolus with swelling and ecchymosis, cap refill <3, soft compartments, sensation intact  hips from  healing semicircular laceration on right shin 2 cm. no tenderness  Neuro: AAOx3, no sensory/motor deficits

## 2022-03-15 NOTE — H&P ADULT - HISTORY OF PRESENT ILLNESS
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 As per patient, she has had dizziness intermittently for the past few (>3) weeks. The other day she was feeling lightheaded and twisted her left ankle in preventing herself from falling while hitting her right shin. She has been having this worked up by her PCP and they believe it could be from her BP meds which have been recently started and/or dehydration. This AM she was getting up from bed and could barely put pressure on her left foot to walk. She also noticed it was swollen and bruised. She call her son who bought her to the ED. She denies dizziness, fever, chills, nausea, vomiting, chest pain,  or abdominal pain.

## 2022-03-15 NOTE — H&P ADULT - NSICDXFAMILYHX_GEN_ALL_CORE_FT
FAMILY HISTORY:  Family history of CHF (congestive heart failure)     FAMILY HISTORY:  Family history of CHF (congestive heart failure), mother

## 2022-03-15 NOTE — H&P ADULT - NSICDXPASTSURGICALHX_GEN_ALL_CORE_FT
PAST SURGICAL HISTORY:  H/O lithotripsy     History of appendectomy age 18 as per patient    History of spinal stenosis cervical and lumbar    Status post THR (total hip replacement) bilateral     PAST SURGICAL HISTORY:  H/O lithotripsy     History of appendectomy age 18 as per patient    History of spinal stenosis cervical and lumbar    Status cardiac pacemaker     Status post THR (total hip replacement) bilateral

## 2022-03-15 NOTE — ED ADULT TRIAGE NOTE - CHIEF COMPLAINT QUOTE
fall due to dizziness braced fall and sustained laceration to right shin and left foot, on eliquis. Pt called by pmd Dr Pang and told blood count is low.

## 2022-03-15 NOTE — H&P ADULT - NSHPSOCIALHISTORY_GEN_ALL_CORE
denies smoking cigarettes, drinking alcohol, and illicit drug use   Lives alone denies smoking cigarettes, drinking alcohol, and illicit drug use. Stopped smoking 50 yrs ago  Lives alone, uses a cane and walker

## 2022-03-15 NOTE — ED PROVIDER NOTE - CLINICAL SUMMARY MEDICAL DECISION MAKING FREE TEXT BOX
87F h/o HTN, HLD, Afib s/p ablation in 2016 on Eliquis, CHF, h/o breast ca, appendectomy c/l left ankle pain and right near full due to dizziness which she has been experiencing for the last month. pt braced fall and sustained laceration to right shin and left foot, on eliquis. pts hemoglobin has been around 9 since her transfusion a few months ago after her transfusion from a nose bleed  no other injuries. denies fever, chills, cp, sob, headache, vision changes, palpitations  Ext:   LLE: tenderness lateral malleolus with swelling and ecchymosis, cap refill <3, soft compartments, sensation intact  hips from  healing semicircular laceration on right shin 2 cm. no tenderness 87F h/o HTN, HLD, Afib s/p ablation in 2016 on Eliquis, CHF, h/o breast ca, appendectomy c/l left ankle pain and right near full due to dizziness which she has been experiencing for the last month. pt braced fall and sustained laceration to right shin and left foot, on eliquis. pts hemoglobin has been around 9 since her transfusion a few months ago after her transfusion from a nose bleed  no other injuries. denies fever, chills, cp, sob, headache, vision changes, palpitations  Ext:   LLE: tenderness lateral malleolus with swelling and ecchymosis, cap refill <3, soft compartments, sensation intact  hips from  healing semicircular laceration on right shin 2 cm. no tenderness  pt unable to ambulate and lives at home. pt placed in air cast by RN. pt not safe to use crutches since she keeps getting dizzy but she doesn't want to stay

## 2022-03-15 NOTE — PATIENT PROFILE ADULT - FALL HARM RISK - HARM RISK INTERVENTIONS
Assistance with ambulation/Assistance OOB with selected safe patient handling equipment/Communicate Risk of Fall with Harm to all staff/Discuss with provider need for PT consult/Monitor gait and stability/Provide patient with walking aids - walker, cane, crutches/Reinforce activity limits and safety measures with patient and family/Sit up slowly, dangle for a short time, stand at bedside before walking/Tailored Fall Risk Interventions/Visual Cue: Yellow wristband and red socks/Bed in lowest position, wheels locked, appropriate side rails in place/Call bell, personal items and telephone in reach/Instruct patient to call for assistance before getting out of bed or chair/Non-slip footwear when patient is out of bed/[place] to call system/Physically safe environment - no spills, clutter or unnecessary equipment/Purposeful Proactive Rounding/Room/bathroom lighting operational, light cord in reach

## 2022-03-15 NOTE — H&P ADULT - NSHPLABSRESULTS_GEN_ALL_CORE
LABS:                        9.5    8.99  )-----------( 214      ( 15 Mar 2022 10:30 )             30.5     03-15    142  |  108  |  42<H>  ----------------------------<  99  4.2   |  22  |  1.14    Ca    9.1      15 Mar 2022 10:30    TPro  7.4  /  Alb  3.5  /  TBili  0.6  /  DBili  x   /  AST  27  /  ALT  24  /  AlkPhos  59  03-15    PT/INR - ( 15 Mar 2022 10:30 )   PT: 13.4 sec;   INR: 1.15 ratio         PTT - ( 15 Mar 2022 10:30 )  PTT:29.4 sec    CAPILLARY BLOOD GLUCOSE      RADIOLOGY & ADDITIONAL TESTS:  Xray Chest 1 View (03.15.22 @ 10:54) > IMPRESSION: Heart enlargement again noted as well as bilateral breast implants.  Presently there is a left-sided pacemaker.    Xray Foot AP + Lateral + Oblique, Left (03.15.22 @ 10:54) > IMPRESSION: Mild left ankle swelling and degeneration. No fracture.    EKG (3/15): a-paced, , qTC 434      Care Discussed with Consultants/Other Providers [ x] YES  [ ] NO  Imaging including EKG Personally Reviewed:  [x ] YES  [ ] NO

## 2022-03-15 NOTE — ED PROVIDER NOTE - NS ED ATTENDING STATEMENT MOD
This was a shared visit with the DOMINGO. I reviewed and verified the documentation and independently performed the documented:

## 2022-03-15 NOTE — ED ADULT NURSE NOTE - OBJECTIVE STATEMENT
pt presents to ED s/p near syncopal episode. pt states she began feeling dizzy and slipped but braced against washing machine sustaining a laceration to R shin and L foot and rolling laterally onto L foot. + edema to L foot with pain on lateral aspect and spreading ecchymosis upon arrival to ED. Pt is on eliquis. did not fall, no head strike. reports PMHx breast CA with BL mastectomy w/ reconstruction, reports episode of severe epistaxis in November requiring blood transfusion and states that her Hgb has been low but trending upward ever since. Had outpatient blood done at [facility] office this week who reported that her platelets (cannot recall level) and Hgb were low (9.7). reports pt has been taking iron supplements and increasing meat intake and denies any recent bleeding.

## 2022-03-15 NOTE — ED ADULT NURSE NOTE - IN THE PAST 12 MONTHS HAVE YOU USED DRUGS OTHER THAN THOSE REQUIRED FOR MEDICAL REASON?
Health Maintenance Due   Topic Date Due   • Cervical Cancer Screening HPV CO-Testing  03/04/2021       Patient is due for the topics as listed above and wishes to proceed with them. Orders placed for Cervical cancer screening.      Over the last 2 weeks, how often have you been bothered by the following problems?          PHQ2 Score: 0  PHQ2 Score Interpretation: No further screening needed  1. Little interest or pleasure in activity?: 0  2. Feeling down, depressed, or hopeless?: 0             
Here for pap and pelvic exam as she was on period when she was here before for physical   GYNECOLOGIC: No vulvar, vault or cervical lesions appreciated. Urethra is normal to inspection with a normal bladder capacity and tone. Uterus is less than 6 week size and adnexae are without tenderness to palpation and without significant discharge. No malodorous vaginal discharge appreciated.   Pap sent   Anel Carvalho MD'  
No

## 2022-03-15 NOTE — ED PROVIDER NOTE - ATTENDING CONTRIBUTION TO CARE
pt c/o left foot and ankle pain, skin tear to right leg and abrasion to left foot s/p twisted foot when had dizzy spell yesterday and almost fell. pt caught self, but twisted left foot/ankle. pt relates gets dizzy spells intermittently past month. no fevers, chills, weakness, numbness, cp, sob, cough, abd pain, neck or back pain, arm pain. pt unsure last tetanus.  wd, wn female, nad, perrl, pale conjunctiva, mmm, s1s2 rrr lungs cta ribs nt, abd soft, nt, no cvat, neck and back nt, ue's nt, full rom, rle nt full rom, lle hip knee nt, full rom, ankle and  lateral aspect with swelling/ecchymosis, + abrasion left foot, neuro no focal motor or sensory deficits

## 2022-03-15 NOTE — ED PROVIDER NOTE - OBJECTIVE STATEMENT
87F h/o HTN, HLD, Afib s/p ablation in 2016 on Eliquis, CHF, h/o breast ca, appendectomy c/l left ankle pain and right near full due to dizziness which she has been experiencing for the last month. pt braced fall and sustained laceration to right shin and left foot, on eliquis. pts hemoglobin has been around 9 since her transfusion a few months ago after her transfusion from a nose bleed  no other injuries. denies fever, chills, cp, sob, headache, vision changes, palpitations

## 2022-03-15 NOTE — H&P ADULT - ASSESSMENT
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      #Difficulty ambulating after twisting ankle from fall  #Lightheadedness from ?dehydration vs BP meds  #History of spinal stenosis  - given 500ml NS in ED  - aircast placed in ED  - Continue pain meds PRN  - PT evaluation ordered  - Continue bowel regimen  - f/u orthostatic VS  - meclizine PRN    #A. fib - chronic  #Chronic HFpEF  - TTE (4/2021) EF 50-55%5  - s/p ablation 2016, Hx ppm 3 weeks ago to 2nd deg AVB  - continue eliquis   - Private cardiologist at [facility]    #Hypertension  - Continue home amlodipine and lisinopril dose  - Monitor vital signs   - Check orthostatic BP    #Hyperlipidemia  - no longer on statin    #Breast cancer Hx  - s/p B/L mastectomy with implant; Recurrent breast cancer  - outpatient f/u    #Normocytic Anemia  - Hg better than previous  - Continue to monitor H/H  - Transfuse to keep > 7    #CKD IIIa  - near baseline eGFR  - Encourage oral intake  - Avoid nephrotoxic agents    #DVT Prophylaxis   - Continue eliquis    DNR/DNI/ No feeding tube  MOLST in chart    Will give son an update    IMPROVE VTE Individual Risk Assessment  RISK                                                                Points  [  ] Previous VTE                                                  3    [  ] Thrombophilia                                               2    [  ] Lower limb paralysis                                      2        (unable to hold up >15 seconds)      [  ] Current Cancer                                              2         (within 6 months)    [  ] Immobilization > 24 hrs                                1    [  ] ICU/CCU stay > 24 hours                              1    [x  ] Age > 60                                                      1    IMPROVE VTE Score 1: Low Risk, No VTE prophylaxis required for most patients, encourage ambulation.

## 2022-03-15 NOTE — ED ADULT NURSE REASSESSMENT NOTE - NS ED NURSE REASSESS COMMENT FT1
local wound care provided to R shin laceration. aircast applied to L ankle, ambulation trial attempted- pt unable to bear weight on L foot. MD notified.

## 2022-03-16 ENCOUNTER — TRANSCRIPTION ENCOUNTER (OUTPATIENT)
Age: 87
End: 2022-03-16

## 2022-03-16 LAB
A1C WITH ESTIMATED AVERAGE GLUCOSE RESULT: 5.5 % — SIGNIFICANT CHANGE UP (ref 4–5.6)
ALBUMIN SERPL ELPH-MCNC: 3.1 G/DL — LOW (ref 3.3–5)
ALP SERPL-CCNC: 55 U/L — SIGNIFICANT CHANGE UP (ref 40–120)
ALT FLD-CCNC: 23 U/L — SIGNIFICANT CHANGE UP (ref 10–45)
ANION GAP SERPL CALC-SCNC: 12 MMOL/L — SIGNIFICANT CHANGE UP (ref 5–17)
APPEARANCE UR: CLEAR — SIGNIFICANT CHANGE UP
AST SERPL-CCNC: 20 U/L — SIGNIFICANT CHANGE UP (ref 10–40)
BACTERIA # UR AUTO: NEGATIVE /HPF — SIGNIFICANT CHANGE UP
BILIRUB SERPL-MCNC: 0.6 MG/DL — SIGNIFICANT CHANGE UP (ref 0.2–1.2)
BILIRUB UR-MCNC: NEGATIVE — SIGNIFICANT CHANGE UP
BUN SERPL-MCNC: 37 MG/DL — HIGH (ref 7–23)
CALCIUM SERPL-MCNC: 9.1 MG/DL — SIGNIFICANT CHANGE UP (ref 8.4–10.5)
CHLORIDE SERPL-SCNC: 109 MMOL/L — HIGH (ref 96–108)
CO2 SERPL-SCNC: 21 MMOL/L — LOW (ref 22–31)
COLOR SPEC: YELLOW — SIGNIFICANT CHANGE UP
COMMENT - URINE: SIGNIFICANT CHANGE UP
CREAT SERPL-MCNC: 1.2 MG/DL — SIGNIFICANT CHANGE UP (ref 0.5–1.3)
DIFF PNL FLD: ABNORMAL
EGFR: 44 ML/MIN/1.73M2 — LOW
EPI CELLS # UR: SIGNIFICANT CHANGE UP
ESTIMATED AVERAGE GLUCOSE: 111 MG/DL — SIGNIFICANT CHANGE UP (ref 68–114)
GLUCOSE SERPL-MCNC: 105 MG/DL — HIGH (ref 70–99)
GLUCOSE UR QL: NEGATIVE — SIGNIFICANT CHANGE UP
HCT VFR BLD CALC: 27.9 % — LOW (ref 34.5–45)
HGB BLD-MCNC: 8.9 G/DL — LOW (ref 11.5–15.5)
KETONES UR-MCNC: NEGATIVE — SIGNIFICANT CHANGE UP
LEUKOCYTE ESTERASE UR-ACNC: NEGATIVE — SIGNIFICANT CHANGE UP
MCHC RBC-ENTMCNC: 27.8 PG — SIGNIFICANT CHANGE UP (ref 27–34)
MCHC RBC-ENTMCNC: 31.9 GM/DL — LOW (ref 32–36)
MCV RBC AUTO: 87.2 FL — SIGNIFICANT CHANGE UP (ref 80–100)
NITRITE UR-MCNC: NEGATIVE — SIGNIFICANT CHANGE UP
NRBC # BLD: 0 /100 WBCS — SIGNIFICANT CHANGE UP (ref 0–0)
PH UR: 6 — SIGNIFICANT CHANGE UP (ref 5–8)
PLATELET # BLD AUTO: 204 K/UL — SIGNIFICANT CHANGE UP (ref 150–400)
POTASSIUM SERPL-MCNC: 4.3 MMOL/L — SIGNIFICANT CHANGE UP (ref 3.5–5.3)
POTASSIUM SERPL-SCNC: 4.3 MMOL/L — SIGNIFICANT CHANGE UP (ref 3.5–5.3)
PROT SERPL-MCNC: 6.9 G/DL — SIGNIFICANT CHANGE UP (ref 6–8.3)
PROT UR-MCNC: 100
RBC # BLD: 3.2 M/UL — LOW (ref 3.8–5.2)
RBC # FLD: 16 % — HIGH (ref 10.3–14.5)
RBC CASTS # UR COMP ASSIST: SIGNIFICANT CHANGE UP /HPF (ref 0–4)
SODIUM SERPL-SCNC: 142 MMOL/L — SIGNIFICANT CHANGE UP (ref 135–145)
SP GR SPEC: 1.02 — SIGNIFICANT CHANGE UP (ref 1.01–1.02)
TSH SERPL-MCNC: 0.42 UIU/ML — SIGNIFICANT CHANGE UP (ref 0.36–3.74)
UROBILINOGEN FLD QL: NEGATIVE — SIGNIFICANT CHANGE UP
WBC # BLD: 6.99 K/UL — SIGNIFICANT CHANGE UP (ref 3.8–10.5)
WBC # FLD AUTO: 6.99 K/UL — SIGNIFICANT CHANGE UP (ref 3.8–10.5)
WBC UR QL: SIGNIFICANT CHANGE UP /HPF (ref 0–5)

## 2022-03-16 PROCEDURE — 99223 1ST HOSP IP/OBS HIGH 75: CPT

## 2022-03-16 PROCEDURE — 99233 SBSQ HOSP IP/OBS HIGH 50: CPT

## 2022-03-16 RX ORDER — AMLODIPINE BESYLATE 2.5 MG/1
30 TABLET ORAL
Qty: 0 | Refills: 0 | DISCHARGE

## 2022-03-16 RX ORDER — L.ACIDOPH/B.ANIMALIS/B.LONGUM 15B CELL
1 CAPSULE ORAL
Qty: 0 | Refills: 0 | DISCHARGE

## 2022-03-16 RX ORDER — ASCORBIC ACID 60 MG
0 TABLET,CHEWABLE ORAL
Qty: 0 | Refills: 0 | DISCHARGE

## 2022-03-16 RX ADMIN — Medication 650 MILLIGRAM(S): at 06:31

## 2022-03-16 RX ADMIN — APIXABAN 2.5 MILLIGRAM(S): 2.5 TABLET, FILM COATED ORAL at 06:01

## 2022-03-16 RX ADMIN — Medication 500 MILLIGRAM(S): at 11:38

## 2022-03-16 RX ADMIN — Medication 650 MILLIGRAM(S): at 06:01

## 2022-03-16 RX ADMIN — LISINOPRIL 10 MILLIGRAM(S): 2.5 TABLET ORAL at 06:01

## 2022-03-16 RX ADMIN — APIXABAN 2.5 MILLIGRAM(S): 2.5 TABLET, FILM COATED ORAL at 17:29

## 2022-03-16 RX ADMIN — Medication 650 MILLIGRAM(S): at 18:25

## 2022-03-16 RX ADMIN — AMLODIPINE BESYLATE 5 MILLIGRAM(S): 2.5 TABLET ORAL at 06:01

## 2022-03-16 RX ADMIN — Medication 3 MILLIGRAM(S): at 21:52

## 2022-03-16 RX ADMIN — Medication 1 TABLET(S): at 11:38

## 2022-03-16 RX ADMIN — Medication 650 MILLIGRAM(S): at 17:29

## 2022-03-16 NOTE — DISCHARGE NOTE PROVIDER - CARE PROVIDER_API CALL
Angelo Pang)  Medicine  [facility], [place] [address]  Phone: [phone]  Fax: [phone]  Follow Up Time:    Angelo Pang)  Medicine  [address], [address]  [place] [address]  Phone: [phone]  Fax: [phone]  Follow Up Time:     Bebeto Woodard)  Hematology; Internal Medicine; Oncology  [address] [place], [place] [id number]  Phone: [phone]  Fax: [phone]  Follow Up Time:     Quintin Valentin)  [facility]  [address] [place] [address]  Phone: [phone]  Fax: [phone]  Follow Up Time:

## 2022-03-16 NOTE — DISCHARGE NOTE PROVIDER - NSDCFUSCHEDAPPT_GEN_ALL_CORE_FT
ROGE SAINI ; 03/22/2022 ; [facility]  ROGE SAINI ; 03/22/2022 ; [facility] Infusion  ROGE SAINI ; 05/03/2022 ; NPP [place] [address]

## 2022-03-16 NOTE — DISCHARGE NOTE PROVIDER - CARE PROVIDERS DIRECT ADDRESSES
[email or web address]

## 2022-03-16 NOTE — DISCHARGE NOTE PROVIDER - DETAILS OF MALNUTRITION DIAGNOSIS/DIAGNOSES
This patient has been assessed with a concern for Malnutrition and was treated during this hospitalization for the following Nutrition diagnosis/diagnoses:     -  03/17/2022: Mild protein-calorie malnutrition

## 2022-03-16 NOTE — CONSULT NOTE ADULT - ASSESSMENT
This is an 87 year old woman with a history of hypertension, hyperlipidemia, spinal stenosis, atrial fibrillation CHF, 2nd degree heart block with PPM.  Hx of breast cancer, more recently found to have recurrence this past august. Hormone positive diease following with Dr. Quintin Valentin at the [facility].  She is currently on fulvestrant therapy and has been responding well to this at least more recent ultrasounds.  Patient now presents to the  ED at [place] for weakness, fatigue, dizziness leading up to a fall.  Patient had dizziness and weakness for what she now says was 1-2 months.  Twisted her ankle and injured right shin.  Was thought to be from her BP meds however this was discontinued and had not helped. Patient has a chronic anemia, baseline HG in the past year 10.2g/dl.  During a severe episode of epistaxis in October 2021 Hg dropped to 7.3g/dl then improved to the 10's again before slowly falling this year.  Normal MCV. Hg this morning 8.9g/dl which is lower than her typical baseline.     Dizziness and lightheadedness could conceivably be the anemia which is lower than the baseline values.  Would check hemolysis panel including flores test, LDH, haptoglobin,. Recheck iron studies B12, folic acid. And run screen for plasma cell dyscrasia including SPEP, EDILBERTO, Free light chains.      Do not suspect that it is  the fulvestrant or the malignancy The response to fulvestrant seemed to have been good and t lymphadenopathy had decraesed.  The timing of the onset of symptoms does not match initiation of fulvestrant which was started last august.

## 2022-03-16 NOTE — CONSULT NOTE ADULT - SUBJECTIVE AND OBJECTIVE BOX
This is an 87 year old woman with a history of hypertension, hyperlipidemia, spinal stenosis, atrial fibrillation CHF, 2nd degree heart block with PPM.  Hx of breast cancer, more recently found to have recurrence this past august. Hormone positive diease following with Dr. Quintin Valentin at the [facility].  She is currently on fulvestrant therapy and has been responding well to this at least more recent ultrasounds.  Patient now presents to the  ED at [place] for weakness, fatigue, dizziness leading up to a fall.  Patient had dizziness and weakness for what she now says was 1-2 months.  Twisted her ankle and injured right shin.  Was thought to be from her BP meds however this was discontinued and had not helped. Patient has a chornic anemia, baseline HG in the past year 10.2g/dl.  During a severe episode of epistaxis in October 2021 Hg dropped to 7.3g/dl then improved to the 10's again before slowly falling this year.  Normal MCV. Hg this morning 8.9g/dl.

## 2022-03-16 NOTE — PHYSICAL THERAPY INITIAL EVALUATION ADULT - ADDITIONAL COMMENTS
Pt lives alone in a house with 1 x step to enter and no stairs inside. Pt has a PT come in 1 x week and a nurses aide come in 2 x week. Pt's son lives close by and checks in on pt.

## 2022-03-16 NOTE — PROGRESS NOTE ADULT - ASSESSMENT
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    #Difficulty ambulating after twisting ankle from fall  - ice left ankle, cont aircast. F/u PT consult.     #Lightheadedness likely multifactorial, probable from anemia, dehydration, BP meds, orthostasis.  #History of spinal stenosis  - Continue pain meds PRN  - PT evaluation ordered  - Continue bowel regimen  - + orthostasis  - meclizine PRN    # Orthostatic hypotension  suspect due to intravascular depletion due to anemia vs. dehydration.  - s/p 500cc bolus in the ER.  - today, continues to be orthostatic with lying HR 60 /66 --> standing HR 75 /60  - Hematology consult.   - JOHNNY compression stockings    #Normocytic Anemia  - Hg better than previous  - Continue to monitor H/H  - Transfuse to keep > 7  - She has been anemic since at least August when she was hospitalized for a nose bleed, requiring a blood transfusion.   - Dr. Woodard consulted. Pt already follows at [facility] for breast cancer.    #Hypertension  - Continue home amlodipine and lisinopril dose  - Monitor vital signs   - orthostatics as above.    # paroxysmal atrial fibrillation  #Chronic HFpEF  - TTE (4/2021) EF 50-55%5  - s/p ablation 2016, Hx ppm 3 weeks ago to 2nd deg AVB  - continue eliquis   - Private cardiologist at [place]    #Hyperlipidemia  - no longer on statin    #Breast cancer Hx  - s/p B/L mastectomy with implant; Recurrent breast cancer  - outpatient f/u    #CKD IIIa  - near baseline eGFR  - Encourage oral intake  - Avoid nephrotoxic agents    #DVT Prophylaxis   - Continue eliquis    DNR/DNI/ No feeding tube  MOLST in chart    Will give son an update    IMPROVE VTE Individual Risk Assessment  RISK                                                                Points  [  ] Previous VTE                                                  3    [  ] Thrombophilia                                               2    [  ] Lower limb paralysis                                      2        (unable to hold up >15 seconds)      [  ] Current Cancer                                              2         (within 6 months)    [  ] Immobilization > 24 hrs                                1    [  ] ICU/CCU stay > 24 hours                              1    [x  ] Age > 60                                                      1    IMPROVE VTE Score 1: Low Risk, No VTE prophylaxis required for most patients, encourage ambulation.    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    #Difficulty ambulating after twisting ankle from fall  - ice left ankle, cont aircast. F/u PT consult.     #Lightheadedness likely multifactorial, probable from anemia, dehydration, BP meds, orthostasis.  #History of spinal stenosis  - Continue pain meds PRN  - PT evaluation ordered  - Continue bowel regimen  - + orthostasis  - meclizine PRN    # Orthostatic hypotension  suspect due to intravascular depletion due to anemia vs. dehydration.  - s/p 500cc bolus in the ER.  - today, continues to be orthostatic with lying HR 60 /66 --> standing HR 75 /60  - Hematology consult.   - JOHNNY compression stockings    #Normocytic Anemia  - Hg better than previous  - Continue to monitor H/H  - Transfuse to keep > 7  - She has been anemic since at least August when she was hospitalized for a nose bleed, requiring a blood transfusion.   - Dr. Woodard consulted. Pt already follows at [facility] for breast cancer.    #Hypertension  - Continue home amlodipine and lisinopril dose  - Monitor vital signs   - orthostatics as above.    # paroxysmal atrial fibrillation  #Chronic HFpEF  - TTE (4/2021) EF 50-55%5  - s/p ablation 2016, Hx ppm 3 weeks ago to 2nd deg AVB  - continue eliquis   - Private cardiologist at [place]    #Hyperlipidemia  - no longer on statin    #Breast cancer Hx  - s/p B/L mastectomy with implant; Recurrent breast cancer  - outpatient f/u    #CKD IIIa  - near baseline eGFR  - Encourage oral intake  - Avoid nephrotoxic agents    #DVT Prophylaxis   - Continue eliquis    Contact: Pt wants to update her son herself.     IMPROVE VTE Individual Risk Assessment  RISK                                                                Points  [  ] Previous VTE                                                  3    [  ] Thrombophilia                                               2    [  ] Lower limb paralysis                                      2        (unable to hold up >15 seconds)      [  ] Current Cancer                                              2         (within 6 months)    [  ] Immobilization > 24 hrs                                1    [  ] ICU/CCU stay > 24 hours                              1    [x  ] Age > 60                                                      1    IMPROVE VTE Score 1: Low Risk, No VTE prophylaxis required for most patients, encourage ambulation.    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    #Difficulty ambulating after twisting ankle from fall  - ice left ankle, cont aircast. F/u PT consult.     #Lightheadedness likely multifactorial, probable from anemia, dehydration, BP meds, orthostasis.  #History of spinal stenosis  - Continue pain meds PRN  - PT evaluation ordered  - Continue bowel regimen  - + orthostasis  - meclizine PRN    # Orthostatic hypotension  suspect due to intravascular depletion due to symptomatic anemia vs. dehydration.  - s/p 500cc bolus in the ER.  - today, continues to be orthostatic with lying HR 60 /66 --> standing HR 75 /60  - Hematology consult.   - JOHNNY compression stockings    #Normocytic Anemia, concern for symptomatic.   - Hg better than previous  - Continue to monitor H/H  - Transfuse to keep > 7  - She has been anemic since at least August when she was hospitalized for a nose bleed, requiring a blood transfusion.   - Dr. Woodard consulted. Pt already follows at [facility] for breast cancer.    #Hypertension  - Continue home amlodipine and lisinopril dose  - Monitor vital signs   - orthostatics as above.    # paroxysmal atrial fibrillation  #Chronic HFpEF  - TTE (4/2021) EF 50-55%5  - s/p ablation 2016, Hx ppm 3 weeks ago to 2nd deg AVB  - continue eliquis   - Private cardiologist at [place]    #Hyperlipidemia  - no longer on statin    #Breast cancer Hx  - s/p B/L mastectomy with implant; Recurrent breast cancer  - outpatient f/u    #CKD IIIa  - near baseline eGFR  - Encourage oral intake  - Avoid nephrotoxic agents    #DVT Prophylaxis   - Continue eliquis    Contact: Pt wants to update her son herself.     IMPROVE VTE Individual Risk Assessment  RISK                                                                Points  [  ] Previous VTE                                                  3    [  ] Thrombophilia                                               2    [  ] Lower limb paralysis                                      2        (unable to hold up >15 seconds)      [  ] Current Cancer                                              2         (within 6 months)    [  ] Immobilization > 24 hrs                                1    [  ] ICU/CCU stay > 24 hours                              1    [x  ] Age > 60                                                      1    IMPROVE VTE Score 1: Low Risk, No VTE prophylaxis required for most patients, encourage ambulation.    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    #Difficulty ambulating after twisting ankle from fall  - ice left ankle, cont aircast. F/u PT consult.     #Lightheadedness likely multifactorial, probable from anemia, dehydration, BP meds, orthostasis.  #History of spinal stenosis  - Continue pain meds PRN  - PT evaluation ordered  - Continue bowel regimen  - + orthostasis  - meclizine PRN    # Orthostatic hypotension  suspect due to intravascular depletion due to symptomatic anemia vs. dehydration.  - s/p 500cc bolus in the ER.  - today, continues to be orthostatic with lying HR 60 /66 --> standing HR 75 /60  - Hematology consult.   - JOHNNY compression stockings    #Normocytic Anemia, concern for symptomatic.   - Hg better than previous  - Continue to monitor H/H  - Transfuse to keep > 7  - She has been anemic since at least August when she was hospitalized for a nose bleed, requiring a blood transfusion.   - Dr. Woodard consulted. Pt already follows at [facility] for breast cancer.    #Hypertension  - Continue home amlodipine and lisinopril dose  - Monitor vital signs   - orthostatics as above.    # paroxysmal atrial fibrillation  #Chronic HFpEF  - TTE (4/2021) EF 50-55%5  - s/p ablation 2016, Hx ppm 3 weeks ago to 2nd deg AVB  - continue eliquis   - Private cardiologist at [place]    #Hyperlipidemia  - no longer on statin    #Breast cancer Hx  - s/p B/L mastectomy with implant; Recurrent breast cancer  - outpatient f/u    #CKD IIIa  - near baseline eGFR  - Encourage oral intake  - Avoid nephrotoxic agents    #DVT Prophylaxis   - Continue eliquis    Contact: Pt wants to update her son herself.     IMPROVE VTE Individual Risk Assessment  RISK                                                                Points  [  ] Previous VTE                                                  3    [  ] Thrombophilia                                               2    [  ] Lower limb paralysis                                      2        (unable to hold up >15 seconds)      [  ] Current Cancer                                              2         (within 6 months)    [  ] Immobilization > 24 hrs                                1    [  ] ICU/CCU stay > 24 hours                              1    [x  ] Age > 60                                                      1    IMPROVE VTE Score 1: Low Risk, No VTE prophylaxis required for most patients, encourage ambulation.       Level 3 billing: discharge planning, consult heme onc, review old records. labwork.

## 2022-03-16 NOTE — DISCHARGE NOTE PROVIDER - NSDCMRMEDTOKEN_GEN_ALL_CORE_FT
amLODIPine 2.5 mg oral tablet: 1 tab(s) orally once a day  Breo Ellipta 200 mcg-25 mcg/inh inhalation powder: 1 puff(s) inhaled once a day  calcium citrate: 50mg twice a day-liquid form  Co Q-10 100 mg oral capsule: 1 cap(s) orally once a day  Eliquis 2.5 mg oral tablet: 1 tab(s) orally 2 times a day  Fulvestrant 50 mg/mL intramuscular solution: monthly    lidocaine 5% patch: 1 patch transdermal once a day  lisinopril 10 mg oral tablet: 1 tab(s) orally once a day  oxycodone-acetaminophen 7.5 mg-325 mg oral tablet: 1 tab(s) orally 2 times a day, As Needed   acetaminophen 325 mg oral tablet: 2 tab(s) orally once a day  amLODIPine 2.5 mg oral tablet: 1 tab(s) orally once a day  Breo Ellipta 200 mcg-25 mcg/inh inhalation powder: 1 puff(s) inhaled once a day  calcium (as calcium citrate) 500 mg oral tablet, effervescent: 2 tab(s) orally once a day  Co Q-10 100 mg oral capsule: 1 cap(s) orally once a day  Eliquis 2.5 mg oral tablet: 1 tab(s) orally 2 times a day  ferrous sulfate 75 mg/mL (15 mg/mL elemental iron) oral liquid: 5 milliliter(s) orally once a day  Fulvestrant 50 mg/mL intramuscular solution: monthly    lidocaine 5% patch: 1 patch transdermal once a day  lisinopril 10 mg oral tablet: 1 tab(s) orally once a day  oxycodone-acetaminophen 7.5 mg-325 mg oral tablet: 1 tab(s) orally 2 times a day, As Needed  Vitamin D3 125 mcg (5000 intl units) oral tablet: 1 tab(s) orally on Thursday and Sunday   acetaminophen 325 mg oral tablet: 2 tab(s) orally once a day as needed for pain  amLODIPine 2.5 mg oral tablet: 1 tab(s) orally once a day  ascorbic acid 500 mg oral tablet: (vitamin C) 1 tab(s) orally once a day  Breo Ellipta 200 mcg-25 mcg/inh inhalation powder: 1 puff(s) inhaled once a day  calcium (as calcium citrate) 500 mg oral tablet, effervescent: 2 tab(s) orally once a day  Co Q-10 100 mg oral capsule: 1 cap(s) orally once a day  Eliquis 2.5 mg oral tablet: 1 tab(s) orally 2 times a day  ferrous sulfate 75 mg/mL (15 mg/mL elemental iron) oral liquid: 5 milliliter(s) orally once a day  Fulvestrant 50 mg/mL intramuscular solution: monthly    lisinopril 10 mg oral tablet: 1 tab(s) orally once a day  melatonin 3 mg oral tablet: 1 tab(s) orally once a day (at bedtime)  Multiple Vitamins oral tablet: 1 tab(s) orally once a day  senna oral tablet: 2 tab(s) orally once a day (at bedtime)  Vitamin D3 125 mcg (5000 intl units) oral tablet: 1 tab(s) orally on Thursday and Sunday

## 2022-03-16 NOTE — CONSULT NOTE ADULT - BREASTS
[FreeTextEntry1] : can use BB on prnbasis \par esophogeal spasms resolved with d/c of domperidone \par medium level exercise is ok No masses; no nipple discharge

## 2022-03-16 NOTE — PHARMACOTHERAPY INTERVENTION NOTE - COMMENTS
Met with patient and reviewed current medications. Pt reports no issues taking medications and had no concerns. Met with patient and reviewed current medications. Pt reports no issues taking medications and had no concerns. She does not find meclizine or tramadol helpful, wants to have orders discontinued. Informed Dr. Saavedra.

## 2022-03-16 NOTE — DISCHARGE NOTE PROVIDER - PROVIDER TOKENS
PROVIDER:[TOKEN:[[id number]]] PROVIDER:[TOKEN:[[id number]]],PROVIDER:[TOKEN:[[id number]]],PROVIDER:[TOKEN:[[id number]]]

## 2022-03-16 NOTE — DISCHARGE NOTE PROVIDER - HOSPITAL COURSE
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 Pt was seen by PT and discussed JULIANE, which pt refused. She has a nurse aide who comes in two times a week and home PT that comes in once a week. She has supportive friends and a son who lives close by. She wants to go home with rolling walker, HHA, and home PT.    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 As per patient, she has had dizziness intermittently for the past few (>3) weeks. The other day she was feeling lightheaded and twisted her left ankle in preventing herself from falling while hitting her right shin. She has been having this worked up by her PCP and they believe it could be from her BP meds which have been recently started and/or dehydration. This AM she was getting up from bed and could barely put pressure on her left foot to walk. She also noticed it was swollen and bruised. She call her son who bought her to the ED. Pt had XR performed that showed Mild left ankle swelling and degeneration. No fracture.     Pt was seen by PT and discussed JULIANE, which pt refused. She has a nurse aide who comes in two times a week and home PT that comes in once a week. She has supportive friends and a son who lives close by. She wants to go home with rolling walker, HHA, and home PT.       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 As per patient, she has had dizziness intermittently for the past few (>3) weeks. The other day she was feeling lightheaded and twisted her left ankle in preventing herself from falling while hitting her right shin. She has been having this worked up by her PCP and they believe it could be from her BP meds which have been recently started and/or dehydration. This AM she was getting up from bed and could barely put pressure on her left foot to walk. She also noticed it was swollen and bruised. She call her son who bought her to the ED. She denies dizziness, fever, chills, nausea, vomiting, chest pain,  or abdominal pain. (15 Mar 2022 15:21)        Outpatient Provider: Dr. Pang.    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 As per patient, she has had dizziness intermittently for the past few (>3) weeks. The other day she was feeling lightheaded and twisted her left ankle in preventing herself from falling while hitting her right shin. She has been having this worked up by her PCP and they believe it could be from her BP meds which have been recently started and/or dehydration. This AM she was getting up from bed and could barely put pressure on her left foot to walk. She also noticed it was swollen and bruised. She call her son who bought her to the ED. Pt had XR performed that showed Mild left ankle swelling and degeneration. No fracture.  Pt was seen by PT and discussed JULIANE, which pt refused. She has a nurse aide who comes in two times a week and home PT that comes in once a week. She has supportive friends and a son who lives close by. She wants to go home with rolling walker, HHA, and home PT.   For the lightheadedness, this is likely multifactorial, probable from orthostatic hypotension, anemia, dehydration, BP meds. Pt given 500 cc bolus in the ER. Ordered compression stockings. Hematology consulted for consideration of symptomatic anemia.     Outpatient Provider: Dr. Pang.    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 As per patient, she has had dizziness intermittently for the past few (>3) weeks. The other day she was feeling lightheaded and twisted her left ankle in preventing herself from falling while hitting her right shin. She has been having this worked up by her PCP and they believe it could be from her BP meds which have been recently started and/or dehydration. This AM she was getting up from bed and could barely put pressure on her left foot to walk. She also noticed it was swollen and bruised. She call her son who bought her to the ED. Pt had XR performed that showed Mild left ankle swelling and degeneration. No fracture.  Pt was seen by PT and discussed JULIANE, which pt refused. She has a nurse aide who comes in two times a week and home PT that comes in once a week. She has supportive friends and a son who lives close by. She wants to go home with rolling walker, HHA, and home PT.   For the lightheadedness, this is likely multifactorial, probable from orthostatic hypotension, anemia, dehydration, BP meds. Pt given 500 cc bolus in the ER. Ordered compression stockings. Hematology consulted for consideration of symptomatic anemia. Heme ordered workup. Anemia likely from ACD. Recommended to take Feosol daily upon discharge.    Outpatient Provider: Dr. Pang - notified of discharge

## 2022-03-16 NOTE — DISCHARGE NOTE PROVIDER - NSDCCPCAREPLAN_GEN_ALL_CORE_FT
PRINCIPAL DISCHARGE DIAGNOSIS  Diagnosis: Contusion of left ankle  Assessment and Plan of Treatment: You should ice the ankle.  You were admitted for difficulty with walking  You were diagnosed with anemia (low blood counts) and orthostatic hypotension.    You will need to follow up with your primary care physician.  Discharging Provider:  Meenu Saavedra MD  Contact Info: Cell [phone]- Please call with any questions or concerns.        SECONDARY DISCHARGE DIAGNOSES  Diagnosis: Unable to ambulate  Assessment and Plan of Treatment: You should continue home PT.    Diagnosis: Orthostatic hypotension  Assessment and Plan of Treatment: Low blood pressure from laying to sitting to standing.   You should wear compression stockings and be mindful when going from seated/laying to standing positions.  You need to increase PO intake of fluids.     PRINCIPAL DISCHARGE DIAGNOSIS  Diagnosis: Contusion of left ankle  Assessment and Plan of Treatment: You were admitted for difficulty with walking.  You were diagnosed with anemia (low blood counts) and orthostatic hypotension.  You should ice and elevate your left ankle.  You will need to follow up with your primary care physician.      SECONDARY DISCHARGE DIAGNOSES  Diagnosis: Unable to ambulate  Assessment and Plan of Treatment: You should continue home PT.    Diagnosis: Orthostatic hypotension  Assessment and Plan of Treatment: You were diagnosed with orthostatic hypotension (low blood pressure from laying to sitting to standing).   You should wear compression stockings during the daytime and be mindful when going from seated/laying to standing positions.  You need to increase your oral intake of fluids.    Diagnosis: Anemia of chronic disease  Assessment and Plan of Treatment: Continue daily iron supplementation, preferably with vitamin C or orange juice to increase the absorption.  Continue Senna since iron can cause constipation.

## 2022-03-17 ENCOUNTER — TRANSCRIPTION ENCOUNTER (OUTPATIENT)
Age: 87
End: 2022-03-17

## 2022-03-17 VITALS — WEIGHT: 141.98 LBS

## 2022-03-17 LAB
BASOPHILS # BLD AUTO: 0.04 K/UL — SIGNIFICANT CHANGE UP (ref 0–0.2)
BASOPHILS NFR BLD AUTO: 0.6 % — SIGNIFICANT CHANGE UP (ref 0–2)
DIR ANTIGLOB POLYSPECIFIC INTERPRETATION: SIGNIFICANT CHANGE UP
EOSINOPHIL # BLD AUTO: 0.38 K/UL — SIGNIFICANT CHANGE UP (ref 0–0.5)
EOSINOPHIL NFR BLD AUTO: 5.8 % — SIGNIFICANT CHANGE UP (ref 0–6)
FERRITIN SERPL-MCNC: 575 NG/ML — HIGH (ref 15–150)
HAPTOGLOB SERPL-MCNC: 218 MG/DL — HIGH (ref 34–200)
HCT VFR BLD CALC: 30.3 % — LOW (ref 34.5–45)
HGB BLD-MCNC: 9.5 G/DL — LOW (ref 11.5–15.5)
IMM GRANULOCYTES NFR BLD AUTO: 0.3 % — SIGNIFICANT CHANGE UP (ref 0–1.5)
IRON SATN MFR SERPL: 11 % — LOW (ref 14–50)
IRON SATN MFR SERPL: 30 UG/DL — SIGNIFICANT CHANGE UP (ref 30–160)
KAPPA LC SER QL IFE: 4.01 MG/DL — HIGH (ref 0.33–1.94)
KAPPA/LAMBDA FREE LIGHT CHAIN RATIO, SERUM: 1.31 RATIO — SIGNIFICANT CHANGE UP (ref 0.26–1.65)
LAMBDA LC SER QL IFE: 3.06 MG/DL — HIGH (ref 0.57–2.63)
LDH SERPL L TO P-CCNC: 271 U/L — HIGH (ref 50–242)
LYMPHOCYTES # BLD AUTO: 1.41 K/UL — SIGNIFICANT CHANGE UP (ref 1–3.3)
LYMPHOCYTES # BLD AUTO: 21.5 % — SIGNIFICANT CHANGE UP (ref 13–44)
MCHC RBC-ENTMCNC: 27.9 PG — SIGNIFICANT CHANGE UP (ref 27–34)
MCHC RBC-ENTMCNC: 31.4 GM/DL — LOW (ref 32–36)
MCV RBC AUTO: 88.9 FL — SIGNIFICANT CHANGE UP (ref 80–100)
MONOCYTES # BLD AUTO: 0.56 K/UL — SIGNIFICANT CHANGE UP (ref 0–0.9)
MONOCYTES NFR BLD AUTO: 8.5 % — SIGNIFICANT CHANGE UP (ref 2–14)
NEUTROPHILS # BLD AUTO: 4.14 K/UL — SIGNIFICANT CHANGE UP (ref 1.8–7.4)
NEUTROPHILS NFR BLD AUTO: 63.3 % — SIGNIFICANT CHANGE UP (ref 43–77)
NRBC # BLD: 0 /100 WBCS — SIGNIFICANT CHANGE UP (ref 0–0)
PLATELET # BLD AUTO: 231 K/UL — SIGNIFICANT CHANGE UP (ref 150–400)
PROT SERPL-MCNC: 6.7 G/DL — SIGNIFICANT CHANGE UP (ref 6–8.3)
PROT SERPL-MCNC: 6.7 G/DL — SIGNIFICANT CHANGE UP (ref 6–8.3)
RBC # BLD: 3.41 M/UL — LOW (ref 3.8–5.2)
RBC # FLD: 16.3 % — HIGH (ref 10.3–14.5)
TIBC SERPL-MCNC: 285 UG/DL — SIGNIFICANT CHANGE UP (ref 220–430)
UIBC SERPL-MCNC: 255 UG/DL — SIGNIFICANT CHANGE UP (ref 110–370)
WBC # BLD: 6.55 K/UL — SIGNIFICANT CHANGE UP (ref 3.8–10.5)
WBC # FLD AUTO: 6.55 K/UL — SIGNIFICANT CHANGE UP (ref 3.8–10.5)

## 2022-03-17 PROCEDURE — 84155 ASSAY OF PROTEIN SERUM: CPT

## 2022-03-17 PROCEDURE — 85730 THROMBOPLASTIN TIME PARTIAL: CPT

## 2022-03-17 PROCEDURE — 73610 X-RAY EXAM OF ANKLE: CPT

## 2022-03-17 PROCEDURE — 99285 EMERGENCY DEPT VISIT HI MDM: CPT | Mod: 25

## 2022-03-17 PROCEDURE — 86880 COOMBS TEST DIRECT: CPT

## 2022-03-17 PROCEDURE — 93005 ELECTROCARDIOGRAM TRACING: CPT

## 2022-03-17 PROCEDURE — 80053 COMPREHEN METABOLIC PANEL: CPT

## 2022-03-17 PROCEDURE — 83010 ASSAY OF HAPTOGLOBIN QUANT: CPT

## 2022-03-17 PROCEDURE — 97162 PT EVAL MOD COMPLEX 30 MIN: CPT

## 2022-03-17 PROCEDURE — 86334 IMMUNOFIX E-PHORESIS SERUM: CPT

## 2022-03-17 PROCEDURE — 83521 IG LIGHT CHAINS FREE EACH: CPT

## 2022-03-17 PROCEDURE — U0003: CPT

## 2022-03-17 PROCEDURE — 81001 URINALYSIS AUTO W/SCOPE: CPT

## 2022-03-17 PROCEDURE — 85027 COMPLETE CBC AUTOMATED: CPT

## 2022-03-17 PROCEDURE — 82728 ASSAY OF FERRITIN: CPT

## 2022-03-17 PROCEDURE — 85025 COMPLETE CBC W/AUTO DIFF WBC: CPT

## 2022-03-17 PROCEDURE — 36415 COLL VENOUS BLD VENIPUNCTURE: CPT

## 2022-03-17 PROCEDURE — 71045 X-RAY EXAM CHEST 1 VIEW: CPT

## 2022-03-17 PROCEDURE — 96360 HYDRATION IV INFUSION INIT: CPT

## 2022-03-17 PROCEDURE — 85610 PROTHROMBIN TIME: CPT

## 2022-03-17 PROCEDURE — 83540 ASSAY OF IRON: CPT

## 2022-03-17 PROCEDURE — 83036 HEMOGLOBIN GLYCOSYLATED A1C: CPT

## 2022-03-17 PROCEDURE — 84165 PROTEIN E-PHORESIS SERUM: CPT

## 2022-03-17 PROCEDURE — U0005: CPT

## 2022-03-17 PROCEDURE — 83615 LACTATE (LD) (LDH) ENZYME: CPT

## 2022-03-17 PROCEDURE — 99239 HOSP IP/OBS DSCHRG MGMT >30: CPT

## 2022-03-17 PROCEDURE — 84443 ASSAY THYROID STIM HORMONE: CPT

## 2022-03-17 PROCEDURE — 83550 IRON BINDING TEST: CPT

## 2022-03-17 PROCEDURE — 84484 ASSAY OF TROPONIN QUANT: CPT

## 2022-03-17 PROCEDURE — 73630 X-RAY EXAM OF FOOT: CPT

## 2022-03-17 RX ORDER — LIDOCAINE 4 G/100G
1 CREAM TOPICAL
Qty: 0 | Refills: 0 | DISCHARGE

## 2022-03-17 RX ORDER — LANOLIN ALCOHOL/MO/W.PET/CERES
1 CREAM (GRAM) TOPICAL
Qty: 0 | Refills: 0 | DISCHARGE
Start: 2022-03-17

## 2022-03-17 RX ORDER — ACETAMINOPHEN 500 MG
2 TABLET ORAL
Qty: 0 | Refills: 0 | DISCHARGE

## 2022-03-17 RX ORDER — SENNA PLUS 8.6 MG/1
2 TABLET ORAL
Qty: 0 | Refills: 0 | DISCHARGE
Start: 2022-03-17

## 2022-03-17 RX ORDER — AMLODIPINE BESYLATE 2.5 MG/1
2.5 TABLET ORAL DAILY
Refills: 0 | Status: DISCONTINUED | OUTPATIENT
Start: 2022-03-18 | End: 2022-03-17

## 2022-03-17 RX ORDER — ASCORBIC ACID 60 MG
1 TABLET,CHEWABLE ORAL
Qty: 0 | Refills: 0 | DISCHARGE
Start: 2022-03-17

## 2022-03-17 RX ADMIN — Medication 500 MILLIGRAM(S): at 11:38

## 2022-03-17 RX ADMIN — Medication 1 TABLET(S): at 11:39

## 2022-03-17 RX ADMIN — APIXABAN 2.5 MILLIGRAM(S): 2.5 TABLET, FILM COATED ORAL at 05:32

## 2022-03-17 RX ADMIN — LISINOPRIL 10 MILLIGRAM(S): 2.5 TABLET ORAL at 05:32

## 2022-03-17 RX ADMIN — AMLODIPINE BESYLATE 5 MILLIGRAM(S): 2.5 TABLET ORAL at 05:32

## 2022-03-17 NOTE — DIETITIAN INITIAL EVALUATION ADULT. - PERSON TAUGHT/METHOD
patient instructed/Nutrition education provided on importance of adequate PO intake/verbal instruction

## 2022-03-17 NOTE — DIETITIAN INITIAL EVALUATION ADULT. - OTHER INFO
Patient reports appetite has been improving. Observed 100% of breakfast consumed today. 51-75% consumption of meals noted per nursing flow sheets for 3/16 and % for 3/17. Patient tolerates diet well, denies issues with chewing or swallowing. Patient denies current issues with nausea, vomiting, diarrhea or constipation. Patient was knowledgeable about low sodium diet, and healthy food choices. Patient shows mild signs of muscle wasting/fat loss, declined taking oral nutrition supplement, but was receptive to encouragement for adequate PO intake. Skin is intact per nursing flow sheet. 3+ edema in left ankle noted per nursing flow sheet.

## 2022-03-17 NOTE — DISCHARGE NOTE NURSING/CASE MANAGEMENT/SOCIAL WORK - PATIENT PORTAL LINK FT
You can access the FollowMyHealth Patient Portal offered by [facility] by registering at the following website: [email or web address]. By joining [employer]’s FollowMyHealth portal, you will also be able to view your health information using other applications (apps) compatible with our system.

## 2022-03-17 NOTE — DIETITIAN INITIAL EVALUATION ADULT. - ORAL INTAKE PTA/DIET HISTORY
Patient reports decreased appetite for the past few (> 3) weeks, related to dizziness and nausea that was causes by medications she was taking. Typical diet is balanced, with higher protein, a lot of vegetables and fruits and some carbohydrates. Patient consumes coconut water for the electrolytes, and reports taking vitamin D, C, and B complex PTA. Patient reports intolerance to citrus.

## 2022-03-17 NOTE — DIETITIAN INITIAL EVALUATION ADULT. - ADD RECOMMEND
1) Monitor Weights, Intake, Tolerance, Skin & Labwork   2) Continue Nutrition Plan of Care 3) Patient declines oral nutrition supplement at this time 4) Nutrition education provided on importance of adequate PO intake

## 2022-03-17 NOTE — DIETITIAN INITIAL EVALUATION ADULT. - EDUCATION DIETARY MODIFICATIONS
Nutrition education provided on importance of adequate PO intake/(2) meets goals/outcomes/verbalization

## 2022-03-17 NOTE — DISCHARGE NOTE NURSING/CASE MANAGEMENT/SOCIAL WORK - NSDCPEFALRISK_GEN_ALL_CORE
For information on Fall & Injury Prevention, visit: [email or web address] OR  [email or web address] OR  https://www.cdc.gov/steadi/patient.html

## 2022-03-17 NOTE — DIETITIAN INITIAL EVALUATION ADULT. - SIGNS/SYMPTOMS
as evidence by episodes of dizziness/nausea causing lack of appetite, Mild signs of wasting/fat loss

## 2022-03-17 NOTE — DIETITIAN INITIAL EVALUATION ADULT. - ORAL NUTRITION SUPPLEMENTS
POST LARGE CORE BREAST BIOPSY PATIENT INFORMATION      1  Place an ice pack inside your bra over the top of the dressing every hour for 20 minutes (20 minutes on, 60 minutes off) Do this until bedtime  2  Do not shower or bathe until the following morning       3  You may bathe your breast carefully with the steri-strips in place  Be careful    Not to loosen them  The steri-strips will fall off in 3-5 days  4  You may have mild discomfort, and you may have some bruising where the   Needle entered the skin  This should clear within 5-7 days  5  If you need medicine for discomfort, take acetaminophen products such as   Tylenol  You may also take Advil or Motrin products  6  Do not participate in strenuous activities such as-tennis, aerobics, skiing,  Weight lifting, etc  for 24 hours  Refrain from swimming for 72 hours  7  Wearing a bra for sleeping may be more comfortable for the first 24-48 hours  8  Watch for continued bleeding, pain or fever over 101     For procedures done at the [address] [place] [address] call:  Amirah Simmons RN at [phone] or  Verenice Huynh RN at [phone]  After 4 PM call the Interventional Radiology Department at [phone] and ask to speak with the nurse on call  For procedures performed at [address] [place] [address] call: The Radiology Nurse at [phone]    After 4 PM call your physician, or go to the Emergency Department  9          The final results of your biopsy are usually available within one week 
Patient declined oral nutrition supplement at this time

## 2022-03-17 NOTE — DIETITIAN INITIAL EVALUATION ADULT. - PERTINENT MEDS FT
MEDICATIONS  (STANDING):  amLODIPine   Tablet 5 milliGRAM(s) Oral daily  apixaban 2.5 milliGRAM(s) Oral two times a day  ascorbic acid 500 milliGRAM(s) Oral daily  influenza  Vaccine (HIGH DOSE) 0.7 milliLiter(s) IntraMuscular once  lisinopril 10 milliGRAM(s) Oral daily  melatonin 3 milliGRAM(s) Oral at bedtime  multivitamin 1 Tablet(s) Oral daily  senna 2 Tablet(s) Oral at bedtime    MEDICATIONS  (PRN):  acetaminophen     Tablet .. 650 milliGRAM(s) Oral every 6 hours PRN Temp greater or equal to 38C (100.4F), Mild Pain (1 - 3)  aluminum hydroxide/magnesium hydroxide/simethicone Suspension 30 milliLiter(s) Oral every 4 hours PRN Dyspepsia  ondansetron Injectable 4 milliGRAM(s) IV Push every 8 hours PRN Nausea and/or Vomiting

## 2022-03-17 NOTE — PROGRESS NOTE ADULT - ASSESSMENT
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    #Difficulty ambulating after twisting ankle from fall  - ice left ankle, continue aircast  - Patient declined JULIANE, would like to go home with HCPT    #Lightheadedness likely multifactorial, probable from anemia, dehydration, BP meds, orthostasis  #History of spinal stenosis  - Continue pain meds PRN  - PT evaluation ordered  - Continue bowel regimen  - + orthostasis  - discontinued meclizine - doubt vertigo    #Orthostatic hypotension  Suspect due to intravascular depletion due to symptomatic anemia vs. dehydration.  - s/p 500cc bolus in the ER  - continues to have orthostatic hypotension but with reported improvement in dizziness/lightheadedness  - Hematology consult  - JOHNNY compression stockings  - caution with opioids    #Normocytic Anemia, concern for symptomatic.   - Hg better than previous  - Continue to monitor H/H  - Transfuse to keep > 7  - She has been anemic since at least August when she was hospitalized for a nose bleed, requiring a blood transfusion.   - Dr. Woodard consulted. Pt already follows at [facility] for breast cancer.    #Hypertension  - Continue home amlodipine and lisinopril dose  - Monitor vital signs   - orthostatics as above    #Paroxysmal atrial fibrillation  #Chronic HFpEF  - TTE (4/2021) EF 50-55%5  - s/p ablation 2016, Hx ppm 3 weeks ago to 2nd deg AVB  - continue eliquis   - Private cardiologist at [place]    #Hyperlipidemia  - no longer on statin    #Breast cancer Hx  - s/p B/L mastectomy with implant; Recurrent breast cancer  - outpatient f/u    #CKD IIIa  - near baseline eGFR  - Encourage oral intake  - Avoid nephrotoxic agents    #DVT Prophylaxis   - Continue eliquis    Contact: Pt wants to update her son herself.  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    #Difficulty ambulating after twisting ankle from fall  - ice left ankle, continue aircast  - Patient declined JULIANE, would like to go home with HCPT    #Lightheadedness likely multifactorial, probable from anemia, dehydration, BP meds, orthostasis  #History of spinal stenosis  - Continue pain meds PRN; caution with opioids  - PT as above  - Continue bowel regimen  - + orthostasis  - discontinued meclizine - doubt vertigo    #Orthostatic hypotension  Suspect due to intravascular depletion due to symptomatic anemia vs. dehydration.  - s/p 500cc bolus in the ER  - continues to have orthostatic hypotension but with reported improvement in dizziness/lightheadedness  - Discussing case with Hematology Dr. Woodard  - Discussed with RN at [facility] on behalf of Dr. Valentin - patient may follow up there outpatient with oncology Dr. Valentin and heme Dr. Woodard  - JOHNNY compression stockings    #Normocytic Anemia, concern for symptomatic.   - Hg better than previous  - Continue to monitor H/H  - Transfuse to keep > 7  - She has been anemic since at least August when she was hospitalized for a nose bleed, requiring a blood transfusion.   - Dr. Woodard consulted. Pt already follows at [facility] for breast cancer.    #Hypertension  - Continue home amlodipine and lisinopril dose  - Monitor vital signs   - orthostatics as above    #Paroxysmal atrial fibrillation  #Chronic HFpEF  - TTE (4/2021) EF 50-55%5  - s/p ablation 2016, Hx ppm 3 weeks ago to 2nd deg AVB  - continue eliquis   - Private cardiologist at [place]    #Hyperlipidemia  - no longer on statin    #Breast cancer Hx  - s/p B/L mastectomy with implant; Recurrent breast cancer  - outpatient f/u    #CKD IIIa  - near baseline eGFR  - Encourage oral intake  - Avoid nephrotoxic agents    #DVT Prophylaxis   - Continue eliquis    Contact: Pt wants to update her son herself.

## 2022-03-17 NOTE — DIETITIAN INITIAL EVALUATION ADULT. - PERTINENT LABORATORY DATA
3/17  Hgb- 9.5 (H)  HCT- 30.3 (H)  3/16  Na- 142 (WNL)  K- 4.3 (WNL)  Cl- 109 (H)  BUN- 37 (H)  Creat- 1.20 (WNL)  Gluc- 105 (H)  GFR- 44 (L)    Hemoglobin A1c - 5.5 (on 3/16)

## 2022-03-17 NOTE — PROGRESS NOTE ADULT - NUTRITIONAL ASSESSMENT
This patient has been assessed with a concern for Malnutrition and has been determined to have a diagnosis/diagnoses of Mild protein-calorie malnutrition.    This patient is being managed with:   Diet Regular-  Low Sodium  Entered: Mar 15 2022  4:15PM      This patient has been assessed with a concern for Malnutrition and has been determined to have a diagnosis/diagnoses of Mild protein-calorie malnutrition.    This patient is being managed with:   Diet Regular-  Low Sodium  Entered: Mar 15 2022  4:15PM

## 2022-03-18 DIAGNOSIS — K59.00 CONSTIPATION, UNSPECIFIED: ICD-10-CM

## 2022-03-18 DIAGNOSIS — E55.9 VITAMIN D DEFICIENCY, UNSPECIFIED: ICD-10-CM

## 2022-03-18 DIAGNOSIS — G47.00 INSOMNIA, UNSPECIFIED: ICD-10-CM

## 2022-03-18 RX ORDER — COLD-HOT PACK
125 MCG EACH MISCELLANEOUS
Refills: 0 | Status: ACTIVE | COMMUNITY
Start: 2022-03-18

## 2022-03-18 RX ORDER — SENNOSIDES 8.6 MG TABLETS 8.6 MG/1
8.6 TABLET ORAL
Qty: 60 | Refills: 3 | Status: ACTIVE | COMMUNITY
Start: 2022-03-18

## 2022-03-18 RX ORDER — MULTIVITAMIN
TABLET ORAL DAILY
Qty: 90 | Refills: 0 | Status: ACTIVE | COMMUNITY
Start: 2022-03-18

## 2022-03-18 RX ORDER — CEPHALEXIN 500 MG/1
500 CAPSULE ORAL
Refills: 0 | Status: COMPLETED | COMMUNITY
End: 2022-03-18

## 2022-03-18 RX ORDER — CEPHALEXIN 500 MG/1
500 CAPSULE ORAL 3 TIMES DAILY
Qty: 21 | Refills: 0 | Status: COMPLETED | COMMUNITY
Start: 2021-08-20 | End: 2022-03-18

## 2022-03-21 LAB
% ALBUMIN: 51.8 % — SIGNIFICANT CHANGE UP
% ALPHA 1: 5.9 % — SIGNIFICANT CHANGE UP
% ALPHA 2: 12.3 % — SIGNIFICANT CHANGE UP
% BETA: 14 % — SIGNIFICANT CHANGE UP
% GAMMA: 16 % — SIGNIFICANT CHANGE UP
ALBUMIN SERPL ELPH-MCNC: 3.5 G/DL — LOW (ref 3.6–5.5)
ALBUMIN/GLOB SERPL ELPH: 1.1 RATIO — SIGNIFICANT CHANGE UP
ALPHA1 GLOB SERPL ELPH-MCNC: 0.4 G/DL — SIGNIFICANT CHANGE UP (ref 0.1–0.4)
ALPHA2 GLOB SERPL ELPH-MCNC: 0.8 G/DL — SIGNIFICANT CHANGE UP (ref 0.5–1)
B-GLOBULIN SERPL ELPH-MCNC: 0.9 G/DL — SIGNIFICANT CHANGE UP (ref 0.5–1)
GAMMA GLOBULIN: 1.1 G/DL — SIGNIFICANT CHANGE UP (ref 0.6–1.6)
INTERPRETATION SERPL IFE-IMP: SIGNIFICANT CHANGE UP
PROT PATTERN SERPL ELPH-IMP: SIGNIFICANT CHANGE UP

## 2022-03-22 ENCOUNTER — APPOINTMENT (OUTPATIENT)
Dept: HEMATOLOGY ONCOLOGY | Facility: CLINIC | Age: 87
End: 2022-03-22
Payer: MEDICARE

## 2022-03-22 ENCOUNTER — APPOINTMENT (OUTPATIENT)
Dept: INFUSION THERAPY | Facility: HOSPITAL | Age: 87
End: 2022-03-22

## 2022-03-22 VITALS
DIASTOLIC BLOOD PRESSURE: 75 MMHG | BODY MASS INDEX: 25.96 KG/M2 | SYSTOLIC BLOOD PRESSURE: 162 MMHG | HEART RATE: 66 BPM | HEIGHT: 61.97 IN | OXYGEN SATURATION: 98 % | RESPIRATION RATE: 22 BRPM | TEMPERATURE: 97.7 F | WEIGHT: 141.1 LBS

## 2022-03-22 PROCEDURE — 99214 OFFICE O/P EST MOD 30 MIN: CPT

## 2022-03-22 RX ORDER — COVID-19 ANTIGEN TEST
KIT MISCELLANEOUS
Qty: 2 | Refills: 0 | Status: DISCONTINUED | COMMUNITY
Start: 2022-01-27

## 2022-03-22 RX ORDER — POTASSIUM CHLORIDE 1500 MG/1
20 TABLET, EXTENDED RELEASE ORAL
Qty: 3 | Refills: 0 | Status: DISCONTINUED | COMMUNITY
Start: 2022-02-18

## 2022-03-22 RX ORDER — COVID-19 MOLECULAR TEST ASSAY
KIT MISCELLANEOUS
Qty: 1 | Refills: 0 | Status: DISCONTINUED | COMMUNITY
Start: 2022-01-27

## 2022-03-24 NOTE — PHYSICAL EXAM
[Restricted in physically strenuous activity but ambulatory and able to carry out work of a light or sedentary nature] : Status 1- Restricted in physically strenuous activity but ambulatory and able to carry out work of a light or sedentary nature, e.g., light house work, office work [Normal] : affect appropriate [de-identified] : Right reconstructed breast with no discrete palpable masses, no palpable axillary nodes. Left reconstructed breast with no discrete palpable masses in breast, palpable mass in left axilla, nontender, mildly mobile.  Stable per patient. \par

## 2022-03-24 NOTE — HISTORY OF PRESENT ILLNESS
[Date: ____________] : Patient's last distress assessment performed on [unfilled]. [0 - No Distress] : Distress Level: 0 [de-identified] : The patient has a history of right breast cancer in 1965 for which she underwent what is described as a "Jeanette mastectomy" by Dr. Olivo at [facility] with "0/19 axillary lymph nodes" per patient's verbal report and followed expectantly.  She was well until 2008 at which time she was found to have a left breast multifocal DCIS per her description for which she underwent a left modified radical mastectomy at [facility] under the care Dr. Yu with a finding of "3 spots of DCIS, and a sentinel lymph node negative" per patient's verbal report; I do not have a copy of that report for my review.  The patient reports having undergone bilateral breast reconstruction at the same time to include what she describes as right "right cadaver tissue" in addition to an implant in the right breast, as well as a left breast implant.\par \par She was then well until approximately 1-2/19 when she describes transient pinching on her left reconstructed breast, centrally and predominantly to the lateral aspect.  She chose to follow this and then start to question whether she was "losing volume."  Through a friend/networking, she contacted Dr. Magan Carey who recommended a bilateral breast MRI, which was performed at [facility] on June 21, 2019 with a finding of a suspicious 5 cm irregular mass in the left axilla as well as further suspicious lymphadenopathy seen posterior to the mass with second-look ultrasound and core biopsy recommended as well as a PET-CT scan.  The patient went on to have an ultrasound of the left axilla with an ultrasound-guided core biopsy, with a finding of a heterogeneous 4.5 cm mass seen corresponding to the finding seen on the breast MRI with abnormal lymph nodes seen posterior to the mass measuring 8 mm with an ultrasound-guided core biopsy on that same date demonstrating invasive poorly differentiated mammary carcinoma with lobular phenotype, Viridiana score 8/9, with invasive tumor measuring at least 8 mm on the core biopsy specimen, ER negative, KS negative, HER-2/jeni negative, and a comment that "no focal residual lymphoid like tissue (no germinal center noted) identified; the lesion may represent a completely effaced lymph node or brisk lymphocytic response to tumor; clinical pathologic-radiologic correlation recommended”.  The patient then went on to see Dr. Carlos Pittman and had a PET-CT scan performed on July 5, 2019 with a finding of a mildly avid focus in the left thyroid lobe extending towards the isthmus, a 1.3 cm non-avid left thyroid lobe nodule; in the left axilla, there was left axillary soft tissue mass with irregular margins containing a biopsy clip measuring 3 x 2 cm with several adjacent subcentimeter left axillary lymph nodes measuring up to 1 cm; bilateral hip arthroplasties with heterogeneous FDG avidity adjacent to the proximal portions bilaterally on the right with an SUV of 13.8 on the left and SUV of 9.4 with a comment, this area is limited in evaluation due to beam hardening artifact; there were degenerative changes in the lower spine with mild areas of FDG avidity, multilevel, non-FDG avid compression fracture of L4 unchanged since 06/2017.\par \par I saw her in initial consultation on 716/19 and subsequently had requested a review of the pathology and it turned our it was ER+ / KS neg. We reviewed her scans at tumor board and the group agreed it was unresectable at this time. I called the pt and informed her of the above change in the pathology and that I recommended neoadjuvant anti-estrogen therapy with an aromatase inhibitor such as anastrozole. She agreed to proceed and started taking anastrozole in the very first days of 7/19. \par \par Seen in 11/19. Had evidence of response in her left ax mass. \par \par Seen 3/20 and she informed me she stopped taking anstrozole end 8/19 secondary to concerns re bone toxicity. She had been seeing an "MD PhD" on [place] who had been infusing "ozone therapy in an electrolyte bag" IV, B17, other anti-inflammatory herbs. She declined to provide his name as " rather not say as he does not want people looking over his back". On exam she had what seemed like a further response. I recommended she re-consult / see Dr. Pittman to consider potential surgery vs XRT as primary local treatment. I advised that as she has been under the care of another physician that she follow up with him. I offered her to see me  as needed in the future. She was agreeable.\par \par In the interim she saw Dr. Pittman 7/6/20. WHen questioned why she did not do so sooner she did not have an answer to provide. Dr Pittman sent her for a B/L breast MRI 7/27/20 which showed :\par \par In the left axilla, again noted is an irregularly-shaped enhancing mass compatible with the known recurrent malignancy. Susceptibility artifact is noted within the mass, a biopsy marker. Overall, the mass measures approximately 6.9 cm AP by 3.9 TV x 6.2 cm CC. The mass appears more draped along the implant with extension along its superior lateral margin. The previously noted additional axillary lymph nodes medial to the mass appear relatively stable in size.\par \par No right axillary lymphadenopathy. No internal mammary lymphadenopathy.\par \par IMPRESSION:\par 1.  The biopsy-proven recurrent malignancy in the left axillary region has increased in size since the prior breast MRI and prior ultrasound, with maximal dimension now measuring 6.9 cm AP.\par 2.  No other suspicious enhancement in either breast.\par 3.  Silicone implants are intact.\par \par The pt now reports she stopped getting ozone therapy in 3/20. \par \par She progressed after she stopped anastrozole and ozone therapy. \par Restarted anastrozole in early August as well as ozone/vitamin/mineral therapy. \par \par Repeat MRI 10/27/20 revealed POD in L breast / axillary / CW mass. PET CT did not show any distant mets. \par  [de-identified] : Patient with a history of breast cancer, local axillary recurrence in 11/2020 now on fulvestrant.  Started fulvestrant on 11/24/20.\par \par  She denies any treatment related side effects. \par \par States she is doing well with no current complaints. \par \par She notes a good appetite, stable weight, and excellent performance status.\par \par US L axilla 1/4/22\par FINDINGS:\par In the left axilla a 1.4 cm hypoechoic mass with no discernible fatty \par hilum is seen slightly decreased in size from prior study. This is \par suspicious for an abnormal axillary lymph node.\par \par Also noted in the left axilla is a well-defined 2.1 x 1.9 x 2.5 cm \par hypoechoic mass which is unchanged from prior study dated 9/16/2021. This \par has been biopsy-proven to be invasive poorly differentiated carcinoma \par with lobular phenotype.\par \par Also noted in the left axilla is a stable 1.1 cm hypoechoic mass with no \par discernible fatty hilum suspicious for abnormal axillary lymph node.\par \par \par IMPRESSION:\par \par Stable findings noted in the left axilla when compared to prior study \par dated 9/16/2021.\par \par \par \par US breast  9/16/21\par \par IMPRESSION:\par Relatively stable appearing amorphous mass in the left axilla. 2 lymph nodes in the left axilla are slightly increased in size. Malignancy is possible but recent: Vaccination could produce similar result. Clinical correlation and if indicated 6 month follow-up directed ultrasound might be appropriate.\par \par RECOMMENDATION:  Clinical follow up.\par \par DEXA 9/16/21\par Impression:\par Findings are normal in the lumbar spine and osteoporosis in the left forearm.\par \par \par CT CAP 5/19/21\par IMPRESSION:\par Lung base ground glass opacities, possibly infectious or inflammatory.\par Colonic diverticulosis without CT evidence of acute diverticulitis.\par Left renal pelvis calculus (1.8 cm) and mild left hydronephrosis, similar to prior.\par \par MRI breast 4/26/21\par IMPRESSION:\par *Markedly limited exam.\par \par 1.  No evidence of implant rupture.\par 2.  The known left axillary malignancy and adjacent lymph nodes cannot be evaluated on this exam, due to extensive artifact in the region.\par \par \par Breast MR 3/29/21\par Stable left axillary mass and enlarged left axillary lymph node\par \par CT CAP 3/31/21\par  An ill-defined left axillary mass adjacent to a biopsy clip is again noted, measuring approximately 3.4 x 3.4 cm, previously 3.6 x 3.3 cm on 11/6/2020. An adjacent left axillary lymph node measures 1.5 x 1.2 cm (2:16), previously 1.4 x 1.2 cm. Multiple hypodense nodules in both lobes of the thyroid gland and in the thyroid isthmus, similar in appearance to the prior PET/CT dated 11/6/2020. 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

## 2022-03-25 ENCOUNTER — RESULT REVIEW (OUTPATIENT)
Age: 87
End: 2022-03-25

## 2022-03-25 ENCOUNTER — OUTPATIENT (OUTPATIENT)
Dept: OUTPATIENT SERVICES | Facility: HOSPITAL | Age: 87
LOS: 1 days | End: 2022-03-25
Payer: MEDICARE

## 2022-03-25 ENCOUNTER — APPOINTMENT (OUTPATIENT)
Dept: HEMATOLOGY ONCOLOGY | Facility: CLINIC | Age: 87
End: 2022-03-25
Payer: MEDICARE

## 2022-03-25 VITALS
HEART RATE: 59 BPM | RESPIRATION RATE: 16 BRPM | BODY MASS INDEX: 26.45 KG/M2 | TEMPERATURE: 97.9 F | WEIGHT: 143.74 LBS | DIASTOLIC BLOOD PRESSURE: 67 MMHG | SYSTOLIC BLOOD PRESSURE: 151 MMHG | HEIGHT: 61.97 IN | OXYGEN SATURATION: 96 %

## 2022-03-25 DIAGNOSIS — Z95.0 PRESENCE OF CARDIAC PACEMAKER: Chronic | ICD-10-CM

## 2022-03-25 DIAGNOSIS — Z80.1 FAMILY HISTORY OF MALIGNANT NEOPLASM OF TRACHEA, BRONCHUS AND LUNG: ICD-10-CM

## 2022-03-25 DIAGNOSIS — Z87.39 PERSONAL HISTORY OF OTHER DISEASES OF THE MUSCULOSKELETAL SYSTEM AND CONNECTIVE TISSUE: Chronic | ICD-10-CM

## 2022-03-25 DIAGNOSIS — C50.919 MALIGNANT NEOPLASM OF UNSPECIFIED SITE OF UNSPECIFIED FEMALE BREAST: ICD-10-CM

## 2022-03-25 DIAGNOSIS — Z86.79 PERSONAL HISTORY OF OTHER DISEASES OF THE CIRCULATORY SYSTEM: ICD-10-CM

## 2022-03-25 DIAGNOSIS — Z96.649 PRESENCE OF UNSPECIFIED ARTIFICIAL HIP JOINT: Chronic | ICD-10-CM

## 2022-03-25 DIAGNOSIS — Z85.3 PERSONAL HISTORY OF MALIGNANT NEOPLASM OF BREAST: ICD-10-CM

## 2022-03-25 DIAGNOSIS — Z90.49 ACQUIRED ABSENCE OF OTHER SPECIFIED PARTS OF DIGESTIVE TRACT: Chronic | ICD-10-CM

## 2022-03-25 DIAGNOSIS — Z98.890 OTHER SPECIFIED POSTPROCEDURAL STATES: Chronic | ICD-10-CM

## 2022-03-25 DIAGNOSIS — Z82.49 FAMILY HISTORY OF ISCHEMIC HEART DISEASE AND OTHER DISEASES OF THE CIRCULATORY SYSTEM: ICD-10-CM

## 2022-03-25 DIAGNOSIS — Z82.3 FAMILY HISTORY OF STROKE: ICD-10-CM

## 2022-03-25 LAB
BASOPHILS # BLD AUTO: 0.04 K/UL — SIGNIFICANT CHANGE UP (ref 0–0.2)
BASOPHILS NFR BLD AUTO: 0.5 % — SIGNIFICANT CHANGE UP (ref 0–2)
EOSINOPHIL # BLD AUTO: 0.25 K/UL — SIGNIFICANT CHANGE UP (ref 0–0.5)
EOSINOPHIL NFR BLD AUTO: 3.1 % — SIGNIFICANT CHANGE UP (ref 0–6)
HCT VFR BLD CALC: 29 % — LOW (ref 34.5–45)
HGB BLD-MCNC: 9.1 G/DL — LOW (ref 11.5–15.5)
IMM GRANULOCYTES NFR BLD AUTO: 0.3 % — SIGNIFICANT CHANGE UP (ref 0–1.5)
LYMPHOCYTES # BLD AUTO: 0.82 K/UL — LOW (ref 1–3.3)
LYMPHOCYTES # BLD AUTO: 10.3 % — LOW (ref 13–44)
MCHC RBC-ENTMCNC: 27.9 PG — SIGNIFICANT CHANGE UP (ref 27–34)
MCHC RBC-ENTMCNC: 31.4 G/DL — LOW (ref 32–36)
MCV RBC AUTO: 89 FL — SIGNIFICANT CHANGE UP (ref 80–100)
MONOCYTES # BLD AUTO: 0.49 K/UL — SIGNIFICANT CHANGE UP (ref 0–0.9)
MONOCYTES NFR BLD AUTO: 6.1 % — SIGNIFICANT CHANGE UP (ref 2–14)
NEUTROPHILS # BLD AUTO: 6.35 K/UL — SIGNIFICANT CHANGE UP (ref 1.8–7.4)
NEUTROPHILS NFR BLD AUTO: 79.7 % — HIGH (ref 43–77)
NRBC # BLD: 0 /100 WBCS — SIGNIFICANT CHANGE UP (ref 0–0)
PLATELET # BLD AUTO: 269 K/UL — SIGNIFICANT CHANGE UP (ref 150–400)
RBC # BLD: 3.26 M/UL — LOW (ref 3.8–5.2)
RBC # FLD: 16.3 % — HIGH (ref 10.3–14.5)
RETICS #: 44 K/UL — SIGNIFICANT CHANGE UP (ref 25–125)
RETICS/RBC NFR: 1.4 % — SIGNIFICANT CHANGE UP (ref 0.5–2.5)
WBC # BLD: 7.97 K/UL — SIGNIFICANT CHANGE UP (ref 3.8–10.5)
WBC # FLD AUTO: 7.97 K/UL — SIGNIFICANT CHANGE UP (ref 3.8–10.5)

## 2022-03-25 PROCEDURE — 99204 OFFICE O/P NEW MOD 45 MIN: CPT

## 2022-03-25 RX ORDER — MECLIZINE HYDROCHLORIDE 12.5 MG/1
12.5 TABLET ORAL 3 TIMES DAILY
Qty: 30 | Refills: 3 | Status: COMPLETED | COMMUNITY
Start: 2022-03-14 | End: 2022-03-25

## 2022-03-25 RX ORDER — GLUCOSAMINE HCL/CHONDROITIN SU 500-400 MG
3 CAPSULE ORAL
Qty: 30 | Refills: 0 | Status: COMPLETED | COMMUNITY
Start: 2022-03-18 | End: 2022-03-25

## 2022-03-25 RX ORDER — ASCORBIC ACID 1000 MG
10 TABLET ORAL DAILY
Refills: 0 | Status: COMPLETED | COMMUNITY
Start: 2020-10-14 | End: 2022-03-25

## 2022-03-28 ENCOUNTER — APPOINTMENT (OUTPATIENT)
Dept: FAMILY MEDICINE | Facility: CLINIC | Age: 87
End: 2022-03-28

## 2022-03-30 DIAGNOSIS — H90.3 SENSORINEURAL HEARING LOSS, BILATERAL: ICD-10-CM

## 2022-03-31 NOTE — ASSESSMENT
[FreeTextEntry1] : 86 yo F. with h/o breast cancer with local axillary recurrence 11/2020 on Fulvestrant, HTN, HLD, AFib s/o ablation 2016 on Eliquis, CHF, and second degree AV block s/p PPM.  Seen in consultation for evaluation of chronic anemia persisting since 12/2019 with HGB ranging in the mid 10s, HGB dropped to 7.3 this past Oct while she was having prolonged nosebleeds, she received 1 unit of PRBC and HGB has since been stable in the 9s, most recent HGB was 9.5 g/dL. Recent anemia workup was negative for hemolysis, vitamin B12/Folate deficiency, and plasma cell dyscrasia. Ferritin was elevated 575 ng/mL, iron sat was 11%- repeated levels today, in the interim patient to continue oral iron. We discussed likely anemia of chronic disease and given active breast cancer patient is not a candidate for erythropoietin stimulating agents. Would transfuse for HGB < 8.0 g/dL. HGB today 9.1, will repeat CBC in 1 month.\par \par Attending attestation\par Patient seen and examined with NP Pasquale Casas.  I was present during relevant parts of the interview. \par This is an 87 year old woman with a history of metastatic breast cancer with axillary recurrence in 11/2020, hormone positive, on fulvestarant with stable disease. Patient with a chronic anemia since 2019 usually in the 10g/dl range .Was admitted to [facility] after prolonged epistaxis. Recieved a PRBC transfusion since then Hg stable in the 9g/d; range.  Workup was negative for other sources of anemia. No hemolysis, B12 and folate normal, SPEP, EDILBERTO unremarkable, free light chain ratio normal. Ferritin elevated. Suspect anemia of chronic disease. Would not recommend use of HIRAM support given history of active malignancy under treatment.  Could only offer transfusions for Hg < 8. Would have patient  follow up monthly.  \par \par Addendum 3/31/22\par \par Called patient re: Bloodwork.  Hg 9.1g/dl, lower than the previous 9.7g/dl.  Still appears to have an anemia of chronic disease profile, with the chronic disease likely being the malignancy.  Still would not recommend HIRAM support given history of active metastatic malignancy. Called patient to discuss, no answer, left a message.

## 2022-03-31 NOTE — HISTORY OF PRESENT ILLNESS
[de-identified] : Madeline Reyes is an 86 yo F. with h/o breast cancer with local axillary recurrence 2020 on Fulvestrant, HTN, HLD, AFib s/o ablation 2016 on Eliquis, CHF, and second degree AV block s/p PPM.  Seen in consultation for evaluation of anemia. EMR shows normocytic anemia persisting since 2019 with HGB ranging in the mid 10s, HGB dropped to 7.3 this past Oct while she was having prolonged nosebleeds, she received 1 unit of PRBC and HGB has since been stable in the 9s, most recent HGB was 9.5 g/dL. This past week she was evaluated at [facility] for weakness, fatigue, and dizziness leading to her twisting her L ankle and injuring her R shin. States symptoms have been present for the past 2 months. Symptoms were thought to be multifactorial: orthostatic hypotension, anemia, dehydration, BP meds.  Anemia workup done resulted with Ferritin 575 ng/mL, iron sat 11%, Vitamin B12 > 2000 pg/mL, Folate 18.5 ng/mL, Haptoglobin 218 mg/mL, and normal SPEP. Patient states she continues to have constant lightheadedness with occ. dizziness and SOB. Denies syncope, lives alone and gets around with a rolling walker. Denies fevers, chills, night sweats, chest pain palpitations, unintentional weight loss and bleeding to all sites. 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 Denies alcohol use. Lives alone. \par FMH: Denies family h/o anemia. Mother had CHF. Father had Lung CA and CVA. Sister  had CVA and sepsis\par Medications: see medication reconciliation\par Allergies: Statins- hives and Sulfa- hives\par Healthcare Maintenance\par PCP: Dr. Pang last visit 3/2022\par Breast Oncologist: Dr. Valentin, last visit 3/2020\par Colonoscopy: last colonoscopy was many years ago but was normal per patient\par COVID: Denies h/o Covid infection. Received the Pfizer Covid vaccines

## 2022-04-01 ENCOUNTER — APPOINTMENT (OUTPATIENT)
Dept: FAMILY MEDICINE | Facility: CLINIC | Age: 87
End: 2022-04-01
Payer: MEDICARE

## 2022-04-01 VITALS
WEIGHT: 143 LBS | HEIGHT: 62 IN | HEART RATE: 68 BPM | RESPIRATION RATE: 20 BRPM | BODY MASS INDEX: 26.31 KG/M2 | DIASTOLIC BLOOD PRESSURE: 70 MMHG | SYSTOLIC BLOOD PRESSURE: 140 MMHG

## 2022-04-01 LAB
FERRITIN SERPL-MCNC: 484 NG/ML
IRON SATN MFR SERPL: 24 %
IRON SERPL-MCNC: 69 UG/DL
TIBC SERPL-MCNC: 290 UG/DL
UIBC SERPL-MCNC: 221 UG/DL

## 2022-04-01 PROCEDURE — 99214 OFFICE O/P EST MOD 30 MIN: CPT

## 2022-04-01 NOTE — REVIEW OF SYSTEMS
[Fatigue] : fatigue [Muscle Weakness] : muscle weakness [Back Pain] : back pain [Unsteady Walking] : ataxia [Negative] : Genitourinary

## 2022-04-01 NOTE — HISTORY OF PRESENT ILLNESS
[de-identified] : Presents for BP check and general follow-up.  Note recent hospitalization after an ankle contusion rendering the patient unable to ambulate.  Pt now ambulating with cane; does have some assistance at home.  Also noted to be persistently anemic - following with Heme-Onc - reviewed recent labs with patient at this encounter.  Pt does self-check BP - states it does sometimes elevate to the 160-range.  Pt does complain of intermittent dizziness, but most pressing complaint is pain associated with known spinal stenosis - states that is the most significant issue affecting ambulation.

## 2022-04-01 NOTE — PHYSICAL EXAM
[No Acute Distress] : no acute distress [Normal] : normal rate, regular rhythm, normal S1 and S2 and no murmur heard [No Edema] : there was no peripheral edema [Soft] : abdomen soft [Non Tender] : non-tender [Normal Posterior Cervical Nodes] : no posterior cervical lymphadenopathy [Normal Anterior Cervical Nodes] : no anterior cervical lymphadenopathy [Muscle Spasms, Bilateral] : bilateral muscle spasms [Stooped] : stooped [Shuffling] : shuffling [Motor Strength Lower Extremities Bilaterally] : there was weakness in both lower extremities [Limited Balance] : the patient's balance was impaired [Alert and Oriented x3] : oriented to person, place, and time [de-identified] : mildly pale conjuctivae

## 2022-04-01 NOTE — ASSESSMENT
[FreeTextEntry1] : Feel BP acceptable at this encounter; would not increase medication as it may lower BPs at times and increase risk of falls\par Chronic anemia - F/U with Heme-Onc as scheduled\par Cardiac status stable with no evidence of decompensation\par Known spinal stenosis - very symptomatic and adversely impacting QOL - pt has requested Prednisone 5mg daily; will try a Medrol DosePak and observe

## 2022-04-13 ENCOUNTER — OUTPATIENT (OUTPATIENT)
Dept: OUTPATIENT SERVICES | Facility: HOSPITAL | Age: 87
LOS: 1 days | Discharge: HOME | End: 2022-04-13

## 2022-04-13 DIAGNOSIS — Z98.890 OTHER SPECIFIED POSTPROCEDURAL STATES: Chronic | ICD-10-CM

## 2022-04-13 DIAGNOSIS — Z95.0 PRESENCE OF CARDIAC PACEMAKER: Chronic | ICD-10-CM

## 2022-04-13 DIAGNOSIS — Z96.649 PRESENCE OF UNSPECIFIED ARTIFICIAL HIP JOINT: Chronic | ICD-10-CM

## 2022-04-13 DIAGNOSIS — Z87.39 PERSONAL HISTORY OF OTHER DISEASES OF THE MUSCULOSKELETAL SYSTEM AND CONNECTIVE TISSUE: Chronic | ICD-10-CM

## 2022-04-13 DIAGNOSIS — C50.919 MALIGNANT NEOPLASM OF UNSPECIFIED SITE OF UNSPECIFIED FEMALE BREAST: ICD-10-CM

## 2022-04-13 DIAGNOSIS — Z90.49 ACQUIRED ABSENCE OF OTHER SPECIFIED PARTS OF DIGESTIVE TRACT: Chronic | ICD-10-CM

## 2022-04-19 ENCOUNTER — RESULT REVIEW (OUTPATIENT)
Age: 87
End: 2022-04-19

## 2022-04-19 ENCOUNTER — APPOINTMENT (OUTPATIENT)
Dept: HEMATOLOGY ONCOLOGY | Facility: CLINIC | Age: 87
End: 2022-04-19

## 2022-04-19 ENCOUNTER — APPOINTMENT (OUTPATIENT)
Dept: INFUSION THERAPY | Facility: HOSPITAL | Age: 87
End: 2022-04-19

## 2022-04-19 LAB
BASOPHILS # BLD AUTO: 0.03 K/UL — SIGNIFICANT CHANGE UP (ref 0–0.2)
BASOPHILS NFR BLD AUTO: 0.4 % — SIGNIFICANT CHANGE UP (ref 0–2)
EOSINOPHIL # BLD AUTO: 0.33 K/UL — SIGNIFICANT CHANGE UP (ref 0–0.5)
EOSINOPHIL NFR BLD AUTO: 4.2 % — SIGNIFICANT CHANGE UP (ref 0–6)
HCT VFR BLD CALC: 31 % — LOW (ref 34.5–45)
HGB BLD-MCNC: 9.7 G/DL — LOW (ref 11.5–15.5)
IMM GRANULOCYTES NFR BLD AUTO: 0.3 % — SIGNIFICANT CHANGE UP (ref 0–1.5)
LYMPHOCYTES # BLD AUTO: 0.98 K/UL — LOW (ref 1–3.3)
LYMPHOCYTES # BLD AUTO: 12.4 % — LOW (ref 13–44)
MCHC RBC-ENTMCNC: 28.3 PG — SIGNIFICANT CHANGE UP (ref 27–34)
MCHC RBC-ENTMCNC: 31.3 G/DL — LOW (ref 32–36)
MCV RBC AUTO: 90.4 FL — SIGNIFICANT CHANGE UP (ref 80–100)
MONOCYTES # BLD AUTO: 0.56 K/UL — SIGNIFICANT CHANGE UP (ref 0–0.9)
MONOCYTES NFR BLD AUTO: 7.1 % — SIGNIFICANT CHANGE UP (ref 2–14)
NEUTROPHILS # BLD AUTO: 6.01 K/UL — SIGNIFICANT CHANGE UP (ref 1.8–7.4)
NEUTROPHILS NFR BLD AUTO: 75.6 % — SIGNIFICANT CHANGE UP (ref 43–77)
NRBC # BLD: 0 /100 WBCS — SIGNIFICANT CHANGE UP (ref 0–0)
PLATELET # BLD AUTO: 223 K/UL — SIGNIFICANT CHANGE UP (ref 150–400)
RBC # BLD: 3.43 M/UL — LOW (ref 3.8–5.2)
RBC # FLD: 16.9 % — HIGH (ref 10.3–14.5)
RETICS #: 53.2 K/UL — SIGNIFICANT CHANGE UP (ref 25–125)
RETICS/RBC NFR: 1.6 % — SIGNIFICANT CHANGE UP (ref 0.5–2.5)
WBC # BLD: 7.93 K/UL — SIGNIFICANT CHANGE UP (ref 3.8–10.5)
WBC # FLD AUTO: 7.93 K/UL — SIGNIFICANT CHANGE UP (ref 3.8–10.5)

## 2022-04-19 PROCEDURE — 86850 RBC ANTIBODY SCREEN: CPT

## 2022-04-19 PROCEDURE — 86901 BLOOD TYPING SEROLOGIC RH(D): CPT

## 2022-04-19 PROCEDURE — 86900 BLOOD TYPING SEROLOGIC ABO: CPT

## 2022-04-20 ENCOUNTER — APPOINTMENT (OUTPATIENT)
Dept: HEMATOLOGY ONCOLOGY | Facility: CLINIC | Age: 87
End: 2022-04-20
Payer: MEDICARE

## 2022-04-20 VITALS
SYSTOLIC BLOOD PRESSURE: 163 MMHG | DIASTOLIC BLOOD PRESSURE: 78 MMHG | OXYGEN SATURATION: 98 % | TEMPERATURE: 98 F | BODY MASS INDEX: 26.04 KG/M2 | HEIGHT: 62.01 IN | RESPIRATION RATE: 16 BRPM | HEART RATE: 95 BPM | WEIGHT: 143.3 LBS

## 2022-04-20 PROCEDURE — 99214 OFFICE O/P EST MOD 30 MIN: CPT

## 2022-04-20 NOTE — PHYSICAL EXAM
[Restricted in physically strenuous activity but ambulatory and able to carry out work of a light or sedentary nature] : Status 1- Restricted in physically strenuous activity but ambulatory and able to carry out work of a light or sedentary nature, e.g., light house work, office work [Normal] : affect appropriate [de-identified] : normal bowel sounds, abd soft, nontender.  In area of patient concern, left UOQ there is well healed surgical scar and above is palpable area of fatty tissue, no discrete margins.  Less pronounced when patient lies flat, more pronounced when standing.  Nontender.  ?lipoma vs hernia.

## 2022-04-20 NOTE — REASON FOR VISIT
[Follow-Up Visit] : a follow-up [Urgent Visit] : an urgent  [FreeTextEntry2] : Breast Cancer/abd mass

## 2022-04-20 NOTE — HISTORY OF PRESENT ILLNESS
[Date: ____________] : Patient's last distress assessment performed on [unfilled]. [0 - No Distress] : Distress Level: 0 [de-identified] : The patient has a history of right breast cancer in 1965 for which she underwent what is described as a "Jeanette mastectomy" by Dr. Olivo at [facility] with "0/19 axillary lymph nodes" per patient's verbal report and followed expectantly.  She was well until 2008 at which time she was found to have a left breast multifocal DCIS per her description for which she underwent a left modified radical mastectomy at [facility] under the care Dr. Yu with a finding of "3 spots of DCIS, and a sentinel lymph node negative" per patient's verbal report; I do not have a copy of that report for my review.  The patient reports having undergone bilateral breast reconstruction at the same time to include what she describes as right "right cadaver tissue" in addition to an implant in the right breast, as well as a left breast implant.\par \par She was then well until approximately 1-2/19 when she describes transient pinching on her left reconstructed breast, centrally and predominantly to the lateral aspect.  She chose to follow this and then start to question whether she was "losing volume."  Through a friend/networking, she contacted Dr. Magan Carey who recommended a bilateral breast MRI, which was performed at [facility] on June 21, 2019 with a finding of a suspicious 5 cm irregular mass in the left axilla as well as further suspicious lymphadenopathy seen posterior to the mass with second-look ultrasound and core biopsy recommended as well as a PET-CT scan.  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  The patient then went on to see Dr. Carlos Pittman and had a PET-CT scan performed on July 5, 2019 with a finding of a mildly avid focus in the left thyroid lobe extending towards the isthmus, a 1.3 cm non-avid left thyroid lobe nodule; in the left axilla, there was left axillary soft tissue mass with irregular margins containing a biopsy clip measuring 3 x 2 cm with several adjacent subcentimeter left axillary lymph nodes measuring up to 1 cm; bilateral hip arthroplasties with heterogeneous FDG avidity adjacent to the proximal portions bilaterally on the right with an SUV of 13.8 on the left and SUV of 9.4 with a comment, this area is limited in evaluation due to beam hardening artifact; there were degenerative changes in the lower spine with mild areas of FDG avidity, multilevel, non-FDG avid compression fracture of L4 unchanged since 06/2017.\par \par I saw her in initial consultation on 716/19 and subsequently had requested a review of the pathology and it turned our it was ER+ / PA neg. We reviewed her scans at tumor board and the group agreed it was unresectable at this time. I called the pt and informed her of the above change in the pathology and that I recommended neoadjuvant anti-estrogen therapy with an aromatase inhibitor such as anastrozole. She agreed to proceed and started taking anastrozole in the very first days of 7/19. \par \par Seen in 11/19. Had evidence of response in her left ax mass. \par \par Seen 3/20 and she informed me she stopped taking anstrozole end 8/19 secondary to concerns re bone toxicity. She had been seeing an "MD PhD" on [place] who had been infusing "ozone therapy in an electrolyte bag" IV, B17, other anti-inflammatory herbs. She declined to provide his name as " rather not say as he does not want people looking over his back". On exam she had what seemed like a further response. I recommended she re-consult / see Dr. Pittman to consider potential surgery vs XRT as primary local treatment. I advised that as she has been under the care of another physician that she follow up with him. I offered her to see me  as needed in the future. She was agreeable.\par \par In the interim she saw Dr. Pittman 7/6/20. WHen questioned why she did not do so sooner she did not have an answer to provide. Dr Pittman sent her for a B/L breast MRI 7/27/20 which showed :\par \par In the left axilla, again noted is an irregularly-shaped enhancing mass compatible with the known recurrent malignancy. Susceptibility artifact is noted within the mass, a biopsy marker. Overall, the mass measures approximately 6.9 cm AP by 3.9 TV x 6.2 cm CC. The mass appears more draped along the implant with extension along its superior lateral margin. The previously noted additional axillary lymph nodes medial to the mass appear relatively stable in size.\par \par No right axillary lymphadenopathy. No internal mammary lymphadenopathy.\par \par IMPRESSION:\par 1.  The biopsy-proven recurrent malignancy in the left axillary region has increased in size since the prior breast MRI and prior ultrasound, with maximal dimension now measuring 6.9 cm AP.\par 2.  No other suspicious enhancement in either breast.\par 3.  Silicone implants are intact.\par \par The pt now reports she stopped getting ozone therapy in 3/20. \par \par She progressed after she stopped anastrozole and ozone therapy. \par Restarted anastrozole in early August as well as ozone/vitamin/mineral therapy. \par \par Repeat MRI 10/27/20 revealed POD in L breast / axillary / CW mass. PET CT did not show any distant mets. \par  [de-identified] : Patient with a history of breast cancer, local axillary recurrence in 11/2020 now on fulvestrant.  Started fulvestrant on 11/24/20.  \par \par She denies any treatment related side effects. \par \par She came in today with complains of palpable mass on left upper abdomen.  She states two nights ago she noticed area of left upper outer quadrant was larger than right.  She denies any pain, n/v/d.  She came in for further evaluation. \par \par She notes a good appetite, stable weight, and excellent performance status.\par \par US L axilla 1/4/22\par FINDINGS:\par In the left axilla a 1.4 cm hypoechoic mass with no discernible fatty \par hilum is seen slightly decreased in size from prior study. This is \par suspicious for an abnormal axillary lymph node.\par \par Also noted in the left axilla is a well-defined 2.1 x 1.9 x 2.5 cm \par hypoechoic mass which is unchanged from prior study dated 9/16/2021. This \par has been biopsy-proven to be invasive poorly differentiated carcinoma \par with lobular phenotype.\par \par Also noted in the left axilla is a stable 1.1 cm hypoechoic mass with no \par discernible fatty hilum suspicious for abnormal axillary lymph node.\par \par \par IMPRESSION:\par \par Stable findings noted in the left axilla when compared to prior study \par dated 9/16/2021.\par \par \par \par US breast  9/16/21\par \par IMPRESSION:\par Relatively stable appearing amorphous mass in the left axilla. 2 lymph nodes in the left axilla are slightly increased in size. Malignancy is possible but recent: Vaccination could produce similar result. Clinical correlation and if indicated 6 month follow-up directed ultrasound might be appropriate.\par \par RECOMMENDATION:  Clinical follow up.\par \par DEXA 9/16/21\par Impression:\par Findings are normal in the lumbar spine and osteoporosis in the left forearm.\par \par \par CT CAP 5/19/21\par IMPRESSION:\par Lung base ground glass opacities, possibly infectious or inflammatory.\par Colonic diverticulosis without CT evidence of acute diverticulitis.\par Left renal pelvis calculus (1.8 cm) and mild left hydronephrosis, similar to prior.\par \par MRI breast 4/26/21\par IMPRESSION:\par *Markedly limited exam.\par \par 1.  No evidence of implant rupture.\par 2.  The known left axillary malignancy and adjacent lymph nodes cannot be evaluated on this exam, due to extensive artifact in the region.\par \par \par Breast MR 3/29/21\par Stable left axillary mass and enlarged left axillary lymph node\par \par CT CAP 3/31/21\par  An ill-defined left axillary mass adjacent to a biopsy clip is again noted, measuring approximately 3.4 x 3.4 cm, previously 3.6 x 3.3 cm on 11/6/2020. An adjacent left axillary lymph node measures 1.5 x 1.2 cm (2:16), previously 1.4 x 1.2 cm. Multiple hypodense nodules in both lobes of the thyroid gland and in the thyroid isthmus, similar in appearance to the prior PET/CT dated 11/6/2020. 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

## 2022-04-22 LAB
COVID-19 SPIKE DOMAIN ANTIBODY INTERPRETATION: POSITIVE
FERRITIN SERPL-MCNC: 369 NG/ML
SARS-COV-2 AB SERPL IA-ACNC: >250 U/ML

## 2022-04-28 ENCOUNTER — APPOINTMENT (OUTPATIENT)
Dept: ULTRASOUND IMAGING | Facility: HOSPITAL | Age: 87
End: 2022-04-28

## 2022-05-05 RX ORDER — GABAPENTIN 100 MG/1
100 CAPSULE ORAL
Qty: 270 | Refills: 3 | Status: ACTIVE | COMMUNITY
Start: 2022-05-05 | End: 1900-01-01

## 2022-05-12 ENCOUNTER — OUTPATIENT (OUTPATIENT)
Dept: OUTPATIENT SERVICES | Facility: HOSPITAL | Age: 87
LOS: 1 days | Discharge: HOME | End: 2022-05-12

## 2022-05-12 DIAGNOSIS — Z96.649 PRESENCE OF UNSPECIFIED ARTIFICIAL HIP JOINT: Chronic | ICD-10-CM

## 2022-05-12 DIAGNOSIS — Z87.39 PERSONAL HISTORY OF OTHER DISEASES OF THE MUSCULOSKELETAL SYSTEM AND CONNECTIVE TISSUE: Chronic | ICD-10-CM

## 2022-05-12 DIAGNOSIS — C50.919 MALIGNANT NEOPLASM OF UNSPECIFIED SITE OF UNSPECIFIED FEMALE BREAST: ICD-10-CM

## 2022-05-12 DIAGNOSIS — Z95.0 PRESENCE OF CARDIAC PACEMAKER: Chronic | ICD-10-CM

## 2022-05-12 DIAGNOSIS — Z90.49 ACQUIRED ABSENCE OF OTHER SPECIFIED PARTS OF DIGESTIVE TRACT: Chronic | ICD-10-CM

## 2022-05-12 DIAGNOSIS — Z98.890 OTHER SPECIFIED POSTPROCEDURAL STATES: Chronic | ICD-10-CM

## 2022-05-13 ENCOUNTER — NON-APPOINTMENT (OUTPATIENT)
Age: 87
End: 2022-05-13

## 2022-05-14 NOTE — PHYSICAL EXAM
Dr Sidra Monroe and charge RN brought to bedside for concern for pt decreasing LOC throughout shift as pt is not sustaining awakening or eye contact or following any commands. At the beginning of shift pt was able to sustain eye contact, and nod in response to some questions however was still not verbally responding or following commands. Pt had corpak placed today and began tube feeds. Pt had moderate sized bowel movement and was turned throughout shift. Pt unable to wean from O2 today. Pt also required frequent suctioning as he is not swallowing secretions. Dr Sidra Monroe was made aware and speech evaluated pt. [de-identified] : Right Lower Extremity\par o Knee :\par ¦ Inspection/Palpation : mild medial tibial plateau tenderness to palpation, moderate swelling with 2+ effusion, no deformity\par ¦ Range of Motion : 0 - 125 degrees, no crepitus\par ¦ Stability : no valgus or varus instability present on provocative testing, Lachman’s Test (-)\par ¦ Strength : hip flexion 4/5 with pain. \par o Muscle Bulk : normal muscle bulk present\par o Skin : no erythema, no ecchymosis\par o Sensation : sensation to pin intact\par o Vascular Exam : no edema, no cyanosis, dorsalis pedis artery pulse 2+, posterior tibial artery pulse 2+\par \par Left Lower Extremity\par o Knee :\par ¦ Inspection/Palpation : no tenderness to palpation, no swelling, no deformity\par ¦ Range of Motion : 0 -130 degrees, no crepitus\par ¦ Stability : no valgus or varus instability present on provocative testing, Lachman’s Test (-)\par ¦ Strength : hip flexion 5/5\par o Muscle Bulk : normal muscle bulk present\par o Skin : no erythema, no ecchymosis\par o Sensation : sensation to pin intact\par o Vascular Exam : no edema, no cyanosis, dorsalis pedis artery pulse 2+, posterior tibial artery pulse 2+  [de-identified] : \par \par ----------------------------------------------------------------------------------------------------------------------------------------------------------------------------------- \par Patient comes to today's visit with outside imaging already performed. I reviewed the images in detail with the patient and discussed the findings as highlighted below.\par \par o MRI of the right knee performed on 07/12/2021 at [facility]: Impression: \par ¦ Insufficiency fracture of the proximal medial tibial metaphysis. 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 Tearing and degeneration of the body/posterior horn of the medial meniscus.\par

## 2022-05-17 ENCOUNTER — APPOINTMENT (OUTPATIENT)
Dept: HEMATOLOGY ONCOLOGY | Facility: CLINIC | Age: 87
End: 2022-05-17
Payer: MEDICARE

## 2022-05-17 ENCOUNTER — APPOINTMENT (OUTPATIENT)
Dept: INFUSION THERAPY | Facility: HOSPITAL | Age: 87
End: 2022-05-17

## 2022-05-17 VITALS
HEART RATE: 65 BPM | BODY MASS INDEX: 25.84 KG/M2 | SYSTOLIC BLOOD PRESSURE: 136 MMHG | RESPIRATION RATE: 16 BRPM | HEIGHT: 62.01 IN | WEIGHT: 142.2 LBS | DIASTOLIC BLOOD PRESSURE: 68 MMHG | TEMPERATURE: 98.2 F | OXYGEN SATURATION: 98 %

## 2022-05-17 DIAGNOSIS — R19.02 LEFT UPPER QUADRANT ABDOMINAL SWELLING, MASS AND LUMP: ICD-10-CM

## 2022-05-17 DIAGNOSIS — Z79.818 LONG TERM (CURRENT) USE OF OTHER AGENTS AFFECTING ESTROGEN RECEPTORS AND ESTROGEN LEVELS: ICD-10-CM

## 2022-05-17 PROCEDURE — 99213 OFFICE O/P EST LOW 20 MIN: CPT

## 2022-05-17 RX ORDER — CALCITONIN SALMON 200 [IU]/1
200 SPRAY, METERED NASAL DAILY
Qty: 1 | Refills: 0 | Status: DISCONTINUED | COMMUNITY
Start: 2021-07-29 | End: 2022-05-17

## 2022-05-17 NOTE — ASSESSMENT
[FreeTextEntry1] : Breast cancer in female (174.9) (C50.919)\par  · Clinically stable and radiologically stable from 2020 although response from\par     pre-treaatment baseline. \par     \par     Cont fulvestrant as tolerated well.   \par \par Use of fulvestrant (Faslodex) (V07.59) (Z79.818)\par \par Left upper quadrant abdominal mass (789.32) (R19.02)\par  · Swelling to left upper outer quadrant,area of patient concern. Patient reports improvement and cancelled  abd sonogram appointment. I recommended to reschedule the appointment but patient states the swelling has resolved and she does not want to have another test. Denies GI issues presently.\par \par Chronic anemia (D64.9)\par - She is f/b Dr. Woodard for anemia was noted to have  Hgb 9.7 on 4/19/22 \par Patient does not  repeat cbc via f/s today. She will f/u with PCP.\par

## 2022-05-17 NOTE — PHYSICAL EXAM
[Restricted in physically strenuous activity but ambulatory and able to carry out work of a light or sedentary nature] : Status 1- Restricted in physically strenuous activity but ambulatory and able to carry out work of a light or sedentary nature, e.g., light house work, office work [Normal] : PERRL, EOMI, no conjunctival infection, anicteric [de-identified] : S/p bl mastectomies with reconstruction no palpable masses in breast or axilla   [de-identified] : normal bowel sounds, abd soft, nontender. No swelling noted in area of patient concern, left UOQ there is well healed surgical scar.

## 2022-05-17 NOTE — HISTORY OF PRESENT ILLNESS
[Date: ____________] : Patient's last distress assessment performed on [unfilled]. [0 - No Distress] : Distress Level: 0 [de-identified] : The patient has a history of right breast cancer in 1965 for which she underwent what is described as a "Jeanette mastectomy" by Dr. Olivo at [facility] with "0/19 axillary lymph nodes" per patient's verbal report and followed expectantly.  She was well until 2008 at which time she was found to have a left breast multifocal DCIS per her description for which she underwent a left modified radical mastectomy at [facility] under the care Dr. Yu with a finding of "3 spots of DCIS, and a sentinel lymph node negative" per patient's verbal report; I do not have a copy of that report for my review.  The patient reports having undergone bilateral breast reconstruction at the same time to include what she describes as right "right cadaver tissue" in addition to an implant in the right breast, as well as a left breast implant.\par \par She was then well until approximately 1-2/19 when she describes transient pinching on her left reconstructed breast, centrally and predominantly to the lateral aspect.  She chose to follow this and then start to question whether she was "losing volume."  Through a friend/networking, she contacted Dr. Magan Carey who recommended a bilateral breast MRI, which was performed at [facility] on June 21, 2019 with a finding of a suspicious 5 cm irregular mass in the left axilla as well as further suspicious lymphadenopathy seen posterior to the mass with second-look ultrasound and core biopsy recommended as well as a PET-CT scan.  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  The patient then went on to see Dr. Carlos Pittman and had a PET-CT scan performed on July 5, 2019 with a finding of a mildly avid focus in the left thyroid lobe extending towards the isthmus, a 1.3 cm non-avid left thyroid lobe nodule; in the left axilla, there was left axillary soft tissue mass with irregular margins containing a biopsy clip measuring 3 x 2 cm with several adjacent subcentimeter left axillary lymph nodes measuring up to 1 cm; bilateral hip arthroplasties with heterogeneous FDG avidity adjacent to the proximal portions bilaterally on the right with an SUV of 13.8 on the left and SUV of 9.4 with a comment, this area is limited in evaluation due to beam hardening artifact; there were degenerative changes in the lower spine with mild areas of FDG avidity, multilevel, non-FDG avid compression fracture of L4 unchanged since 06/2017.\par \par I saw her in initial consultation on 716/19 and subsequently had requested a review of the pathology and it turned our it was ER+ / NE neg. We reviewed her scans at tumor board and the group agreed it was unresectable at this time. I called the pt and informed her of the above change in the pathology and that I recommended neoadjuvant anti-estrogen therapy with an aromatase inhibitor such as anastrozole. She agreed to proceed and started taking anastrozole in the very first days of 7/19. \par \par Seen in 11/19. Had evidence of response in her left ax mass. \par \par Seen 3/20 and she informed me she stopped taking anstrozole end 8/19 secondary to concerns re bone toxicity. She had been seeing an "MD PhD" on [place] who had been infusing "ozone therapy in an electrolyte bag" IV, B17, other anti-inflammatory herbs. She declined to provide his name as " rather not say as he does not want people looking over his back". On exam she had what seemed like a further response. I recommended she re-consult / see Dr. Pittman to consider potential surgery vs XRT as primary local treatment. I advised that as she has been under the care of another physician that she follow up with him. I offered her to see me  as needed in the future. She was agreeable.\par \par In the interim she saw Dr. Pittman 7/6/20. WHen questioned why she did not do so sooner she did not have an answer to provide. Dr Pittman sent her for a B/L breast MRI 7/27/20 which showed :\par \par In the left axilla, again noted is an irregularly-shaped enhancing mass compatible with the known recurrent malignancy. Susceptibility artifact is noted within the mass, a biopsy marker. Overall, the mass measures approximately 6.9 cm AP by 3.9 TV x 6.2 cm CC. The mass appears more draped along the implant with extension along its superior lateral margin. The previously noted additional axillary lymph nodes medial to the mass appear relatively stable in size.\par \par No right axillary lymphadenopathy. No internal mammary lymphadenopathy.\par \par IMPRESSION:\par 1.  The biopsy-proven recurrent malignancy in the left axillary region has increased in size since the prior breast MRI and prior ultrasound, with maximal dimension now measuring 6.9 cm AP.\par 2.  No other suspicious enhancement in either breast.\par 3.  Silicone implants are intact.\par \par The pt now reports she stopped getting ozone therapy in 3/20. \par \par She progressed after she stopped anastrozole and ozone therapy. \par Restarted anastrozole in early August as well as ozone/vitamin/mineral therapy. \par \par Repeat MRI 10/27/20 revealed POD in L breast / axillary / CW mass. PET CT did not show any distant mets. \par  [de-identified] : Patient with a history of breast cancer, local axillary recurrence in 11/2020 now on fulvestrant.  Started fulvestrant on 11/24/20.  \par \par She denies any treatment related side effects. \par \par She came in today with complains of palpable mass on left upper abdomen.  She states two nights ago she noticed area of left upper outer quadrant was larger than right.  She denies any pain, n/v/d.  She came in for further evaluation. \par \par She notes a good appetite, stable weight, and excellent performance status.\par \par Here today for monthly Faslodex injection,  tolerating well.  She was scheduled for US of abdomen 4/28/22 for c/o left upper outer quadrant swelling but cancelled the appointment. I recommended to reschedule but patient states the swelling has resolved and she does not want to have another test. Denies N/V/D or constipation. Last BM normal this am. Appetite good\par She is f/b Dr. Woodard for anemia was noted to have  Hgb 9.7 on 4/19/22 but she does not  f/s today. She will f/u with PCP.\par \par US L axilla 1/4/22\par FINDINGS:\par In the left axilla a 1.4 cm hypoechoic mass with no discernible fatty \par hilum is seen slightly decreased in size from prior study. This is \par suspicious for an abnormal axillary lymph node.\par \par Also noted in the left axilla is a well-defined 2.1 x 1.9 x 2.5 cm \par hypoechoic mass which is unchanged from prior study dated 9/16/2021. This \par has been biopsy-proven to be invasive poorly differentiated carcinoma \par with lobular phenotype.\par \par Also noted in the left axilla is a stable 1.1 cm hypoechoic mass with no \par discernible fatty hilum suspicious for abnormal axillary lymph node.\par \par \par IMPRESSION:\par \par Stable findings noted in the left axilla when compared to prior study \par dated 9/16/2021.\par \par \par \par US breast  9/16/21\par \par IMPRESSION:\par Relatively stable appearing amorphous mass in the left axilla. 2 lymph nodes in the left axilla are slightly increased in size. Malignancy is possible but recent: Vaccination could produce similar result. Clinical correlation and if indicated 6 month follow-up directed ultrasound might be appropriate.\par \par RECOMMENDATION:  Clinical follow up.\par \par DEXA 9/16/21\par Impression:\par Findings are normal in the lumbar spine and osteoporosis in the left forearm.\par \par \par CT CAP 5/19/21\par IMPRESSION:\par Lung base ground glass opacities, possibly infectious or inflammatory.\par Colonic diverticulosis without CT evidence of acute diverticulitis.\par Left renal pelvis calculus (1.8 cm) and mild left hydronephrosis, similar to prior.\par \par MRI breast 4/26/21\par IMPRESSION:\par *Markedly limited exam.\par \par 1.  No evidence of implant rupture.\par 2.  The known left axillary malignancy and adjacent lymph nodes cannot be evaluated on this exam, due to extensive artifact in the region.\par \par \par Breast MR 3/29/21\par Stable left axillary mass and enlarged left axillary lymph node\par \par CT CAP 3/31/21\par  An ill-defined left axillary mass adjacent to a biopsy clip is again noted, measuring approximately 3.4 x 3.4 cm, previously 3.6 x 3.3 cm on 11/6/2020. An adjacent left axillary lymph node measures 1.5 x 1.2 cm (2:16), previously 1.4 x 1.2 cm. Multiple hypodense nodules in both lobes of the thyroid gland and in the thyroid isthmus, similar in appearance to the prior PET/CT dated 11/6/2020. 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

## 2022-05-17 NOTE — REASON FOR VISIT
[Follow-Up Visit] : a follow-up [Urgent Visit] : an urgent  [Family Member] : family member [Other: _____] : [unfilled] [FreeTextEntry2] : Breast Cancer/abd mass

## 2022-06-07 ENCOUNTER — OUTPATIENT (OUTPATIENT)
Dept: OUTPATIENT SERVICES | Facility: HOSPITAL | Age: 87
LOS: 1 days | Discharge: HOME | End: 2022-06-07

## 2022-06-07 DIAGNOSIS — Z90.49 ACQUIRED ABSENCE OF OTHER SPECIFIED PARTS OF DIGESTIVE TRACT: Chronic | ICD-10-CM

## 2022-06-07 DIAGNOSIS — Z98.890 OTHER SPECIFIED POSTPROCEDURAL STATES: Chronic | ICD-10-CM

## 2022-06-07 DIAGNOSIS — C50.919 MALIGNANT NEOPLASM OF UNSPECIFIED SITE OF UNSPECIFIED FEMALE BREAST: ICD-10-CM

## 2022-06-07 DIAGNOSIS — Z96.649 PRESENCE OF UNSPECIFIED ARTIFICIAL HIP JOINT: Chronic | ICD-10-CM

## 2022-06-07 DIAGNOSIS — Z87.39 PERSONAL HISTORY OF OTHER DISEASES OF THE MUSCULOSKELETAL SYSTEM AND CONNECTIVE TISSUE: Chronic | ICD-10-CM

## 2022-06-07 DIAGNOSIS — Z95.0 PRESENCE OF CARDIAC PACEMAKER: Chronic | ICD-10-CM

## 2022-06-16 ENCOUNTER — APPOINTMENT (OUTPATIENT)
Dept: NEPHROLOGY | Facility: CLINIC | Age: 87
End: 2022-06-16
Payer: MEDICARE

## 2022-06-16 VITALS
WEIGHT: 137.89 LBS | DIASTOLIC BLOOD PRESSURE: 73 MMHG | HEART RATE: 60 BPM | HEIGHT: 62 IN | TEMPERATURE: 97.9 F | OXYGEN SATURATION: 97 % | BODY MASS INDEX: 25.38 KG/M2 | SYSTOLIC BLOOD PRESSURE: 167 MMHG

## 2022-06-16 DIAGNOSIS — M16.0 BILATERAL PRIMARY OSTEOARTHRITIS OF HIP: ICD-10-CM

## 2022-06-16 DIAGNOSIS — E78.00 PURE HYPERCHOLESTEROLEMIA, UNSPECIFIED: ICD-10-CM

## 2022-06-16 DIAGNOSIS — Z85.3 PERSONAL HISTORY OF MALIGNANT NEOPLASM OF BREAST: ICD-10-CM

## 2022-06-16 DIAGNOSIS — Z87.891 PERSONAL HISTORY OF NICOTINE DEPENDENCE: ICD-10-CM

## 2022-06-16 DIAGNOSIS — Z87.442 PERSONAL HISTORY OF URINARY CALCULI: ICD-10-CM

## 2022-06-16 DIAGNOSIS — M81.0 AGE-RELATED OSTEOPOROSIS W/OUT CURRENT PATHOLOGICAL FRACTURE: ICD-10-CM

## 2022-06-16 DIAGNOSIS — R04.0 EPISTAXIS: ICD-10-CM

## 2022-06-16 DIAGNOSIS — M54.50 LOW BACK PAIN, UNSPECIFIED: ICD-10-CM

## 2022-06-16 LAB
25(OH)D3 SERPL-MCNC: 30 NG/ML
ALBUMIN SERPL ELPH-MCNC: 4.2 G/DL
ANION GAP SERPL CALC-SCNC: 13 MMOL/L
APPEARANCE: CLEAR
BACTERIA: NEGATIVE
BASOPHILS # BLD AUTO: 0.04 K/UL
BASOPHILS NFR BLD AUTO: 0.4 %
BILIRUBIN URINE: NEGATIVE
BLOOD URINE: ABNORMAL
BUN SERPL-MCNC: 40 MG/DL
CALCIUM SERPL-MCNC: 9.7 MG/DL
CALCIUM SERPL-MCNC: 9.7 MG/DL
CHLORIDE SERPL-SCNC: 108 MMOL/L
CO2 SERPL-SCNC: 20 MMOL/L
COLOR: YELLOW
CREAT SERPL-MCNC: 1.14 MG/DL
CREAT SPEC-SCNC: 150 MG/DL
CREAT/PROT UR: 1 RATIO
EGFR: 46 ML/MIN/1.73M2
EOSINOPHIL # BLD AUTO: 0.42 K/UL
EOSINOPHIL NFR BLD AUTO: 4.4 %
GLUCOSE QUALITATIVE U: NEGATIVE
GLUCOSE SERPL-MCNC: 100 MG/DL
HCT VFR BLD CALC: 30.7 %
HGB BLD-MCNC: 9.8 G/DL
HYALINE CASTS: 0 /LPF
IMM GRANULOCYTES NFR BLD AUTO: 0.2 %
KETONES URINE: NEGATIVE
LEUKOCYTE ESTERASE URINE: ABNORMAL
LYMPHOCYTES # BLD AUTO: 1.22 K/UL
LYMPHOCYTES NFR BLD AUTO: 12.9 %
MAGNESIUM SERPL-MCNC: 2.2 MG/DL
MAN DIFF?: NORMAL
MCHC RBC-ENTMCNC: 28.4 PG
MCHC RBC-ENTMCNC: 31.9 GM/DL
MCV RBC AUTO: 89 FL
MICROSCOPIC-UA: NORMAL
MONOCYTES # BLD AUTO: 0.55 K/UL
MONOCYTES NFR BLD AUTO: 5.8 %
NEUTROPHILS # BLD AUTO: 7.24 K/UL
NEUTROPHILS NFR BLD AUTO: 76.3 %
NITRITE URINE: NEGATIVE
PARATHYROID HORMONE INTACT: 97 PG/ML
PH URINE: 6
PHOSPHATE SERPL-MCNC: 4.3 MG/DL
PLATELET # BLD AUTO: 217 K/UL
POTASSIUM SERPL-SCNC: 4.7 MMOL/L
PROT UR-MCNC: 148 MG/DL
PROTEIN URINE: ABNORMAL
RBC # BLD: 3.45 M/UL
RBC # FLD: 16.1 %
RED BLOOD CELLS URINE: 30 /HPF
SODIUM SERPL-SCNC: 141 MMOL/L
SPECIFIC GRAVITY URINE: 1.02
SQUAMOUS EPITHELIAL CELLS: 4 /HPF
UROBILINOGEN URINE: NORMAL
WBC # FLD AUTO: 9.49 K/UL
WHITE BLOOD CELLS URINE: 7 /HPF

## 2022-06-16 PROCEDURE — 99204 OFFICE O/P NEW MOD 45 MIN: CPT

## 2022-06-16 RX ORDER — METHYLPREDNISOLONE 4 MG/1
4 TABLET ORAL
Qty: 1 | Refills: 0 | Status: DISCONTINUED | COMMUNITY
Start: 2022-04-01 | End: 2022-06-16

## 2022-06-16 RX ORDER — NITROGLYCERIN 0.4 MG/1
0.4 TABLET SUBLINGUAL
Refills: 0 | Status: ACTIVE | COMMUNITY
Start: 2022-06-16

## 2022-06-16 NOTE — PHYSICAL EXAM
[General Appearance - Alert] : alert [General Appearance - In No Acute Distress] : in no acute distress [Neck Appearance] : the appearance of the neck was normal [] : no respiratory distress [Respiration, Rhythm And Depth] : normal respiratory rhythm and effort [Exaggerated Use Of Accessory Muscles For Inspiration] : no accessory muscle use [Auscultation Breath Sounds / Voice Sounds] : lungs were clear to auscultation bilaterally [Apical Impulse] : the apical impulse was normal [Heart Rate And Rhythm] : heart rate was normal and rhythm regular [Heart Sounds] : normal S1 and S2 [Heart Sounds Gallop] : no gallops [Edema] : there was no peripheral edema [Bowel Sounds] : normal bowel sounds [Abdomen Soft] : soft [Abdomen Tenderness] : non-tender [No CVA Tenderness] : no ~M costovertebral angle tenderness [Musculoskeletal - Swelling] : no joint swelling seen [FreeTextEntry1] : unsteady gait.  [Oriented To Time, Place, And Person] : oriented to person, place, and time [Impaired Insight] : insight and judgment were intact [Affect] : the affect was normal

## 2022-06-16 NOTE — HISTORY OF PRESENT ILLNESS
[FreeTextEntry1] : Ms. Reyes is a 89 y/o woman with h/o breast cancer, HTN, CHF, Pacemaker placement, Kidney stones here for evaluation of worsening HTN\par \par She has a H/O Breast cancer and underwent a Right breast mastectomy in ( 1965). Subsequently found to have a recurrence and underwent a left mastectomy in 2008. Had been doing well and was then found to have a recurrence in left axilla in 2019. She has been treated with fulverstrant Q monthly since then. Recently found to have an abdominal mass and is awaiting imaging for that. Has also been doing IV ozone therapy for the last couple of months. Gets them once a week for the last 8 weeks although has not had it for the last 1 month. \par \par She has a long standing H/O HTN since 1995 . For the most part it has been well controlled although it has  been going up and ranging in the 160-170/80 mm hg range for the last 6-9 months which is new. She has been taking norvasc and NTG as needed for SBP>180 and ends up taking these once a week at least. She does have some s/o dizziness and palpitations when her BP>180. Her last dose of vaccination was in August/2021. \par \par \par She has a long standing H/O nephrolithiasis and has undergone  Lithotripsy 10 years ago. CT scans done last year still with Bl renal stones. \par Takes OTC Lipidene ( bergamot) for hyperlipidemia\par \par Had some SOB a for the last 2 days and took lasix ( 40 mg ) x 2 doses which have helped the breathing\par \par \par  \par \par

## 2022-06-16 NOTE — REVIEW OF SYSTEMS
[As Noted in HPI] : as noted in HPI [Feeling Tired] : feeling tired [Recent Weight Loss (___ Lbs)] : recent [unfilled] ~Ulb weight loss [Nosebleeds] : nosebleeds [Nasal Discharge] : nasal discharge [Shortness Of Breath] : shortness of breath [SOB on Exertion] : shortness of breath during exertion [Joint Swelling] : joint swelling [Joint Stiffness] : joint stiffness [Negative] : Endocrine [Fever] : no fever [FreeTextEntry4] : +post nasal drip, h/o epistaxis ( 10/2021) [FreeTextEntry9] : +low back pain  [de-identified] : +numbness feet and hands

## 2022-06-17 ENCOUNTER — NON-APPOINTMENT (OUTPATIENT)
Age: 87
End: 2022-06-17

## 2022-06-17 ENCOUNTER — APPOINTMENT (OUTPATIENT)
Dept: INFUSION THERAPY | Facility: HOSPITAL | Age: 87
End: 2022-06-17

## 2022-06-17 LAB — ALDOSTERONE SERUM: 10.9 NG/DL

## 2022-06-23 LAB
METANEPHRINE, PL: <10 PG/ML
NORMETANEPHRINE, PL: 55.5 PG/ML

## 2022-06-24 ENCOUNTER — NON-APPOINTMENT (OUTPATIENT)
Age: 87
End: 2022-06-24

## 2022-06-24 LAB — RENIN ACTIVITY, PLASMA: 0.65 NG/ML/HR

## 2022-07-01 ENCOUNTER — APPOINTMENT (OUTPATIENT)
Dept: ULTRASOUND IMAGING | Facility: CLINIC | Age: 87
End: 2022-07-01

## 2022-07-01 NOTE — ED ADULT NURSE NOTE - PRIMARY CARE PROVIDER
Department of Internal Medicine  General Internal Medicine  Attending Progress Note      SUBJECTIVE:    Seen and examined in the ICU  Had a lot of secretions and was suctioned with thick yellow sputum that was sent for culture  Was resting comfortably in bed, wife at bedside      Medications    Current Facility-Administered Medications: lidocaine 1 % injection 5 mL, 5 mL, IntraDERmal, Once  sodium chloride flush 0.9 % injection 5-40 mL, 5-40 mL, IntraVENous, 2 times per day  sodium chloride flush 0.9 % injection 5-40 mL, 5-40 mL, IntraVENous, PRN  0.9 % sodium chloride infusion, , IntraVENous, PRN  heparin flush 100 UNIT/ML injection 100 Units, 1 mL, IntraVENous, 2 times per day  heparin flush 100 UNIT/ML injection 100 Units, 1 mL, IntraCATHeter, PRN  meropenem (MERREM) 1,000 mg in sodium chloride 0.9 % 100 mL IVPB (mini-bag), 1,000 mg, IntraVENous, Q8H  levalbuterol (XOPENEX) nebulization 0.63 mg, 0.63 mg, Nebulization, Q4H  ipratropium (ATROVENT) 0.02 % nebulizer solution 0.5 mg, 0.5 mg, Nebulization, Q4H  dilTIAZem (CARDIZEM CD) extended release capsule 240 mg, 240 mg, Oral, Daily  metoprolol tartrate (LOPRESSOR) tablet 25 mg, 25 mg, Oral, TID  acetylcysteine (MUCOMYST) 20 % solution 600 mg, 600 mg, Inhalation, q8h  acetaminophen (TYLENOL) tablet 650 mg, 650 mg, Oral, Q6H PRN  vancomycin (VANCOCIN) 1,000 mg in sodium chloride 0.9 % 250 mL IVPB ([id number]), 1,000 mg, IntraVENous, Q12H  doxycycline hyclate (VIBRAMYCIN) capsule 100 mg, 100 mg, Oral, 2 times per day  Roflumilast (DALIRESP) tablet 500 mcg, 500 mcg, Oral, Daily  famotidine (PEPCID) tablet 20 mg, 20 mg, Oral, BID  haloperidol lactate (HALDOL) injection 2 mg, 2 mg, IntraVENous, Q2H PRN  lactated ringers infusion, , IntraVENous, Continuous  sodium chloride flush 0.9 % injection 5-40 mL, 5-40 mL, IntraVENous, 2 times per day  sodium chloride flush 0.9 % injection 5-40 mL, 5-40 mL, IntraVENous, PRN  0.9 % sodium chloride infusion, , IntraVENous, PRN  heparin flush 100 UNIT/ML injection 300 Units, 3 mL, IntraVENous, 2 times per day  heparin flush 100 UNIT/ML injection 300 Units, 3 mL, IntraCATHeter, PRN  apixaban (ELIQUIS) tablet 5 mg, 5 mg, Oral, BID  atorvastatin (LIPITOR) tablet 10 mg, 10 mg, Oral, Daily  vitamin D (CHOLECALCIFEROL) tablet 2,000 Units, 2,000 Units, Oral, Daily  budesonide (PULMICORT) nebulizer suspension 500 mcg, 0.5 mg, Nebulization, BID  Arformoterol Tartrate (BROVANA) nebulizer solution 15 mcg, 15 mcg, Nebulization, BID    Physical    VITALS:  /83   Pulse 86   Temp 98.4 °F (36.9 °C)   Resp 15   Ht 5' 11\" (1.803 m)   Wt 257 lb 15 oz (117 kg)   SpO2 94%   BMI 35.98 kg/m²   TEMPERATURE:  Current - Temp: 98.4 °F (36.9 °C); Max - Temp  Av.3 °F (36.8 °C)  Min: 98 °F (36.7 °C)  Max: 98.5 °F (36.9 °C)  RESPIRATIONS RANGE: Resp  Av.9  Min: 14  Max: 27  PULSE RANGE: Pulse  Av.2  Min: 72  Max: 99  BLOOD PRESSURE RANGE:  Systolic (21TOM), FPD:898 , Min:124 , ASHLEE:964   ; Diastolic (24BWE), FXX:55, Min:68, Max:95    PULSE OXIMETRY RANGE: SpO2  Av.2 %  Min: 91 %  Max: 97 %  24HR INTAKE/OUTPUT:      Intake/Output Summary (Last 24 hours) at 2022 1452  Last data filed at 2022 1200  Gross per 24 hour   Intake 473.42 ml   Output 1825 ml   Net -1351.58 ml       CONSTITUTIONAL: Alert and oriented x3, in no acute distress  HEENT: Normocephalic atraumatic. Pupils are equal and reactive bilaterally. Extraocular movements are intact. No pallor or icterus appreciated. NECK: Supple, no JVD , no lymphadenopathy, no bruits, no thyromegaly  LUNGS: diminished air movements b/l - however slightly improved,  Minimal  inspiratory crackles over bases and some wheezing, upper airway congestion as well. CARDIOVASCULAR: Regular, tachycardia, no murmur rub or gallop. ABDOMEN: Soft, nontender, distended, bowel sounds are positive, no organomegaly appreciated. NEUROLOGIC: sleepy but easy to arouse, oriented , no focal deficits noted.  Mildly agitated. EXT: no edema, no clubbing, or cyanosis. CBC with Differential:    Lab Results   Component Value Date/Time    WBC 19.3 06/30/2022 01:59 AM    RBC 4.72 06/30/2022 01:59 AM    HGB 14.2 06/30/2022 01:59 AM    HCT 44.5 06/30/2022 01:59 AM     06/30/2022 01:59 AM    MCV 94.3 06/30/2022 01:59 AM    MCH 30.1 06/30/2022 01:59 AM    MCHC 31.9 06/30/2022 01:59 AM    RDW 13.8 06/30/2022 01:59 AM    METASPCT 0.9 06/24/2022 12:27 AM    LYMPHOPCT 5.9 06/30/2022 01:59 AM    MONOPCT 6.6 06/30/2022 01:59 AM    BASOPCT 0.3 06/30/2022 01:59 AM    MONOSABS 1.28 06/30/2022 01:59 AM    LYMPHSABS 1.14 06/30/2022 01:59 AM    EOSABS 0.00 06/30/2022 01:59 AM    BASOSABS 0.05 06/30/2022 01:59 AM     CMP:    Lab Results   Component Value Date/Time     07/01/2022 05:57 AM    K 3.9 07/01/2022 05:57 AM    K 4.3 06/24/2022 12:27 AM     07/01/2022 05:57 AM    CO2 27 07/01/2022 05:57 AM    BUN 29 07/01/2022 05:57 AM    CREATININE 1.0 07/01/2022 05:57 AM    GFRAA >60 07/01/2022 05:57 AM    LABGLOM >60 07/01/2022 05:57 AM    GLUCOSE 101 07/01/2022 05:57 AM    PROT 5.8 07/01/2022 05:57 AM    LABALBU 3.3 07/01/2022 05:57 AM    CALCIUM 8.8 07/01/2022 05:57 AM    BILITOT 0.9 07/01/2022 05:57 AM    ALKPHOS 77 07/01/2022 05:57 AM    AST 52 07/01/2022 05:57 AM    ALT 70 07/01/2022 05:57 AM         ASSESSMENT AND PLAN      1.RLL community acquired pneumonia   Currently on iv meropenem, vancomycin and doxycycline  Blood cultures negative, sputum cultures sent today  Urine Legionella and strep pneumo antigens presumptive negative  Pulmonology following closely, improving    2. Acute on chronic respiratory failure secondary to above and COPD exacerbation  Now on oxygen at 6 L/min by nasal cannula  Home level is 5L NC  On levalbuterol and ipratropium    3. COPD, with exacerbation  Severe long standing disease  Iv steroids discontinued due to steroid induced psychosis  Daliresp added by Pulmonology  Also on Alveria Juan Jose, levalbuterol and ipratropium    4. Leukocytosis secondary to pneumonia,.  -Was 20.5 on admission, down to 12.2 yesterday but yesterday back up to 19.3    5. A Fib with rvr overnight  Cardizem drip on and off the past few days as was in and out of afib RVR. Now off drip and on metoprolol tartrate and PO cardizem and in NSR  Eliquis 5 mg po bid  Dr Adrian Waldrop following. 6.Hyperlipidemia  Lipitor 10 mg daily    7. GERD  Continue pepcid 20 mg po bid      8. Steroid induced psychosis, episode of psychosis overnight secondary to respiratory status and dexamethasone which was discontinued    9. Hematuria, send urine for UA with microscopy  Cleared after irrigation, Urology following    10. PT/OT and  consult for rehab.  Patient approved for [facility] Naq[facility] MD  2:52 PM  7/1/2022 PMD

## 2022-07-05 ENCOUNTER — APPOINTMENT (OUTPATIENT)
Dept: HEMATOLOGY ONCOLOGY | Facility: CLINIC | Age: 87
End: 2022-07-05

## 2022-07-07 ENCOUNTER — OUTPATIENT (OUTPATIENT)
Dept: OUTPATIENT SERVICES | Facility: HOSPITAL | Age: 87
LOS: 1 days | End: 2022-07-07
Payer: MEDICARE

## 2022-07-07 ENCOUNTER — OUTPATIENT (OUTPATIENT)
Dept: OUTPATIENT SERVICES | Facility: HOSPITAL | Age: 87
LOS: 1 days | End: 2022-07-07

## 2022-07-07 ENCOUNTER — APPOINTMENT (OUTPATIENT)
Dept: ULTRASOUND IMAGING | Facility: CLINIC | Age: 87
End: 2022-07-07

## 2022-07-07 ENCOUNTER — RESULT REVIEW (OUTPATIENT)
Age: 87
End: 2022-07-07

## 2022-07-07 DIAGNOSIS — Z00.8 ENCOUNTER FOR OTHER GENERAL EXAMINATION: ICD-10-CM

## 2022-07-07 DIAGNOSIS — Z98.890 OTHER SPECIFIED POSTPROCEDURAL STATES: Chronic | ICD-10-CM

## 2022-07-07 DIAGNOSIS — C50.919 MALIGNANT NEOPLASM OF UNSPECIFIED SITE OF UNSPECIFIED FEMALE BREAST: ICD-10-CM

## 2022-07-07 DIAGNOSIS — Z95.0 PRESENCE OF CARDIAC PACEMAKER: Chronic | ICD-10-CM

## 2022-07-07 DIAGNOSIS — Z87.39 PERSONAL HISTORY OF OTHER DISEASES OF THE MUSCULOSKELETAL SYSTEM AND CONNECTIVE TISSUE: Chronic | ICD-10-CM

## 2022-07-07 DIAGNOSIS — Z96.649 PRESENCE OF UNSPECIFIED ARTIFICIAL HIP JOINT: Chronic | ICD-10-CM

## 2022-07-07 DIAGNOSIS — Z90.49 ACQUIRED ABSENCE OF OTHER SPECIFIED PARTS OF DIGESTIVE TRACT: Chronic | ICD-10-CM

## 2022-07-07 PROCEDURE — 76882 US LMTD JT/FCL EVL NVASC XTR: CPT

## 2022-07-07 PROCEDURE — 93975 VASCULAR STUDY: CPT

## 2022-07-07 PROCEDURE — 76882 US LMTD JT/FCL EVL NVASC XTR: CPT | Mod: 26,LT

## 2022-07-07 PROCEDURE — 93975 VASCULAR STUDY: CPT | Mod: 26

## 2022-07-12 ENCOUNTER — APPOINTMENT (OUTPATIENT)
Dept: HEMATOLOGY ONCOLOGY | Facility: CLINIC | Age: 87
End: 2022-07-12

## 2022-07-12 ENCOUNTER — APPOINTMENT (OUTPATIENT)
Dept: INFUSION THERAPY | Facility: HOSPITAL | Age: 87
End: 2022-07-12

## 2022-07-12 VITALS
SYSTOLIC BLOOD PRESSURE: 137 MMHG | HEART RATE: 64 BPM | BODY MASS INDEX: 24.84 KG/M2 | HEIGHT: 62.05 IN | DIASTOLIC BLOOD PRESSURE: 63 MMHG | OXYGEN SATURATION: 98 % | WEIGHT: 136.69 LBS | TEMPERATURE: 96.8 F | RESPIRATION RATE: 16 BRPM

## 2022-07-12 DIAGNOSIS — R63.4 ABNORMAL WEIGHT LOSS: ICD-10-CM

## 2022-07-12 PROCEDURE — 99214 OFFICE O/P EST MOD 30 MIN: CPT

## 2022-07-14 PROBLEM — R63.4 WEIGHT LOSS, UNINTENTIONAL: Status: ACTIVE | Noted: 2022-07-12

## 2022-07-14 NOTE — PHYSICAL EXAM
[Restricted in physically strenuous activity but ambulatory and able to carry out work of a light or sedentary nature] : Status 1- Restricted in physically strenuous activity but ambulatory and able to carry out work of a light or sedentary nature, e.g., light house work, office work [Normal] : affect appropriate [de-identified] : S/p bl mastectomies with reconstruction no palpable masses in breast; L axilla with palp desnity approx 4+ cm , mostly attached / fixed [de-identified] : normal bowel sounds, abd soft, nontender. No swelling noted in area of patient concern, left UOQ there is well healed surgical scar.

## 2022-07-14 NOTE — HISTORY OF PRESENT ILLNESS
[Date: ____________] : Patient's last distress assessment performed on [unfilled]. [0 - No Distress] : Distress Level: 0 [de-identified] : The patient has a history of right breast cancer in 1965 for which she underwent what is described as a "Jeanette mastectomy" by Dr. Olivo at [facility] with "0/19 axillary lymph nodes" per patient's verbal report and followed expectantly.  She was well until 2008 at which time she was found to have a left breast multifocal DCIS per her description for which she underwent a left modified radical mastectomy at [facility] under the care Dr. Yu with a finding of "3 spots of DCIS, and a sentinel lymph node negative" per patient's verbal report; I do not have a copy of that report for my review.  The patient reports having undergone bilateral breast reconstruction at the same time to include what she describes as right "right cadaver tissue" in addition to an implant in the right breast, as well as a left breast implant.\par \par She was then well until approximately 1-2/19 when she describes transient pinching on her left reconstructed breast, centrally and predominantly to the lateral aspect.  She chose to follow this and then start to question whether she was "losing volume."  Through a friend/networking, she contacted Dr. Magan Carey who recommended a bilateral breast MRI, which was performed at [facility] on June 21, 2019 with a finding of a suspicious 5 cm irregular mass in the left axilla as well as further suspicious lymphadenopathy seen posterior to the mass with second-look ultrasound and core biopsy recommended as well as a PET-CT scan.  The patient went on to have an ultrasound of the left axilla with an ultrasound-guided core biopsy, with a finding of a heterogeneous 4.5 cm mass seen corresponding to the finding seen on the breast MRI with abnormal lymph nodes seen posterior to the mass measuring 8 mm with an ultrasound-guided core biopsy on that same date demonstrating invasive poorly differentiated mammary carcinoma with lobular phenotype, Viridiana score 8/9, with invasive tumor measuring at least 8 mm on the core biopsy specimen, ER negative, NV negative, HER-2/jeni negative, and a comment that "no focal residual lymphoid like tissue (no germinal center noted) identified; the lesion may represent a completely effaced lymph node or brisk lymphocytic response to tumor; clinical pathologic-radiologic correlation recommended”.  The patient then went on to see Dr. Carlos Pittman and had a PET-CT scan performed on July 5, 2019 with a finding of a mildly avid focus in the left thyroid lobe extending towards the isthmus, a 1.3 cm non-avid left thyroid lobe nodule; in the left axilla, there was left axillary soft tissue mass with irregular margins containing a biopsy clip measuring 3 x 2 cm with several adjacent subcentimeter left axillary lymph nodes measuring up to 1 cm; bilateral hip arthroplasties with heterogeneous FDG avidity adjacent to the proximal portions bilaterally on the right with an SUV of 13.8 on the left and SUV of 9.4 with a comment, this area is limited in evaluation due to beam hardening artifact; there were degenerative changes in the lower spine with mild areas of FDG avidity, multilevel, non-FDG avid compression fracture of L4 unchanged since 06/2017.\par \par I saw her in initial consultation on 716/19 and subsequently had requested a review of the pathology and it turned our it was ER+ / NV neg. We reviewed her scans at tumor board and the group agreed it was unresectable at this time. I called the pt and informed her of the above change in the pathology and that I recommended neoadjuvant anti-estrogen therapy with an aromatase inhibitor such as anastrozole. She agreed to proceed and started taking anastrozole in the very first days of 7/19. \par \par Seen in 11/19. Had evidence of response in her left ax mass. \par \par Seen 3/20 and she informed me she stopped taking anstrozole end 8/19 secondary to concerns re bone toxicity. She had been seeing an "MD PhD" on [place] who had been infusing "ozone therapy in an electrolyte bag" IV, B17, other anti-inflammatory herbs. She declined to provide his name as " rather not say as he does not want people looking over his back". On exam she had what seemed like a further response. I recommended she re-consult / see Dr. Pittman to consider potential surgery vs XRT as primary local treatment. I advised that as she has been under the care of another physician that she follow up with him. I offered her to see me  as needed in the future. She was agreeable.\par \par In the interim she saw Dr. Pittman 7/6/20. WHen questioned why she did not do so sooner she did not have an answer to provide. Dr Pittman sent her for a B/L breast MRI 7/27/20 which showed :\par \par In the left axilla, again noted is an irregularly-shaped enhancing mass compatible with the known recurrent malignancy. Susceptibility artifact is noted within the mass, a biopsy marker. Overall, the mass measures approximately 6.9 cm AP by 3.9 TV x 6.2 cm CC. The mass appears more draped along the implant with extension along its superior lateral margin. The previously noted additional axillary lymph nodes medial to the mass appear relatively stable in size.\par \par No right axillary lymphadenopathy. No internal mammary lymphadenopathy.\par \par IMPRESSION:\par 1.  The biopsy-proven recurrent malignancy in the left axillary region has increased in size since the prior breast MRI and prior ultrasound, with maximal dimension now measuring 6.9 cm AP.\par 2.  No other suspicious enhancement in either breast.\par 3.  Silicone implants are intact.\par \par The pt now reports she stopped getting ozone therapy in 3/20. \par \par She progressed after she stopped anastrozole and ozone therapy. \par Restarted anastrozole in early August as well as ozone/vitamin/mineral therapy. \par \par Repeat MRI 10/27/20 revealed POD in L breast / axillary / CW mass. PET CT did not show any distant mets. \par  [de-identified] : Patient with a history of breast cancer, local axillary / lateral chest wall recurrence in 11/2020.  Started fulvestrant on 11/24/20.  \par \par In the interim had scan with POD.\par \par She has lost approx 10 pounds since 5/22 - notes she cont to eat 4 meals daily.\par \par She has baseline fatigue w/o change.\par \par She has baseline back pain from chronic spinal stenosis w/o change. \par \par US L axilla 7/7/22\par Previously noted irregular hypoechoic mass, biopsy-proven invasive poorly \par differentiated carcinoma, is reidentified. This is now seen to extend to \par the skin surface. Associated subcutaneous edema is noted This currently \par measures at least 2.8 x 0.9 x 1.5 cm with longest oblique dimension of \par 3.7 cm. Accurate measurement is limited due to poor differentiation of \par the border.\par \par Three Adjacent/contiguous hypoechoic masses, versus one large mass is \par reidentified again thought possible abnormal lymph nodes within the \par axilla. These currently measure 1.2 x 0.7 x 0.9 cm, 6 x 4 x 5 mm and \par approximately 5 mm representing interval change compared to prior at \par which time one hypoechoic mass measuring 1.1 cm was noted.\par IMPRESSION:\par Known left axillary malignancy with interval change in size and extension \par as above\par Left axillary possible abnormal lymph nodes reidentified with interval \par change in size/number as above.\par Clinical follow-up and management of biopsy-proven recurrence is \par recommended. The patient states she is for clinical follow-up in \par approximately 5 days.\par RECOMMENDATION:  Clinical follow up\par \par US L axilla 1/4/22\par FINDINGS:\par In the left axilla a 1.4 cm hypoechoic mass with no discernible fatty \par hilum is seen slightly decreased in size from prior study. This is \par suspicious for an abnormal axillary lymph node.\par \par Also noted in the left axilla is a well-defined 2.1 x 1.9 x 2.5 cm \par hypoechoic mass which is unchanged from prior study dated 9/16/2021. This \par has been biopsy-proven to be invasive poorly differentiated carcinoma \par with lobular phenotype.\par \par Also noted in the left axilla is a stable 1.1 cm hypoechoic mass with no \par discernible fatty hilum suspicious for abnormal axillary lymph node.\par \par \par IMPRESSION:\par \par Stable findings noted in the left axilla when compared to prior study \par dated 9/16/2021.\par \par \par \par US breast  9/16/21\par \par IMPRESSION:\par Relatively stable appearing amorphous mass in the left axilla. 2 lymph nodes in the left axilla are slightly increased in size. Malignancy is possible but recent: Vaccination could produce similar result. Clinical correlation and if indicated 6 month follow-up directed ultrasound might be appropriate.\par \par RECOMMENDATION:  Clinical follow up.\par \par DEXA 9/16/21\par Impression:\par Findings are normal in the lumbar spine and osteoporosis in the left forearm.\par \par \par CT CAP 5/19/21\par IMPRESSION:\par Lung base ground glass opacities, possibly infectious or inflammatory.\par Colonic diverticulosis without CT evidence of acute diverticulitis.\par Left renal pelvis calculus (1.8 cm) and mild left hydronephrosis, similar to prior.\par \par MRI breast 4/26/21\par IMPRESSION:\par *Markedly limited exam.\par \par 1.  No evidence of implant rupture.\par 2.  The known left axillary malignancy and adjacent lymph nodes cannot be evaluated on this exam, due to extensive artifact in the region.\par \par \par Breast MR 3/29/21\par Stable left axillary mass and enlarged left axillary lymph node\par \par CT CAP 3/31/21\par  An ill-defined left axillary mass adjacent to a biopsy clip is again noted, measuring approximately 3.4 x 3.4 cm, previously 3.6 x 3.3 cm on 11/6/2020. An adjacent left axillary lymph node measures 1.5 x 1.2 cm (2:16), previously 1.4 x 1.2 cm. Multiple hypodense nodules in both lobes of the thyroid gland and in the thyroid isthmus, similar in appearance to the prior PET/CT dated 11/6/2020. 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

## 2022-07-22 ENCOUNTER — APPOINTMENT (OUTPATIENT)
Dept: NUCLEAR MEDICINE | Facility: CLINIC | Age: 87
End: 2022-07-22

## 2022-07-22 ENCOUNTER — OUTPATIENT (OUTPATIENT)
Dept: OUTPATIENT SERVICES | Facility: HOSPITAL | Age: 87
LOS: 1 days | End: 2022-07-22
Payer: MEDICARE

## 2022-07-22 DIAGNOSIS — Z98.890 OTHER SPECIFIED POSTPROCEDURAL STATES: Chronic | ICD-10-CM

## 2022-07-22 DIAGNOSIS — Z95.0 PRESENCE OF CARDIAC PACEMAKER: Chronic | ICD-10-CM

## 2022-07-22 DIAGNOSIS — Z90.49 ACQUIRED ABSENCE OF OTHER SPECIFIED PARTS OF DIGESTIVE TRACT: Chronic | ICD-10-CM

## 2022-07-22 DIAGNOSIS — R63.4 ABNORMAL WEIGHT LOSS: ICD-10-CM

## 2022-07-22 DIAGNOSIS — Z96.649 PRESENCE OF UNSPECIFIED ARTIFICIAL HIP JOINT: Chronic | ICD-10-CM

## 2022-07-22 DIAGNOSIS — C50.919 MALIGNANT NEOPLASM OF UNSPECIFIED SITE OF UNSPECIFIED FEMALE BREAST: ICD-10-CM

## 2022-07-22 DIAGNOSIS — Z87.39 PERSONAL HISTORY OF OTHER DISEASES OF THE MUSCULOSKELETAL SYSTEM AND CONNECTIVE TISSUE: Chronic | ICD-10-CM

## 2022-07-22 PROCEDURE — A9552: CPT

## 2022-07-22 PROCEDURE — 78815 PET IMAGE W/CT SKULL-THIGH: CPT | Mod: 26,PI,MH

## 2022-07-22 PROCEDURE — 78815 PET IMAGE W/CT SKULL-THIGH: CPT

## 2022-07-25 RX ORDER — EXEMESTANE 25 MG/1
25 TABLET, FILM COATED ORAL DAILY
Qty: 30 | Refills: 1 | Status: DISCONTINUED | COMMUNITY
Start: 2022-07-25 | End: 2022-07-25

## 2022-08-03 ENCOUNTER — APPOINTMENT (OUTPATIENT)
Dept: HEMATOLOGY ONCOLOGY | Facility: CLINIC | Age: 87
End: 2022-08-03

## 2022-08-05 ENCOUNTER — RESULT REVIEW (OUTPATIENT)
Age: 87
End: 2022-08-05

## 2022-08-05 ENCOUNTER — APPOINTMENT (OUTPATIENT)
Dept: HEMATOLOGY ONCOLOGY | Facility: CLINIC | Age: 87
End: 2022-08-05

## 2022-08-05 VITALS
DIASTOLIC BLOOD PRESSURE: 64 MMHG | RESPIRATION RATE: 16 BRPM | OXYGEN SATURATION: 94 % | SYSTOLIC BLOOD PRESSURE: 118 MMHG | HEART RATE: 63 BPM | BODY MASS INDEX: 24.96 KG/M2 | WEIGHT: 136.68 LBS | TEMPERATURE: 98.1 F

## 2022-08-05 LAB
BASOPHILS # BLD AUTO: 0.04 K/UL — SIGNIFICANT CHANGE UP (ref 0–0.2)
BASOPHILS NFR BLD AUTO: 0.5 % — SIGNIFICANT CHANGE UP (ref 0–2)
EOSINOPHIL # BLD AUTO: 0.2 K/UL — SIGNIFICANT CHANGE UP (ref 0–0.5)
EOSINOPHIL NFR BLD AUTO: 2.6 % — SIGNIFICANT CHANGE UP (ref 0–6)
HCT VFR BLD CALC: 30.1 % — LOW (ref 34.5–45)
HGB BLD-MCNC: 9.5 G/DL — LOW (ref 11.5–15.5)
IMM GRANULOCYTES NFR BLD AUTO: 0.3 % — SIGNIFICANT CHANGE UP (ref 0–1.5)
LYMPHOCYTES # BLD AUTO: 0.96 K/UL — LOW (ref 1–3.3)
LYMPHOCYTES # BLD AUTO: 12.6 % — LOW (ref 13–44)
MCHC RBC-ENTMCNC: 28.1 PG — SIGNIFICANT CHANGE UP (ref 27–34)
MCHC RBC-ENTMCNC: 31.6 G/DL — LOW (ref 32–36)
MCV RBC AUTO: 89.1 FL — SIGNIFICANT CHANGE UP (ref 80–100)
MONOCYTES # BLD AUTO: 0.68 K/UL — SIGNIFICANT CHANGE UP (ref 0–0.9)
MONOCYTES NFR BLD AUTO: 8.9 % — SIGNIFICANT CHANGE UP (ref 2–14)
NEUTROPHILS # BLD AUTO: 5.73 K/UL — SIGNIFICANT CHANGE UP (ref 1.8–7.4)
NEUTROPHILS NFR BLD AUTO: 75.1 % — SIGNIFICANT CHANGE UP (ref 43–77)
NRBC # BLD: 0 /100 WBCS — SIGNIFICANT CHANGE UP (ref 0–0)
PLATELET # BLD AUTO: 241 K/UL — SIGNIFICANT CHANGE UP (ref 150–400)
RBC # BLD: 3.38 M/UL — LOW (ref 3.8–5.2)
RBC # FLD: 15.7 % — HIGH (ref 10.3–14.5)
WBC # BLD: 7.63 K/UL — SIGNIFICANT CHANGE UP (ref 3.8–10.5)
WBC # FLD AUTO: 7.63 K/UL — SIGNIFICANT CHANGE UP (ref 3.8–10.5)

## 2022-08-05 PROCEDURE — 99214 OFFICE O/P EST MOD 30 MIN: CPT

## 2022-08-05 RX ORDER — LISINOPRIL 10 MG/1
10 TABLET ORAL
Qty: 90 | Refills: 3 | Status: DISCONTINUED | COMMUNITY
Start: 2022-02-04 | End: 2022-08-05

## 2022-08-05 NOTE — HISTORY OF PRESENT ILLNESS
[Date: ____________] : Patient's last distress assessment performed on [unfilled]. [0 - No Distress] : Distress Level: 0 [de-identified] : The patient has a history of right breast cancer in 1965 for which she underwent what is described as a "Jeanette mastectomy" by Dr. Olivo at [facility] with "0/19 axillary lymph nodes" per patient's verbal report and followed expectantly.  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 Yu with a finding of "3 spots of DCIS, and a sentinel lymph node negative" per patient's verbal report; I do not have a copy of that report for my review.  The patient reports having undergone bilateral breast reconstruction at the same time to include what she describes as right "right cadaver tissue" in addition to an implant in the right breast, as well as a left breast implant.\par \par She was then well until approximately 1-2/19 when she describes transient pinching on her left reconstructed breast, centrally and predominantly to the lateral aspect.  She chose to follow this and then start to question whether she was "losing volume."  Through a friend/networking, she contacted Dr. Magan Carey who recommended a bilateral breast MRI, which was performed at [facility] on June 21, 2019 with a finding of a suspicious 5 cm irregular mass in the left axilla as well as further suspicious lymphadenopathy seen posterior to the mass with second-look ultrasound and core biopsy recommended as well as a PET-CT scan.  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  The patient then went on to see Dr. Carlos Pittman and had a PET-CT scan performed on July 5, 2019 with a finding of a mildly avid focus in the left thyroid lobe extending towards the isthmus, a 1.3 cm non-avid left thyroid lobe nodule; in the left axilla, there was left axillary soft tissue mass with irregular margins containing a biopsy clip measuring 3 x 2 cm with several adjacent subcentimeter left axillary lymph nodes measuring up to 1 cm; bilateral hip arthroplasties with heterogeneous FDG avidity adjacent to the proximal portions bilaterally on the right with an SUV of 13.8 on the left and SUV of 9.4 with a comment, this area is limited in evaluation due to beam hardening artifact; there were degenerative changes in the lower spine with mild areas of FDG avidity, multilevel, non-FDG avid compression fracture of L4 unchanged since 06/2017.\par \par I saw her in initial consultation on 716/19 and subsequently had requested a review of the pathology and it turned our it was ER+ / AZ neg. We reviewed her scans at tumor board and the group agreed it was unresectable at this time. I called the pt and informed her of the above change in the pathology and that I recommended neoadjuvant anti-estrogen therapy with an aromatase inhibitor such as anastrozole. She agreed to proceed and started taking anastrozole in the very first days of 7/19. \par \par Seen in 11/19. Had evidence of response in her left ax mass. \par \par Seen 3/20 and she informed me she stopped taking anstrozole end 8/19 secondary to concerns re bone toxicity. She had been seeing an "MD PhD" on [place] who had been infusing "ozone therapy in an electrolyte bag" IV, B17, other anti-inflammatory herbs. She declined to provide his name as " rather not say as he does not want people looking over his back". On exam she had what seemed like a further response. I recommended she re-consult / see Dr. Pittman to consider potential surgery vs XRT as primary local treatment. I advised that as she has been under the care of another physician that she follow up with him. I offered her to see me  as needed in the future. She was agreeable.\par \par In the interim she saw Dr. Pittman 7/6/20. WHen questioned why she did not do so sooner she did not have an answer to provide. Dr Pittman sent her for a B/L breast MRI 7/27/20 which showed :\par \par In the left axilla, again noted is an irregularly-shaped enhancing mass compatible with the known recurrent malignancy. Susceptibility artifact is noted within the mass, a biopsy marker. Overall, the mass measures approximately 6.9 cm AP by 3.9 TV x 6.2 cm CC. The mass appears more draped along the implant with extension along its superior lateral margin. The previously noted additional axillary lymph nodes medial to the mass appear relatively stable in size.\par \par No right axillary lymphadenopathy. No internal mammary lymphadenopathy.\par \par IMPRESSION:\par 1.  The biopsy-proven recurrent malignancy in the left axillary region has increased in size since the prior breast MRI and prior ultrasound, with maximal dimension now measuring 6.9 cm AP.\par 2.  No other suspicious enhancement in either breast.\par 3.  Silicone implants are intact.\par \par The pt now reports she stopped getting ozone therapy in 3/20. \par \par She progressed after she stopped anastrozole and ozone therapy. \par Restarted anastrozole in early August as well as ozone/vitamin/mineral therapy. \par \par Repeat MRI 10/27/20 revealed POD in L breast / axillary / CW mass. PET CT did not show any distant mets. \par  [de-identified] : Patient with a history of breast cancer, local axillary / lateral chest wall recurrence in 11/2020.  Started fulvestrant on 11/24/20.   Now with POD.  Started on Letrozole on 7/25/22.\par \par Tolerating Letrozole well, with no treatment related side effects.  \par \par She has baseline fatigue and arthralgias w/o change.\par \par She has baseline back pain from chronic spinal stenosis w/o change. \par \par She denies any other complaints.  \par \par PET/CT, 7/22/22- IMPRESSION: Compared to FDG-PET/CT scan dated 11/6/2020:\par \par 1. Overall unchanged size and FDG avidity of left axillary soft tissue mass and adjacent left axillary lymph node, consistent with known recurrent disease.\par 2. Unchanged FDG avid left thyroid nodule.\par 3. Nonspecific bilateral periprosthetic hip joint FDG avidity, not significantly changed. If infection is a clinical concern, labeled WBC/bone marrow imaging may be performed.\par \par US L axilla 7/7/22\par Previously noted irregular hypoechoic mass, biopsy-proven invasive poorly \par differentiated carcinoma, is reidentified. This is now seen to extend to \par the skin surface. Associated subcutaneous edema is noted This currently \par measures at least 2.8 x 0.9 x 1.5 cm with longest oblique dimension of \par 3.7 cm. Accurate measurement is limited due to poor differentiation of \par the border.\par \par Three Adjacent/contiguous hypoechoic masses, versus one large mass is \par reidentified again thought possible abnormal lymph nodes within the \par axilla. These currently measure 1.2 x 0.7 x 0.9 cm, 6 x 4 x 5 mm and \par approximately 5 mm representing interval change compared to prior at \par which time one hypoechoic mass measuring 1.1 cm was noted.\par IMPRESSION:\par Known left axillary malignancy with interval change in size and extension \par as above\par Left axillary possible abnormal lymph nodes reidentified with interval \par change in size/number as above.\par Clinical follow-up and management of biopsy-proven recurrence is \par recommended. The patient states she is for clinical follow-up in \par approximately 5 days.\par RECOMMENDATION:  Clinical follow up\par \par US L axilla 1/4/22\par FINDINGS:\par In the left axilla a 1.4 cm hypoechoic mass with no discernible fatty \par hilum is seen slightly decreased in size from prior study. This is \par suspicious for an abnormal axillary lymph node.\par \par Also noted in the left axilla is a well-defined 2.1 x 1.9 x 2.5 cm \par hypoechoic mass which is unchanged from prior study dated 9/16/2021. This \par has been biopsy-proven to be invasive poorly differentiated carcinoma \par with lobular phenotype.\par \par Also noted in the left axilla is a stable 1.1 cm hypoechoic mass with no \par discernible fatty hilum suspicious for abnormal axillary lymph node.\par \par \par IMPRESSION:\par \par Stable findings noted in the left axilla when compared to prior study \par dated 9/16/2021.\par \par \par \par US breast  9/16/21\par \par IMPRESSION:\par Relatively stable appearing amorphous mass in the left axilla. 2 lymph nodes in the left axilla are slightly increased in size. Malignancy is possible but recent: Vaccination could produce similar result. Clinical correlation and if indicated 6 month follow-up directed ultrasound might be appropriate.\par \par RECOMMENDATION:  Clinical follow up.\par \par DEXA 9/16/21\par Impression:\par Findings are normal in the lumbar spine and osteoporosis in the left forearm.\par \par \par CT CAP 5/19/21\par IMPRESSION:\par Lung base ground glass opacities, possibly infectious or inflammatory.\par Colonic diverticulosis without CT evidence of acute diverticulitis.\par Left renal pelvis calculus (1.8 cm) and mild left hydronephrosis, similar to prior.\par \par MRI breast 4/26/21\par IMPRESSION:\par *Markedly limited exam.\par \par 1.  No evidence of implant rupture.\par 2.  The known left axillary malignancy and adjacent lymph nodes cannot be evaluated on this exam, due to extensive artifact in the region.\par \par \par Breast MR 3/29/21\par Stable left axillary mass and enlarged left axillary lymph node\par \par CT CAP 3/31/21\par  An ill-defined left axillary mass adjacent to a biopsy clip is again noted, measuring approximately 3.4 x 3.4 cm, previously 3.6 x 3.3 cm on 11/6/2020. An adjacent left axillary lymph node measures 1.5 x 1.2 cm (2:16), previously 1.4 x 1.2 cm. Multiple hypodense nodules in both lobes of the thyroid gland and in the thyroid isthmus, similar in appearance to the prior PET/CT dated 11/6/2020. 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

## 2022-08-05 NOTE — PHYSICAL EXAM
well developed, well nourished , in no acute distress. , well developed, well nourished , in no acute distress. [Restricted in physically strenuous activity but ambulatory and able to carry out work of a light or sedentary nature] : Status 1- Restricted in physically strenuous activity but ambulatory and able to carry out work of a light or sedentary nature, e.g., light house work, office work [Normal] : normoactive bowel sounds, soft and nontender, no hepatosplenomegaly or masses appreciated [de-identified] : S/p bl mastectomies with reconstruction no discrete palpable masses in breast; Left axilla with palp density approx 4+ cm , mostly attached / fixed.   No palpable nodes right axilla. [de-identified] : difficulty walking, comes to apt in wheelchair.

## 2022-08-05 NOTE — REASON FOR VISIT
[Follow-Up Visit] : a follow-up [Urgent Visit] : an urgent  [Other: _____] : [unfilled] [FreeTextEntry2] : Breast Cancer/abd mass

## 2022-08-09 ENCOUNTER — APPOINTMENT (OUTPATIENT)
Dept: INFUSION THERAPY | Facility: HOSPITAL | Age: 87
End: 2022-08-09

## 2022-08-09 LAB
ALBUMIN SERPL ELPH-MCNC: 4.4 G/DL
ALP BLD-CCNC: 59 U/L
ALT SERPL-CCNC: 18 U/L
ANION GAP SERPL CALC-SCNC: 11 MMOL/L
AST SERPL-CCNC: 22 U/L
BILIRUB SERPL-MCNC: 0.2 MG/DL
BUN SERPL-MCNC: 43 MG/DL
CALCIUM SERPL-MCNC: 9.1 MG/DL
CHLORIDE SERPL-SCNC: 101 MMOL/L
CO2 SERPL-SCNC: 24 MMOL/L
CREAT SERPL-MCNC: 1.4 MG/DL
EGFR: 36 ML/MIN/1.73M2
GLUCOSE SERPL-MCNC: 98 MG/DL
POTASSIUM SERPL-SCNC: 5.2 MMOL/L
PROT SERPL-MCNC: 6.9 G/DL
SODIUM SERPL-SCNC: 136 MMOL/L

## 2022-08-15 ENCOUNTER — NON-APPOINTMENT (OUTPATIENT)
Age: 87
End: 2022-08-15

## 2022-08-26 ENCOUNTER — LABORATORY RESULT (OUTPATIENT)
Age: 87
End: 2022-08-26

## 2022-08-29 ENCOUNTER — NON-APPOINTMENT (OUTPATIENT)
Age: 87
End: 2022-08-29

## 2022-08-30 ENCOUNTER — NON-APPOINTMENT (OUTPATIENT)
Age: 87
End: 2022-08-30

## 2022-08-31 ENCOUNTER — APPOINTMENT (OUTPATIENT)
Dept: NEPHROLOGY | Facility: CLINIC | Age: 87
End: 2022-08-31

## 2022-09-02 ENCOUNTER — OUTPATIENT (OUTPATIENT)
Dept: OUTPATIENT SERVICES | Facility: HOSPITAL | Age: 87
LOS: 1 days | Discharge: HOME | End: 2022-09-02

## 2022-09-02 DIAGNOSIS — Z98.890 OTHER SPECIFIED POSTPROCEDURAL STATES: Chronic | ICD-10-CM

## 2022-09-02 DIAGNOSIS — Z96.649 PRESENCE OF UNSPECIFIED ARTIFICIAL HIP JOINT: Chronic | ICD-10-CM

## 2022-09-02 DIAGNOSIS — C50.919 MALIGNANT NEOPLASM OF UNSPECIFIED SITE OF UNSPECIFIED FEMALE BREAST: ICD-10-CM

## 2022-09-02 DIAGNOSIS — Z90.49 ACQUIRED ABSENCE OF OTHER SPECIFIED PARTS OF DIGESTIVE TRACT: Chronic | ICD-10-CM

## 2022-09-02 DIAGNOSIS — Z95.0 PRESENCE OF CARDIAC PACEMAKER: Chronic | ICD-10-CM

## 2022-09-02 DIAGNOSIS — Z87.39 PERSONAL HISTORY OF OTHER DISEASES OF THE MUSCULOSKELETAL SYSTEM AND CONNECTIVE TISSUE: Chronic | ICD-10-CM

## 2022-09-06 ENCOUNTER — APPOINTMENT (OUTPATIENT)
Dept: INFUSION THERAPY | Facility: HOSPITAL | Age: 87
End: 2022-09-06

## 2022-09-06 ENCOUNTER — NON-APPOINTMENT (OUTPATIENT)
Age: 87
End: 2022-09-06

## 2022-09-06 ENCOUNTER — APPOINTMENT (OUTPATIENT)
Dept: HEMATOLOGY ONCOLOGY | Facility: CLINIC | Age: 87
End: 2022-09-06

## 2022-09-07 ENCOUNTER — NON-APPOINTMENT (OUTPATIENT)
Age: 87
End: 2022-09-07

## 2022-09-07 ENCOUNTER — LABORATORY RESULT (OUTPATIENT)
Age: 87
End: 2022-09-07

## 2022-09-08 ENCOUNTER — APPOINTMENT (OUTPATIENT)
Dept: HEMATOLOGY ONCOLOGY | Facility: CLINIC | Age: 87
End: 2022-09-08

## 2022-09-08 ENCOUNTER — OUTPATIENT (OUTPATIENT)
Dept: OUTPATIENT SERVICES | Facility: HOSPITAL | Age: 87
LOS: 1 days | End: 2022-09-08
Payer: MEDICARE

## 2022-09-08 ENCOUNTER — APPOINTMENT (OUTPATIENT)
Dept: CT IMAGING | Facility: IMAGING CENTER | Age: 87
End: 2022-09-08

## 2022-09-08 VITALS
BODY MASS INDEX: 24.23 KG/M2 | WEIGHT: 133.38 LBS | DIASTOLIC BLOOD PRESSURE: 80 MMHG | HEIGHT: 62.05 IN | SYSTOLIC BLOOD PRESSURE: 120 MMHG | OXYGEN SATURATION: 96 % | TEMPERATURE: 97.9 F | RESPIRATION RATE: 16 BRPM | HEART RATE: 61 BPM

## 2022-09-08 DIAGNOSIS — Z87.39 PERSONAL HISTORY OF OTHER DISEASES OF THE MUSCULOSKELETAL SYSTEM AND CONNECTIVE TISSUE: Chronic | ICD-10-CM

## 2022-09-08 DIAGNOSIS — Z96.649 PRESENCE OF UNSPECIFIED ARTIFICIAL HIP JOINT: Chronic | ICD-10-CM

## 2022-09-08 DIAGNOSIS — Z95.0 PRESENCE OF CARDIAC PACEMAKER: Chronic | ICD-10-CM

## 2022-09-08 DIAGNOSIS — Z98.890 OTHER SPECIFIED POSTPROCEDURAL STATES: Chronic | ICD-10-CM

## 2022-09-08 DIAGNOSIS — C50.919 MALIGNANT NEOPLASM OF UNSPECIFIED SITE OF UNSPECIFIED FEMALE BREAST: ICD-10-CM

## 2022-09-08 DIAGNOSIS — Z90.49 ACQUIRED ABSENCE OF OTHER SPECIFIED PARTS OF DIGESTIVE TRACT: Chronic | ICD-10-CM

## 2022-09-08 DIAGNOSIS — R06.09 OTHER FORMS OF DYSPNEA: ICD-10-CM

## 2022-09-08 PROCEDURE — 99214 OFFICE O/P EST MOD 30 MIN: CPT

## 2022-09-08 PROCEDURE — 71275 CT ANGIOGRAPHY CHEST: CPT

## 2022-09-08 PROCEDURE — 71275 CT ANGIOGRAPHY CHEST: CPT | Mod: 26,MH

## 2022-09-08 NOTE — PHYSICAL EXAM
[Normal] : affect appropriate [Ambulatory and capable of all self care but unable to carry out any work activities] : Status 2- Ambulatory and capable of all self care but unable to carry out any work activities. Up and about more than 50% of waking hours [Thin] : thin [de-identified] : with mild dyspnea at rest [de-identified] : no lower extremity edema  [de-identified] : difficulty walking, comes to apt in wheelchair.

## 2022-09-08 NOTE — HISTORY OF PRESENT ILLNESS
[Date: ____________] : Patient's last distress assessment performed on [unfilled]. [0 - No Distress] : Distress Level: 0 [de-identified] : The patient has a history of right breast cancer in 1965 for which she underwent what is described as a "Jeanette mastectomy" by Dr. Olivo at [facility] with "0/19 axillary lymph nodes" per patient's verbal report and followed expectantly.  She was well until 2008 at which time she was found to have a left breast multifocal DCIS per her description for which she underwent a left modified radical mastectomy at [facility] under the care Dr. Yu with a finding of "3 spots of DCIS, and a sentinel lymph node negative" per patient's verbal report; I do not have a copy of that report for my review.  The patient reports having undergone bilateral breast reconstruction at the same time to include what she describes as right "right cadaver tissue" in addition to an implant in the right breast, as well as a left breast implant.\par \par She was then well until approximately 1-2/19 when she describes transient pinching on her left reconstructed breast, centrally and predominantly to the lateral aspect.  She chose to follow this and then start to question whether she was "losing volume."  Through a friend/networking, she contacted Dr. Magan Carey who recommended a bilateral breast MRI, which was performed at [facility] on June 21, 2019 with a finding of a suspicious 5 cm irregular mass in the left axilla as well as further suspicious lymphadenopathy seen posterior to the mass with second-look ultrasound and core biopsy recommended as well as a PET-CT scan.  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  The patient then went on to see Dr. Carlos Pittman and had a PET-CT scan performed on July 5, 2019 with a finding of a mildly avid focus in the left thyroid lobe extending towards the isthmus, a 1.3 cm non-avid left thyroid lobe nodule; in the left axilla, there was left axillary soft tissue mass with irregular margins containing a biopsy clip measuring 3 x 2 cm with several adjacent subcentimeter left axillary lymph nodes measuring up to 1 cm; bilateral hip arthroplasties with heterogeneous FDG avidity adjacent to the proximal portions bilaterally on the right with an SUV of 13.8 on the left and SUV of 9.4 with a comment, this area is limited in evaluation due to beam hardening artifact; there were degenerative changes in the lower spine with mild areas of FDG avidity, multilevel, non-FDG avid compression fracture of L4 unchanged since 06/2017.\par \par I saw her in initial consultation on 716/19 and subsequently had requested a review of the pathology and it turned our it was ER+ / LA neg. We reviewed her scans at tumor board and the group agreed it was unresectable at this time. I called the pt and informed her of the above change in the pathology and that I recommended neoadjuvant anti-estrogen therapy with an aromatase inhibitor such as anastrozole. She agreed to proceed and started taking anastrozole in the very first days of 7/19. \par \par Seen in 11/19. Had evidence of response in her left ax mass. \par \par Seen 3/20 and she informed me she stopped taking anstrozole end 8/19 secondary to concerns re bone toxicity. She had been seeing an "MD PhD" on [place] who had been infusing "ozone therapy in an electrolyte bag" IV, B17, other anti-inflammatory herbs. She declined to provide his name as " rather not say as he does not want people looking over his back". On exam she had what seemed like a further response. I recommended she re-consult / see Dr. Pittman to consider potential surgery vs XRT as primary local treatment. I advised that as she has been under the care of another physician that she follow up with him. I offered her to see me  as needed in the future. She was agreeable.\par \par In the interim she saw Dr. Pittman 7/6/20. WHen questioned why she did not do so sooner she did not have an answer to provide. Dr Pittman sent her for a B/L breast MRI 7/27/20 which showed :\par \par In the left axilla, again noted is an irregularly-shaped enhancing mass compatible with the known recurrent malignancy. Susceptibility artifact is noted within the mass, a biopsy marker. Overall, the mass measures approximately 6.9 cm AP by 3.9 TV x 6.2 cm CC. The mass appears more draped along the implant with extension along its superior lateral margin. The previously noted additional axillary lymph nodes medial to the mass appear relatively stable in size.\par \par No right axillary lymphadenopathy. No internal mammary lymphadenopathy.\par \par IMPRESSION:\par 1.  The biopsy-proven recurrent malignancy in the left axillary region has increased in size since the prior breast MRI and prior ultrasound, with maximal dimension now measuring 6.9 cm AP.\par 2.  No other suspicious enhancement in either breast.\par 3.  Silicone implants are intact.\par \par The pt now reports she stopped getting ozone therapy in 3/20. \par \par She progressed after she stopped anastrozole and ozone therapy. \par Restarted anastrozole in early August as well as ozone/vitamin/mineral therapy. \par \par Repeat MRI 10/27/20 revealed POD in L breast / axillary / CW mass. PET CT did not show any distant mets. \par  [de-identified] : Patient with a history of breast cancer, local axillary / lateral chest wall recurrence in 11/2020.  Started fulvestrant on 11/24/20.   Now with POD.  Started on Letrozole on 7/25/22.\par \par Tolerating Letrozole well, with no treatment related side effects.   Started on palbo 8/5/2022; notes progressive dyspnea first with exertion and now at rest. Denies any fever, chills, cough or chest pain.  Some pleuritic discomfort with inspiration.  Patient has a history of CHF, but was seen by her cardiologist 2 weeks ago and no acute exacerbation of cardiac disease or CHF.  Notes increased difficulty with breath when supine, intermittent mild swelling of extremities last week which resolved w/ lasix. \par Most recent PET/CT did not demonstrate any metastatic disease to lung, pleura or mediastinum.\par \par CBC 9/7/2022 shows hgb 8.1 g/dl - stable (c/w 8/5/2022), ANC 1.68; plt normal. \par \par She has baseline fatigue and arthralgias. She has baseline back pain from chronic spinal stenosis w/o change. \par \par She denies any other complaints.  \par \par PET/CT, 7/22/22- IMPRESSION: Compared to FDG-PET/CT scan dated 11/6/2020:\par \par 1. Overall unchanged size and FDG avidity of left axillary soft tissue mass and adjacent left axillary lymph node, consistent with known recurrent disease.\par 2. Unchanged FDG avid left thyroid nodule.\par 3. Nonspecific bilateral periprosthetic hip joint FDG avidity, not significantly changed. If infection is a clinical concern, labeled WBC/bone marrow imaging may be performed.\par \par US L axilla 7/7/22\par Previously noted irregular hypoechoic mass, biopsy-proven invasive poorly \par differentiated carcinoma, is reidentified. This is now seen to extend to \par the skin surface. Associated subcutaneous edema is noted This currently \par measures at least 2.8 x 0.9 x 1.5 cm with longest oblique dimension of \par 3.7 cm. Accurate measurement is limited due to poor differentiation of \par the border.\par \par Three Adjacent/contiguous hypoechoic masses, versus one large mass is \par reidentified again thought possible abnormal lymph nodes within the \par axilla. These currently measure 1.2 x 0.7 x 0.9 cm, 6 x 4 x 5 mm and \par approximately 5 mm representing interval change compared to prior at \par which time one hypoechoic mass measuring 1.1 cm was noted.\par IMPRESSION:\par Known left axillary malignancy with interval change in size and extension \par as above\par Left axillary possible abnormal lymph nodes reidentified with interval \par change in size/number as above.\par Clinical follow-up and management of biopsy-proven recurrence is \par recommended. The patient states she is for clinical follow-up in \par approximately 5 days.\par RECOMMENDATION:  Clinical follow up\par \par US L axilla 1/4/22\par FINDINGS:\par In the left axilla a 1.4 cm hypoechoic mass with no discernible fatty \par hilum is seen slightly decreased in size from prior study. This is \par suspicious for an abnormal axillary lymph node.\par \par Also noted in the left axilla is a well-defined 2.1 x 1.9 x 2.5 cm \par hypoechoic mass which is unchanged from prior study dated 9/16/2021. This \par has been biopsy-proven to be invasive poorly differentiated carcinoma \par with lobular phenotype.\par \par Also noted in the left axilla is a stable 1.1 cm hypoechoic mass with no \par discernible fatty hilum suspicious for abnormal axillary lymph node.\par \par \par IMPRESSION:\par \par Stable findings noted in the left axilla when compared to prior study \par dated 9/16/2021.\par \par \par \par US breast  9/16/21\par \par IMPRESSION:\par Relatively stable appearing amorphous mass in the left axilla. 2 lymph nodes in the left axilla are slightly increased in size. Malignancy is possible but recent: Vaccination could produce similar result. Clinical correlation and if indicated 6 month follow-up directed ultrasound might be appropriate.\par \par RECOMMENDATION:  Clinical follow up.\par \par DEXA 9/16/21\par Impression:\par Findings are normal in the lumbar spine and osteoporosis in the left forearm.\par \par \par CT CAP 5/19/21\par IMPRESSION:\par Lung base ground glass opacities, possibly infectious or inflammatory.\par Colonic diverticulosis without CT evidence of acute diverticulitis.\par Left renal pelvis calculus (1.8 cm) and mild left hydronephrosis, similar to prior.\par \par MRI breast 4/26/21\par IMPRESSION:\par *Markedly limited exam.\par \par 1.  No evidence of implant rupture.\par 2.  The known left axillary malignancy and adjacent lymph nodes cannot be evaluated on this exam, due to extensive artifact in the region.\par \par \par Breast MR 3/29/21\par Stable left axillary mass and enlarged left axillary lymph node\par \par CT CAP 3/31/21\par  An ill-defined left axillary mass adjacent to a biopsy clip is again noted, measuring approximately 3.4 x 3.4 cm, previously 3.6 x 3.3 cm on 11/6/2020. An adjacent left axillary lymph node measures 1.5 x 1.2 cm (2:16), previously 1.4 x 1.2 cm. Multiple hypodense nodules in both lobes of the thyroid gland and in the thyroid isthmus, similar in appearance to the prior PET/CT dated 11/6/2020. 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

## 2022-09-09 DIAGNOSIS — D62 ACUTE POSTHEMORRHAGIC ANEMIA: ICD-10-CM

## 2022-09-13 ENCOUNTER — APPOINTMENT (OUTPATIENT)
Dept: HEMATOLOGY ONCOLOGY | Facility: CLINIC | Age: 87
End: 2022-09-13

## 2022-09-13 ENCOUNTER — RESULT REVIEW (OUTPATIENT)
Age: 87
End: 2022-09-13

## 2022-09-13 ENCOUNTER — OUTPATIENT (OUTPATIENT)
Dept: OUTPATIENT SERVICES | Facility: HOSPITAL | Age: 87
LOS: 1 days | End: 2022-09-13
Payer: MEDICARE

## 2022-09-13 DIAGNOSIS — Z90.49 ACQUIRED ABSENCE OF OTHER SPECIFIED PARTS OF DIGESTIVE TRACT: Chronic | ICD-10-CM

## 2022-09-13 DIAGNOSIS — Z87.39 PERSONAL HISTORY OF OTHER DISEASES OF THE MUSCULOSKELETAL SYSTEM AND CONNECTIVE TISSUE: Chronic | ICD-10-CM

## 2022-09-13 DIAGNOSIS — Z96.649 PRESENCE OF UNSPECIFIED ARTIFICIAL HIP JOINT: Chronic | ICD-10-CM

## 2022-09-13 DIAGNOSIS — Z98.890 OTHER SPECIFIED POSTPROCEDURAL STATES: Chronic | ICD-10-CM

## 2022-09-13 DIAGNOSIS — D64.9 ANEMIA, UNSPECIFIED: ICD-10-CM

## 2022-09-13 DIAGNOSIS — Z95.0 PRESENCE OF CARDIAC PACEMAKER: Chronic | ICD-10-CM

## 2022-09-13 LAB
BASOPHILS # BLD AUTO: 0.04 K/UL — SIGNIFICANT CHANGE UP (ref 0–0.2)
BASOPHILS NFR BLD AUTO: 1.1 % — SIGNIFICANT CHANGE UP (ref 0–2)
EOSINOPHIL # BLD AUTO: 0.09 K/UL — SIGNIFICANT CHANGE UP (ref 0–0.5)
EOSINOPHIL NFR BLD AUTO: 2.5 % — SIGNIFICANT CHANGE UP (ref 0–6)
FERRITIN SERPL-MCNC: 619 NG/ML
HCT VFR BLD CALC: 25.5 % — LOW (ref 34.5–45)
HGB BLD-MCNC: 8.1 G/DL — LOW (ref 11.5–15.5)
IMM GRANULOCYTES NFR BLD AUTO: 0.3 % — SIGNIFICANT CHANGE UP (ref 0–1.5)
LYMPHOCYTES # BLD AUTO: 0.41 K/UL — LOW (ref 1–3.3)
LYMPHOCYTES # BLD AUTO: 11.6 % — LOW (ref 13–44)
MCHC RBC-ENTMCNC: 28.8 PG — SIGNIFICANT CHANGE UP (ref 27–34)
MCHC RBC-ENTMCNC: 31.8 G/DL — LOW (ref 32–36)
MCV RBC AUTO: 90.7 FL — SIGNIFICANT CHANGE UP (ref 80–100)
MONOCYTES # BLD AUTO: 0.42 K/UL — SIGNIFICANT CHANGE UP (ref 0–0.9)
MONOCYTES NFR BLD AUTO: 11.9 % — SIGNIFICANT CHANGE UP (ref 2–14)
NEUTROPHILS # BLD AUTO: 2.56 K/UL — SIGNIFICANT CHANGE UP (ref 1.8–7.4)
NEUTROPHILS NFR BLD AUTO: 72.6 % — SIGNIFICANT CHANGE UP (ref 43–77)
NRBC # BLD: 0 /100 WBCS — SIGNIFICANT CHANGE UP (ref 0–0)
PLATELET # BLD AUTO: 305 K/UL — SIGNIFICANT CHANGE UP (ref 150–400)
RBC # BLD: 2.81 M/UL — LOW (ref 3.8–5.2)
RBC # FLD: 17.1 % — HIGH (ref 10.3–14.5)
WBC # BLD: 3.53 K/UL — LOW (ref 3.8–10.5)
WBC # FLD AUTO: 3.53 K/UL — LOW (ref 3.8–10.5)

## 2022-09-13 PROCEDURE — 86900 BLOOD TYPING SEROLOGIC ABO: CPT

## 2022-09-13 PROCEDURE — 86850 RBC ANTIBODY SCREEN: CPT

## 2022-09-13 PROCEDURE — 86901 BLOOD TYPING SEROLOGIC RH(D): CPT

## 2022-09-13 PROCEDURE — 86923 COMPATIBILITY TEST ELECTRIC: CPT

## 2022-09-14 ENCOUNTER — APPOINTMENT (OUTPATIENT)
Dept: INFUSION THERAPY | Facility: HOSPITAL | Age: 87
End: 2022-09-14

## 2022-09-14 ENCOUNTER — APPOINTMENT (OUTPATIENT)
Dept: HEMATOLOGY ONCOLOGY | Facility: CLINIC | Age: 87
End: 2022-09-14

## 2022-09-14 VITALS
OXYGEN SATURATION: 95 % | DIASTOLIC BLOOD PRESSURE: 66 MMHG | SYSTOLIC BLOOD PRESSURE: 133 MMHG | BODY MASS INDEX: 24.11 KG/M2 | RESPIRATION RATE: 16 BRPM | HEART RATE: 89 BPM | TEMPERATURE: 97.3 F | WEIGHT: 131.99 LBS

## 2022-09-14 PROCEDURE — 99214 OFFICE O/P EST MOD 30 MIN: CPT

## 2022-09-14 NOTE — HISTORY OF PRESENT ILLNESS
[Date: ____________] : Patient's last distress assessment performed on [unfilled]. [0 - No Distress] : Distress Level: 0 [de-identified] : The patient has a history of right breast cancer in 1965 for which she underwent what is described as a "Jeanette mastectomy" by Dr. Olivo at [facility] with "0/19 axillary lymph nodes" per patient's verbal report and followed expectantly.  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 Yu with a finding of "3 spots of DCIS, and a sentinel lymph node negative" per patient's verbal report; I do not have a copy of that report for my review.  The patient reports having undergone bilateral breast reconstruction at the same time to include what she describes as right "right cadaver tissue" in addition to an implant in the right breast, as well as a left breast implant.\par \par She was then well until approximately 1-2/19 when she describes transient pinching on her left reconstructed breast, centrally and predominantly to the lateral aspect.  She chose to follow this and then start to question whether she was "losing volume."  Through a friend/networking, she contacted Dr. Magan Carey who recommended a bilateral breast MRI, which was performed at [facility] on June 21, 2019 with a finding of a suspicious 5 cm irregular mass in the left axilla as well as further suspicious lymphadenopathy seen posterior to the mass with second-look ultrasound and core biopsy recommended as well as a PET-CT scan.  The patient went on to have an ultrasound of the left axilla with an ultrasound-guided core biopsy, with a finding of a heterogeneous 4.5 cm mass seen corresponding to the finding seen on the breast MRI with abnormal lymph nodes seen posterior to the mass measuring 8 mm with an ultrasound-guided core biopsy on that same date demonstrating invasive poorly differentiated mammary carcinoma with lobular phenotype, Viridiana score 8/9, with invasive tumor measuring at least 8 mm on the core biopsy specimen, ER negative, CA negative, HER-2/jeni negative, and a comment that "no focal residual lymphoid like tissue (no germinal center noted) identified; the lesion may represent a completely effaced lymph node or brisk lymphocytic response to tumor; clinical pathologic-radiologic correlation recommended”.  The patient then went on to see Dr. Carlos Pittman and had a PET-CT scan performed on July 5, 2019 with a finding of a mildly avid focus in the left thyroid lobe extending towards the isthmus, a 1.3 cm non-avid left thyroid lobe nodule; in the left axilla, there was left axillary soft tissue mass with irregular margins containing a biopsy clip measuring 3 x 2 cm with several adjacent subcentimeter left axillary lymph nodes measuring up to 1 cm; bilateral hip arthroplasties with heterogeneous FDG avidity adjacent to the proximal portions bilaterally on the right with an SUV of 13.8 on the left and SUV of 9.4 with a comment, this area is limited in evaluation due to beam hardening artifact; there were degenerative changes in the lower spine with mild areas of FDG avidity, multilevel, non-FDG avid compression fracture of L4 unchanged since 06/2017.\par \par I saw her in initial consultation on 716/19 and subsequently had requested a review of the pathology and it turned our it was ER+ / CA neg. We reviewed her scans at tumor board and the group agreed it was unresectable at this time. I called the pt and informed her of the above change in the pathology and that I recommended neoadjuvant anti-estrogen therapy with an aromatase inhibitor such as anastrozole. She agreed to proceed and started taking anastrozole in the very first days of 7/19. \par \par Seen in 11/19. Had evidence of response in her left ax mass. \par \par Seen 3/20 and she informed me she stopped taking anstrozole end 8/19 secondary to concerns re bone toxicity. She had been seeing an "MD PhD" on [place] who had been infusing "ozone therapy in an electrolyte bag" IV, B17, other anti-inflammatory herbs. She declined to provide his name as " rather not say as he does not want people looking over his back". On exam she had what seemed like a further response. I recommended she re-consult / see Dr. Pittman to consider potential surgery vs XRT as primary local treatment. I advised that as she has been under the care of another physician that she follow up with him. I offered her to see me  as needed in the future. She was agreeable.\par \par In the interim she saw Dr. Pittman 7/6/20. WHen questioned why she did not do so sooner she did not have an answer to provide. Dr Pittman sent her for a B/L breast MRI 7/27/20 which showed :\par \par In the left axilla, again noted is an irregularly-shaped enhancing mass compatible with the known recurrent malignancy. Susceptibility artifact is noted within the mass, a biopsy marker. Overall, the mass measures approximately 6.9 cm AP by 3.9 TV x 6.2 cm CC. The mass appears more draped along the implant with extension along its superior lateral margin. The previously noted additional axillary lymph nodes medial to the mass appear relatively stable in size.\par \par No right axillary lymphadenopathy. No internal mammary lymphadenopathy.\par \par IMPRESSION:\par 1.  The biopsy-proven recurrent malignancy in the left axillary region has increased in size since the prior breast MRI and prior ultrasound, with maximal dimension now measuring 6.9 cm AP.\par 2.  No other suspicious enhancement in either breast.\par 3.  Silicone implants are intact.\par \par The pt now reports she stopped getting ozone therapy in 3/20. \par \par She progressed after she stopped anastrozole and ozone therapy. \par Restarted anastrozole in early August as well as ozone/vitamin/mineral therapy. \par \par Repeat MRI 10/27/20 revealed POD in L breast / axillary / CW mass. PET CT did not show any distant mets. \par  [de-identified] : Patient with a history of breast cancer, local axillary / lateral chest wall recurrence in 11/2020.  Started fulvestrant on 11/24/20.   Now with POD.  Started on Letrozole on 7/25/22 and Palbociclib on 8/5/22.\par \par Tolerating letrozole well, with no treatment related side effects. \par \par After taking palbo for three weeks she noted progressive dyspnea first with exertion and now at rest.  She was seen in office last week, CTA was done and came back negative.  \par  \par She has been off palbo since that time.  She states she is feeling much better.  Her dyspnea is back to baseline, only occurring with exertion.\par \par She has baseline fatigue and arthralgias with no change. She has baseline back pain from chronic spinal stenosis w/o change. \par \par She denies any other complaints.  \par \par PET/CT, 7/22/22- IMPRESSION: Compared to FDG-PET/CT scan dated 11/6/2020:\par \par 1. Overall unchanged size and FDG avidity of left axillary soft tissue mass and adjacent left axillary lymph node, consistent with known recurrent disease.\par 2. Unchanged FDG avid left thyroid nodule.\par 3. Nonspecific bilateral periprosthetic hip joint FDG avidity, not significantly changed. If infection is a clinical concern, labeled WBC/bone marrow imaging may be performed.\par \par US L axilla 7/7/22\par Previously noted irregular hypoechoic mass, biopsy-proven invasive poorly \par differentiated carcinoma, is reidentified. This is now seen to extend to \par the skin surface. Associated subcutaneous edema is noted This currently \par measures at least 2.8 x 0.9 x 1.5 cm with longest oblique dimension of \par 3.7 cm. Accurate measurement is limited due to poor differentiation of \par the border.\par \par Three Adjacent/contiguous hypoechoic masses, versus one large mass is \par reidentified again thought possible abnormal lymph nodes within the \par axilla. These currently measure 1.2 x 0.7 x 0.9 cm, 6 x 4 x 5 mm and \par approximately 5 mm representing interval change compared to prior at \par which time one hypoechoic mass measuring 1.1 cm was noted.\par IMPRESSION:\par Known left axillary malignancy with interval change in size and extension \par as above\par Left axillary possible abnormal lymph nodes reidentified with interval \par change in size/number as above.\par Clinical follow-up and management of biopsy-proven recurrence is \par recommended. The patient states she is for clinical follow-up in \par approximately 5 days.\par RECOMMENDATION:  Clinical follow up\par \par US L axilla 1/4/22\par FINDINGS:\par In the left axilla a 1.4 cm hypoechoic mass with no discernible fatty \par hilum is seen slightly decreased in size from prior study. This is \par suspicious for an abnormal axillary lymph node.\par \par Also noted in the left axilla is a well-defined 2.1 x 1.9 x 2.5 cm \par hypoechoic mass which is unchanged from prior study dated 9/16/2021. This \par has been biopsy-proven to be invasive poorly differentiated carcinoma \par with lobular phenotype.\par \par Also noted in the left axilla is a stable 1.1 cm hypoechoic mass with no \par discernible fatty hilum suspicious for abnormal axillary lymph node.\par \par \par IMPRESSION:\par \par Stable findings noted in the left axilla when compared to prior study \par dated 9/16/2021.\par \par \par \par US breast  9/16/21\par \par IMPRESSION:\par Relatively stable appearing amorphous mass in the left axilla. 2 lymph nodes in the left axilla are slightly increased in size. Malignancy is possible but recent: Vaccination could produce similar result. Clinical correlation and if indicated 6 month follow-up directed ultrasound might be appropriate.\par \par RECOMMENDATION:  Clinical follow up.\par \par DEXA 9/16/21\par Impression:\par Findings are normal in the lumbar spine and osteoporosis in the left forearm.\par \par \par CT CAP 5/19/21\par IMPRESSION:\par Lung base ground glass opacities, possibly infectious or inflammatory.\par Colonic diverticulosis without CT evidence of acute diverticulitis.\par Left renal pelvis calculus (1.8 cm) and mild left hydronephrosis, similar to prior.\par \par MRI breast 4/26/21\par IMPRESSION:\par *Markedly limited exam.\par \par 1.  No evidence of implant rupture.\par 2.  The known left axillary malignancy and adjacent lymph nodes cannot be evaluated on this exam, due to extensive artifact in the region.\par \par \par Breast MR 3/29/21\par Stable left axillary mass and enlarged left axillary lymph node\par \par CT CAP 3/31/21\par  An ill-defined left axillary mass adjacent to a biopsy clip is again noted, measuring approximately 3.4 x 3.4 cm, previously 3.6 x 3.3 cm on 11/6/2020. An adjacent left axillary lymph node measures 1.5 x 1.2 cm (2:16), previously 1.4 x 1.2 cm. Multiple hypodense nodules in both lobes of the thyroid gland and in the thyroid isthmus, similar in appearance to the prior PET/CT dated 11/6/2020. 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

## 2022-09-14 NOTE — PHYSICAL EXAM
[Ambulatory and capable of all self care but unable to carry out any work activities] : Status 2- Ambulatory and capable of all self care but unable to carry out any work activities. Up and about more than 50% of waking hours [Thin] : thin [Normal] : affect appropriate [de-identified] : with mild dyspnea at rest [de-identified] : no lower extremity edema  [de-identified] : difficulty walking, comes to apt in wheelchair.

## 2022-09-15 DIAGNOSIS — Z51.89 ENCOUNTER FOR OTHER SPECIFIED AFTERCARE: ICD-10-CM

## 2022-09-15 LAB — IRON SERPL-MCNC: 31 UG/DL

## 2022-09-19 ENCOUNTER — RX RENEWAL (OUTPATIENT)
Age: 87
End: 2022-09-19

## 2022-10-04 ENCOUNTER — APPOINTMENT (OUTPATIENT)
Dept: INFUSION THERAPY | Facility: HOSPITAL | Age: 87
End: 2022-10-04

## 2022-10-05 ENCOUNTER — APPOINTMENT (OUTPATIENT)
Dept: FAMILY MEDICINE | Facility: CLINIC | Age: 87
End: 2022-10-05

## 2022-10-05 VITALS
RESPIRATION RATE: 20 BRPM | DIASTOLIC BLOOD PRESSURE: 80 MMHG | HEART RATE: 78 BPM | WEIGHT: 134 LBS | HEIGHT: 62 IN | BODY MASS INDEX: 24.66 KG/M2 | SYSTOLIC BLOOD PRESSURE: 140 MMHG

## 2022-10-05 PROCEDURE — 36415 COLL VENOUS BLD VENIPUNCTURE: CPT

## 2022-10-05 PROCEDURE — G0008: CPT

## 2022-10-05 PROCEDURE — 99214 OFFICE O/P EST MOD 30 MIN: CPT | Mod: 25

## 2022-10-05 PROCEDURE — 90662 IIV NO PRSV INCREASED AG IM: CPT

## 2022-10-05 RX ORDER — AMLODIPINE BESYLATE 2.5 MG/1
2.5 TABLET ORAL
Qty: 90 | Refills: 0 | Status: DISCONTINUED | COMMUNITY
Start: 2022-02-24 | End: 2022-10-05

## 2022-10-05 RX ORDER — PALBOCICLIB 100 MG/1
100 TABLET, FILM COATED ORAL DAILY
Qty: 3 | Refills: 5 | Status: DISCONTINUED | COMMUNITY
Start: 2022-08-05 | End: 2022-10-05

## 2022-10-05 RX ORDER — AMLODIPINE BESYLATE 5 MG/1
5 TABLET ORAL
Qty: 30 | Refills: 6 | Status: DISCONTINUED | COMMUNITY
Start: 2022-02-24 | End: 2022-10-05

## 2022-10-05 RX ORDER — FULVESTRANT 50 MG/ML
250 INJECTION INTRAMUSCULAR
Refills: 0 | Status: DISCONTINUED | COMMUNITY
Start: 2022-03-18 | End: 2022-10-05

## 2022-10-05 NOTE — HISTORY OF PRESENT ILLNESS
[de-identified] : Presents for BP check, labs, and general follow-up; also due for current flu vaccine - pt in agreement.  States fatigued and is concerned about her "blood count" - note received transfusion several weeks ago but CBC was not re-checked.  Also states BPs have been persistently elevated especially in the evenings.

## 2022-10-05 NOTE — ASSESSMENT
[FreeTextEntry1] : Hemodynamically stable\par Cardiac status stable with rhythm clinically regular\par BP suboptimal - with pt history of elevated BPs at home will add low dose Hctz and monitor (of note pt has Furosemide on her med list but she takes this for edema only 1-2 times per month)\par Lab profiles drawn in office and sent\par High dose flu vaccine given L deltoid

## 2022-10-06 LAB
25(OH)D3 SERPL-MCNC: 34.9 NG/ML
ALBUMIN SERPL ELPH-MCNC: 4 G/DL
ALP BLD-CCNC: 69 U/L
ALT SERPL-CCNC: 50 U/L
ANION GAP SERPL CALC-SCNC: 13 MMOL/L
AST SERPL-CCNC: 37 U/L
BASOPHILS # BLD AUTO: 0.07 K/UL
BASOPHILS NFR BLD AUTO: 0.9 %
BILIRUB SERPL-MCNC: 0.5 MG/DL
BUN SERPL-MCNC: 36 MG/DL
CALCIUM SERPL-MCNC: 9.5 MG/DL
CHLORIDE SERPL-SCNC: 105 MMOL/L
CHOLEST SERPL-MCNC: 194 MG/DL
CO2 SERPL-SCNC: 23 MMOL/L
CREAT SERPL-MCNC: 1.14 MG/DL
EGFR: 46 ML/MIN/1.73M2
EOSINOPHIL # BLD AUTO: 0.69 K/UL
EOSINOPHIL NFR BLD AUTO: 8.5 %
ESTIMATED AVERAGE GLUCOSE: 120 MG/DL
FOLATE SERPL-MCNC: 16.9 NG/ML
GLUCOSE SERPL-MCNC: 90 MG/DL
HBA1C MFR BLD HPLC: 5.8 %
HCT VFR BLD CALC: 31.4 %
HDLC SERPL-MCNC: 75 MG/DL
HGB BLD-MCNC: 9.4 G/DL
IMM GRANULOCYTES NFR BLD AUTO: 0.5 %
LDLC SERPL CALC-MCNC: 114 MG/DL
LYMPHOCYTES # BLD AUTO: 0.99 K/UL
LYMPHOCYTES NFR BLD AUTO: 12.2 %
MAN DIFF?: NORMAL
MCHC RBC-ENTMCNC: 27.2 PG
MCHC RBC-ENTMCNC: 29.9 GM/DL
MCV RBC AUTO: 91 FL
MONOCYTES # BLD AUTO: 0.67 K/UL
MONOCYTES NFR BLD AUTO: 8.2 %
NEUTROPHILS # BLD AUTO: 5.68 K/UL
NEUTROPHILS NFR BLD AUTO: 69.7 %
NONHDLC SERPL-MCNC: 118 MG/DL
PLATELET # BLD AUTO: 313 K/UL
POTASSIUM SERPL-SCNC: 4.1 MMOL/L
PROT SERPL-MCNC: 7.1 G/DL
RBC # BLD: 3.45 M/UL
RBC # FLD: 17.8 %
SODIUM SERPL-SCNC: 141 MMOL/L
T4 FREE SERPL-MCNC: 1.6 NG/DL
TRIGL SERPL-MCNC: 23 MG/DL
TSH SERPL-ACNC: 0.02 UIU/ML
VIT B12 SERPL-MCNC: >2000 PG/ML
WBC # FLD AUTO: 8.14 K/UL

## 2022-10-07 ENCOUNTER — APPOINTMENT (OUTPATIENT)
Dept: HEMATOLOGY ONCOLOGY | Facility: CLINIC | Age: 87
End: 2022-10-07

## 2022-10-07 VITALS
SYSTOLIC BLOOD PRESSURE: 128 MMHG | TEMPERATURE: 97.4 F | WEIGHT: 132.28 LBS | OXYGEN SATURATION: 95 % | HEART RATE: 78 BPM | HEIGHT: 61.97 IN | DIASTOLIC BLOOD PRESSURE: 69 MMHG | RESPIRATION RATE: 16 BRPM | BODY MASS INDEX: 24.34 KG/M2

## 2022-10-07 PROCEDURE — 99214 OFFICE O/P EST MOD 30 MIN: CPT

## 2022-10-07 NOTE — PHYSICAL EXAM
[Ambulatory and capable of all self care but unable to carry out any work activities] : Status 2- Ambulatory and capable of all self care but unable to carry out any work activities. Up and about more than 50% of waking hours [Thin] : thin [Normal] : affect appropriate [de-identified] : - [de-identified] : S/p bl mastectomies with reconstruction no palpable masses in breast; L axilla with palp density approx 4+ cm , mostly attached / fixed. No change

## 2022-10-07 NOTE — REVIEW OF SYSTEMS
[Negative] : Endocrine [FreeTextEntry2] : as above [FreeTextEntry6] : as above [FreeTextEntry9] : as above

## 2022-10-07 NOTE — HISTORY OF PRESENT ILLNESS
[Date: ____________] : Patient's last distress assessment performed on [unfilled]. [0 - No Distress] : Distress Level: 0 [de-identified] : The patient has a history of right breast cancer in 1965 for which she underwent what is described as a "Jeanette mastectomy" by Dr. Olivo at [facility] with "0/19 axillary lymph nodes" per patient's verbal report and followed expectantly.  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 Yu with a finding of "3 spots of DCIS, and a sentinel lymph node negative" per patient's verbal report; I do not have a copy of that report for my review.  The patient reports having undergone bilateral breast reconstruction at the same time to include what she describes as right "right cadaver tissue" in addition to an implant in the right breast, as well as a left breast implant.\par \par She was then well until approximately 1-2/19 when she describes transient pinching on her left reconstructed breast, centrally and predominantly to the lateral aspect.  She chose to follow this and then start to question whether she was "losing volume."  Through a friend/networking, she contacted Dr. Magan Carey who recommended a bilateral breast MRI, which was performed at [facility] on June 21, 2019 with a finding of a suspicious 5 cm irregular mass in the left axilla as well as further suspicious lymphadenopathy seen posterior to the mass with second-look ultrasound and core biopsy recommended as well as a PET-CT scan.  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  The patient then went on to see Dr. Carlos Pittman and had a PET-CT scan performed on July 5, 2019 with a finding of a mildly avid focus in the left thyroid lobe extending towards the isthmus, a 1.3 cm non-avid left thyroid lobe nodule; in the left axilla, there was left axillary soft tissue mass with irregular margins containing a biopsy clip measuring 3 x 2 cm with several adjacent subcentimeter left axillary lymph nodes measuring up to 1 cm; bilateral hip arthroplasties with heterogeneous FDG avidity adjacent to the proximal portions bilaterally on the right with an SUV of 13.8 on the left and SUV of 9.4 with a comment, this area is limited in evaluation due to beam hardening artifact; there were degenerative changes in the lower spine with mild areas of FDG avidity, multilevel, non-FDG avid compression fracture of L4 unchanged since 06/2017.\par \par I saw her in initial consultation on 716/19 and subsequently had requested a review of the pathology and it turned our it was ER+ / MD neg. We reviewed her scans at tumor board and the group agreed it was unresectable at this time. I called the pt and informed her of the above change in the pathology and that I recommended neoadjuvant anti-estrogen therapy with an aromatase inhibitor such as anastrozole. She agreed to proceed and started taking anastrozole in the very first days of 7/19. \par \par Seen in 11/19. Had evidence of response in her left ax mass. \par \par Seen 3/20 and she informed me she stopped taking anstrozole end 8/19 secondary to concerns re bone toxicity. She had been seeing an "MD PhD" on [place] who had been infusing "ozone therapy in an electrolyte bag" IV, B17, other anti-inflammatory herbs. She declined to provide his name as " rather not say as he does not want people looking over his back". On exam she had what seemed like a further response. I recommended she re-consult / see Dr. Pittman to consider potential surgery vs XRT as primary local treatment. I advised that as she has been under the care of another physician that she follow up with him. I offered her to see me  as needed in the future. She was agreeable.\par \par In the interim she saw Dr. Pittman 7/6/20. WHen questioned why she did not do so sooner she did not have an answer to provide. Dr Pittman sent her for a B/L breast MRI 7/27/20 which showed :\par \par In the left axilla, again noted is an irregularly-shaped enhancing mass compatible with the known recurrent malignancy. Susceptibility artifact is noted within the mass, a biopsy marker. Overall, the mass measures approximately 6.9 cm AP by 3.9 TV x 6.2 cm CC. The mass appears more draped along the implant with extension along its superior lateral margin. The previously noted additional axillary lymph nodes medial to the mass appear relatively stable in size.\par \par No right axillary lymphadenopathy. No internal mammary lymphadenopathy.\par \par IMPRESSION:\par 1.  The biopsy-proven recurrent malignancy in the left axillary region has increased in size since the prior breast MRI and prior ultrasound, with maximal dimension now measuring 6.9 cm AP.\par 2.  No other suspicious enhancement in either breast.\par 3.  Silicone implants are intact.\par \par The pt now reports she stopped getting ozone therapy in 3/20. \par \par She progressed after she stopped anastrozole and ozone therapy. \par Restarted anastrozole in early August as well as ozone/vitamin/mineral therapy. \par \par Repeat MRI 10/27/20 revealed POD in L breast / axillary / CW mass. PET CT did not show any distant mets. \par \par Started fulvestrant on 11/24/20.   \par \par POD. Started on Letrozole on 7/25/22 and Palbociclib on 8/5/22.\par \par Tolerating letrozole well, with no treatment related side effects. \par \par After taking palbo for three weeks she noted progressive dyspnea first with exertion and now at rest.  She was seen in office last week, CTA was done and came back negative.  She came off palbo with resolution of symtoms. [de-identified] : She has baseline fatigue and arthralgias with no change. She has baseline MIDD[place]. She has baseline back pain from chronic spinal stenosis w/o change. \par \par She denies any other complaints.  \par \par Pt noted she has started weekly Vit C infusion ("25 grams") , vit B17 and "other things" with a "DR. Velez". \par \par PET/CT, 7/22/22- IMPRESSION: Compared to FDG-PET/CT scan dated 11/6/2020:\par \par 1. Overall unchanged size and FDG avidity of left axillary soft tissue mass and adjacent left axillary lymph node, consistent with known recurrent disease.\par 2. Unchanged FDG avid left thyroid nodule.\par 3. Nonspecific bilateral periprosthetic hip joint FDG avidity, not significantly changed. If infection is a clinical concern, labeled WBC/bone marrow imaging may be performed.\par \par US L axilla 7/7/22\par Previously noted irregular hypoechoic mass, biopsy-proven invasive poorly \par differentiated carcinoma, is reidentified. This is now seen to extend to \par the skin surface. Associated subcutaneous edema is noted This currently \par measures at least 2.8 x 0.9 x 1.5 cm with longest oblique dimension of \par 3.7 cm. Accurate measurement is limited due to poor differentiation of \par the border.\par \par Three Adjacent/contiguous hypoechoic masses, versus one large mass is \par reidentified again thought possible abnormal lymph nodes within the \par axilla. These currently measure 1.2 x 0.7 x 0.9 cm, 6 x 4 x 5 mm and \par approximately 5 mm representing interval change compared to prior at \par which time one hypoechoic mass measuring 1.1 cm was noted.\par IMPRESSION:\par Known left axillary malignancy with interval change in size and extension \par as above\par Left axillary possible abnormal lymph nodes reidentified with interval \par change in size/number as above.\par Clinical follow-up and management of biopsy-proven recurrence is \par recommended. The patient states she is for clinical follow-up in \par approximately 5 days.\par RECOMMENDATION:  Clinical follow up\par \par US L axilla 1/4/22\par FINDINGS:\par In the left axilla a 1.4 cm hypoechoic mass with no discernible fatty \par hilum is seen slightly decreased in size from prior study. This is \par suspicious for an abnormal axillary lymph node.\par \par Also noted in the left axilla is a well-defined 2.1 x 1.9 x 2.5 cm \par hypoechoic mass which is unchanged from prior study dated 9/16/2021. This \par has been biopsy-proven to be invasive poorly differentiated carcinoma \par with lobular phenotype.\par \par Also noted in the left axilla is a stable 1.1 cm hypoechoic mass with no \par discernible fatty hilum suspicious for abnormal axillary lymph node.\par \par \par IMPRESSION:\par \par Stable findings noted in the left axilla when compared to prior study \par dated 9/16/2021.\par \par \par \par US breast  9/16/21\par \par IMPRESSION:\par Relatively stable appearing amorphous mass in the left axilla. 2 lymph nodes in the left axilla are slightly increased in size. Malignancy is possible but recent: Vaccination could produce similar result. Clinical correlation and if indicated 6 month follow-up directed ultrasound might be appropriate.\par \par RECOMMENDATION:  Clinical follow up.\par \par DEXA 9/16/21\par Impression:\par Findings are normal in the lumbar spine and osteoporosis in the left forearm.\par \par \par CT CAP 5/19/21\par IMPRESSION:\par Lung base ground glass opacities, possibly infectious or inflammatory.\par Colonic diverticulosis without CT evidence of acute diverticulitis.\par Left renal pelvis calculus (1.8 cm) and mild left hydronephrosis, similar to prior.\par \par MRI breast 4/26/21\par IMPRESSION:\par *Markedly limited exam.\par \par 1.  No evidence of implant rupture.\par 2.  The known left axillary malignancy and adjacent lymph nodes cannot be evaluated on this exam, due to extensive artifact in the region.\par \par \par Breast MR 3/29/21\par Stable left axillary mass and enlarged left axillary lymph node\par \par CT CAP 3/31/21\par  An ill-defined left axillary mass adjacent to a biopsy clip is again noted, measuring approximately 3.4 x 3.4 cm, previously 3.6 x 3.3 cm on 11/6/2020. An adjacent left axillary lymph node measures 1.5 x 1.2 cm (2:16), previously 1.4 x 1.2 cm. Multiple hypodense nodules in both lobes of the thyroid gland and in the thyroid isthmus, similar in appearance to the prior PET/CT dated 11/6/2020. 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

## 2022-10-24 ENCOUNTER — RX RENEWAL (OUTPATIENT)
Age: 87
End: 2022-10-24

## 2022-11-08 ENCOUNTER — OUTPATIENT (OUTPATIENT)
Dept: OUTPATIENT SERVICES | Facility: HOSPITAL | Age: 87
LOS: 1 days | Discharge: HOME | End: 2022-11-08

## 2022-11-08 DIAGNOSIS — Z90.49 ACQUIRED ABSENCE OF OTHER SPECIFIED PARTS OF DIGESTIVE TRACT: Chronic | ICD-10-CM

## 2022-11-08 DIAGNOSIS — C50.919 MALIGNANT NEOPLASM OF UNSPECIFIED SITE OF UNSPECIFIED FEMALE BREAST: ICD-10-CM

## 2022-11-08 DIAGNOSIS — Z98.890 OTHER SPECIFIED POSTPROCEDURAL STATES: Chronic | ICD-10-CM

## 2022-11-08 DIAGNOSIS — Z96.649 PRESENCE OF UNSPECIFIED ARTIFICIAL HIP JOINT: Chronic | ICD-10-CM

## 2022-11-08 DIAGNOSIS — Z95.0 PRESENCE OF CARDIAC PACEMAKER: Chronic | ICD-10-CM

## 2022-11-08 DIAGNOSIS — Z87.39 PERSONAL HISTORY OF OTHER DISEASES OF THE MUSCULOSKELETAL SYSTEM AND CONNECTIVE TISSUE: Chronic | ICD-10-CM

## 2022-11-11 ENCOUNTER — APPOINTMENT (OUTPATIENT)
Dept: HEMATOLOGY ONCOLOGY | Facility: CLINIC | Age: 87
End: 2022-11-11

## 2022-11-11 ENCOUNTER — OUTPATIENT (OUTPATIENT)
Dept: OUTPATIENT SERVICES | Facility: HOSPITAL | Age: 87
LOS: 1 days | End: 2022-11-11
Payer: MEDICARE

## 2022-11-11 ENCOUNTER — APPOINTMENT (OUTPATIENT)
Dept: ULTRASOUND IMAGING | Facility: IMAGING CENTER | Age: 87
End: 2022-11-11

## 2022-11-11 VITALS
BODY MASS INDEX: 23.69 KG/M2 | HEIGHT: 61.97 IN | RESPIRATION RATE: 16 BRPM | OXYGEN SATURATION: 96 % | HEART RATE: 69 BPM | TEMPERATURE: 96.8 F | DIASTOLIC BLOOD PRESSURE: 68 MMHG | WEIGHT: 128.75 LBS | SYSTOLIC BLOOD PRESSURE: 155 MMHG

## 2022-11-11 DIAGNOSIS — R59.0 LOCALIZED ENLARGED LYMPH NODES: ICD-10-CM

## 2022-11-11 DIAGNOSIS — Z98.890 OTHER SPECIFIED POSTPROCEDURAL STATES: Chronic | ICD-10-CM

## 2022-11-11 DIAGNOSIS — C50.919 MALIGNANT NEOPLASM OF UNSPECIFIED SITE OF UNSPECIFIED FEMALE BREAST: ICD-10-CM

## 2022-11-11 DIAGNOSIS — Z90.49 ACQUIRED ABSENCE OF OTHER SPECIFIED PARTS OF DIGESTIVE TRACT: Chronic | ICD-10-CM

## 2022-11-11 DIAGNOSIS — Z96.649 PRESENCE OF UNSPECIFIED ARTIFICIAL HIP JOINT: Chronic | ICD-10-CM

## 2022-11-11 DIAGNOSIS — Z95.0 PRESENCE OF CARDIAC PACEMAKER: Chronic | ICD-10-CM

## 2022-11-11 DIAGNOSIS — Z87.39 PERSONAL HISTORY OF OTHER DISEASES OF THE MUSCULOSKELETAL SYSTEM AND CONNECTIVE TISSUE: Chronic | ICD-10-CM

## 2022-11-11 PROCEDURE — 99214 OFFICE O/P EST MOD 30 MIN: CPT

## 2022-11-11 PROCEDURE — 76882 US LMTD JT/FCL EVL NVASC XTR: CPT

## 2022-11-11 PROCEDURE — 76882 US LMTD JT/FCL EVL NVASC XTR: CPT | Mod: 26,LT

## 2022-11-11 NOTE — PHYSICAL EXAM
[Ambulatory and capable of all self care but unable to carry out any work activities] : Status 2- Ambulatory and capable of all self care but unable to carry out any work activities. Up and about more than 50% of waking hours [Thin] : thin [Normal] : affect appropriate [de-identified] : S/p bl mastectomies with reconstruction no discrete palpable masses in breast; L axilla with palp density approx 4+ cm , mostly attached / fixed. Larger than last visit per Dr. Valentin who also examined the patient.  [de-identified] : uses cane to ambulate

## 2022-11-11 NOTE — HISTORY OF PRESENT ILLNESS
[Date: ____________] : Patient's last distress assessment performed on [unfilled]. [0 - No Distress] : Distress Level: 0 [de-identified] : The patient has a history of right breast cancer in 1965 for which she underwent what is described as a "Jeanette mastectomy" by Dr. Olivo at [facility] with "0/19 axillary lymph nodes" per patient's verbal report and followed expectantly.  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 Yu with a finding of "3 spots of DCIS, and a sentinel lymph node negative" per patient's verbal report; I do not have a copy of that report for my review.  The patient reports having undergone bilateral breast reconstruction at the same time to include what she describes as right "right cadaver tissue" in addition to an implant in the right breast, as well as a left breast implant.\par \par She was then well until approximately 1-2/19 when she describes transient pinching on her left reconstructed breast, centrally and predominantly to the lateral aspect.  She chose to follow this and then start to question whether she was "losing volume."  Through a friend/networking, she contacted Dr. Magan Carey who recommended a bilateral breast MRI, which was performed at [facility] on June 21, 2019 with a finding of a suspicious 5 cm irregular mass in the left axilla as well as further suspicious lymphadenopathy seen posterior to the mass with second-look ultrasound and core biopsy recommended as well as a PET-CT scan.  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  The patient then went on to see Dr. Carlos Pittman and had a PET-CT scan performed on July 5, 2019 with a finding of a mildly avid focus in the left thyroid lobe extending towards the isthmus, a 1.3 cm non-avid left thyroid lobe nodule; in the left axilla, there was left axillary soft tissue mass with irregular margins containing a biopsy clip measuring 3 x 2 cm with several adjacent subcentimeter left axillary lymph nodes measuring up to 1 cm; bilateral hip arthroplasties with heterogeneous FDG avidity adjacent to the proximal portions bilaterally on the right with an SUV of 13.8 on the left and SUV of 9.4 with a comment, this area is limited in evaluation due to beam hardening artifact; there were degenerative changes in the lower spine with mild areas of FDG avidity, multilevel, non-FDG avid compression fracture of L4 unchanged since 06/2017.\par \par I saw her in initial consultation on 716/19 and subsequently had requested a review of the pathology and it turned our it was ER+ / OK neg. We reviewed her scans at tumor board and the group agreed it was unresectable at this time. I called the pt and informed her of the above change in the pathology and that I recommended neoadjuvant anti-estrogen therapy with an aromatase inhibitor such as anastrozole. She agreed to proceed and started taking anastrozole in the very first days of 7/19. \par \par Seen in 11/19. Had evidence of response in her left ax mass. \par \par Seen 3/20 and she informed me she stopped taking anstrozole end 8/19 secondary to concerns re bone toxicity. She had been seeing an "MD PhD" on [place] who had been infusing "ozone therapy in an electrolyte bag" IV, B17, other anti-inflammatory herbs. She declined to provide his name as " rather not say as he does not want people looking over his back". On exam she had what seemed like a further response. I recommended she re-consult / see Dr. Pittman to consider potential surgery vs XRT as primary local treatment. I advised that as she has been under the care of another physician that she follow up with him. I offered her to see me  as needed in the future. She was agreeable.\par \par In the interim she saw Dr. Pittman 7/6/20. WHen questioned why she did not do so sooner she did not have an answer to provide. Dr Pittman sent her for a B/L breast MRI 7/27/20 which showed :\par \par In the left axilla, again noted is an irregularly-shaped enhancing mass compatible with the known recurrent malignancy. Susceptibility artifact is noted within the mass, a biopsy marker. Overall, the mass measures approximately 6.9 cm AP by 3.9 TV x 6.2 cm CC. The mass appears more draped along the implant with extension along its superior lateral margin. The previously noted additional axillary lymph nodes medial to the mass appear relatively stable in size.\par \par No right axillary lymphadenopathy. No internal mammary lymphadenopathy.\par \par IMPRESSION:\par 1.  The biopsy-proven recurrent malignancy in the left axillary region has increased in size since the prior breast MRI and prior ultrasound, with maximal dimension now measuring 6.9 cm AP.\par 2.  No other suspicious enhancement in either breast.\par 3.  Silicone implants are intact.\par \par The pt now reports she stopped getting ozone therapy in 3/20. \par \par She progressed after she stopped anastrozole and ozone therapy. \par Restarted anastrozole in early August as well as ozone/vitamin/mineral therapy. \par \par Repeat MRI 10/27/20 revealed POD in L breast / axillary / CW mass. PET CT did not show any distant mets. \par \par Started fulvestrant on 11/24/20.   \par \par POD. Started on Letrozole on 7/25/22 and Palbociclib on 8/5/22.\par \par Tolerating letrozole well, with no treatment related side effects. \par \par After taking palbo for three weeks she noted progressive dyspnea first with exertion and now at rest.  She was seen in office last week, CTA was done and came back negative.  She came off palbo with resolution of symtoms. [de-identified] : She has baseline fatigue and arthralgias with no change. She has baseline [place]. She has baseline back pain from chronic spinal stenosis w/o change. \par \par She came in today with c/o enlarging left axillary mass.  She states last week she noticed the mass has enlarged.  She denies any pain, swelling, numbness of arm.  She came in for further evaluation. \par \par She denies any other complaints, except for recent weight loss of 3 lbs, however she has stopped eating meat and sugars.  \par \par She states she is tolerating letrozole well with no complaints. \par \par PET/CT, 7/22/22- IMPRESSION: Compared to FDG-PET/CT scan dated 11/6/2020:\par \par 1. Overall unchanged size and FDG avidity of left axillary soft tissue mass and adjacent left axillary lymph node, consistent with known recurrent disease.\par 2. Unchanged FDG avid left thyroid nodule.\par 3. Nonspecific bilateral periprosthetic hip joint FDG avidity, not significantly changed. If infection is a clinical concern, labeled WBC/bone marrow imaging may be performed.\par \par US L axilla 7/7/22\par Previously noted irregular hypoechoic mass, biopsy-proven invasive poorly \par differentiated carcinoma, is reidentified. This is now seen to extend to \par the skin surface. Associated subcutaneous edema is noted This currently \par measures at least 2.8 x 0.9 x 1.5 cm with longest oblique dimension of \par 3.7 cm. Accurate measurement is limited due to poor differentiation of \par the border.\par \par Three Adjacent/contiguous hypoechoic masses, versus one large mass is \par reidentified again thought possible abnormal lymph nodes within the \par axilla. These currently measure 1.2 x 0.7 x 0.9 cm, 6 x 4 x 5 mm and \par approximately 5 mm representing interval change compared to prior at \par which time one hypoechoic mass measuring 1.1 cm was noted.\par IMPRESSION:\par Known left axillary malignancy with interval change in size and extension \par as above\par Left axillary possible abnormal lymph nodes reidentified with interval \par change in size/number as above.\par Clinical follow-up and management of biopsy-proven recurrence is \par recommended. The patient states she is for clinical follow-up in \par approximately 5 days.\par RECOMMENDATION:  Clinical follow up\par \par US L axilla 1/4/22\par FINDINGS:\par In the left axilla a 1.4 cm hypoechoic mass with no discernible fatty \par hilum is seen slightly decreased in size from prior study. This is \par suspicious for an abnormal axillary lymph node.\par \par Also noted in the left axilla is a well-defined 2.1 x 1.9 x 2.5 cm \par hypoechoic mass which is unchanged from prior study dated 9/16/2021. This \par has been biopsy-proven to be invasive poorly differentiated carcinoma \par with lobular phenotype.\par \par Also noted in the left axilla is a stable 1.1 cm hypoechoic mass with no \par discernible fatty hilum suspicious for abnormal axillary lymph node.\par \par \par IMPRESSION:\par \par Stable findings noted in the left axilla when compared to prior study \par dated 9/16/2021.\par \par \par \par US breast  9/16/21\par \par IMPRESSION:\par Relatively stable appearing amorphous mass in the left axilla. 2 lymph nodes in the left axilla are slightly increased in size. Malignancy is possible but recent: Vaccination could produce similar result. Clinical correlation and if indicated 6 month follow-up directed ultrasound might be appropriate.\par \par RECOMMENDATION:  Clinical follow up.\par \par DEXA 9/16/21\par Impression:\par Findings are normal in the lumbar spine and osteoporosis in the left forearm.\par \par \par CT CAP 5/19/21\par IMPRESSION:\par Lung base ground glass opacities, possibly infectious or inflammatory.\par Colonic diverticulosis without CT evidence of acute diverticulitis.\par Left renal pelvis calculus (1.8 cm) and mild left hydronephrosis, similar to prior.\par \par MRI breast 4/26/21\par IMPRESSION:\par *Markedly limited exam.\par \par 1.  No evidence of implant rupture.\par 2.  The known left axillary malignancy and adjacent lymph nodes cannot be evaluated on this exam, due to extensive artifact in the region.\par \par \par Breast MR 3/29/21\par Stable left axillary mass and enlarged left axillary lymph node\par \par CT CAP 3/31/21\par  An ill-defined left axillary mass adjacent to a biopsy clip is again noted, measuring approximately 3.4 x 3.4 cm, previously 3.6 x 3.3 cm on 11/6/2020. An adjacent left axillary lymph node measures 1.5 x 1.2 cm (2:16), previously 1.4 x 1.2 cm. Multiple hypodense nodules in both lobes of the thyroid gland and in the thyroid isthmus, similar in appearance to the prior PET/CT dated 11/6/2020. 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

## 2022-11-30 ENCOUNTER — APPOINTMENT (OUTPATIENT)
Dept: NUCLEAR MEDICINE | Facility: CLINIC | Age: 87
End: 2022-11-30

## 2022-11-30 ENCOUNTER — OUTPATIENT (OUTPATIENT)
Dept: OUTPATIENT SERVICES | Facility: HOSPITAL | Age: 87
LOS: 1 days | End: 2022-11-30
Payer: MEDICARE

## 2022-11-30 DIAGNOSIS — Z90.49 ACQUIRED ABSENCE OF OTHER SPECIFIED PARTS OF DIGESTIVE TRACT: Chronic | ICD-10-CM

## 2022-11-30 DIAGNOSIS — Z95.0 PRESENCE OF CARDIAC PACEMAKER: Chronic | ICD-10-CM

## 2022-11-30 DIAGNOSIS — Z96.649 PRESENCE OF UNSPECIFIED ARTIFICIAL HIP JOINT: Chronic | ICD-10-CM

## 2022-11-30 DIAGNOSIS — Z98.890 OTHER SPECIFIED POSTPROCEDURAL STATES: Chronic | ICD-10-CM

## 2022-11-30 DIAGNOSIS — Z87.39 PERSONAL HISTORY OF OTHER DISEASES OF THE MUSCULOSKELETAL SYSTEM AND CONNECTIVE TISSUE: Chronic | ICD-10-CM

## 2022-11-30 DIAGNOSIS — C50.919 MALIGNANT NEOPLASM OF UNSPECIFIED SITE OF UNSPECIFIED FEMALE BREAST: ICD-10-CM

## 2022-11-30 PROCEDURE — A9552: CPT

## 2022-11-30 PROCEDURE — 78815 PET IMAGE W/CT SKULL-THIGH: CPT | Mod: 26,PS,MH

## 2022-11-30 PROCEDURE — 78815 PET IMAGE W/CT SKULL-THIGH: CPT

## 2022-12-19 ENCOUNTER — RX RENEWAL (OUTPATIENT)
Age: 87
End: 2022-12-19

## 2022-12-21 ENCOUNTER — APPOINTMENT (OUTPATIENT)
Dept: FAMILY MEDICINE | Facility: CLINIC | Age: 87
End: 2022-12-21

## 2022-12-21 VITALS — DIASTOLIC BLOOD PRESSURE: 74 MMHG | SYSTOLIC BLOOD PRESSURE: 132 MMHG

## 2022-12-21 DIAGNOSIS — M48.062 SPINAL STENOSIS, LUMBAR REGION WITH NEUROGENIC CLAUDICATION: ICD-10-CM

## 2022-12-21 PROCEDURE — 96372 THER/PROPH/DIAG INJ SC/IM: CPT

## 2022-12-21 RX ORDER — METHYLPRED ACET/NACL,ISO-OS/PF 40 MG/ML
40 VIAL (ML) INJECTION
Qty: 1 | Refills: 0 | Status: COMPLETED | OUTPATIENT
Start: 2022-12-21

## 2022-12-21 RX ADMIN — METHYLPREDNISOLONE ACETATE 0 MG/ML: 40 INJECTION, SUSPENSION INTRA-ARTICULAR; INTRALESIONAL; INTRAMUSCULAR; SOFT TISSUE at 00:00

## 2022-12-23 ENCOUNTER — APPOINTMENT (OUTPATIENT)
Dept: HEMATOLOGY ONCOLOGY | Facility: CLINIC | Age: 87
End: 2022-12-23

## 2022-12-23 VITALS
OXYGEN SATURATION: 98 % | BODY MASS INDEX: 23.37 KG/M2 | WEIGHT: 126.99 LBS | RESPIRATION RATE: 16 BRPM | HEART RATE: 71 BPM | TEMPERATURE: 97 F | DIASTOLIC BLOOD PRESSURE: 68 MMHG | SYSTOLIC BLOOD PRESSURE: 157 MMHG | HEIGHT: 61.97 IN

## 2022-12-23 DIAGNOSIS — R59.0 LOCALIZED ENLARGED LYMPH NODES: ICD-10-CM

## 2022-12-23 PROCEDURE — 99214 OFFICE O/P EST MOD 30 MIN: CPT

## 2022-12-27 PROBLEM — R59.0 AXILLARY LYMPHADENOPATHY: Status: ACTIVE | Noted: 2019-06-26

## 2022-12-27 NOTE — HISTORY OF PRESENT ILLNESS
[Date: ____________] : Patient's last distress assessment performed on [unfilled]. [0 - No Distress] : Distress Level: 0 [de-identified] : The patient has a history of right breast cancer in 1965 for which she underwent what is described as a "Jeanette mastectomy" by Dr. Olivo at [facility] with "0/19 axillary lymph nodes" per patient's verbal report and followed expectantly.  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 Yu with a finding of "3 spots of DCIS, and a sentinel lymph node negative" per patient's verbal report; I do not have a copy of that report for my review.  The patient reports having undergone bilateral breast reconstruction at the same time to include what she describes as right "right cadaver tissue" in addition to an implant in the right breast, as well as a left breast implant.\par \par She was then well until approximately 1-2/19 when she describes transient pinching on her left reconstructed breast, centrally and predominantly to the lateral aspect.  She chose to follow this and then start to question whether she was "losing volume."  Through a friend/networking, she contacted Dr. Magan Carey who recommended a bilateral breast MRI, which was performed at [facility] on June 21, 2019 with a finding of a suspicious 5 cm irregular mass in the left axilla as well as further suspicious lymphadenopathy seen posterior to the mass with second-look ultrasound and core biopsy recommended as well as a PET-CT scan.  The patient went on to have an ultrasound of the left axilla with an ultrasound-guided core biopsy, with a finding of a heterogeneous 4.5 cm mass seen corresponding to the finding seen on the breast MRI with abnormal lymph nodes seen posterior to the mass measuring 8 mm with an ultrasound-guided core biopsy on that same date demonstrating invasive poorly differentiated mammary carcinoma with lobular phenotype, [place] score 8/9, with invasive tumor measuring at least 8 mm on the core biopsy specimen, ER negative, KS negative, HER-2/jeni negative, and a comment that "no focal residual lymphoid like tissue (no germinal center noted) identified; the lesion may represent a completely effaced lymph node or brisk lymphocytic response to tumor; clinical pathologic-radiologic correlation recommended”.  The patient then went on to see Dr. Carlos Pittman and had a PET-CT scan performed on July 5, 2019 with a finding of a mildly avid focus in the left thyroid lobe extending towards the isthmus, a 1.3 cm non-avid left thyroid lobe nodule; in the left axilla, there was left axillary soft tissue mass with irregular margins containing a biopsy clip measuring 3 x 2 cm with several adjacent subcentimeter left axillary lymph nodes measuring up to 1 cm; bilateral hip arthroplasties with heterogeneous FDG avidity adjacent to the proximal portions bilaterally on the right with an SUV of 13.8 on the left and SUV of 9.4 with a comment, this area is limited in evaluation due to beam hardening artifact; there were degenerative changes in the lower spine with mild areas of FDG avidity, multilevel, non-FDG avid compression fracture of L4 unchanged since 06/2017.\par \par I saw her in initial consultation on 716/19 and subsequently had requested a review of the pathology and it turned our it was ER+ / KS neg. We reviewed her scans at tumor board and the group agreed it was unresectable at this time. I called the pt and informed her of the above change in the pathology and that I recommended neoadjuvant anti-estrogen therapy with an aromatase inhibitor such as anastrozole. She agreed to proceed and started taking anastrozole in the very first days of 7/19. \par \par Seen in 11/19. Had evidence of response in her left ax mass. \par \par Seen 3/20 and she informed me she stopped taking anstrozole end 8/19 secondary to concerns re bone toxicity. She had been seeing an "MD PhD" on [place] who had been infusing "ozone therapy in an electrolyte bag" IV, B17, other anti-inflammatory herbs. She declined to provide his name as " rather not say as he does not want people looking over his back". On exam she had what seemed like a further response. I recommended she re-consult / see Dr. Pittman to consider potential surgery vs XRT as primary local treatment. I advised that as she has been under the care of another physician that she follow up with him. I offered her to see me  as needed in the future. She was agreeable.\par \par In the interim she saw Dr. Pittman 7/6/20. WHen questioned why she did not do so sooner she did not have an answer to provide. Dr Pittman sent her for a B/L breast MRI 7/27/20 which showed :\par \par In the left axilla, again noted is an irregularly-shaped enhancing mass compatible with the known recurrent malignancy. Susceptibility artifact is noted within the mass, a biopsy marker. Overall, the mass measures approximately 6.9 cm AP by 3.9 TV x 6.2 cm CC. The mass appears more draped along the implant with extension along its superior lateral margin. The previously noted additional axillary lymph nodes medial to the mass appear relatively stable in size.\par \par No right axillary lymphadenopathy. No internal mammary lymphadenopathy.\par \par IMPRESSION:\par 1.  The biopsy-proven recurrent malignancy in the left axillary region has increased in size since the prior breast MRI and prior ultrasound, with maximal dimension now measuring 6.9 cm AP.\par 2.  No other suspicious enhancement in either breast.\par 3.  Silicone implants are intact.\par \par The pt now reports she stopped getting ozone therapy in 3/20. \par \par She progressed after she stopped anastrozole and ozone therapy. \par Restarted anastrozole in early August as well as ozone/vitamin/mineral therapy. \par \par Repeat MRI 10/27/20 revealed POD in L breast / axillary / CW mass. PET CT did not show any distant mets. \par \par Started fulvestrant on 11/24/20.   \par \par POD. Started on Letrozole on 7/25/22 and Palbociclib on 8/5/22.\par \par Tolerating letrozole well, with no treatment related side effects. \par \par After taking palbo for three weeks she noted progressive dyspnea first with exertion and now at rest.  She was seen in office last week, CTA was done and came back negative.  She came off palbo with resolution of symptoms. [de-identified] : She has baseline fatigue and arthralgias with no change. \par \par She has baseline [place]. \par \par She has baseline back pain from chronic spinal stenosis - had worsened in the interim and got steroid injection with sig improvement.\par \par She c/o enlarging L ax mass. \par \par She c/o wt loss - now approx 10 lbs, however she has stopped eating meat and sugars.  \par \par She states she is tolerating letrozole well with no complaints. \par \par PET/CT, 11/30/22\par IMPRESSION: Compared to FDG-PET/CT scan dated 7/22/2022:\par \par 1. Overall not significantly changed size and FDG avidity of left \par axillary soft tissue mass and adjacent left axillary lymph node, \par consistent with known recurrent disease.\par \par 2. Unchanged FDG avid left thyroid nodule.\par \par 3. Nonspecific new FDG avidity in the right colon and mild FDG avidity in \par the sigmoid colon without CT correlate. Please correlate clinically or \par with colonoscopy or CT colonography as indicated.\par \par 4. Nonspecific bilateral periprosthetic joint avidity, not significantly \par changed. Again, if infection is a clinical concern, labeled WBC/bone \par marrow imaging may be performed.\par \par US L ax 11/11/22\par IMPRESSION:\par \par Known left axillary malignancy with relatively stable size, although \par evaluation is suboptimal given technical limitations and ill-defined \par appearance of the known malignant mass.\par \par Mass extending into the skin and additional left axillary lymph nodes are \par increased in size suggestive of progression of disease. Clinical \par follow-up and management is recommended. If clinical management will be \par altered warranted, biopsy of the superficial mass or additional abnormal \par lymph nodes may be performed.\par

## 2022-12-27 NOTE — PHYSICAL EXAM
[Ambulatory and capable of all self care but unable to carry out any work activities] : Status 2- Ambulatory and capable of all self care but unable to carry out any work activities. Up and about more than 50% of waking hours [Thin] : thin [Normal] : affect appropriate [de-identified] : S/p bl mastectomies with reconstruction no discrete palpable masses in breast; L axilla with palp density approx 4-5cm+ cm , mostly attached / fixed. Larger than last visit

## 2022-12-27 NOTE — REASON FOR VISIT
[Follow-Up Visit] : a follow-up [Urgent Visit] : an urgent  [FreeTextEntry2] : Breast Cancer/abd mass non-distended bilaterally

## 2022-12-30 ENCOUNTER — RX RENEWAL (OUTPATIENT)
Age: 87
End: 2022-12-30

## 2023-01-04 ENCOUNTER — OUTPATIENT (OUTPATIENT)
Dept: OUTPATIENT SERVICES | Facility: HOSPITAL | Age: 88
LOS: 1 days | Discharge: HOME | End: 2023-01-04
Payer: MEDICARE

## 2023-01-04 ENCOUNTER — APPOINTMENT (OUTPATIENT)
Dept: RADIATION ONCOLOGY | Facility: CLINIC | Age: 88
End: 2023-01-04
Payer: MEDICARE

## 2023-01-04 VITALS
HEIGHT: 61 IN | RESPIRATION RATE: 17 BRPM | OXYGEN SATURATION: 98 % | DIASTOLIC BLOOD PRESSURE: 60 MMHG | TEMPERATURE: 97.7 F | SYSTOLIC BLOOD PRESSURE: 122 MMHG | HEART RATE: 65 BPM | BODY MASS INDEX: 23.03 KG/M2 | WEIGHT: 122 LBS

## 2023-01-04 DIAGNOSIS — Z95.0 PRESENCE OF CARDIAC PACEMAKER: Chronic | ICD-10-CM

## 2023-01-04 DIAGNOSIS — Z96.649 PRESENCE OF UNSPECIFIED ARTIFICIAL HIP JOINT: Chronic | ICD-10-CM

## 2023-01-04 DIAGNOSIS — Z98.890 OTHER SPECIFIED POSTPROCEDURAL STATES: Chronic | ICD-10-CM

## 2023-01-04 DIAGNOSIS — Z87.39 PERSONAL HISTORY OF OTHER DISEASES OF THE MUSCULOSKELETAL SYSTEM AND CONNECTIVE TISSUE: Chronic | ICD-10-CM

## 2023-01-04 DIAGNOSIS — Z90.49 ACQUIRED ABSENCE OF OTHER SPECIFIED PARTS OF DIGESTIVE TRACT: Chronic | ICD-10-CM

## 2023-01-04 PROCEDURE — 99204 OFFICE O/P NEW MOD 45 MIN: CPT | Mod: GC,25

## 2023-01-04 NOTE — OB/GYN HISTORY
[Definite:  ___ (Date)] : the last menstrual period was [unfilled] [Currently In Menopause] : currently in menopause [___] : Full Term: [unfilled]

## 2023-01-05 PROCEDURE — 77263 THER RADIOLOGY TX PLNG CPLX: CPT

## 2023-01-06 ENCOUNTER — NON-APPOINTMENT (OUTPATIENT)
Age: 88
End: 2023-01-06

## 2023-01-06 PROCEDURE — 77290 THER RAD SIMULAJ FIELD CPLX: CPT | Mod: 26

## 2023-01-06 PROCEDURE — 77333 RADIATION TREATMENT AID(S): CPT | Mod: 26

## 2023-01-09 NOTE — HISTORY OF PRESENT ILLNESS
[FreeTextEntry1] : Ms. Reyes is 87 yo F with PMH of right breast cancer s/p mastectomy and negative ALND in 1965 and DCIS of left breast s/p left mastectomy with negative SLNB in 2008. She developed left axillary recurrence in 2019 managed mainly with endocrine therapy. She is referred for consideration of radiation therapy. \par \par Right sided breast cancer treated with radical mastectomy in 1965. \par \par Left sided DCIS in 2008, treated with modified radical mastectomy. \par \par She developed left sided symptoms which led to a breast MRI on 6/21/19 showing a 5 cm suspicious mass in the left axilla involving the pectoralis muscle and suspicious retropectoral adenopathy. \par \par Biopsy of the left axillary mass on 7/1/19 showed invasive poorly differentiated mammary carcinoma with lobular features, [place] score 8/9, measuring at least 8 mm, ER 90%, CA 0%, and HER2 negative (0 membrane staining). \par \par PET/CT  on 7/5/19 showed FDG avid irregular left axillary soft tissue mass and additional small left axillary lymph nodes. \par \par She started anastrozole in Jul 2019 but discontinued shortly after. \par \par Breast MRI on 7/27/20 showed the left axillary mass, increased in size to 6.9 cm. \par \par She re-started anastrozole in Aug 2020. PET/CT on 8/12/20 showed interval increase in the left axilla soft tissue mass and a new left axillary lymph node, and no new distant metastatic disease. Treatment was switched to fulvestrant for progression of disease in the axilla. \par \par PTE/CTon 7/25/22 showed unchanged size and avidity on the\par left axillary soft tissue mass. Treatment was changed to letrozole in July 2022 and palbociclib in Aug 2022. She developed dyspnea, which resolved after discontinuing palbociclib. \par \par Left axilla ultrasound on 11/11/22 showed 2.2 cm hypoechoic left axillary mass, previously measuring 2.8 cm. A superficial hypoechoic mass extending to the skin measuring 0.8 cm, previously measuring 0.4 cm. A conglomerate of two adjacent enlarged lymph nodes measuring 1.8 and 1.0 cm, previously measuring 1.2 and 0.6 cm. \par \par PET/CT from 11/30/22 showed stable left axillary soft tissue mass and adjacent axillary lymph node. There was no evidence of distant progression.\par \par She continues letrozole. \par \par The left axillary mass is enlarging but not painful.  She has an Abbott pacemaker placed one year ago. She has atrial flutter, A-fib and CHF. Her cardiologist is Dr. Kingston Zavala [phone].  She takes Eliquis, amlodipine, HCTZ,  prednisone, gabapentin, oxycodone as needed, tylenol, letrozole. She has never received radiation therapy.

## 2023-01-09 NOTE — PHYSICAL EXAM
[No UE Edema] : there is no upper extremity edema [Sclera] : the sclera and conjunctiva were normal [Outer Ear] : the ears and nose were normal in appearance [] : no respiratory distress [Heart Rate And Rhythm] : heart rate and rhythm were normal [Abdomen Soft] : soft [Nondistended] : nondistended [Supraclavicular Lymph Nodes Enlarged Bilaterally] : supraclavicular [No Focal Deficits] : no focal deficits [Normal] : oriented to person, place and time, the affect was normal, the mood was normal and not anxious [de-identified] : pacemaker mid-upper left chest wall [de-identified] : bilateral breast reconstruction [de-identified] : left mid-axillary mass approx. 5 cm, tethered to skin measuring about 2 cm [place] without skin erythema, mass is mobile, non-tender

## 2023-01-09 NOTE — LETTER CLOSING
[Consult Closing:] : Thank you for allowing me to participate in the care of this patient.  If you have any questions, please do not hesitate to contact me. [Sincerely yours,] : Sincerely yours, [FreeTextEntry3] : Jesusita Woodard MD\par

## 2023-01-12 ENCOUNTER — NON-APPOINTMENT (OUTPATIENT)
Age: 88
End: 2023-01-12

## 2023-01-13 PROCEDURE — 77338 DESIGN MLC DEVICE FOR IMRT: CPT | Mod: 26

## 2023-01-13 PROCEDURE — 77301 RADIOTHERAPY DOSE PLAN IMRT: CPT | Mod: 26

## 2023-01-13 PROCEDURE — 77300 RADIATION THERAPY DOSE PLAN: CPT | Mod: 26

## 2023-01-18 NOTE — PHYSICAL EXAM
Chief Complaint   Patient presents with    [facility]     Pt states here to have physical.He says he has been having lower back pain for about 4 yrs after playing basketball. Vitals:    01/18/23 0932   BP: 129/76   Pulse: 67   Resp: 18   Temp: 98.2 °F (36.8 °C)   TempSrc: Temporal   SpO2: 98%   Weight: 164 lb 3.2 oz (74.5 kg)   Height: 5' 10\" (1.778 m)   PainSc:   6   PainLoc: Back       Current Outpatient Medications on File Prior to Visit   Medication Sig Dispense Refill    predniSONE (STERAPRED DS) 10 mg dose pack Take by mouth as directed until completion. (Patient not taking: Reported on 1/18/2023) 21 Tablet 0    ibuprofen (MOTRIN) 600 mg tablet Take 1 Tablet by mouth every six (6) hours as needed for Pain. (Patient not taking: Reported on 1/18/2023) 20 Tablet 0    methocarbamoL (ROBAXIN) 750 mg tablet Take 1 Tablet by mouth four (4) times daily as needed for Muscle Spasm(s). (Patient not taking: Reported on 1/18/2023) 20 Tablet 0    lidocaine 4 % patch Apply every 12 hours as needed for pain. (Patient not taking: Reported on 1/18/2023) 10 Patch 0    methylPREDNISolone (Medrol, [place],) 4 mg tablet Follow taper (Patient not taking: Reported on 1/18/2023) 1 Dose Pack 0    diclofenac EC (VOLTAREN) 75 mg EC tablet Take 1 Tablet by mouth two (2) times daily as needed for Pain. (Patient not taking: Reported on 1/18/2023) 20 Tablet 0    naproxen (NAPROSYN) 500 mg tablet Take 1 Tab by mouth every twelve (12) hours as needed for Pain. (Patient not taking: Reported on 1/18/2023) 20 Tab 0     No current facility-administered medications on file prior to visit. Health Maintenance Due   Topic    Hepatitis C Screening     Depression Screen     COVID-19 Vaccine (1)    DTaP/Tdap/Td series (1 - Tdap)    Flu Vaccine (1)       1. \"Have you been to the ER, urgent care clinic since your last visit? Hospitalized since your last visit? \" No    2.  \"Have you seen or consulted any other health care providers outside of the [address] [place] [address] since your last visit? \" No     3. For patients aged 39-70: Has the patient had a colonoscopy / FIT/ Cologuard? NA - based on age      If the patient is female:    4. For patients aged 41-77: Has the patient had a mammogram within the past 2 years? NA - based on age or sex      11. For patients aged 21-65: Has the patient had a pap smear?  NA - based on age or sex [No Acute Distress] : no acute distress [Supple] : supple [Normal] : normal rate, regular rhythm, normal S1 and S2 and no murmur heard [No Edema] : there was no peripheral edema [Soft] : abdomen soft [Non Tender] : non-tender [No Focal Deficits] : no focal deficits [Alert and Oriented x3] : oriented to person, place, and time

## 2023-01-19 ENCOUNTER — OUTPATIENT (OUTPATIENT)
Dept: OUTPATIENT SERVICES | Facility: HOSPITAL | Age: 88
LOS: 1 days | Discharge: HOME | End: 2023-01-19

## 2023-01-19 DIAGNOSIS — C50.919 MALIGNANT NEOPLASM OF UNSPECIFIED SITE OF UNSPECIFIED FEMALE BREAST: ICD-10-CM

## 2023-01-19 DIAGNOSIS — Z98.890 OTHER SPECIFIED POSTPROCEDURAL STATES: Chronic | ICD-10-CM

## 2023-01-19 DIAGNOSIS — Z87.39 PERSONAL HISTORY OF OTHER DISEASES OF THE MUSCULOSKELETAL SYSTEM AND CONNECTIVE TISSUE: Chronic | ICD-10-CM

## 2023-01-19 DIAGNOSIS — Z95.0 PRESENCE OF CARDIAC PACEMAKER: Chronic | ICD-10-CM

## 2023-01-19 DIAGNOSIS — Z96.649 PRESENCE OF UNSPECIFIED ARTIFICIAL HIP JOINT: Chronic | ICD-10-CM

## 2023-01-19 DIAGNOSIS — Z90.49 ACQUIRED ABSENCE OF OTHER SPECIFIED PARTS OF DIGESTIVE TRACT: Chronic | ICD-10-CM

## 2023-01-23 PROCEDURE — 77387B: CUSTOM | Mod: 26

## 2023-01-23 PROCEDURE — 77427 RADIATION TX MANAGEMENT X5: CPT

## 2023-01-24 ENCOUNTER — RESULT REVIEW (OUTPATIENT)
Age: 88
End: 2023-01-24

## 2023-01-24 ENCOUNTER — APPOINTMENT (OUTPATIENT)
Dept: RADIOLOGY | Facility: IMAGING CENTER | Age: 88
End: 2023-01-24
Payer: MEDICARE

## 2023-01-24 ENCOUNTER — OUTPATIENT (OUTPATIENT)
Dept: OUTPATIENT SERVICES | Facility: HOSPITAL | Age: 88
LOS: 1 days | End: 2023-01-24
Payer: MEDICARE

## 2023-01-24 ENCOUNTER — APPOINTMENT (OUTPATIENT)
Dept: HEMATOLOGY ONCOLOGY | Facility: CLINIC | Age: 88
End: 2023-01-24
Payer: MEDICARE

## 2023-01-24 VITALS
WEIGHT: 123.53 LBS | HEART RATE: 60 BPM | RESPIRATION RATE: 18 BRPM | DIASTOLIC BLOOD PRESSURE: 63 MMHG | OXYGEN SATURATION: 94 % | TEMPERATURE: 97.6 F | HEIGHT: 61 IN | BODY MASS INDEX: 23.32 KG/M2 | SYSTOLIC BLOOD PRESSURE: 127 MMHG

## 2023-01-24 VITALS — HEART RATE: 70 BPM | OXYGEN SATURATION: 96 %

## 2023-01-24 VITALS — HEART RATE: 72 BPM | OXYGEN SATURATION: 94 %

## 2023-01-24 DIAGNOSIS — Z79.811 LONG TERM (CURRENT) USE OF AROMATASE INHIBITORS: ICD-10-CM

## 2023-01-24 DIAGNOSIS — Z87.39 PERSONAL HISTORY OF OTHER DISEASES OF THE MUSCULOSKELETAL SYSTEM AND CONNECTIVE TISSUE: Chronic | ICD-10-CM

## 2023-01-24 DIAGNOSIS — R06.00 DYSPNEA, UNSPECIFIED: ICD-10-CM

## 2023-01-24 DIAGNOSIS — Z98.890 OTHER SPECIFIED POSTPROCEDURAL STATES: Chronic | ICD-10-CM

## 2023-01-24 DIAGNOSIS — Z90.49 ACQUIRED ABSENCE OF OTHER SPECIFIED PARTS OF DIGESTIVE TRACT: Chronic | ICD-10-CM

## 2023-01-24 DIAGNOSIS — R06.09 OTHER FORMS OF DYSPNEA: ICD-10-CM

## 2023-01-24 DIAGNOSIS — Z95.0 PRESENCE OF CARDIAC PACEMAKER: Chronic | ICD-10-CM

## 2023-01-24 DIAGNOSIS — Z96.649 PRESENCE OF UNSPECIFIED ARTIFICIAL HIP JOINT: Chronic | ICD-10-CM

## 2023-01-24 LAB
BASOPHILS # BLD AUTO: 0.03 K/UL — SIGNIFICANT CHANGE UP (ref 0–0.2)
BASOPHILS NFR BLD AUTO: 0.4 % — SIGNIFICANT CHANGE UP (ref 0–2)
EOSINOPHIL # BLD AUTO: 0.1 K/UL — SIGNIFICANT CHANGE UP (ref 0–0.5)
EOSINOPHIL NFR BLD AUTO: 1.5 % — SIGNIFICANT CHANGE UP (ref 0–6)
HCT VFR BLD CALC: 27.9 % — LOW (ref 34.5–45)
HGB BLD-MCNC: 9 G/DL — LOW (ref 11.5–15.5)
IMM GRANULOCYTES NFR BLD AUTO: 0.3 % — SIGNIFICANT CHANGE UP (ref 0–0.9)
LYMPHOCYTES # BLD AUTO: 0.53 K/UL — LOW (ref 1–3.3)
LYMPHOCYTES # BLD AUTO: 7.7 % — LOW (ref 13–44)
MCHC RBC-ENTMCNC: 27.4 PG — SIGNIFICANT CHANGE UP (ref 27–34)
MCHC RBC-ENTMCNC: 32.3 G/DL — SIGNIFICANT CHANGE UP (ref 32–36)
MCV RBC AUTO: 84.8 FL — SIGNIFICANT CHANGE UP (ref 80–100)
MONOCYTES # BLD AUTO: 0.3 K/UL — SIGNIFICANT CHANGE UP (ref 0–0.9)
MONOCYTES NFR BLD AUTO: 4.4 % — SIGNIFICANT CHANGE UP (ref 2–14)
NEUTROPHILS # BLD AUTO: 5.88 K/UL — SIGNIFICANT CHANGE UP (ref 1.8–7.4)
NEUTROPHILS NFR BLD AUTO: 85.7 % — HIGH (ref 43–77)
NRBC # BLD: 0 /100 WBCS — SIGNIFICANT CHANGE UP (ref 0–0)
PLATELET # BLD AUTO: 306 K/UL — SIGNIFICANT CHANGE UP (ref 150–400)
RBC # BLD: 3.29 M/UL — LOW (ref 3.8–5.2)
RBC # FLD: 15.1 % — HIGH (ref 10.3–14.5)
WBC # BLD: 6.86 K/UL — SIGNIFICANT CHANGE UP (ref 3.8–10.5)
WBC # FLD AUTO: 6.86 K/UL — SIGNIFICANT CHANGE UP (ref 3.8–10.5)

## 2023-01-24 PROCEDURE — 71046 X-RAY EXAM CHEST 2 VIEWS: CPT

## 2023-01-24 PROCEDURE — 99215 OFFICE O/P EST HI 40 MIN: CPT

## 2023-01-24 PROCEDURE — 71046 X-RAY EXAM CHEST 2 VIEWS: CPT | Mod: 26

## 2023-01-24 NOTE — PHYSICAL EXAM
[Ambulatory and capable of all self care but unable to carry out any work activities] : Status 2- Ambulatory and capable of all self care but unable to carry out any work activities. Up and about more than 50% of waking hours [Thin] : thin [Normal] : affect appropriate [de-identified] : few bibasilar crackles [de-identified] : S/p bl mastectomies with reconstruction no discrete palpable masses in breast; L axilla with palp density approx 4-5cm+ cm , mostly attached / fixed. Larger than last visit

## 2023-01-24 NOTE — HISTORY OF PRESENT ILLNESS
[Date: ____________] : Patient's last distress assessment performed on [unfilled]. [0 - No Distress] : Distress Level: 0 [de-identified] : The patient has a history of right breast cancer in 1965 for which she underwent what is described as a "Jeanette mastectomy" by Dr. Olivo at [facility] with "0/19 axillary lymph nodes" per patient's verbal report and followed expectantly.  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 Yu with a finding of "3 spots of DCIS, and a sentinel lymph node negative" per patient's verbal report; I do not have a copy of that report for my review.  The patient reports having undergone bilateral breast reconstruction at the same time to include what she describes as right "right cadaver tissue" in addition to an implant in the right breast, as well as a left breast implant.\par \par She was then well until approximately 1-2/19 when she describes transient pinching on her left reconstructed breast, centrally and predominantly to the lateral aspect.  She chose to follow this and then start to question whether she was "losing volume."  Through a friend/networking, she contacted Dr. Magan Carey who recommended a bilateral breast MRI, which was performed at [facility] on June 21, 2019 with a finding of a suspicious 5 cm irregular mass in the left axilla as well as further suspicious lymphadenopathy seen posterior to the mass with second-look ultrasound and core biopsy recommended as well as a PET-CT scan.  The patient went on to have an ultrasound of the left axilla with an ultrasound-guided core biopsy, with a finding of a heterogeneous 4.5 cm mass seen corresponding to the finding seen on the breast MRI with abnormal lymph nodes seen posterior to the mass measuring 8 mm with an ultrasound-guided core biopsy on that same date demonstrating invasive poorly differentiated mammary carcinoma with lobular phenotype, Olathe score 8/9, with invasive tumor measuring at least 8 mm on the core biopsy specimen, ER negative, MA negative, HER-2/jeni negative, and a comment that "no focal residual lymphoid like tissue (no germinal center noted) identified; the lesion may represent a completely effaced lymph node or brisk lymphocytic response to tumor; clinical pathologic-radiologic correlation recommended”.  The patient then went on to see Dr. Carlos Pittman and had a PET-CT scan performed on July 5, 2019 with a finding of a mildly avid focus in the left thyroid lobe extending towards the isthmus, a 1.3 cm non-avid left thyroid lobe nodule; in the left axilla, there was left axillary soft tissue mass with irregular margins containing a biopsy clip measuring 3 x 2 cm with several adjacent subcentimeter left axillary lymph nodes measuring up to 1 cm; bilateral hip arthroplasties with heterogeneous FDG avidity adjacent to the proximal portions bilaterally on the right with an SUV of 13.8 on the left and SUV of 9.4 with a comment, this area is limited in evaluation due to beam hardening artifact; there were degenerative changes in the lower spine with mild areas of FDG avidity, multilevel, non-FDG avid compression fracture of L4 unchanged since 06/2017.\par \par I saw her in initial consultation on 716/19 and subsequently had requested a review of the pathology and it turned our it was ER+ / MA neg. We reviewed her scans at tumor board and the group agreed it was unresectable at this time. I called the pt and informed her of the above change in the pathology and that I recommended neoadjuvant anti-estrogen therapy with an aromatase inhibitor such as anastrozole. She agreed to proceed and started taking anastrozole in the very first days of 7/19. \par \par Seen in 11/19. Had evidence of response in her left ax mass. \par \par Seen 3/20 and she informed me she stopped taking anstrozole end 8/19 secondary to concerns re bone toxicity. She had been seeing an "MD PhD" on [place] who had been infusing "ozone therapy in an electrolyte bag" IV, B17, other anti-inflammatory herbs. She declined to provide his name as " rather not say as he does not want people looking over his back". On exam she had what seemed like a further response. I recommended she re-consult / see Dr. Pittman to consider potential surgery vs XRT as primary local treatment. I advised that as she has been under the care of another physician that she follow up with him. I offered her to see me  as needed in the future. She was agreeable.\par \par In the interim she saw Dr. Pittman 7/6/20. WHen questioned why she did not do so sooner she did not have an answer to provide. Dr Pittman sent her for a B/L breast MRI 7/27/20 which showed :\par \par In the left axilla, again noted is an irregularly-shaped enhancing mass compatible with the known recurrent malignancy. Susceptibility artifact is noted within the mass, a biopsy marker. Overall, the mass measures approximately 6.9 cm AP by 3.9 TV x 6.2 cm CC. The mass appears more draped along the implant with extension along its superior lateral margin. The previously noted additional axillary lymph nodes medial to the mass appear relatively stable in size.\par \par No right axillary lymphadenopathy. No internal mammary lymphadenopathy.\par \par IMPRESSION:\par 1.  The biopsy-proven recurrent malignancy in the left axillary region has increased in size since the prior breast MRI and prior ultrasound, with maximal dimension now measuring 6.9 cm AP.\par 2.  No other suspicious enhancement in either breast.\par 3.  Silicone implants are intact.\par \par The pt now reports she stopped getting ozone therapy in 3/20. \par \par She progressed after she stopped anastrozole and ozone therapy. \par Restarted anastrozole in early August as well as ozone/vitamin/mineral therapy. \par \par Repeat MRI 10/27/20 revealed POD in L breast / axillary / CW mass. PET CT did not show any distant mets. \par \par Started fulvestrant on 11/24/20.   \par \par POD. Started on Letrozole on 7/25/22 and Palbociclib on 8/5/22.\par \par Tolerating letrozole well, with no treatment related side effects. \par \par After taking palbo for three weeks she noted progressive dyspnea first with exertion and now at rest.  She was seen in office last week, CTA was done and came back negative.  She came off palbo with resolution of symptoms. [de-identified] : She has baseline fatigue and arthralgias with no change. \par \par She has baseline [place] but has increase recently over past 3 weeks - gets OOB within steps instead of within room / rooms. Denies cough. Notes dyspnea worse when lies down - uses 2 pillows.  \par \par She has baseline back pain from chronic spinal stenosis - got steroid injection with sig improvement.\par \par She c/o wt loss - now approx 10 lbs, however she has stopped eating meat and sugars.  \par \par She states she is tolerating letrozole well with no complaints. \par \par PET/CT, 11/30/22\par IMPRESSION: Compared to FDG-PET/CT scan dated 7/22/2022:\par \par 1. Overall not significantly changed size and FDG avidity of left \par axillary soft tissue mass and adjacent left axillary lymph node, \par consistent with known recurrent disease.\par \par 2. Unchanged FDG avid left thyroid nodule.\par \par 3. Nonspecific new FDG avidity in the right colon and mild FDG avidity in \par the sigmoid colon without CT correlate. Please correlate clinically or \par with colonoscopy or CT colonography as indicated.\par \par 4. Nonspecific bilateral periprosthetic joint avidity, not significantly \par changed. Again, if infection is a clinical concern, labeled WBC/bone \par marrow imaging may be performed.\par \par US L ax 11/11/22\par IMPRESSION:\par \par Known left axillary malignancy with relatively stable size, although \par evaluation is suboptimal given technical limitations and ill-defined \par appearance of the known malignant mass.\par \par Mass extending into the skin and additional left axillary lymph nodes are \par increased in size suggestive of progression of disease. Clinical \par follow-up and management is recommended. If clinical management will be \par altered warranted, biopsy of the superficial mass or additional abnormal \par lymph nodes may be performed.\par

## 2023-01-25 LAB
ALBUMIN SERPL ELPH-MCNC: 3.9 G/DL
ALP BLD-CCNC: 107 U/L
ALT SERPL-CCNC: 54 U/L
ANION GAP SERPL CALC-SCNC: 12 MMOL/L
AST SERPL-CCNC: 26 U/L
BILIRUB SERPL-MCNC: 0.4 MG/DL
BUN SERPL-MCNC: 42 MG/DL
CALCIUM SERPL-MCNC: 9.6 MG/DL
CHLORIDE SERPL-SCNC: 106 MMOL/L
CO2 SERPL-SCNC: 23 MMOL/L
CREAT SERPL-MCNC: 1.1 MG/DL
EGFR: 48 ML/MIN/1.73M2
GLUCOSE SERPL-MCNC: 106 MG/DL
POTASSIUM SERPL-SCNC: 4.9 MMOL/L
PROT SERPL-MCNC: 6.6 G/DL
SODIUM SERPL-SCNC: 142 MMOL/L

## 2023-01-25 PROCEDURE — 77387B: CUSTOM | Mod: 26

## 2023-01-26 ENCOUNTER — NON-APPOINTMENT (OUTPATIENT)
Age: 88
End: 2023-01-26

## 2023-01-26 NOTE — DISEASE MANAGEMENT
[Pathological] : TNM Stage: p [TTNM] : x [NTNM] : x [MTNM] : 1 [IV] : IV [de-identified] : 1200 cGy [de-identified] : 3000 cGy [de-identified] : Left axilla

## 2023-01-26 NOTE — HISTORY OF PRESENT ILLNESS
[FreeTextEntry1] : Ms. Reyes is 89 yo F with PMH of right breast cancer s/p mastectomy and negative ALND in 1965 and DCIS of left breast s/p left mastectomy with negative SLNB in 2008. She developed a left axillary recurrence in 2019, managed mainly with endocrine therapy, presenting with progression of disease and early skin involvement. She is a suitable candidate for palliative radiation therapy with the goal of local control. The pacemaker is on the left side, close to the axillary mass, but probably far enough away that it can be avoided adequately without relocating it. \par \par She is now receiving radiation to the left axilla, fx 2/5.

## 2023-01-27 PROCEDURE — 77387B: CUSTOM | Mod: 26

## 2023-01-30 ENCOUNTER — NON-APPOINTMENT (OUTPATIENT)
Age: 88
End: 2023-01-30

## 2023-01-30 PROCEDURE — 77387B: CUSTOM | Mod: 26

## 2023-02-01 ENCOUNTER — NON-APPOINTMENT (OUTPATIENT)
Age: 88
End: 2023-02-01

## 2023-02-01 PROCEDURE — 77387B: CUSTOM | Mod: 26

## 2023-02-01 NOTE — HISTORY OF PRESENT ILLNESS
[FreeTextEntry1] : Ms. Reyes is 89 yo F with PMH of right breast cancer s/p mastectomy and negative ALND in 1965 and DCIS of left breast s/p left mastectomy with negative SLNB in 2008. She developed a left axillary recurrence in 2019, managed mainly with endocrine therapy, presenting with progression of disease and early skin involvement. She is receiving palliative radiation therapy. \par \par She is feeling generally well. She denies fatigue and pain. She has not noted any skin reactions. She is using Aquaphor on the skin.

## 2023-02-01 NOTE — REVIEW OF SYSTEMS
[Alopecia: Grade 0] : Alopecia: Grade 0 [Pruritus: Grade 0] : Pruritus: Grade 0 [Skin Hyperpigmentation: Grade 0] : Skin Hyperpigmentation: Grade 0 [Skin Atrophy: Grade 0] : Skin Atrophy: Grade 0 [Skin Induration: Grade 0] : Skin Induration: Grade 0 [Dermatitis Radiation: Grade 0] : Dermatitis Radiation: Grade 0

## 2023-02-01 NOTE — DISEASE MANAGEMENT
[TTNM] : x [NTNM] : x [MTNM] : 1 [de-identified] : 2400 cGy [de-identified] : 3000 cGy [de-identified] : Left axilla

## 2023-02-01 NOTE — PHYSICAL EXAM
[Normal] : well developed, well nourished, in no acute distress [de-identified] : left axillary mass firm and fixed, no skin erythema

## 2023-02-08 ENCOUNTER — NON-APPOINTMENT (OUTPATIENT)
Age: 88
End: 2023-02-08

## 2023-02-20 ENCOUNTER — RX RENEWAL (OUTPATIENT)
Age: 88
End: 2023-02-20

## 2023-03-13 ENCOUNTER — APPOINTMENT (OUTPATIENT)
Dept: RADIATION ONCOLOGY | Facility: CLINIC | Age: 88
End: 2023-03-13
Payer: MEDICARE

## 2023-03-13 ENCOUNTER — APPOINTMENT (OUTPATIENT)
Dept: FAMILY MEDICINE | Facility: CLINIC | Age: 88
End: 2023-03-13
Payer: MEDICARE

## 2023-03-13 VITALS
OXYGEN SATURATION: 99 % | BODY MASS INDEX: 21.52 KG/M2 | RESPIRATION RATE: 20 BRPM | SYSTOLIC BLOOD PRESSURE: 108 MMHG | HEIGHT: 61 IN | WEIGHT: 114 LBS | DIASTOLIC BLOOD PRESSURE: 65 MMHG | HEART RATE: 72 BPM

## 2023-03-13 PROCEDURE — 99213 OFFICE O/P EST LOW 20 MIN: CPT

## 2023-03-13 NOTE — REVIEW OF SYSTEMS
[Fatigue] : fatigue [Negative] : Genitourinary [FreeTextEntry4] : as in HPI [FreeTextEntry6] : as in HPI

## 2023-03-13 NOTE — HISTORY OF PRESENT ILLNESS
[FreeTextEntry8] : Presents on acute basis over concern about "losing my voice" - denies sore throat or any other symptom of illness - states has become more SOB - note following with Cardiology (Dr. Yee - [facility]) and has an appointment on Wednesday.  Now on [employer]axiga.

## 2023-03-13 NOTE — PHYSICAL EXAM
[No Acute Distress] : no acute distress [Normal Oropharynx] : the oropharynx was normal [No JVD] : no jugular venous distention [No Edema] : there was no peripheral edema [Normal] : soft, non-tender, non-distended, no masses palpated, no HSM and normal bowel sounds [No Focal Deficits] : no focal deficits [Alert and Oriented x3] : oriented to person, place, and time

## 2023-03-27 ENCOUNTER — RX RENEWAL (OUTPATIENT)
Age: 88
End: 2023-03-27

## 2023-03-27 ENCOUNTER — APPOINTMENT (OUTPATIENT)
Dept: RADIATION ONCOLOGY | Facility: CLINIC | Age: 88
End: 2023-03-27
Payer: MEDICARE

## 2023-03-27 VITALS
TEMPERATURE: 97.7 F | BODY MASS INDEX: 21.71 KG/M2 | RESPIRATION RATE: 18 BRPM | WEIGHT: 115 LBS | DIASTOLIC BLOOD PRESSURE: 46 MMHG | SYSTOLIC BLOOD PRESSURE: 106 MMHG | OXYGEN SATURATION: 96 % | HEIGHT: 61 IN | HEART RATE: 61 BPM

## 2023-03-27 PROCEDURE — 99024 POSTOP FOLLOW-UP VISIT: CPT

## 2023-03-28 NOTE — PHYSICAL EXAM
[Thin] : thin [Sclera] : the sclera and conjunctiva were normal [PERRL With Normal Accommodation] : pupils were equal in size, round, reactive to light [Normal] : oriented to person, place and time, the affect was normal, the mood was normal and not anxious [General Appearance - In No Acute Distress] : in no acute distress [] : no respiratory distress [Abdomen Soft] : soft [Nondistended] : nondistended [de-identified] : mildly enlarged firm mobile LNs in the left axilla with the largest measuring about 1.5cm in size, no erythema

## 2023-03-28 NOTE — HISTORY OF PRESENT ILLNESS
[FreeTextEntry1] : Ms. Reyes is 87 yo F with PMH of right breast cancer s/p mastectomy and negative ALND in 1965 and DCIS of left breast s/p left mastectomy with negative SLNB in 2008. She developed a left axillary recurrence in 2019, managed mainly with endocrine therapy, presenting with progression of disease and early skin involvement. She completed a course of palliative radiation therapy to the left axilla, a dose of 3,000 cGy from 1/23/23 - 2/1/23\par \par She comes today for a post-treatment evaluation. Her underarm mass decreased from the size of a silver dollar to a lico since treatment. She had minimal skin effects. The patient reported fatigue and appetite loss for 5-6 weeks after radiation, and she is now finally starting to improve. She denies chest or breast pain or rash. She started to experience SOB 2 weeks after completion of radiation, now improving since starting steroids for spinal stenosis. She denies having a cough.  She went to see her cardiologist and cause of the SOB is unknown. She was initiated on prednisone 5mg daily for spinal stenosis and back pain.  In addition to her back pain improving, she is feels the SOB and the fatigue is improving as well. \par

## 2023-03-28 NOTE — REVIEW OF SYSTEMS
[Alopecia: Grade 0] : Alopecia: Grade 0 [Pruritus: Grade 0] : Pruritus: Grade 0 [Skin Atrophy: Grade 0] : Skin Atrophy: Grade 0 [Skin Hyperpigmentation: Grade 0] : Skin Hyperpigmentation: Grade 0 [Skin Induration: Grade 0] : Skin Induration: Grade 0 [Dermatitis Radiation: Grade 0] : Dermatitis Radiation: Grade 0 [Recent Change In Weight] : ~T recent weight change [Negative] : Heme/Lymph [FreeTextEntry2] : weight loss [FreeTextEntry6] : moderate SOB improved from a few weeks ago since starting steroids for stenosis and began 2 weeks after RT, limited ability to walk long distances

## 2023-04-26 ENCOUNTER — OUTPATIENT (OUTPATIENT)
Dept: OUTPATIENT SERVICES | Facility: HOSPITAL | Age: 88
LOS: 1 days | Discharge: HOME | End: 2023-04-26

## 2023-04-26 DIAGNOSIS — C50.919 MALIGNANT NEOPLASM OF UNSPECIFIED SITE OF UNSPECIFIED FEMALE BREAST: ICD-10-CM

## 2023-04-26 DIAGNOSIS — Z90.49 ACQUIRED ABSENCE OF OTHER SPECIFIED PARTS OF DIGESTIVE TRACT: Chronic | ICD-10-CM

## 2023-04-26 DIAGNOSIS — Z98.890 OTHER SPECIFIED POSTPROCEDURAL STATES: Chronic | ICD-10-CM

## 2023-04-26 DIAGNOSIS — Z87.39 PERSONAL HISTORY OF OTHER DISEASES OF THE MUSCULOSKELETAL SYSTEM AND CONNECTIVE TISSUE: Chronic | ICD-10-CM

## 2023-04-26 DIAGNOSIS — Z96.649 PRESENCE OF UNSPECIFIED ARTIFICIAL HIP JOINT: Chronic | ICD-10-CM

## 2023-04-26 DIAGNOSIS — Z95.0 PRESENCE OF CARDIAC PACEMAKER: Chronic | ICD-10-CM

## 2023-04-27 ENCOUNTER — APPOINTMENT (OUTPATIENT)
Dept: FAMILY MEDICINE | Facility: CLINIC | Age: 88
End: 2023-04-27
Payer: MEDICARE

## 2023-04-27 VITALS — RESPIRATION RATE: 20 BRPM | HEART RATE: 76 BPM | DIASTOLIC BLOOD PRESSURE: 60 MMHG | SYSTOLIC BLOOD PRESSURE: 126 MMHG

## 2023-04-27 DIAGNOSIS — R42 DIZZINESS AND GIDDINESS: ICD-10-CM

## 2023-04-27 PROCEDURE — 99214 OFFICE O/P EST MOD 30 MIN: CPT

## 2023-04-27 NOTE — PLAN
[FreeTextEntry1] : Would begin by holding Gabapentin; reduce Prednisone to 5mg alternating with 2.5mg\par Plan F/U 10 days

## 2023-04-27 NOTE — PHYSICAL EXAM
[No Acute Distress] : no acute distress [Normal Rate] : normal rate  [Normal S1, S2] : normal S1 and S2 [No Murmur] : no murmur heard [No Edema] : there was no peripheral edema [Normal] : soft, non-tender, non-distended, no masses palpated, no HSM and normal bowel sounds [Normal Posterior Cervical Nodes] : no posterior cervical lymphadenopathy [Normal Anterior Cervical Nodes] : no anterior cervical lymphadenopathy [No Focal Deficits] : no focal deficits [Alert and Oriented x3] : oriented to person, place, and time [de-identified] : irregular rhythm

## 2023-04-27 NOTE — HISTORY OF PRESENT ILLNESS
[de-identified] : Presents over concern about recurrent dizziness and nausea.  Pt has seen Cardiology recently who could find no heart-associated reason for these symptoms.  Pt is questioning could this be a side-effect of medication.

## 2023-04-27 NOTE — REVIEW OF SYSTEMS
[Fever] : no fever [Chills] : no chills [Negative] : Genitourinary [FreeTextEntry7] : as in HPI [de-identified] : as in HPI

## 2023-04-27 NOTE — ASSESSMENT
[FreeTextEntry1] : Hemodynamically stable with acceptable BP\par Cardiac status stable\par No gross neuro findings\par

## 2023-05-02 ENCOUNTER — APPOINTMENT (OUTPATIENT)
Dept: HEMATOLOGY ONCOLOGY | Facility: CLINIC | Age: 88
End: 2023-05-02

## 2023-05-03 ENCOUNTER — NON-APPOINTMENT (OUTPATIENT)
Age: 88
End: 2023-05-03

## 2023-05-09 ENCOUNTER — APPOINTMENT (OUTPATIENT)
Dept: FAMILY MEDICINE | Facility: CLINIC | Age: 88
End: 2023-05-09
Payer: MEDICARE

## 2023-05-09 VITALS
WEIGHT: 116 LBS | HEIGHT: 61 IN | RESPIRATION RATE: 20 BRPM | DIASTOLIC BLOOD PRESSURE: 60 MMHG | HEART RATE: 76 BPM | SYSTOLIC BLOOD PRESSURE: 114 MMHG | BODY MASS INDEX: 21.9 KG/M2 | OXYGEN SATURATION: 99 %

## 2023-05-09 PROCEDURE — 99214 OFFICE O/P EST MOD 30 MIN: CPT | Mod: 25

## 2023-05-09 PROCEDURE — 36415 COLL VENOUS BLD VENIPUNCTURE: CPT

## 2023-05-09 NOTE — HISTORY OF PRESENT ILLNESS
[de-identified] : Presents for BP check, labs, and general follow-up.  Note Cardiology had started Entresto for CHF but pt states was unable to tolerate and has stopped.  Denies CP, SOB at present.  Reviewed nutrition with goal of wt gain - advised patient "you can eat whatever you want."  Also recommended Ensure, homemade shakes, etc.

## 2023-05-09 NOTE — ASSESSMENT
[FreeTextEntry1] : Hemodynamically stable with acceptable BP\par No clinical evidence of cardiac decompensation\par Lab profiles drawn in office and sent

## 2023-05-10 LAB
ALBUMIN SERPL ELPH-MCNC: 3.7 G/DL
ALP BLD-CCNC: 149 U/L
ALT SERPL-CCNC: 41 U/L
ANION GAP SERPL CALC-SCNC: 14 MMOL/L
AST SERPL-CCNC: 33 U/L
BASOPHILS # BLD AUTO: 0.02 K/UL
BASOPHILS NFR BLD AUTO: 0.4 %
BILIRUB SERPL-MCNC: 0.5 MG/DL
BUN SERPL-MCNC: 55 MG/DL
CALCIUM SERPL-MCNC: 10.1 MG/DL
CHLORIDE SERPL-SCNC: 109 MMOL/L
CHOLEST SERPL-MCNC: 137 MG/DL
CO2 SERPL-SCNC: 19 MMOL/L
CREAT SERPL-MCNC: 1.09 MG/DL
EGFR: 49 ML/MIN/1.73M2
EOSINOPHIL # BLD AUTO: 0.15 K/UL
EOSINOPHIL NFR BLD AUTO: 2.6 %
ESTIMATED AVERAGE GLUCOSE: 105 MG/DL
FOLATE SERPL-MCNC: >20 NG/ML
GLUCOSE SERPL-MCNC: 137 MG/DL
HBA1C MFR BLD HPLC: 5.3 %
HCT VFR BLD CALC: 28.1 %
HDLC SERPL-MCNC: 59 MG/DL
HGB BLD-MCNC: 8.6 G/DL
IMM GRANULOCYTES NFR BLD AUTO: 0.2 %
LDLC SERPL CALC-MCNC: 61 MG/DL
LYMPHOCYTES # BLD AUTO: 0.94 K/UL
LYMPHOCYTES NFR BLD AUTO: 16.6 %
MAN DIFF?: NORMAL
MCHC RBC-ENTMCNC: 26.6 PG
MCHC RBC-ENTMCNC: 30.6 GM/DL
MCV RBC AUTO: 87 FL
MONOCYTES # BLD AUTO: 0.41 K/UL
MONOCYTES NFR BLD AUTO: 7.2 %
NEUTROPHILS # BLD AUTO: 4.14 K/UL
NEUTROPHILS NFR BLD AUTO: 73 %
NONHDLC SERPL-MCNC: 78 MG/DL
PLATELET # BLD AUTO: 209 K/UL
POTASSIUM SERPL-SCNC: 4.3 MMOL/L
PROT SERPL-MCNC: 6.3 G/DL
RBC # BLD: 3.23 M/UL
RBC # FLD: 15.5 %
SODIUM SERPL-SCNC: 142 MMOL/L
T4 FREE SERPL-MCNC: 6.1 NG/DL
TRIGL SERPL-MCNC: 82 MG/DL
TSH SERPL-ACNC: <0.01 UIU/ML
VIT B12 SERPL-MCNC: >2000 PG/ML
WBC # FLD AUTO: 5.67 K/UL

## 2023-05-11 ENCOUNTER — APPOINTMENT (OUTPATIENT)
Dept: HEMATOLOGY ONCOLOGY | Facility: CLINIC | Age: 88
End: 2023-05-11
Payer: MEDICARE

## 2023-05-11 VITALS
OXYGEN SATURATION: 96 % | HEIGHT: 61 IN | WEIGHT: 114 LBS | TEMPERATURE: 97.8 F | RESPIRATION RATE: 20 BRPM | SYSTOLIC BLOOD PRESSURE: 122 MMHG | HEART RATE: 64 BPM | DIASTOLIC BLOOD PRESSURE: 53 MMHG | BODY MASS INDEX: 21.52 KG/M2

## 2023-05-11 PROCEDURE — 99214 OFFICE O/P EST MOD 30 MIN: CPT

## 2023-05-11 NOTE — HISTORY OF PRESENT ILLNESS
[Date: ____________] : Patient's last distress assessment performed on [unfilled]. [0 - No Distress] : Distress Level: 0 [de-identified] : The patient has a history of right breast cancer in 1965 for which she underwent what is described as a "Jeanette mastectomy" by Dr. Olivo at [facility] with "0/19 axillary lymph nodes" per patient's verbal report and followed expectantly.  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 Yu with a finding of "3 spots of DCIS, and a sentinel lymph node negative" per patient's verbal report; I do not have a copy of that report for my review.  The patient reports having undergone bilateral breast reconstruction at the same time to include what she describes as right "right cadaver tissue" in addition to an implant in the right breast, as well as a left breast implant.\par \par She was then well until approximately 1-2/19 when she describes transient pinching on her left reconstructed breast, centrally and predominantly to the lateral aspect.  She chose to follow this and then start to question whether she was "losing volume."  Through a friend/networking, she contacted Dr. Magan Carey who recommended a bilateral breast MRI, which was performed at [facility] on June 21, 2019 with a finding of a suspicious 5 cm irregular mass in the left axilla as well as further suspicious lymphadenopathy seen posterior to the mass with second-look ultrasound and core biopsy recommended as well as a PET-CT scan.  The patient went on to have an ultrasound of the left axilla with an ultrasound-guided core biopsy, with a finding of a heterogeneous 4.5 cm mass seen corresponding to the finding seen on the breast MRI with abnormal lymph nodes seen posterior to the mass measuring 8 mm with an ultrasound-guided core biopsy on that same date demonstrating invasive poorly differentiated mammary carcinoma with lobular phenotype, Anderson score 8/9, with invasive tumor measuring at least 8 mm on the core biopsy specimen, ER negative, OR negative, HER-2/jeni negative, and a comment that "no focal residual lymphoid like tissue (no germinal center noted) identified; the lesion may represent a completely effaced lymph node or brisk lymphocytic response to tumor; clinical pathologic-radiologic correlation recommended”.  The patient then went on to see Dr. Carlos Pittman and had a PET-CT scan performed on July 5, 2019 with a finding of a mildly avid focus in the left thyroid lobe extending towards the isthmus, a 1.3 cm non-avid left thyroid lobe nodule; in the left axilla, there was left axillary soft tissue mass with irregular margins containing a biopsy clip measuring 3 x 2 cm with several adjacent subcentimeter left axillary lymph nodes measuring up to 1 cm; bilateral hip arthroplasties with heterogeneous FDG avidity adjacent to the proximal portions bilaterally on the right with an SUV of 13.8 on the left and SUV of 9.4 with a comment, this area is limited in evaluation due to beam hardening artifact; there were degenerative changes in the lower spine with mild areas of FDG avidity, multilevel, non-FDG avid compression fracture of L4 unchanged since 06/2017.\par \par I saw her in initial consultation on 716/19 and subsequently had requested a review of the pathology and it turned our it was ER+ / OR neg. We reviewed her scans at tumor board and the group agreed it was unresectable at this time. I called the pt and informed her of the above change in the pathology and that I recommended neoadjuvant anti-estrogen therapy with an aromatase inhibitor such as anastrozole. She agreed to proceed and started taking anastrozole in the very first days of 7/19. \par \par Seen in 11/19. Had evidence of response in her left ax mass. \par \par Seen 3/20 and she informed me she stopped taking anstrozole end 8/19 secondary to concerns re bone toxicity. She had been seeing an "MD PhD" on [place] who had been infusing "ozone therapy in an electrolyte bag" IV, B17, other anti-inflammatory herbs. She declined to provide his name as " rather not say as he does not want people looking over his back". On exam she had what seemed like a further response. I recommended she re-consult / see Dr. Pittman to consider potential surgery vs XRT as primary local treatment. I advised that as she has been under the care of another physician that she follow up with him. I offered her to see me  as needed in the future. She was agreeable.\par \par In the interim she saw Dr. Pittman 7/6/20. WHen questioned why she did not do so sooner she did not have an answer to provide. Dr Pittman sent her for a B/L breast MRI 7/27/20 which showed :\par \par In the left axilla, again noted is an irregularly-shaped enhancing mass compatible with the known recurrent malignancy. Susceptibility artifact is noted within the mass, a biopsy marker. Overall, the mass measures approximately 6.9 cm AP by 3.9 TV x 6.2 cm CC. The mass appears more draped along the implant with extension along its superior lateral margin. The previously noted additional axillary lymph nodes medial to the mass appear relatively stable in size.\par \par No right axillary lymphadenopathy. No internal mammary lymphadenopathy.\par \par IMPRESSION:\par 1.  The biopsy-proven recurrent malignancy in the left axillary region has increased in size since the prior breast MRI and prior ultrasound, with maximal dimension now measuring 6.9 cm AP.\par 2.  No other suspicious enhancement in either breast.\par 3.  Silicone implants are intact.\par \par The pt now reports she stopped getting ozone therapy in 3/20. \par \par She progressed after she stopped anastrozole and ozone therapy. \par Restarted anastrozole in early August as well as ozone/vitamin/mineral therapy. \par \par Repeat MRI 10/27/20 revealed POD in L breast / axillary / CW mass. PET CT did not show any distant mets. \par \par Started fulvestrant on 11/24/20.   \par \par POD. Started on Letrozole on 7/25/22 and Palbociclib on 8/5/22.\par \par Tolerating letrozole well, with no treatment related side effects. \par \par After taking palbo for three weeks she noted progressive dyspnea first with exertion and now at rest.  She was seen in office last week, CTA was done and came back negative.  She came off palbo with resolution of symptoms. [de-identified] : The patient presents for follow up.\par \par She is s/p palliative radiation therapy to the left axilla,  from 1/23/23 - 2/1/23 with Dr. Woodard.  She states she tolerated it well.  \par \par She states since radiation she hasn't taken the letrozole any more.  She states letrozole was effecting her balance.  However she states all her medications effect her balance.  She states she takes too many pills and doesn't want to restart letrozole. \par \par She c/o recent weight loss and was found to have hyperthyroidism.  She states she was referred to endocrinologist and is scheduled to see him in June. \par  \par She has baseline fatigue and arthralgias with no change. \par \par She has baseline MIDDLETON/SOB, stable.  She had recent cardiac workup that was negative per patient. \par \par She has baseline back pain from chronic spinal stenosis - got steroid injection with sig improvement.\par \par \par PET/CT, 11/30/22\par IMPRESSION: Compared to FDG-PET/CT scan dated 7/22/2022:\par \par 1. Overall not significantly changed size and FDG avidity of left \par axillary soft tissue mass and adjacent left axillary lymph node, \par consistent with known recurrent disease.\par \par 2. Unchanged FDG avid left thyroid nodule.\par \par 3. Nonspecific new FDG avidity in the right colon and mild FDG avidity in \par the sigmoid colon without CT correlate. Please correlate clinically or \par with colonoscopy or CT colonography as indicated.\par \par 4. Nonspecific bilateral periprosthetic joint avidity, not significantly \par changed. Again, if infection is a clinical concern, labeled WBC/bone \par marrow imaging may be performed.\par \par US L ax 11/11/22\par IMPRESSION:\par \par Known left axillary malignancy with relatively stable size, although \par evaluation is suboptimal given technical limitations and ill-defined \par appearance of the known malignant mass.\par \par Mass extending into the skin and additional left axillary lymph nodes are \par increased in size suggestive of progression of disease. Clinical \par follow-up and management is recommended. If clinical management will be \par altered warranted, biopsy of the superficial mass or additional abnormal \par lymph nodes may be performed.\par

## 2023-05-11 NOTE — PHYSICAL EXAM
[Ambulatory and capable of all self care but unable to carry out any work activities] : Status 2- Ambulatory and capable of all self care but unable to carry out any work activities. Up and about more than 50% of waking hours [Thin] : thin [Normal] : clear to auscultation bilaterally, no dullness, no wheezing [de-identified] : S/p bl mastectomies with reconstruction no discrete palpable masses in breast; L axilla with palp density decreased in size from last visit.  No palpable axillary nodes on right.

## 2023-05-18 NOTE — PHYSICAL THERAPY INITIAL EVALUATION ADULT - IMPAIRMENTS CONTRIBUTING TO GAIT DEVIATIONS, PT EVAL
decreased flexibility/pain [FreeTextEntry1] : right foot pain- patient was referred to Podiatrist\par \par Left wrist pain- patient was prescribed Voltaren gel and referred to ortho hand surgeon. \par \par Prior to appointment and during encounter with patient extensive medical records were reviewed including but not limited to, Hospital records records, out patient records, laboratory data and microbiology data \par In addition extensive time was also spent in reviewing diagnostic studies.\par \par Total encounter total time 30 mins\par >50% of time spent counseling/coordinating care\par \par \par Counseling included abnormal lab results, differential diagnoses, treatment options, risks and benefits, lifestyle changes, current condition, medications, and dose adjustments. \par The patient was interactive, attentive, asked questions, and verbalized understanding

## 2023-05-19 ENCOUNTER — NON-APPOINTMENT (OUTPATIENT)
Age: 88
End: 2023-05-19

## 2023-06-09 ENCOUNTER — NON-APPOINTMENT (OUTPATIENT)
Age: 88
End: 2023-06-09

## 2023-06-15 ENCOUNTER — APPOINTMENT (OUTPATIENT)
Dept: FAMILY MEDICINE | Facility: CLINIC | Age: 88
End: 2023-06-15
Payer: MEDICARE

## 2023-06-15 VITALS
DIASTOLIC BLOOD PRESSURE: 60 MMHG | WEIGHT: 107 LBS | RESPIRATION RATE: 20 BRPM | BODY MASS INDEX: 20.2 KG/M2 | HEART RATE: 68 BPM | HEIGHT: 61 IN | SYSTOLIC BLOOD PRESSURE: 125 MMHG

## 2023-06-15 PROCEDURE — 99214 OFFICE O/P EST MOD 30 MIN: CPT | Mod: 25

## 2023-06-15 PROCEDURE — 36415 COLL VENOUS BLD VENIPUNCTURE: CPT

## 2023-06-15 RX ORDER — AMLODIPINE BESYLATE 5 MG/1
5 TABLET ORAL
Qty: 90 | Refills: 3 | Status: DISCONTINUED | COMMUNITY
Start: 2022-10-05 | End: 2023-06-15

## 2023-06-15 RX ORDER — HYDROCHLOROTHIAZIDE 12.5 MG/1
12.5 TABLET ORAL
Qty: 90 | Refills: 3 | Status: DISCONTINUED | COMMUNITY
Start: 2022-10-05 | End: 2023-06-15

## 2023-06-15 RX ORDER — AMLODIPINE BESYLATE 2.5 MG/1
2.5 TABLET ORAL
Qty: 90 | Refills: 3 | Status: DISCONTINUED | COMMUNITY
Start: 2022-02-24 | End: 2023-06-15

## 2023-06-15 RX ORDER — FUROSEMIDE 40 MG/1
40 TABLET ORAL
Qty: 30 | Refills: 3 | Status: DISCONTINUED | COMMUNITY
Start: 2021-04-19 | End: 2023-06-15

## 2023-06-15 RX ORDER — AMLODIPINE BESYLATE 5 MG/1
5 TABLET ORAL
Qty: 180 | Refills: 3 | Status: DISCONTINUED | COMMUNITY
Start: 2022-02-24 | End: 2023-06-15

## 2023-06-15 NOTE — PHYSICAL EXAM
[Normal] : normal rate, regular rhythm, normal S1 and S2 and no murmur heard [No Edema] : there was no peripheral edema [Soft] : abdomen soft [Non Tender] : non-tender [Normal Posterior Cervical Nodes] : no posterior cervical lymphadenopathy [Normal Anterior Cervical Nodes] : no anterior cervical lymphadenopathy [No Focal Deficits] : no focal deficits [Alert and Oriented x3] : oriented to person, place, and time

## 2023-06-15 NOTE — ASSESSMENT
[FreeTextEntry1] : Hemodynamically stable with acceptable BP\par Cardiac status stable with no evidence of decompensation\par No clinical evidence of a hyperthyroid state at this encounter\par Lab profiles drawn in office and sent

## 2023-06-15 NOTE — HISTORY OF PRESENT ILLNESS
[de-identified] : Presents for BP check, labs, and general follow-up.  Pt was admitted to [facility] with exacerbation of CHF and tachycardia - felt to be non-cardiac but due to newly diagnosed hyperthyroidism - now on Methimazole; to F/U with Endocrine.  Reviewed medication with patient.

## 2023-06-16 ENCOUNTER — RX RENEWAL (OUTPATIENT)
Age: 88
End: 2023-06-16

## 2023-06-16 LAB
ALBUMIN SERPL ELPH-MCNC: 3.9 G/DL
ALP BLD-CCNC: 75 U/L
ALT SERPL-CCNC: 29 U/L
ANION GAP SERPL CALC-SCNC: 16 MMOL/L
AST SERPL-CCNC: 22 U/L
BILIRUB SERPL-MCNC: 0.3 MG/DL
BUN SERPL-MCNC: 69 MG/DL
CALCIUM SERPL-MCNC: 11 MG/DL
CHLORIDE SERPL-SCNC: 109 MMOL/L
CO2 SERPL-SCNC: 20 MMOL/L
CREAT SERPL-MCNC: 1.25 MG/DL
EGFR: 41 ML/MIN/1.73M2
GLUCOSE SERPL-MCNC: 142 MG/DL
POTASSIUM SERPL-SCNC: 4.6 MMOL/L
PROT SERPL-MCNC: 6.5 G/DL
SODIUM SERPL-SCNC: 145 MMOL/L
T3FREE SERPL-MCNC: 8.38 PG/ML
T4 FREE SERPL-MCNC: 3.5 NG/DL
TSH SERPL-ACNC: <0.01 UIU/ML

## 2023-06-20 ENCOUNTER — OUTPATIENT (OUTPATIENT)
Dept: OUTPATIENT SERVICES | Facility: HOSPITAL | Age: 88
LOS: 1 days | Discharge: HOME | End: 2023-06-20

## 2023-06-20 DIAGNOSIS — Z90.49 ACQUIRED ABSENCE OF OTHER SPECIFIED PARTS OF DIGESTIVE TRACT: Chronic | ICD-10-CM

## 2023-06-20 DIAGNOSIS — Z96.649 PRESENCE OF UNSPECIFIED ARTIFICIAL HIP JOINT: Chronic | ICD-10-CM

## 2023-06-20 DIAGNOSIS — Z95.0 PRESENCE OF CARDIAC PACEMAKER: Chronic | ICD-10-CM

## 2023-06-20 DIAGNOSIS — C50.919 MALIGNANT NEOPLASM OF UNSPECIFIED SITE OF UNSPECIFIED FEMALE BREAST: ICD-10-CM

## 2023-06-20 DIAGNOSIS — Z98.890 OTHER SPECIFIED POSTPROCEDURAL STATES: Chronic | ICD-10-CM

## 2023-06-20 DIAGNOSIS — Z87.39 PERSONAL HISTORY OF OTHER DISEASES OF THE MUSCULOSKELETAL SYSTEM AND CONNECTIVE TISSUE: Chronic | ICD-10-CM

## 2023-06-21 ENCOUNTER — APPOINTMENT (OUTPATIENT)
Dept: ENDOCRINOLOGY | Facility: CLINIC | Age: 88
End: 2023-06-21

## 2023-06-30 ENCOUNTER — APPOINTMENT (OUTPATIENT)
Dept: HEMATOLOGY ONCOLOGY | Facility: CLINIC | Age: 88
End: 2023-06-30
Payer: MEDICARE

## 2023-06-30 VITALS
HEART RATE: 68 BPM | BODY MASS INDEX: 18.71 KG/M2 | DIASTOLIC BLOOD PRESSURE: 51 MMHG | TEMPERATURE: 96.3 F | RESPIRATION RATE: 18 BRPM | OXYGEN SATURATION: 97 % | WEIGHT: 98.99 LBS | SYSTOLIC BLOOD PRESSURE: 95 MMHG

## 2023-06-30 DIAGNOSIS — C50.919 MALIGNANT NEOPLASM OF UNSPECIFIED SITE OF UNSPECIFIED FEMALE BREAST: ICD-10-CM

## 2023-06-30 PROCEDURE — 99214 OFFICE O/P EST MOD 30 MIN: CPT

## 2023-06-30 RX ORDER — LETROZOLE TABLETS 2.5 MG/1
2.5 TABLET, FILM COATED ORAL
Qty: 30 | Refills: 3 | Status: ACTIVE | COMMUNITY
Start: 2022-07-25 | End: 1900-01-01

## 2023-07-02 PROBLEM — C50.919 BREAST CANCER IN FEMALE: Status: ACTIVE | Noted: 2019-07-08

## 2023-07-02 NOTE — PHYSICAL EXAM
[Ambulatory and capable of all self care but unable to carry out any work activities] : Status 2- Ambulatory and capable of all self care but unable to carry out any work activities. Up and about more than 50% of waking hours [Thin] : thin [Normal] : affect appropriate [de-identified] : S/p bl mastectomies with reconstruction no discrete palpable masses in breast; L axilla w/o any palp masses.  No palpable axillary nodes on right.

## 2023-07-02 NOTE — REVIEW OF SYSTEMS
[Negative] : Endocrine [FreeTextEntry2] : as above [FreeTextEntry6] : as above [FreeTextEntry9] : as above [de-identified] : as above

## 2023-07-02 NOTE — HISTORY OF PRESENT ILLNESS
[Date: ____________] : Patient's last distress assessment performed on [unfilled]. [0 - No Distress] : Distress Level: 0 [de-identified] : The patient has a history of right breast cancer in 1965 for which she underwent what is described as a "Jeanette mastectomy" by Dr. Olivo at [facility] with "0/19 axillary lymph nodes" per patient's verbal report and followed expectantly.  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 Yu with a finding of "3 spots of DCIS, and a sentinel lymph node negative" per patient's verbal report; I do not have a copy of that report for my review.  The patient reports having undergone bilateral breast reconstruction at the same time to include what she describes as right "right cadaver tissue" in addition to an implant in the right breast, as well as a left breast implant.\par \par She was then well until approximately 1-2/19 when she describes transient pinching on her left reconstructed breast, centrally and predominantly to the lateral aspect.  She chose to follow this and then start to question whether she was "losing volume."  Through a friend/networking, she contacted Dr. Magan Carey who recommended a bilateral breast MRI, which was performed at [facility] on June 21, 2019 with a finding of a suspicious 5 cm irregular mass in the left axilla as well as further suspicious lymphadenopathy seen posterior to the mass with second-look ultrasound and core biopsy recommended as well as a PET-CT scan.  The patient went on to have an ultrasound of the left axilla with an ultrasound-guided core biopsy, with a finding of a heterogeneous 4.5 cm mass seen corresponding to the finding seen on the breast MRI with abnormal lymph nodes seen posterior to the mass measuring 8 mm with an ultrasound-guided core biopsy on that same date demonstrating invasive poorly differentiated mammary carcinoma with lobular phenotype, Carey score 8/9, with invasive tumor measuring at least 8 mm on the core biopsy specimen, ER negative, NM negative, HER-2/jeni negative, and a comment that "no focal residual lymphoid like tissue (no germinal center noted) identified; the lesion may represent a completely effaced lymph node or brisk lymphocytic response to tumor; clinical pathologic-radiologic correlation recommended”.  The patient then went on to see Dr. Carlos Pittman and had a PET-CT scan performed on July 5, 2019 with a finding of a mildly avid focus in the left thyroid lobe extending towards the isthmus, a 1.3 cm non-avid left thyroid lobe nodule; in the left axilla, there was left axillary soft tissue mass with irregular margins containing a biopsy clip measuring 3 x 2 cm with several adjacent subcentimeter left axillary lymph nodes measuring up to 1 cm; bilateral hip arthroplasties with heterogeneous FDG avidity adjacent to the proximal portions bilaterally on the right with an SUV of 13.8 on the left and SUV of 9.4 with a comment, this area is limited in evaluation due to beam hardening artifact; there were degenerative changes in the lower spine with mild areas of FDG avidity, multilevel, non-FDG avid compression fracture of L4 unchanged since 06/2017.\par \par I saw her in initial consultation on 716/19 and subsequently had requested a review of the pathology and it turned our it was ER+ / NM neg. We reviewed her scans at tumor board and the group agreed it was unresectable at this time. I called the pt and informed her of the above change in the pathology and that I recommended neoadjuvant anti-estrogen therapy with an aromatase inhibitor such as anastrozole. She agreed to proceed and started taking anastrozole in the very first days of 7/19. \par \par Seen in 11/19. Had evidence of response in her left ax mass. \par \par Seen 3/20 and she informed me she stopped taking anstrozole end 8/19 secondary to concerns re bone toxicity. She had been seeing an "MD PhD" on [place] who had been infusing "ozone therapy in an electrolyte bag" IV, B17, other anti-inflammatory herbs. She declined to provide his name as " rather not say as he does not want people looking over his back". On exam she had what seemed like a further response. I recommended she re-consult / see Dr. Pittman to consider potential surgery vs XRT as primary local treatment. I advised that as she has been under the care of another physician that she follow up with him. I offered her to see me  as needed in the future. She was agreeable.\par \par In the interim she saw Dr. Pittman 7/6/20. WHen questioned why she did not do so sooner she did not have an answer to provide. Dr Pittman sent her for a B/L breast MRI 7/27/20 which showed :\par \par In the left axilla, again noted is an irregularly-shaped enhancing mass compatible with the known recurrent malignancy. Susceptibility artifact is noted within the mass, a biopsy marker. Overall, the mass measures approximately 6.9 cm AP by 3.9 TV x 6.2 cm CC. The mass appears more draped along the implant with extension along its superior lateral margin. The previously noted additional axillary lymph nodes medial to the mass appear relatively stable in size.\par \par No right axillary lymphadenopathy. No internal mammary lymphadenopathy.\par \par IMPRESSION:\par 1.  The biopsy-proven recurrent malignancy in the left axillary region has increased in size since the prior breast MRI and prior ultrasound, with maximal dimension now measuring 6.9 cm AP.\par 2.  No other suspicious enhancement in either breast.\par 3.  Silicone implants are intact.\par \par The pt now reports she stopped getting ozone therapy in 3/20. \par \par She progressed after she stopped anastrozole and ozone therapy. \par Restarted anastrozole in early August as well as ozone/vitamin/mineral therapy. \par \par Repeat MRI 10/27/20 revealed POD in L breast / axillary / CW mass. PET CT did not show any distant mets. \par \par Started fulvestrant on 11/24/20.   \par \par POD. Started on Letrozole on 7/25/22 and Palbociclib on 8/5/22.\par \par Tolerating letrozole well, with no treatment related side effects. \par \par After taking palbo for three weeks she noted progressive dyspnea first with exertion and now at rest.  She was seen in office last week, CTA was done and came back negative.  She came off palbo with resolution of symptoms.\par \par She is s/p palliative radiation therapy to the left axilla,  from 1/23/23 - 2/1/23 with Dr. Woodard.  [de-identified] : The patient presents for follow up.\par \par She was admitted to [facility] with tachycardia and wt loss - extensive workup neg for cardiology etiology but ultimately dxd hyperthyroidism. Started on methimazole  - since that time developed side effects and held it the last 3 days - scheduled to see endocrinology in 4 days.  \par \par She states since radiation she hasn't taken the letrozole any more.  \par  \par She has baseline fatigue and arthralgias with no change. \par \par She has baseline MIDDLETON/SOB, stable.  She had recent cardiac workup that was negative per patient. \par \par She has baseline back pain from chronic spinal stenosis - got steroid injection with sig improvement.\par \par PET/CT, 11/30/22\par IMPRESSION: Compared to FDG-PET/CT scan dated 7/22/2022:\par \par 1. Overall not significantly changed size and FDG avidity of left \par axillary soft tissue mass and adjacent left axillary lymph node, \par consistent with known recurrent disease.\par \par 2. Unchanged FDG avid left thyroid nodule.\par \par 3. Nonspecific new FDG avidity in the right colon and mild FDG avidity in \par the sigmoid colon without CT correlate. Please correlate clinically or \par with colonoscopy or CT colonography as indicated.\par \par 4. Nonspecific bilateral periprosthetic joint avidity, not significantly \par changed. Again, if infection is a clinical concern, labeled WBC/bone \par marrow imaging may be performed.\par \par US L ax 11/11/22\par IMPRESSION:\par \par Known left axillary malignancy with relatively stable size, although \par evaluation is suboptimal given technical limitations and ill-defined \par appearance of the known malignant mass.\par \par Mass extending into the skin and additional left axillary lymph nodes are \par increased in size suggestive of progression of disease. Clinical \par follow-up and management is recommended. If clinical management will be \par altered warranted, biopsy of the superficial mass or additional abnormal \par lymph nodes may be performed.\par

## 2023-07-10 NOTE — DISCHARGE NOTE PROVIDER - DATE OF DISCHARGE SERVICE:
Problem: Discharge Planning  Goal: Discharge to home or other facility with appropriate resources  7/10/2023 1448 by Ti Diaz RN  Outcome: Completed  Flowsheets (Taken 7/10/2023 0800)  Discharge to home or other facility with appropriate resources: Identify barriers to discharge with patient and caregiver  7/10/2023 0242 by Denys Caicedo RN  Outcome: [address] (Taken 7/9/2023 2000)  Discharge to home or other facility with appropriate resources: Identify barriers to discharge with patient and caregiver     Problem: Safety - Adult  Goal: Free from fall injury  7/10/2023 1448 by Ti Diaz RN  Outcome: Completed  7/10/2023 0242 by Denys Caicedo RN  Outcome: Progressing     Problem: Skin/Tissue Integrity  Goal: Absence of new skin breakdown  Description: 1. Monitor for areas of redness and/or skin breakdown  2. Assess vascular access sites hourly  3. Every 4-6 hours minimum:  Change oxygen saturation probe site  4. Every 4-6 hours:  If on nasal continuous positive airway pressure, respiratory therapy assess nares and determine need for appliance change or resting period.   7/10/2023 1448 by Ti Diaz RN  Outcome: Completed  7/10/2023 0242 by Denys Caicedo RN  Outcome: Progressing     Problem: ABCDS Injury Assessment  Goal: Absence of physical injury  7/10/2023 1448 by Ti Diaz RN  Outcome: Completed  7/10/2023 0242 by Denys Caicedo RN  Outcome: Progressing     Problem: Chronic Conditions and Co-morbidities  Goal: Patient's chronic conditions and co-morbidity symptoms are monitored and maintained or improved  7/10/2023 1448 by Ti Diaz RN  Outcome: Completed  Flowsheets (Taken 7/10/2023 0800)  Care Plan - Patient's Chronic Conditions and Co-Morbidity Symptoms are Monitored and Maintained or Improved: Monitor and assess patient's chronic conditions and comorbid symptoms for stability, deterioration, or improvement  7/10/2023 0242 by Denys Caicedo RN  Outcome: 16-Mar-2022

## 2023-07-25 ENCOUNTER — APPOINTMENT (OUTPATIENT)
Dept: FAMILY MEDICINE | Facility: CLINIC | Age: 88
End: 2023-07-25
Payer: MEDICARE

## 2023-07-25 VITALS
HEIGHT: 61 IN | HEART RATE: 76 BPM | RESPIRATION RATE: 20 BRPM | BODY MASS INDEX: 19.45 KG/M2 | WEIGHT: 103 LBS | SYSTOLIC BLOOD PRESSURE: 126 MMHG | DIASTOLIC BLOOD PRESSURE: 60 MMHG

## 2023-07-25 PROCEDURE — 36415 COLL VENOUS BLD VENIPUNCTURE: CPT

## 2023-07-25 PROCEDURE — 99214 OFFICE O/P EST MOD 30 MIN: CPT | Mod: 25

## 2023-07-25 RX ORDER — LOSARTAN POTASSIUM 25 MG/1
25 TABLET, FILM COATED ORAL
Qty: 90 | Refills: 3 | Status: DISCONTINUED | COMMUNITY
Start: 2023-06-15 | End: 2023-07-25

## 2023-07-25 RX ORDER — METHIMAZOLE 5 MG/1
5 TABLET ORAL DAILY
Qty: 90 | Refills: 3 | Status: DISCONTINUED | COMMUNITY
Start: 2023-06-15 | End: 2023-07-25

## 2023-07-25 RX ORDER — APIXABAN 2.5 MG/1
2.5 TABLET, FILM COATED ORAL
Refills: 0 | Status: DISCONTINUED | COMMUNITY
End: 2023-07-25

## 2023-07-25 NOTE — PHYSICAL EXAM
[No Acute Distress] : no acute distress [Normal] : normal rate, regular rhythm, normal S1 and S2 and no murmur heard [No Edema] : there was no peripheral edema [Soft] : abdomen soft [Non Tender] : non-tender [Normal Posterior Cervical Nodes] : no posterior cervical lymphadenopathy [Normal Anterior Cervical Nodes] : no anterior cervical lymphadenopathy [No Focal Deficits] : no focal deficits [Alert and Oriented x3] : oriented to person, place, and time [de-identified] : no tremors appreciated

## 2023-07-25 NOTE — HISTORY OF PRESENT ILLNESS
[de-identified] : Presents for BP check, labs, and general follow-up.  Reviewed medication - note following with Endocrine at [facility].  Does have occasional tremors but denies palpitations; note desirable wt gain.

## 2023-07-25 NOTE — ASSESSMENT
[FreeTextEntry1] : Hemodynamically stable with acceptable BP\par Cardiac status stable with no evidence of decompensation\par No gross manifestations of thyroid dysfunction\par Lab profiles drawn in office and sent

## 2023-07-26 LAB
ALBUMIN SERPL ELPH-MCNC: 3.5 G/DL
ALP BLD-CCNC: 116 U/L
ALT SERPL-CCNC: 39 U/L
ANION GAP SERPL CALC-SCNC: 12 MMOL/L
AST SERPL-CCNC: 30 U/L
BILIRUB SERPL-MCNC: 0.2 MG/DL
BUN SERPL-MCNC: 46 MG/DL
CALCIUM SERPL-MCNC: 10.1 MG/DL
CHLORIDE SERPL-SCNC: 107 MMOL/L
CHOLEST SERPL-MCNC: 165 MG/DL
CO2 SERPL-SCNC: 21 MMOL/L
CREAT SERPL-MCNC: 1.13 MG/DL
EGFR: 47 ML/MIN/1.73M2
ESTIMATED AVERAGE GLUCOSE: 126 MG/DL
FOLATE SERPL-MCNC: >20 NG/ML
GLUCOSE SERPL-MCNC: 119 MG/DL
HBA1C MFR BLD HPLC: 6 %
HDLC SERPL-MCNC: 62 MG/DL
LDLC SERPL CALC-MCNC: 86 MG/DL
NONHDLC SERPL-MCNC: 102 MG/DL
POTASSIUM SERPL-SCNC: 4.6 MMOL/L
PROT SERPL-MCNC: 6.4 G/DL
SODIUM SERPL-SCNC: 140 MMOL/L
T3FREE SERPL-MCNC: 12.1 PG/ML
T4 FREE SERPL-MCNC: 2.6 NG/DL
TRIGL SERPL-MCNC: 92 MG/DL
TSH SERPL-ACNC: <0.01 UIU/ML
VIT B12 SERPL-MCNC: 1153 PG/ML

## 2023-08-16 ENCOUNTER — RX RENEWAL (OUTPATIENT)
Age: 88
End: 2023-08-16

## 2023-08-25 ENCOUNTER — APPOINTMENT (OUTPATIENT)
Dept: FAMILY MEDICINE | Facility: CLINIC | Age: 88
End: 2023-08-25
Payer: MEDICARE

## 2023-08-25 VITALS
DIASTOLIC BLOOD PRESSURE: 70 MMHG | HEIGHT: 61 IN | RESPIRATION RATE: 20 BRPM | BODY MASS INDEX: 19.26 KG/M2 | WEIGHT: 102 LBS | SYSTOLIC BLOOD PRESSURE: 112 MMHG | HEART RATE: 76 BPM

## 2023-08-25 PROCEDURE — 99214 OFFICE O/P EST MOD 30 MIN: CPT | Mod: 25

## 2023-08-25 PROCEDURE — 36415 COLL VENOUS BLD VENIPUNCTURE: CPT

## 2023-08-25 NOTE — HISTORY OF PRESENT ILLNESS
[de-identified] : Presents for BP check, labs, and general follow-up.  Remains on treatment for hyperthyroidism; states does feel improved.  States "heart isn't pounding anymore.

## 2023-08-26 LAB
ALBUMIN SERPL ELPH-MCNC: 3.9 G/DL
ALP BLD-CCNC: 129 U/L
ALT SERPL-CCNC: 33 U/L
ANION GAP SERPL CALC-SCNC: 13 MMOL/L
AST SERPL-CCNC: 19 U/L
BASOPHILS # BLD AUTO: 0.03 K/UL
BASOPHILS NFR BLD AUTO: 0.5 %
BILIRUB SERPL-MCNC: 0.2 MG/DL
BUN SERPL-MCNC: 71 MG/DL
CALCIUM SERPL-MCNC: 9.2 MG/DL
CHLORIDE SERPL-SCNC: 110 MMOL/L
CO2 SERPL-SCNC: 17 MMOL/L
CREAT SERPL-MCNC: 1.11 MG/DL
EGFR: 48 ML/MIN/1.73M2
EOSINOPHIL # BLD AUTO: 0.1 K/UL
EOSINOPHIL NFR BLD AUTO: 1.6 %
GLUCOSE SERPL-MCNC: 160 MG/DL
HCT VFR BLD CALC: 30.6 %
HGB BLD-MCNC: 9.3 G/DL
IMM GRANULOCYTES NFR BLD AUTO: 0.2 %
LYMPHOCYTES # BLD AUTO: 0.93 K/UL
LYMPHOCYTES NFR BLD AUTO: 14.9 %
MAN DIFF?: NORMAL
MCHC RBC-ENTMCNC: 26 PG
MCHC RBC-ENTMCNC: 30.4 GM/DL
MCV RBC AUTO: 85.5 FL
MONOCYTES # BLD AUTO: 0.38 K/UL
MONOCYTES NFR BLD AUTO: 6.1 %
NEUTROPHILS # BLD AUTO: 4.8 K/UL
NEUTROPHILS NFR BLD AUTO: 76.7 %
PLATELET # BLD AUTO: 211 K/UL
POTASSIUM SERPL-SCNC: 4.9 MMOL/L
PROT SERPL-MCNC: 6.8 G/DL
RBC # BLD: 3.58 M/UL
RBC # FLD: 18.2 %
SODIUM SERPL-SCNC: 140 MMOL/L
T3FREE SERPL-MCNC: 4.93 PG/ML
T4 FREE SERPL-MCNC: 0.8 NG/DL
TSH SERPL-ACNC: <0.01 UIU/ML
WBC # FLD AUTO: 6.25 K/UL

## 2023-09-10 ENCOUNTER — RX RENEWAL (OUTPATIENT)
Age: 88
End: 2023-09-10

## 2023-09-13 ENCOUNTER — OUTPATIENT (OUTPATIENT)
Dept: OUTPATIENT SERVICES | Facility: HOSPITAL | Age: 88
LOS: 1 days | Discharge: HOME | End: 2023-09-13

## 2023-09-13 DIAGNOSIS — Z87.39 PERSONAL HISTORY OF OTHER DISEASES OF THE MUSCULOSKELETAL SYSTEM AND CONNECTIVE TISSUE: Chronic | ICD-10-CM

## 2023-09-13 DIAGNOSIS — Z98.890 OTHER SPECIFIED POSTPROCEDURAL STATES: Chronic | ICD-10-CM

## 2023-09-13 DIAGNOSIS — C50.919 MALIGNANT NEOPLASM OF UNSPECIFIED SITE OF UNSPECIFIED FEMALE BREAST: ICD-10-CM

## 2023-09-13 DIAGNOSIS — Z95.0 PRESENCE OF CARDIAC PACEMAKER: Chronic | ICD-10-CM

## 2023-09-13 DIAGNOSIS — Z90.49 ACQUIRED ABSENCE OF OTHER SPECIFIED PARTS OF DIGESTIVE TRACT: Chronic | ICD-10-CM

## 2023-09-13 DIAGNOSIS — Z96.649 PRESENCE OF UNSPECIFIED ARTIFICIAL HIP JOINT: Chronic | ICD-10-CM

## 2023-09-17 ENCOUNTER — NON-APPOINTMENT (OUTPATIENT)
Age: 88
End: 2023-09-17

## 2023-09-19 ENCOUNTER — APPOINTMENT (OUTPATIENT)
Dept: HEMATOLOGY ONCOLOGY | Facility: CLINIC | Age: 88
End: 2023-09-19

## 2023-09-28 ENCOUNTER — APPOINTMENT (OUTPATIENT)
Dept: FAMILY MEDICINE | Facility: CLINIC | Age: 88
End: 2023-09-28
Payer: MEDICARE

## 2023-09-28 VITALS
BODY MASS INDEX: 20.77 KG/M2 | DIASTOLIC BLOOD PRESSURE: 70 MMHG | RESPIRATION RATE: 20 BRPM | SYSTOLIC BLOOD PRESSURE: 125 MMHG | WEIGHT: 110 LBS | HEART RATE: 76 BPM | HEIGHT: 61 IN

## 2023-09-28 DIAGNOSIS — Z23 ENCOUNTER FOR IMMUNIZATION: ICD-10-CM

## 2023-09-28 PROCEDURE — G0008: CPT

## 2023-09-28 PROCEDURE — 36415 COLL VENOUS BLD VENIPUNCTURE: CPT

## 2023-09-28 PROCEDURE — 99214 OFFICE O/P EST MOD 30 MIN: CPT | Mod: 25

## 2023-09-28 PROCEDURE — 90662 IIV NO PRSV INCREASED AG IM: CPT

## 2023-09-29 LAB
25(OH)D3 SERPL-MCNC: 31.5 NG/ML
ALBUMIN SERPL ELPH-MCNC: 4 G/DL
ALP BLD-CCNC: 204 U/L
ALT SERPL-CCNC: 133 U/L
ANION GAP SERPL CALC-SCNC: 11 MMOL/L
AST SERPL-CCNC: 61 U/L
BILIRUB SERPL-MCNC: 0.4 MG/DL
BUN SERPL-MCNC: 40 MG/DL
CALCIUM SERPL-MCNC: 9.2 MG/DL
CHLORIDE SERPL-SCNC: 113 MMOL/L
CHOLEST SERPL-MCNC: 195 MG/DL
CO2 SERPL-SCNC: 21 MMOL/L
CREAT SERPL-MCNC: 0.98 MG/DL
EGFR: 55 ML/MIN/1.73M2
FOLATE SERPL-MCNC: >20 NG/ML
GLUCOSE SERPL-MCNC: 87 MG/DL
HCT VFR BLD CALC: 31.9 %
HDLC SERPL-MCNC: 99 MG/DL
HGB BLD-MCNC: 9.7 G/DL
LDLC SERPL CALC-MCNC: 82 MG/DL
MCHC RBC-ENTMCNC: 26.6 PG
MCHC RBC-ENTMCNC: 30.4 GM/DL
MCV RBC AUTO: 87.6 FL
NONHDLC SERPL-MCNC: 96 MG/DL
PLATELET # BLD AUTO: 219 K/UL
POTASSIUM SERPL-SCNC: 4.7 MMOL/L
PROT SERPL-MCNC: 6.6 G/DL
RBC # BLD: 3.64 M/UL
RBC # FLD: 17.3 %
SODIUM SERPL-SCNC: 145 MMOL/L
T3FREE SERPL-MCNC: 6.79 PG/ML
T4 FREE SERPL-MCNC: 0.9 NG/DL
TRIGL SERPL-MCNC: 79 MG/DL
TSH SERPL-ACNC: <0.01 UIU/ML
VIT B12 SERPL-MCNC: 1127 PG/ML
WBC # FLD AUTO: 4.84 K/UL

## 2023-10-02 NOTE — ED ADULT NURSE NOTE - CAS TRG GENERAL NORM CIRC DET
Strong peripheral pulses related to renal dysfunction & endocrine related to excessive energy intake

## 2023-10-03 NOTE — ED ADULT NURSE NOTE - PAIN: BODY LOCATION
lateral foot/Left: Hemigard Intro: Due to skin fragility and wound tension, it was decided to use HEMIGARD adhesive retention suture devices to permit a linear closure. The skin was cleaned and dried for a 6cm distance away from the wound. Excessive hair, if present, was removed to allow for adhesion.

## 2023-10-04 ENCOUNTER — APPOINTMENT (OUTPATIENT)
Dept: ENDOCRINOLOGY | Facility: CLINIC | Age: 88
End: 2023-10-04
Payer: MEDICARE

## 2023-10-04 VITALS
HEART RATE: 75 BPM | WEIGHT: 110.13 LBS | OXYGEN SATURATION: 98 % | BODY MASS INDEX: 20.79 KG/M2 | TEMPERATURE: 97.3 F | DIASTOLIC BLOOD PRESSURE: 72 MMHG | SYSTOLIC BLOOD PRESSURE: 130 MMHG | HEIGHT: 61 IN

## 2023-10-04 DIAGNOSIS — E04.2 NONTOXIC MULTINODULAR GOITER: ICD-10-CM

## 2023-10-04 PROCEDURE — 99204 OFFICE O/P NEW MOD 45 MIN: CPT

## 2023-10-04 RX ORDER — PROPYLTHIOURACIL 50 MG/1
50 TABLET ORAL TWICE DAILY
Refills: 0 | Status: DISCONTINUED | COMMUNITY
Start: 2023-07-25 | End: 2023-10-04

## 2023-10-05 ENCOUNTER — RX RENEWAL (OUTPATIENT)
Age: 88
End: 2023-10-05

## 2023-10-16 ENCOUNTER — RX RENEWAL (OUTPATIENT)
Age: 88
End: 2023-10-16

## 2023-10-16 RX ORDER — METHIMAZOLE 5 MG/1
5 TABLET ORAL DAILY
Qty: 90 | Refills: 0 | Status: ACTIVE | COMMUNITY
Start: 2023-10-04 | End: 1900-01-01

## 2023-10-18 ENCOUNTER — NON-APPOINTMENT (OUTPATIENT)
Age: 88
End: 2023-10-18

## 2023-10-27 ENCOUNTER — INPATIENT (INPATIENT)
Facility: HOSPITAL | Age: 88
LOS: 2 days | Discharge: HOME | DRG: 311 | End: 2023-10-30
Attending: STUDENT IN AN ORGANIZED HEALTH CARE EDUCATION/TRAINING PROGRAM | Admitting: HOSPITALIST
Payer: MEDICARE

## 2023-10-27 VITALS
DIASTOLIC BLOOD PRESSURE: 68 MMHG | SYSTOLIC BLOOD PRESSURE: 120 MMHG | HEART RATE: 76 BPM | WEIGHT: 104.06 LBS | TEMPERATURE: 98 F | RESPIRATION RATE: 17 BRPM | OXYGEN SATURATION: 96 %

## 2023-10-27 DIAGNOSIS — Z96.649 PRESENCE OF UNSPECIFIED ARTIFICIAL HIP JOINT: Chronic | ICD-10-CM

## 2023-10-27 DIAGNOSIS — Z95.0 PRESENCE OF CARDIAC PACEMAKER: Chronic | ICD-10-CM

## 2023-10-27 DIAGNOSIS — Z90.49 ACQUIRED ABSENCE OF OTHER SPECIFIED PARTS OF DIGESTIVE TRACT: Chronic | ICD-10-CM

## 2023-10-27 DIAGNOSIS — R09.89 OTHER SPECIFIED SYMPTOMS AND SIGNS INVOLVING THE CIRCULATORY AND RESPIRATORY SYSTEMS: ICD-10-CM

## 2023-10-27 DIAGNOSIS — Z98.890 OTHER SPECIFIED POSTPROCEDURAL STATES: Chronic | ICD-10-CM

## 2023-10-27 DIAGNOSIS — I21.4 NON-ST ELEVATION (NSTEMI) MYOCARDIAL INFARCTION: ICD-10-CM

## 2023-10-27 DIAGNOSIS — Z87.39 PERSONAL HISTORY OF OTHER DISEASES OF THE MUSCULOSKELETAL SYSTEM AND CONNECTIVE TISSUE: Chronic | ICD-10-CM

## 2023-10-27 LAB
ALBUMIN SERPL ELPH-MCNC: 2.8 G/DL — LOW (ref 3.3–5)
ALBUMIN SERPL ELPH-MCNC: 2.8 G/DL — LOW (ref 3.3–5)
ALP SERPL-CCNC: 163 U/L — HIGH (ref 40–120)
ALP SERPL-CCNC: 163 U/L — HIGH (ref 40–120)
ALT FLD-CCNC: 113 U/L — HIGH (ref 10–45)
ALT FLD-CCNC: 113 U/L — HIGH (ref 10–45)
ANION GAP SERPL CALC-SCNC: 12 MMOL/L — SIGNIFICANT CHANGE UP (ref 5–17)
ANION GAP SERPL CALC-SCNC: 12 MMOL/L — SIGNIFICANT CHANGE UP (ref 5–17)
APTT BLD: 30.9 SEC — SIGNIFICANT CHANGE UP (ref 24.5–35.6)
APTT BLD: 30.9 SEC — SIGNIFICANT CHANGE UP (ref 24.5–35.6)
AST SERPL-CCNC: 57 U/L — HIGH (ref 10–40)
AST SERPL-CCNC: 57 U/L — HIGH (ref 10–40)
BASOPHILS # BLD AUTO: 0.01 K/UL — SIGNIFICANT CHANGE UP (ref 0–0.2)
BASOPHILS # BLD AUTO: 0.01 K/UL — SIGNIFICANT CHANGE UP (ref 0–0.2)
BASOPHILS NFR BLD AUTO: 0.2 % — SIGNIFICANT CHANGE UP (ref 0–2)
BASOPHILS NFR BLD AUTO: 0.2 % — SIGNIFICANT CHANGE UP (ref 0–2)
BILIRUB SERPL-MCNC: 0.9 MG/DL — SIGNIFICANT CHANGE UP (ref 0.2–1.2)
BILIRUB SERPL-MCNC: 0.9 MG/DL — SIGNIFICANT CHANGE UP (ref 0.2–1.2)
BUN SERPL-MCNC: 81 MG/DL — HIGH (ref 7–23)
BUN SERPL-MCNC: 81 MG/DL — HIGH (ref 7–23)
CALCIUM SERPL-MCNC: 9.6 MG/DL — SIGNIFICANT CHANGE UP (ref 8.4–10.5)
CALCIUM SERPL-MCNC: 9.6 MG/DL — SIGNIFICANT CHANGE UP (ref 8.4–10.5)
CHLORIDE SERPL-SCNC: 110 MMOL/L — HIGH (ref 96–108)
CHLORIDE SERPL-SCNC: 110 MMOL/L — HIGH (ref 96–108)
CK SERPL-CCNC: 80 U/L — SIGNIFICANT CHANGE UP (ref 25–170)
CK SERPL-CCNC: 80 U/L — SIGNIFICANT CHANGE UP (ref 25–170)
CO2 SERPL-SCNC: 16 MMOL/L — LOW (ref 22–31)
CO2 SERPL-SCNC: 16 MMOL/L — LOW (ref 22–31)
CREAT SERPL-MCNC: 1.32 MG/DL — HIGH (ref 0.5–1.3)
CREAT SERPL-MCNC: 1.32 MG/DL — HIGH (ref 0.5–1.3)
D DIMER BLD IA.RAPID-MCNC: 854 NG/ML DDU — HIGH
D DIMER BLD IA.RAPID-MCNC: 854 NG/ML DDU — HIGH
EGFR: 39 ML/MIN/1.73M2 — LOW
EGFR: 39 ML/MIN/1.73M2 — LOW
EOSINOPHIL # BLD AUTO: 0.19 K/UL — SIGNIFICANT CHANGE UP (ref 0–0.5)
EOSINOPHIL # BLD AUTO: 0.19 K/UL — SIGNIFICANT CHANGE UP (ref 0–0.5)
EOSINOPHIL NFR BLD AUTO: 3.2 % — SIGNIFICANT CHANGE UP (ref 0–6)
EOSINOPHIL NFR BLD AUTO: 3.2 % — SIGNIFICANT CHANGE UP (ref 0–6)
GLUCOSE SERPL-MCNC: 123 MG/DL — HIGH (ref 70–99)
GLUCOSE SERPL-MCNC: 123 MG/DL — HIGH (ref 70–99)
HCT VFR BLD CALC: 26.5 % — LOW (ref 34.5–45)
HCT VFR BLD CALC: 26.5 % — LOW (ref 34.5–45)
HGB BLD-MCNC: 8.3 G/DL — LOW (ref 11.5–15.5)
HGB BLD-MCNC: 8.3 G/DL — LOW (ref 11.5–15.5)
IMM GRANULOCYTES NFR BLD AUTO: 0.2 % — SIGNIFICANT CHANGE UP (ref 0–0.9)
IMM GRANULOCYTES NFR BLD AUTO: 0.2 % — SIGNIFICANT CHANGE UP (ref 0–0.9)
INR BLD: 1.08 RATIO — SIGNIFICANT CHANGE UP (ref 0.85–1.18)
INR BLD: 1.08 RATIO — SIGNIFICANT CHANGE UP (ref 0.85–1.18)
LACTATE SERPL-SCNC: 0.9 MMOL/L — SIGNIFICANT CHANGE UP (ref 0.7–2)
LACTATE SERPL-SCNC: 0.9 MMOL/L — SIGNIFICANT CHANGE UP (ref 0.7–2)
LYMPHOCYTES # BLD AUTO: 0.44 K/UL — LOW (ref 1–3.3)
LYMPHOCYTES # BLD AUTO: 0.44 K/UL — LOW (ref 1–3.3)
LYMPHOCYTES # BLD AUTO: 7.5 % — LOW (ref 13–44)
LYMPHOCYTES # BLD AUTO: 7.5 % — LOW (ref 13–44)
MCHC RBC-ENTMCNC: 26.5 PG — LOW (ref 27–34)
MCHC RBC-ENTMCNC: 26.5 PG — LOW (ref 27–34)
MCHC RBC-ENTMCNC: 31.3 GM/DL — LOW (ref 32–36)
MCHC RBC-ENTMCNC: 31.3 GM/DL — LOW (ref 32–36)
MCV RBC AUTO: 84.7 FL — SIGNIFICANT CHANGE UP (ref 80–100)
MCV RBC AUTO: 84.7 FL — SIGNIFICANT CHANGE UP (ref 80–100)
MONOCYTES # BLD AUTO: 0.49 K/UL — SIGNIFICANT CHANGE UP (ref 0–0.9)
MONOCYTES # BLD AUTO: 0.49 K/UL — SIGNIFICANT CHANGE UP (ref 0–0.9)
MONOCYTES NFR BLD AUTO: 8.3 % — SIGNIFICANT CHANGE UP (ref 2–14)
MONOCYTES NFR BLD AUTO: 8.3 % — SIGNIFICANT CHANGE UP (ref 2–14)
NEUTROPHILS # BLD AUTO: 4.74 K/UL — SIGNIFICANT CHANGE UP (ref 1.8–7.4)
NEUTROPHILS # BLD AUTO: 4.74 K/UL — SIGNIFICANT CHANGE UP (ref 1.8–7.4)
NEUTROPHILS NFR BLD AUTO: 80.6 % — HIGH (ref 43–77)
NEUTROPHILS NFR BLD AUTO: 80.6 % — HIGH (ref 43–77)
NRBC # BLD: 0 /100 WBCS — SIGNIFICANT CHANGE UP (ref 0–0)
NRBC # BLD: 0 /100 WBCS — SIGNIFICANT CHANGE UP (ref 0–0)
NT-PROBNP SERPL-SCNC: HIGH PG/ML (ref 0–300)
NT-PROBNP SERPL-SCNC: HIGH PG/ML (ref 0–300)
PLATELET # BLD AUTO: 131 K/UL — LOW (ref 150–400)
PLATELET # BLD AUTO: 131 K/UL — LOW (ref 150–400)
POTASSIUM SERPL-MCNC: 5.1 MMOL/L — SIGNIFICANT CHANGE UP (ref 3.5–5.3)
POTASSIUM SERPL-MCNC: 5.1 MMOL/L — SIGNIFICANT CHANGE UP (ref 3.5–5.3)
POTASSIUM SERPL-SCNC: 5.1 MMOL/L — SIGNIFICANT CHANGE UP (ref 3.5–5.3)
POTASSIUM SERPL-SCNC: 5.1 MMOL/L — SIGNIFICANT CHANGE UP (ref 3.5–5.3)
PROT SERPL-MCNC: 6 G/DL — SIGNIFICANT CHANGE UP (ref 6–8.3)
PROT SERPL-MCNC: 6 G/DL — SIGNIFICANT CHANGE UP (ref 6–8.3)
PROTHROM AB SERPL-ACNC: 12.6 SEC — SIGNIFICANT CHANGE UP (ref 9.5–13)
PROTHROM AB SERPL-ACNC: 12.6 SEC — SIGNIFICANT CHANGE UP (ref 9.5–13)
RAPID RVP RESULT: SIGNIFICANT CHANGE UP
RAPID RVP RESULT: SIGNIFICANT CHANGE UP
RBC # BLD: 3.13 M/UL — LOW (ref 3.8–5.2)
RBC # BLD: 3.13 M/UL — LOW (ref 3.8–5.2)
RBC # FLD: 14.2 % — SIGNIFICANT CHANGE UP (ref 10.3–14.5)
RBC # FLD: 14.2 % — SIGNIFICANT CHANGE UP (ref 10.3–14.5)
SARS-COV-2 RNA SPEC QL NAA+PROBE: SIGNIFICANT CHANGE UP
SARS-COV-2 RNA SPEC QL NAA+PROBE: SIGNIFICANT CHANGE UP
SODIUM SERPL-SCNC: 138 MMOL/L — SIGNIFICANT CHANGE UP (ref 135–145)
SODIUM SERPL-SCNC: 138 MMOL/L — SIGNIFICANT CHANGE UP (ref 135–145)
TROPONIN I, HIGH SENSITIVITY RESULT: 405.2 NG/L — HIGH
TROPONIN I, HIGH SENSITIVITY RESULT: 405.2 NG/L — HIGH
TROPONIN I, HIGH SENSITIVITY RESULT: 462.9 NG/L — HIGH
TROPONIN I, HIGH SENSITIVITY RESULT: 462.9 NG/L — HIGH
WBC # BLD: 5.88 K/UL — SIGNIFICANT CHANGE UP (ref 3.8–10.5)
WBC # BLD: 5.88 K/UL — SIGNIFICANT CHANGE UP (ref 3.8–10.5)
WBC # FLD AUTO: 5.88 K/UL — SIGNIFICANT CHANGE UP (ref 3.8–10.5)
WBC # FLD AUTO: 5.88 K/UL — SIGNIFICANT CHANGE UP (ref 3.8–10.5)

## 2023-10-27 PROCEDURE — 99223 1ST HOSP IP/OBS HIGH 75: CPT | Mod: FS

## 2023-10-27 PROCEDURE — 72125 CT NECK SPINE W/O DYE: CPT | Mod: 26,MA

## 2023-10-27 PROCEDURE — 93970 EXTREMITY STUDY: CPT | Mod: 26

## 2023-10-27 PROCEDURE — 73522 X-RAY EXAM HIPS BI 3-4 VIEWS: CPT | Mod: 26

## 2023-10-27 PROCEDURE — 72220 X-RAY EXAM SACRUM TAILBONE: CPT | Mod: 26

## 2023-10-27 PROCEDURE — 93010 ELECTROCARDIOGRAM REPORT: CPT | Mod: 76

## 2023-10-27 PROCEDURE — 70450 CT HEAD/BRAIN W/O DYE: CPT | Mod: 26,MA

## 2023-10-27 PROCEDURE — 99291 CRITICAL CARE FIRST HOUR: CPT | Mod: FS

## 2023-10-27 PROCEDURE — 71045 X-RAY EXAM CHEST 1 VIEW: CPT | Mod: 26

## 2023-10-27 PROCEDURE — 99222 1ST HOSP IP/OBS MODERATE 55: CPT

## 2023-10-27 PROCEDURE — 93306 TTE W/DOPPLER COMPLETE: CPT | Mod: 26

## 2023-10-27 PROCEDURE — 99223 1ST HOSP IP/OBS HIGH 75: CPT

## 2023-10-27 RX ORDER — ENOXAPARIN SODIUM 100 MG/ML
30 INJECTION SUBCUTANEOUS EVERY 24 HOURS
Refills: 0 | Status: DISCONTINUED | OUTPATIENT
Start: 2023-10-27 | End: 2023-10-30

## 2023-10-27 RX ORDER — ACETAMINOPHEN 500 MG
650 TABLET ORAL EVERY 6 HOURS
Refills: 0 | Status: DISCONTINUED | OUTPATIENT
Start: 2023-10-27 | End: 2023-10-30

## 2023-10-27 RX ORDER — ASPIRIN/CALCIUM CARB/MAGNESIUM 324 MG
81 TABLET ORAL DAILY
Refills: 0 | Status: DISCONTINUED | OUTPATIENT
Start: 2023-10-27 | End: 2023-10-30

## 2023-10-27 RX ORDER — APIXABAN 2.5 MG/1
1 TABLET, FILM COATED ORAL
Qty: 0 | Refills: 0 | DISCHARGE

## 2023-10-27 RX ORDER — ACETAMINOPHEN 500 MG
2 TABLET ORAL
Qty: 0 | Refills: 0 | DISCHARGE

## 2023-10-27 RX ORDER — ONDANSETRON 8 MG/1
4 TABLET, FILM COATED ORAL EVERY 8 HOURS
Refills: 0 | Status: DISCONTINUED | OUTPATIENT
Start: 2023-10-27 | End: 2023-10-30

## 2023-10-27 RX ORDER — SODIUM CHLORIDE 9 MG/ML
500 INJECTION INTRAMUSCULAR; INTRAVENOUS; SUBCUTANEOUS ONCE
Refills: 0 | Status: COMPLETED | OUTPATIENT
Start: 2023-10-27 | End: 2023-10-27

## 2023-10-27 RX ORDER — ACETAMINOPHEN 500 MG
650 TABLET ORAL ONCE
Refills: 0 | Status: DISCONTINUED | OUTPATIENT
Start: 2023-10-27 | End: 2023-10-27

## 2023-10-27 RX ADMIN — SODIUM CHLORIDE 500 MILLILITER(S): 9 INJECTION INTRAMUSCULAR; INTRAVENOUS; SUBCUTANEOUS at 11:22

## 2023-10-27 RX ADMIN — Medication 650 MILLIGRAM(S): at 21:13

## 2023-10-27 RX ADMIN — ENOXAPARIN SODIUM 30 MILLIGRAM(S): 100 INJECTION SUBCUTANEOUS at 21:16

## 2023-10-27 RX ADMIN — Medication 650 MILLIGRAM(S): at 22:16

## 2023-10-27 NOTE — ED ADULT NURSE NOTE - OBJECTIVE STATEMENT
Pt presents to ED s/p fall, accompanied by home health aide.  Pt reports fall 3 days prior when she attempted to get out of bed and felt lightheaded.  Denies any injury at that time.  Reports another fall yesterday in the bathtub with positive head strike, no LOC.  Pt states that she was unable to get out of her bathtub until her home health aide arrived several hours later.  Reports left hip and right groin pain upon arrival to ED.

## 2023-10-27 NOTE — CONSULT NOTE ADULT - SUBJECTIVE AND OBJECTIVE BOX
ROGE SAINI  [id number]      HPI: 89-year-old female past medical history of right-sided breast cancer, hypothyroidism, CHF, hypertension, A-fib status post pacemaker (not on anticoagulation) presents to the ED with 2 falls in 2 days.  Patient reports her first fall was 3 days ago, she got out of bed, felt lightheaded and fell, however she was able to get up by herself and did not have any injuries.  Her second fall was yesterday, reports she was in the bathtub and is not sure how she fell but she fell backwards and hit her head, did not pass out, but was unable to get off the ground by herself, she was on the ground for 3 to 4 hours until her home health aide arrived in the morning.  Reports that she has been feeling a little confused and having difficulty getting her thoughts/words out since her fall early Thursday morning.  Patient reports that she is having a lot of pain to the buttock, the left hip and right groin.  Reports she came to the ED primarily because of the pain.  She denies any chest pain, shortness of breath, visual changes, fever chills, numbness tingling in the arms or legs, extremity weakness, headache, abdominal pain or urinary symptoms. She took 2 650mg tylenol pills PTA      ALLERGIES:  statins (Unknown)  Zocor (Unknown)  Originally Entered as [Unknown] reaction to [RS] (Unknown)  LAI-2 inhibitors (Unknown)  Lipitor (Unknown)  sulfa drugs (Unknown)      PAST MEDICAL & SURGICAL HISTORY:  CHF (congestive heart failure)      Malignant neoplasm of female breast, unspecified estrogen receptor status, unspecified laterality, unspecified site of breast      Atrial fibrillation, unspecified type  history of ablation in 2016; a fib gone a per patient      Kidney stone      History of spinal stenosis  cervical and lumbar      H/O lithotripsy      History of appendectomy  age 18 as per patient      Status post THR (total hip replacement)  bilateral      Status cardiac pacemaker            CURRENT MEDICATIONS:      SOCIAL HISTORY:  Former smoker for 20 yrs 2pk/day, quit since 1975    FAMILY HISTORY:  Family history of CHF (congestive heart failure)  mother  father hx lung ca  sister hx cva    ROS:  All 10 systems reviewed and positives noted in HPI    OBJECTIVE:    VITAL SIGNS:  Vital Signs Last 24 Hrs  T(C): 37.8 (27 Oct 2023 10:07), Max: 37.8 (27 Oct 2023 10:07)  T(F): 100 (27 Oct 2023 10:07), Max: 100 (27 Oct 2023 10:07)  HR: 65 (27 Oct 2023 13:35) (65 - 76)  BP: 133/95 (27 Oct 2023 13:35) (120/68 - 133/95)  BP(mean): --  RR: 19 (27 Oct 2023 13:35) (17 - 19)  SpO2: 95% (27 Oct 2023 13:35) (95% - 96%)    Parameters below as of 27 Oct 2023 13:35  Patient On (Oxygen Delivery Method): room air        PHYSICAL EXAM:  General: well appearing, no distress  HEENT: sclera anicteric  Neck: supple, no carotid bruits b/l  CVS: JVP ~ 7 cm H20, RRR, s1, s2, no murmurs/rubs/gallops  Chest: unlabored respirations, clear to auscultation b/l  Extremities: no lower extremity edema b/l  Neuro: awake, alert & oriented x 3  Psych: normal affect      LABS:                        8.3    5.88  )-----------( 131      ( 27 Oct 2023 10:35 )             26.5     10-27    138  |  110<H>  |  81<H>  ----------------------------<  123<H>  5.1   |  16<L>  |  1.32<H>    Ca    9.6      27 Oct 2023 10:35    TPro  6.0  /  Alb  2.8<L>  /  TBili  0.9  /  DBili  x   /  AST  57<H>  /  ALT  113<H>  /  AlkPhos  163<H>  10-27    CARDIAC MARKERS ( 27 Oct 2023 10:35 )  x     / x     / 80 U/L / x     / x          PT/INR - ( 27 Oct 2023 10:35 )   PT: 12.6 sec;   INR: 1.08 ratio         PTT - ( 27 Oct 2023 10:35 )  PTT:30.9 sec      ECG:       TTE: < from: TTE Echo Complete w/o Contrast w/ Doppler (04.17.21 @ 09:29) >   1. 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 Left atrial enlargement   3. Left ventricular ejection fraction, by visual estimation, is 50 to 55%.   4. Normal global left ventricular systolic function.   5. Spectral Doppler shows pseudonormal pattern of left ventricular myocardial filling (Grade II diastolic dysfunction).   6. There is severe concentric left ventricular hypertrophy.   7. Mild mitral valve regurgitation.   8. Mild tricuspid regurgitation.   9. Mild pulmonic valve regurgitation.    < end of copied text >   D/w cardiologist Lucas from [facility]: last CORRINA on 8/30 with EF 60%. Moderate MR, TR

## 2023-10-27 NOTE — ED PROVIDER NOTE - NSICDXPASTSURGICALHX_GEN_ALL_CORE_FT
PAST SURGICAL HISTORY:  H/O lithotripsy     History of appendectomy age 18 as per patient    History of spinal stenosis cervical and lumbar    Status cardiac pacemaker     Status post THR (total hip replacement) bilateral

## 2023-10-27 NOTE — ED PROVIDER NOTE - PHYSICAL EXAMINATION
Gen: Well appearing in NAD.   Eyes: PERRLA, EOMI   ENT: oral mucosa dry   Head: atraumatic, no hematoma or open wounds noted  Heart: s1/s2, RRR  Lung: CTA b/l  Abd: soft, NT/ND, no rebound or guarding  Msk: Pelvis stable.  +tenderness to palpation over the left hip.  Full range of motion of hips bilaterally.  +tenderness to palpation of right groin.  No C-spine or mid spinal tenderness to palpation.  + bruising over the sacrum with tenderness to palpation  Neuro: AAO x3, patient moving all extremity equally, no focal neuro deficits noted  Skin: see above   Psych: Alert and oriented

## 2023-10-27 NOTE — H&P ADULT - ASSESSMENT
89yo female with hx of Breast cancer s/p mastectomy, CHF, HTN, A FIB sp PPM, presented to the ED w/ complaints of frequent falls in the past few days, noted to have difficulty word finding, concern for TIA/CVA, also noted to have abnormal blood work findings    #Rule out TIA/CVA  #Hx of CVA   -NIHSS score of 1   -Not a candidate for TNK  -CT Head results noted  -Start ASA. No Statin due to allergies  -Permissive HTN for now  -Not a candidate for MRI due to PPM  -Dysphagia screen passed. Start Diet  -ECHO results noted   -PT/OT consult  -Monitor tele  -Recommend repeat CT Head 24hrs from initial   -Neurology consult     #Diastolic CHF  #HTN  #AFIB  #Elevated Troponin  #Severe pulmonary HTN  -Euvolemic appearing however elevated proBNP and Elevated Troponin. Likely demand ischemia in the setting of possible CVA  -Cardiology recommendations noted  -Considering risks/benefits, pt likely is not a candidate for cardiac cath considering pt's comorbidities   -ECHO results noted; Grade III DD  -Not on any rate control medications  -Continue to monitor vitals, I/Os and daily weights    #ABBE on CKD III   -Likely prerenal   -near baseline eGFR  -Encourage oral intake  -Avoid nephrotoxic agents    #Elevated D-Dimer  -In the setting of hx of Cancer   -Pt could possibly have VTE as she is high risk, however, risks outweigh benefits regarding management w/ AC as pt was taken off of AC for AFIB  -Will order B/L LE venous dopplers  -Cannot perform CTA Chest in the setting of ABBE. Defer to day team regarding VQ scan as likely will not      #Breast cancer  #Anemia  -H/H stable  -Outpatient follow up at [facility]    #VTE ppx  -Lovenox     Updated son Chris

## 2023-10-27 NOTE — ED ADULT NURSE NOTE - NSFALLHARMRISKINTERV_ED_ALL_ED

## 2023-10-27 NOTE — ED PROVIDER NOTE - CLINICAL SUMMARY MEDICAL DECISION MAKING FREE TEXT BOX
89-year-old female past medical history of right-sided breast cancer, hypothyroidism, CHF, hypertension, A-fib status post pacemaker (not on anticoagulation) presents to the ED with 2 falls in 2 days.  Patient reports her first fall was 3 days ago, she got out of bed, felt lightheaded and fell, however she was able to get up by herself and did not have any injuries.  Her second fall was yesterday, reports she was in the bathtub and is not sure how she fell but she fell backwards and hit her head, did not pass out, but was unable to get off the ground by herself, she was on the ground for 3 to 4 hours until her home health aide arrived in the morning.  Reports that she has been feeling a little confused and having difficulty getting her thoughts/words out since her fall early Thursday morning.  Patient reports that she is having a lot of pain to the buttock, the left hip and right groin.  Reports she came to the ED primarily because of the pain.  She denies any chest pain, shortness of breath, visual changes, fever chills, numbness tingling in the arms or legs, extremity weakness, headache, abdominal pain or urinary symptoms. She took 2 650mg tylenol pills PTA.  PE as noted above. rectal temp 100.0.  labs/UA/imaging pending. IVF hydration, meds given, reassess 89-year-old female past medical history of right-sided breast cancer, hypothyroidism, CHF, hypertension, A-fib status post pacemaker (not on anticoagulation) presents to the ED with 2 falls in 2 days.  Patient reports her first fall was 3 days ago, she got out of bed, felt lightheaded and fell, however she was able to get up by herself and did not have any injuries.  Her second fall was yesterday, reports she was in the bathtub and is not sure how she fell but she fell backwards and hit her head, did not pass out, but was unable to get off the ground by herself, she was on the ground for 3 to 4 hours until her home health aide arrived in the morning.  Reports that she has been feeling a little confused and having difficulty getting her thoughts/words out since her fall early Thursday morning.  Patient reports that she is having a lot of pain to the buttock, the left hip and right groin.  Reports she came to the ED primarily because of the pain.  She denies any chest pain, shortness of breath, visual changes, fever chills, numbness tingling in the arms or legs, extremity weakness, headache, abdominal pain or urinary symptoms. She took 2 650mg tylenol pills PTA.  PE as noted above. rectal temp 100.0.  labs/UA/imaging pending. IVF hydration, meds given, reassess    315pm: Labs reviewed.  Abnormalities noted.  Second Trope results noted.  D-dimer elevated however patient's creatinine and BUN are also elevated.  Spoke with cardiologist Dr. Lim and hospitalist Dr. Johnston.  Patient should receive VQ scan on admission for rule out PE. 89-year-old female past medical history of right-sided breast cancer, hypothyroidism, CHF, hypertension, A-fib status post pacemaker (not on anticoagulation) presents to the ED with 2 falls in 2 days.  Patient reports her first fall was 3 days ago, she got out of bed, felt lightheaded and fell, however she was able to get up by herself and did not have any injuries.  Her second fall was yesterday, reports she was in the bathtub and is not sure how she fell but she fell backwards and hit her head, did not pass out, but was unable to get off the ground by herself, she was on the ground for 3 to 4 hours until her home health aide arrived in the morning.  Reports that she has been feeling a little confused and having difficulty getting her thoughts/words out since her fall early Thursday morning.  Patient reports that she is having a lot of pain to the buttock, the left hip and right groin.  Reports she came to the ED primarily because of the pain.  She denies any chest pain, shortness of breath, visual changes, fever chills, numbness tingling in the arms or legs, extremity weakness, headache, abdominal pain or urinary symptoms. She took 2 650mg tylenol pills PTA.  PE as noted above. NIHSS rectal temp 100.0.  labs/UA/imaging pending. IVF hydration, meds given, reassess    315pm: Labs reviewed.  Abnormalities noted.  Second Trop results noted.  D-dimer elevated however patient's creatinine and BUN are also elevated.  Spoke with cardiologist Dr. Lim and hospitalist Dr. Johnston.  Patient should receive VQ scan on admission for rule out PE. 89-year-old female past medical history of right-sided breast cancer, hypothyroidism, CHF, hypertension, A-fib status post pacemaker (not on anticoagulation) presents to the ED with 2 falls in 2 days.  Patient reports her first fall was 3 days ago, she got out of bed, felt lightheaded and fell, however she was able to get up by herself and did not have any injuries.  Her second fall was yesterday, reports she was in the bathtub and is not sure how she fell but she fell backwards and hit her head, did not pass out, but was unable to get off the ground by herself, she was on the ground for 3 to 4 hours until her home health aide arrived in the morning.  Reports that she has been feeling a little confused and having difficulty getting her thoughts/words out since her fall early Thursday morning.  Patient reports that she is having a lot of pain to the buttock, the left hip and right groin.  Reports she came to the ED primarily because of the pain.  She denies any chest pain, shortness of breath, visual changes, fever chills, numbness tingling in the arms or legs, extremity weakness, headache, abdominal pain or urinary symptoms. She took 2 650mg tylenol pills PTA.  PE as noted above. NIHSS 1. Pt is not in the TNK or thrombectomy window. Patients sympts were already present on Thursday when the HHA at bedside arrived,  HHA state prior to that she saw her Monday and she was normal. rectal temp 100.0.  labs/UA/imaging pending. IVF hydration, meds given, reassess    315pm: Labs reviewed.  Abnormalities noted.  Second Trop results noted.  D-dimer elevated however patient's creatinine and BUN are also elevated.  Spoke with cardiologist Dr. Lim and hospitalist Dr. Johnston.  Patient should receive VQ scan on admission for rule out PE.

## 2023-10-27 NOTE — PATIENT PROFILE ADULT - FALL HARM RISK - HARM RISK INTERVENTIONS
Assistance with ambulation/Assistance OOB with selected safe patient handling equipment/Communicate Risk of Fall with Harm to all staff/Discuss with provider need for PT consult/Monitor gait and stability/Provide patient with walking aids - walker, cane, crutches/Reinforce activity limits and safety measures with patient and family/Sit up slowly, dangle for a short time, stand at bedside before walking/Tailored Fall Risk Interventions/Visual Cue: Yellow wristband and red socks/Bed in lowest position, wheels locked, appropriate side rails in place/Call bell, personal items and telephone in reach/Instruct patient to call for assistance before getting out of bed or chair/Non-slip footwear when patient is out of bed/Normal to call system/Physically safe environment - no spills, clutter or unnecessary equipment/Purposeful Proactive Rounding/Room/bathroom lighting operational, light cord in reach

## 2023-10-27 NOTE — ED ADULT NURSE REASSESSMENT NOTE - NS ED NURSE REASSESS COMMENT FT1
Pt taken to bathroom with use of wheelchair.  Pt able to bear weight with standby assist, noted to have unsteady gait.  Colostomy bag drained of stool.

## 2023-10-27 NOTE — ED PROVIDER NOTE - DIFFERENTIAL DIAGNOSIS
Differential diagnosis includes but is not limited to ACS, TIA, infectious process, Differential Diagnosis

## 2023-10-27 NOTE — ED PROVIDER NOTE - CONSULTANT FREE TEXT FOR MDM DISCUSSED CASE WITH QUESTION
cardiology Dr. Lim evaluated patient in the ED and spoke with patients cardiologist at [facility]. He reccds admission to  for continued trops,

## 2023-10-27 NOTE — PATIENT PROFILE ADULT - FUNCTIONAL ASSESSMENT - DAILY ACTIVITY SCORE.
Gisella Landis  is being evaluated via a billable video visit.      How would you like to obtain your AVS? [employer] [employer]  For the video visit, send the invitation by: Send to e-mail at: [email or web address]  Will anyone else be joining your video visit? No        Magui Sanchez LPN 02/07/23 2:41 PM          
20

## 2023-10-27 NOTE — PATIENT PROFILE ADULT - FUNCTIONAL ASSESSMENT - BASIC MOBILITY ASSESSMENT TYPE
"Matti Kelly, This patient has urinary incontinence and leaks urine because of poor fit.  The large is too large and the medium is too tight.  Sound like the issue is with the elastic that goes around the thigh and that maybe it's too big?  Do you have any suggestions on other orders that could help in this case?  When I type in \"Depends\" into orders, I see options for adjustable Depends, inserts, etc.   Thank you! Reny"
Admission

## 2023-10-27 NOTE — ED PROVIDER NOTE - CARE PLAN
1 Principal Discharge DX:	NSTEMI (non-ST elevation myocardial infarction)  Secondary Diagnosis:	Fall   Principal Discharge DX:	NSTEMI (non-ST elevation myocardial infarction)  Secondary Diagnosis:	Fall  Secondary Diagnosis:	TIA (transient ischemic attack)   Principal Discharge DX:	NSTEMI (non-ST elevation myocardial infarction)  Secondary Diagnosis:	TIA (transient ischemic attack)  Secondary Diagnosis:	Fall

## 2023-10-27 NOTE — H&P ADULT - HISTORY OF PRESENT ILLNESS
88yo female with hx of Breast cancer s/p mastectomy, CHF, HTN, A FIB sp PPM, presented to the ED w/ complaints of frequent falls in the past few days. Pt states she lives at home alone and has an aide for a few hrs during the day. She has noted the past few days to have LE weakness leading to falling in different places in the house. Endorses hitting her head but no LOC or dizziness prior to falling. She also endorses difficulty word finding for the past few days as well. Today she felt worsening of her symptoms including mild confusion and pain in her back for which reason she decided to come to the ED. Denies cp, palpitations, sob, abd pain, N/V, fever, chills.

## 2023-10-27 NOTE — ED ADULT TRIAGE NOTE - TEMPERATURE IN FAHRENHEIT (DEGREES F)
Subjective:       Patient ID: Renetta Guerra is a 31 y.o. female      Chief Complaint   Patient presents with    Concerns About Ocular Health     History of Present Illness  Dls: 9/15/21 Dr. Muñoz     32 y/o female presents today with no complaints.   Pt wears scls and single vision glasses.     No tearing  No itching  No burning  No pain  + ha's  No floaters  No flashes    Eye meds  None       Assessment/Plan:     1. Examination of eyes and vision  Eyemed    2. Myopia, bilateral  Educated patient on refractive error and discussed lens options. Dispensed updated spectacle Rx. Educated about adaptation period to new specs.    Eyeglass Final Rx       Eyeglass Final Rx         Sphere Cylinder    Right -2.25 Sphere    Left -2.00 Sphere      Type: SVL    Expiration Date: 10/19/2023                      3. Wears contact lenses  Order biofinity trials -2.25//-2.00. No appt needed for pickup. Pt will call to finalize. Can keep 2.00 OU or increase OD to -2.25 if pt prefers.     Follow up for CL pickup when trials arrive.        97.6

## 2023-10-27 NOTE — ED PROVIDER NOTE - OBJECTIVE STATEMENT
89-year-old female past medical history of right-sided breast cancer, hypothyroidism, CHF, hypertension, A-fib status post pacemaker (not on anticoagulation) presents to the ED with 2 falls in 2 days.  Patient reports her first fall was 3 days ago, she got out of bed, felt lightheaded and fell, however she was able to get up by herself and did not have any injuries.  Her second fall was yesterday, reports she was in the bathtub and is not sure how she fell but she fell backwards and hit her head, did not pass out, but was unable to get off the ground by herself, she was on the ground for 3 to 4 hours until her home health aide arrived in the morning.  Reports that she has been feeling a little confused and having difficulty getting her thoughts/words out since her fall early Thursday morning.  Patient reports that she is having a lot of pain to the buttock, the left hip and right groin.  Reports she came to the ED primarily because of the pain.  She denies any chest pain, shortness of breath, visual changes, fever chills, numbness tingling in the arms or legs, extremity weakness, headache, abdominal pain or urinary symptoms. She took 2 650mg tylenol pills PTA

## 2023-10-27 NOTE — ED ADULT TRIAGE NOTE - CHIEF COMPLAINT QUOTE
Patient complaints of pain to legs, buttocks and head , s/p fall two days ago while in the bathroom. No LOC, no blood thinners.

## 2023-10-27 NOTE — ED ADULT NURSE NOTE - NSICDXPASTSURGICALHX_GEN_ALL_CORE_FT
Oriented - self; Oriented - place; Oriented - time/Age appropriate behavior
PAST SURGICAL HISTORY:  H/O lithotripsy     History of appendectomy age 18 as per patient    History of spinal stenosis cervical and lumbar    Status cardiac pacemaker     Status post THR (total hip replacement) bilateral

## 2023-10-27 NOTE — CONSULT NOTE ADULT - ASSESSMENT
Assessment: 89-year-old female past medical history of right-sided breast cancer, hypothyroidism, CHF, hypertension, A-fib status post pacemaker (not on anticoagulation) presents to the ED with 2 falls in 2 days.  Pt states her first fall occured after lightheadedness and is unsure how her second fall occurred but did suffer head strike. CTH and spine negative for acute hemorrhage or fracture. Trops negative but flat. Dimer 854. Currently denies chest pain but endorses prior recent episodes of chest pain and SOB. syncopal and dyspneic episodes likely with a cardiac component 2/2 chf, afib vs infectious vs malignancy induced thrombus vs anemia    Reccomendations  Syncope   last TTE 8/30 with EF 60%, moderate MR, TR> awaiting repeat TTE  cardiac cath 2016 with 30% stenosis in OM> pt may not be an ideal candidate for repeat cath. discussed option with pt and currently unsure. could consider nuclear stress test instead    hx afib: awaiting repeat ekg, last ekg showing a paced but currently rates are controlled  pt is a poor candidate for AC 2/2 frequent falls  pt with a PPM> PPM interrogation for other arrythmias    chf: pt with elevated bnp 21426 but does not seem overloaded  Cr also elevated 1.32, elevated probnp may be 2/2 to ABBE    elevated dimer: dimer 854. as per cardiologist, concern for malignancy induced thrombus. hx breast ca. due to ABBE, unable to pursue CTA, can consider VQ scan  awaiting XR results, RVP negative. low grade fever 100 rectally    anemia: hgb 8.3, has required transfusions in the past. limit transfusions to hgb <7 &/or active bleed    DW attg Dr Lim and outpt cardiologist Dr Zavala at [facility]       Assessment: 89-year-old female past medical history of right-sided breast cancer, hypothyroidism, CHF, hypertension, A-fib status post pacemaker (not on anticoagulation) presents to the ED with 2 falls in 2 days.  Pt states her first fall occured after lightheadedness and is unsure how her second fall occurred but did suffer head strike. CTH and spine negative for acute hemorrhage or fracture. Trops negative but flat. Dimer 854. Currently denies chest pain but endorses prior recent episodes of chest pain and SOB. syncopal and dyspneic episodes likely with a cardiac component 2/2 chf, afib vs infectious vs malignancy induced thrombus vs anemia    Reccomendations  Syncope   last TTE 8/30 with EF 60%, moderate MR, TR> awaiting repeat TTE  cardiac cath 2016 with 30% stenosis in OM> pt may not be an ideal candidate for repeat cath. discussed option with pt and currently unsure. could consider nuclear stress test instead    hx afib: awaiting repeat ekg, last ekg showing a paced but currently rates are controlled  pt is a poor candidate for AC 2/2 frequent falls  pt with a PPM> PPM interrogation for other arrythmias    chf: pt with elevated bnp 75537 but does not seem overloaded  Cr also elevated 1.32, elevated probnp may be 2/2 to ABBE    elevated dimer: dimer 854. as per cardiologist, concern for malignancy induced thrombus. hx breast ca. due to ABBE, unable to pursue CTA, can consider VQ scan  awaiting XR results, RVP negative. low grade fever 100 rectally    anemia: hgb 8.3, has required transfusions in the past. limit transfusions to hgb <7 &/or active bleed    DW attg Dr Lim and outpt cardiologist Dr Zavala at [facility]      ___________________________________________________________________________________________________________________________  impression cardiac cath 2016 .3 o/m lesion ETT 11/10/20 normal MPI ekg atrial pacer  crotid mild obstructive dizease.   elevated troponin  in the setting of chest pains and shortness of breath would consider patient for nSTEMI or type II mi. an echo is requested to evaluate for new wall motion abnormalities . would consider patient for asa+/- clopidogrel probably high risk due to anemia beta blocker and statin. ? candidate for update of ischemic risk given carcinoma.    elevated d dimer  can not exclude PE. may come to V/Q scan if unable to perform cTA. patient at increased risk due to decrease ambulation and carcinoma history.renal dysfunction makes  dye tests increased risk.     breast CA  known left axillary malignancy with mass extending int the skin and additional left axillary ln are increased in size suggestive of progression of disease. ? need for a biopsy. may need heme onc follow up for further recommendations.      Assessment: 89-year-old female past medical history of right-sided breast cancer, hypothyroidism, CHF, hypertension, A-fib status post pacemaker (not on anticoagulation) presents to the ED with 2 falls in 2 days.  Pt states her first fall occured after lightheadedness and is unsure how her second fall occurred but did suffer head strike. CTH and spine negative for acute hemorrhage or fracture. Trops negative but flat. Dimer 854. Currently denies chest pain but endorses prior recent episodes of chest pain and SOB. syncopal and dyspneic episodes likely with a cardiac component 2/2 chf, afib vs infectious vs malignancy induced thrombus vs anemia    Reccomendations  Syncope   last TTE 8/30 with EF 60%, moderate MR, TR> awaiting repeat TTE  cardiac cath 2016 with 30% stenosis in OM> pt may not be an ideal candidate for repeat cath. discussed option with pt and currently unsure. could consider nuclear stress test instead  check orthostatics    hx afib: awaiting repeat ekg, last ekg showing a paced but currently rates are controlled  pt is a poor candidate for AC 2/2 frequent falls and anemia  pt with a PPM> PPM interrogation for other arrythmias    chf: pt with elevated bnp 17200 but does not seem overloaded  Cr also elevated 1.32, elevated probnp may be 2/2 to ABBE    elevated dimer: dimer 854. as per cardiologist, concern for malignancy induced thrombus. hx breast ca. due to ABBE, unable to pursue CTA, can consider VQ scan  awaiting XR results, RVP negative. low grade fever 100 rectally    anemia: hgb 8.3, has required transfusions in the past. limit transfusions to hgb <7 &/or active bleed    DW attg Dr Lim and outpt cardiologist Dr Zavala at [facility]      ___________________________________________________________________________________________________________________________  impression cardiac cath 2016 .3 o/m lesion ETT 11/10/20 normal MPI ekg atrial pacer  crotid mild obstructive dizease.   elevated troponin  in the setting of chest pains and shortness of breath would consider patient for nSTEMI or type II mi. an echo is requested to evaluate for new wall motion abnormalities . would consider patient for asa+/- clopidogrel probably high risk due to anemia beta blocker and statin. ? candidate for update of ischemic risk given carcinoma.    elevated d dimer  can not exclude PE. may come to V/Q scan if unable to perform cTA. patient at increased risk due to decrease ambulation and carcinoma history.renal dysfunction makes  dye tests increased risk.     breast CA  known left axillary malignancy with mass extending int the skin and additional left axillary ln are increased in size suggestive of progression of disease. ? need for a biopsy. may need heme onc follow up for further recommendations.

## 2023-10-27 NOTE — ED PROVIDER NOTE - CRITICAL CARE ATTENDING CONTRIBUTION TO CARE
I have discussed the case with the PA. I have personally performed a history, physical exam, and my own medical decision making. I have reviewed the note and agree with the findings and plan with the following exceptions: None.    Upon my evaluation, this patient had a high probability of imminent or life-threatening deterioration due to NSTEMI, which required my direct attention, intervention, and personal management.     I have personally provided 31 minutes of critical care time exclusive of time spent on separately billable procedures. Time includes review of laboratory data, radiology results, discussion with consultants, and monitoring for potential decompensation. Interventions were performed as documented above.    - Smith Woodard MD, Emergency Medicine and Medical Toxicology Attending.

## 2023-10-28 LAB
A1C WITH ESTIMATED AVERAGE GLUCOSE RESULT: 5.3 % — SIGNIFICANT CHANGE UP (ref 4–5.6)
A1C WITH ESTIMATED AVERAGE GLUCOSE RESULT: 5.3 % — SIGNIFICANT CHANGE UP (ref 4–5.6)
ANION GAP SERPL CALC-SCNC: 15 MMOL/L — SIGNIFICANT CHANGE UP (ref 5–17)
ANION GAP SERPL CALC-SCNC: 15 MMOL/L — SIGNIFICANT CHANGE UP (ref 5–17)
APPEARANCE UR: CLEAR — SIGNIFICANT CHANGE UP
APPEARANCE UR: CLEAR — SIGNIFICANT CHANGE UP
BACTERIA # UR AUTO: NEGATIVE /HPF — SIGNIFICANT CHANGE UP
BACTERIA # UR AUTO: NEGATIVE /HPF — SIGNIFICANT CHANGE UP
BILIRUB UR-MCNC: NEGATIVE — SIGNIFICANT CHANGE UP
BILIRUB UR-MCNC: NEGATIVE — SIGNIFICANT CHANGE UP
BUN SERPL-MCNC: 66 MG/DL — HIGH (ref 7–23)
BUN SERPL-MCNC: 66 MG/DL — HIGH (ref 7–23)
CALCIUM SERPL-MCNC: 10.1 MG/DL — SIGNIFICANT CHANGE UP (ref 8.4–10.5)
CALCIUM SERPL-MCNC: 10.1 MG/DL — SIGNIFICANT CHANGE UP (ref 8.4–10.5)
CHLORIDE SERPL-SCNC: 111 MMOL/L — HIGH (ref 96–108)
CHLORIDE SERPL-SCNC: 111 MMOL/L — HIGH (ref 96–108)
CHOLEST SERPL-MCNC: 107 MG/DL — SIGNIFICANT CHANGE UP
CHOLEST SERPL-MCNC: 107 MG/DL — SIGNIFICANT CHANGE UP
CO2 SERPL-SCNC: 16 MMOL/L — LOW (ref 22–31)
CO2 SERPL-SCNC: 16 MMOL/L — LOW (ref 22–31)
COLOR SPEC: YELLOW — SIGNIFICANT CHANGE UP
COLOR SPEC: YELLOW — SIGNIFICANT CHANGE UP
CREAT SERPL-MCNC: 1.11 MG/DL — SIGNIFICANT CHANGE UP (ref 0.5–1.3)
CREAT SERPL-MCNC: 1.11 MG/DL — SIGNIFICANT CHANGE UP (ref 0.5–1.3)
DIFF PNL FLD: NEGATIVE — SIGNIFICANT CHANGE UP
DIFF PNL FLD: NEGATIVE — SIGNIFICANT CHANGE UP
EGFR: 48 ML/MIN/1.73M2 — LOW
EGFR: 48 ML/MIN/1.73M2 — LOW
EPI CELLS # UR: SIGNIFICANT CHANGE UP
EPI CELLS # UR: SIGNIFICANT CHANGE UP
ESTIMATED AVERAGE GLUCOSE: 105 MG/DL — SIGNIFICANT CHANGE UP (ref 68–114)
ESTIMATED AVERAGE GLUCOSE: 105 MG/DL — SIGNIFICANT CHANGE UP (ref 68–114)
GLUCOSE SERPL-MCNC: 78 MG/DL — SIGNIFICANT CHANGE UP (ref 70–99)
GLUCOSE SERPL-MCNC: 78 MG/DL — SIGNIFICANT CHANGE UP (ref 70–99)
GLUCOSE UR QL: NEGATIVE MG/DL — SIGNIFICANT CHANGE UP
GLUCOSE UR QL: NEGATIVE MG/DL — SIGNIFICANT CHANGE UP
HCT VFR BLD CALC: 27.1 % — LOW (ref 34.5–45)
HCT VFR BLD CALC: 27.1 % — LOW (ref 34.5–45)
HDLC SERPL-MCNC: 43 MG/DL — LOW
HDLC SERPL-MCNC: 43 MG/DL — LOW
HGB BLD-MCNC: 8.4 G/DL — LOW (ref 11.5–15.5)
HGB BLD-MCNC: 8.4 G/DL — LOW (ref 11.5–15.5)
KETONES UR-MCNC: ABNORMAL MG/DL
KETONES UR-MCNC: ABNORMAL MG/DL
LEUKOCYTE ESTERASE UR-ACNC: NEGATIVE — SIGNIFICANT CHANGE UP
LEUKOCYTE ESTERASE UR-ACNC: NEGATIVE — SIGNIFICANT CHANGE UP
LIPID PNL WITH DIRECT LDL SERPL: 47 MG/DL — SIGNIFICANT CHANGE UP
LIPID PNL WITH DIRECT LDL SERPL: 47 MG/DL — SIGNIFICANT CHANGE UP
MCHC RBC-ENTMCNC: 26.3 PG — LOW (ref 27–34)
MCHC RBC-ENTMCNC: 26.3 PG — LOW (ref 27–34)
MCHC RBC-ENTMCNC: 31 GM/DL — LOW (ref 32–36)
MCHC RBC-ENTMCNC: 31 GM/DL — LOW (ref 32–36)
MCV RBC AUTO: 84.7 FL — SIGNIFICANT CHANGE UP (ref 80–100)
MCV RBC AUTO: 84.7 FL — SIGNIFICANT CHANGE UP (ref 80–100)
NITRITE UR-MCNC: NEGATIVE — SIGNIFICANT CHANGE UP
NITRITE UR-MCNC: NEGATIVE — SIGNIFICANT CHANGE UP
NON HDL CHOLESTEROL: 64 MG/DL — SIGNIFICANT CHANGE UP
NON HDL CHOLESTEROL: 64 MG/DL — SIGNIFICANT CHANGE UP
NRBC # BLD: 0 /100 WBCS — SIGNIFICANT CHANGE UP (ref 0–0)
NRBC # BLD: 0 /100 WBCS — SIGNIFICANT CHANGE UP (ref 0–0)
PH UR: 5 — SIGNIFICANT CHANGE UP (ref 5–8)
PH UR: 5 — SIGNIFICANT CHANGE UP (ref 5–8)
PLATELET # BLD AUTO: 151 K/UL — SIGNIFICANT CHANGE UP (ref 150–400)
PLATELET # BLD AUTO: 151 K/UL — SIGNIFICANT CHANGE UP (ref 150–400)
POTASSIUM SERPL-MCNC: 5 MMOL/L — SIGNIFICANT CHANGE UP (ref 3.5–5.3)
POTASSIUM SERPL-MCNC: 5 MMOL/L — SIGNIFICANT CHANGE UP (ref 3.5–5.3)
POTASSIUM SERPL-SCNC: 5 MMOL/L — SIGNIFICANT CHANGE UP (ref 3.5–5.3)
POTASSIUM SERPL-SCNC: 5 MMOL/L — SIGNIFICANT CHANGE UP (ref 3.5–5.3)
PROT UR-MCNC: 30 MG/DL
PROT UR-MCNC: 30 MG/DL
RBC # BLD: 3.2 M/UL — LOW (ref 3.8–5.2)
RBC # BLD: 3.2 M/UL — LOW (ref 3.8–5.2)
RBC # FLD: 14.3 % — SIGNIFICANT CHANGE UP (ref 10.3–14.5)
RBC # FLD: 14.3 % — SIGNIFICANT CHANGE UP (ref 10.3–14.5)
RBC CASTS # UR COMP ASSIST: 0 /HPF — SIGNIFICANT CHANGE UP (ref 0–4)
RBC CASTS # UR COMP ASSIST: 0 /HPF — SIGNIFICANT CHANGE UP (ref 0–4)
SODIUM SERPL-SCNC: 142 MMOL/L — SIGNIFICANT CHANGE UP (ref 135–145)
SODIUM SERPL-SCNC: 142 MMOL/L — SIGNIFICANT CHANGE UP (ref 135–145)
SP GR SPEC: 1.02 — SIGNIFICANT CHANGE UP (ref 1–1.03)
SP GR SPEC: 1.02 — SIGNIFICANT CHANGE UP (ref 1–1.03)
T4 FREE SERPL-MCNC: >7.8 NG/DL — HIGH (ref 0.9–1.8)
T4 FREE SERPL-MCNC: >7.8 NG/DL — HIGH (ref 0.9–1.8)
TRIGL SERPL-MCNC: 85 MG/DL — SIGNIFICANT CHANGE UP
TRIGL SERPL-MCNC: 85 MG/DL — SIGNIFICANT CHANGE UP
TROPONIN I, HIGH SENSITIVITY RESULT: 496.6 NG/L — HIGH
TROPONIN I, HIGH SENSITIVITY RESULT: 496.6 NG/L — HIGH
TSH SERPL-MCNC: <0.007 UIU/ML — LOW (ref 0.36–3.74)
TSH SERPL-MCNC: <0.007 UIU/ML — LOW (ref 0.36–3.74)
UROBILINOGEN FLD QL: 0.2 MG/DL — SIGNIFICANT CHANGE UP (ref 0.2–1)
UROBILINOGEN FLD QL: 0.2 MG/DL — SIGNIFICANT CHANGE UP (ref 0.2–1)
WBC # BLD: 6.37 K/UL — SIGNIFICANT CHANGE UP (ref 3.8–10.5)
WBC # BLD: 6.37 K/UL — SIGNIFICANT CHANGE UP (ref 3.8–10.5)
WBC # FLD AUTO: 6.37 K/UL — SIGNIFICANT CHANGE UP (ref 3.8–10.5)
WBC # FLD AUTO: 6.37 K/UL — SIGNIFICANT CHANGE UP (ref 3.8–10.5)
WBC UR QL: 0 /HPF — SIGNIFICANT CHANGE UP (ref 0–5)
WBC UR QL: 0 /HPF — SIGNIFICANT CHANGE UP (ref 0–5)

## 2023-10-28 PROCEDURE — 99233 SBSQ HOSP IP/OBS HIGH 50: CPT

## 2023-10-28 PROCEDURE — 93010 ELECTROCARDIOGRAM REPORT: CPT

## 2023-10-28 PROCEDURE — 70450 CT HEAD/BRAIN W/O DYE: CPT | Mod: 26

## 2023-10-28 PROCEDURE — 99223 1ST HOSP IP/OBS HIGH 75: CPT | Mod: FS

## 2023-10-28 RX ORDER — AMLODIPINE BESYLATE 2.5 MG/1
5 TABLET ORAL DAILY
Refills: 0 | Status: DISCONTINUED | OUTPATIENT
Start: 2023-10-28 | End: 2023-10-30

## 2023-10-28 RX ORDER — ACETAMINOPHEN 500 MG
700 TABLET ORAL ONCE
Refills: 0 | Status: COMPLETED | OUTPATIENT
Start: 2023-10-28 | End: 2023-10-28

## 2023-10-28 RX ADMIN — Medication 81 MILLIGRAM(S): at 13:22

## 2023-10-28 RX ADMIN — Medication 700 MILLIGRAM(S): at 07:20

## 2023-10-28 RX ADMIN — ONDANSETRON 4 MILLIGRAM(S): 8 TABLET, FILM COATED ORAL at 22:07

## 2023-10-28 RX ADMIN — Medication 650 MILLIGRAM(S): at 21:58

## 2023-10-28 RX ADMIN — Medication 10 MILLIGRAM(S): at 10:44

## 2023-10-28 RX ADMIN — Medication 650 MILLIGRAM(S): at 23:00

## 2023-10-28 RX ADMIN — Medication 280 MILLIGRAM(S): at 06:26

## 2023-10-28 NOTE — OCCUPATIONAL THERAPY INITIAL EVALUATION ADULT - HOME MANAGEMENT SKILLS, PREVIOUS LEVEL OF FUNCTION, OT EVAL
Called pt's daughter [place] and stated that pt needed to start Multaq 400 mg BID on Monday 10/28/19 when samples would be available in [place] office for the pt to .  Advised pt's daughter that pt would need an EKG 11/1/19 in the [place] office, after starting Multaq.    Pt's daughter verbalizes understanding and agreeable to plan.   
needed assist

## 2023-10-28 NOTE — OCCUPATIONAL THERAPY INITIAL EVALUATION ADULT - ADDITIONAL COMMENTS
Per Pt report, patient lives in a single story home with a tub-shower unit with curtain. Does not drive but was previously independent with ADLs, assist with IADLs from family and home health aide (5 days/week 4 hours/day). Pt has a commode and uses a walker and cane at baseline.

## 2023-10-28 NOTE — DIETITIAN INITIAL EVALUATION ADULT - ETIOLOGY
related to inadequate protein-energy intake in setting of chronic disease (CHF), living alone, increased needs with hyperthyroid

## 2023-10-28 NOTE — DIETITIAN INITIAL EVALUATION ADULT - ORAL INTAKE PTA/DIET HISTORY
Pt endorses decreased appetite recently. States that she unintentionally lost 40lbs after dx of hyperthyroid after breast CA radiation. Was able to regain some of the weight by increasing CHO/caloric intake. Drinks a protein smoothie daily (dislikes commercial oral nutrition supplements). Lives alone with aide for few hours daily. States that a friend prepares meals for her, which she stores an reheats. Prefers to eat a whole foods/plant based diet, lost taste for most meat. NKFA. No chewing/swallowing issues.

## 2023-10-28 NOTE — DIETITIAN INITIAL EVALUATION ADULT - ADD RECOMMEND
1. liberalize diet to low Na to allow for higher calorie choices  2. provide high calorie snacks/desserts with meals

## 2023-10-28 NOTE — DIETITIAN INITIAL EVALUATION ADULT - PERTINENT MEDS FT
MEDICATIONS  (STANDING):  aspirin  chewable 81 milliGRAM(s) Oral daily  enoxaparin Injectable 30 milliGRAM(s) SubCutaneous every 24 hours    MEDICATIONS  (PRN):  acetaminophen     Tablet .. 650 milliGRAM(s) Oral every 6 hours PRN Temp greater or equal to 38C (100.4F), Mild Pain (1 - 3)  ondansetron Injectable 4 milliGRAM(s) IV Push every 8 hours PRN Nausea and/or Vomiting

## 2023-10-28 NOTE — CHART NOTE - NSCHARTNOTEFT_GEN_A_CORE
Contacted by nurse given pt complaint of new headache.   Evaluated at bedside, pt states headache now 8/10 from 10/10 previously given to nurse. Denies cp, dizziness, vision changes, sob, slurred speech  A&Ox3, CN II-XII exam intact, sensation and motor intact.  Ordered:  CT head ordered (initially to be repeated within 24h of index CT)  ecg  IV tylenol given, pending troponin, ecg

## 2023-10-28 NOTE — DIETITIAN INITIAL EVALUATION ADULT - OTHER INFO
89yo female with hx of Breast cancer s/p mastectomy, CHF, HTN, A FIB sp PPM, presented to the ED w/ complaints of frequent falls in the past few days, noted to have difficulty word finding, concern for TIA/CVA, also noted to have abnormal blood work findings.   Pt interviewed at bedside, frustrated with word finding difficulty. Tolerating diet with fair appetite. Reports UBW 104lbs, up from lowest of 86lbs. Pt with evidence of severe muscle wasting/fat loss per NFPE. Not currently receptive to oral nutrition supplements, but agreeable to high calorie snacks with meals. Provided suggestions on meal delivery programs that comply with parameters of heart failure nutrition therapy +reviewed heart failure nutrition therapy & ways to increase caloric intake mostly using plan based fats. Education well received. Denies N/V/D, constipation.

## 2023-10-28 NOTE — DIETITIAN INITIAL EVALUATION ADULT - PERTINENT LABORATORY DATA
10-28    142  |  111<H>  |  66<H>  ----------------------------<  78  5.0   |  16<L>  |  1.11    Ca    10.1      28 Oct 2023 07:49    TPro  6.0  /  Alb  2.8<L>  /  TBili  0.9  /  DBili  x   /  AST  57<H>  /  ALT  113<H>  /  AlkPhos  163<H>  10-27

## 2023-10-28 NOTE — CONSULT NOTE ADULT - SUBJECTIVE AND OBJECTIVE BOX
PULMONARY CONSULT  Location of Patient :  3EST E334 W1 ([facility])      Initial HPI on admission:  HPI:  89 year old female with hx of Breast cancer s/p mastectomy, CHF, HTN, A FIB sp PPM, presented to the ED w/ complaints of frequent falls in the past few days.  on 10/27/2023.  She lives at home alone and has an aide for a few hrs during the day. She has noted the past few days to have LE weakness leading to falling in different places in the house. Endorses hitting her head but no LOC or dizziness prior to falling. She also endorses difficulty word finding for the past few days as well. Today she felt worsening of her symptoms including mild confusion and pain in her back for which reason she decided to come to the ED. Denies cp, palpitations, sob, abd pain, N/V, fever, chills.  (27 Oct 2023 16:00)  Today pulmonary consult called for episodes of SOB.    BRIEF HOSPITAL COURSE: ***    PAST MEDICAL & SURGICAL HISTORY:  CHF (congestive heart failure)  Malignant neoplasm of female breast, unspecified estrogen receptor status, unspecified laterality, unspecified site of breast  Atrial fibrillation, unspecified type history of ablation in 2016; a fib gone a per patient  Kidney stone  History of spinal stenosis cervical and lumbar  H/O lithotripsy  History of appendectomy age 18 as per patient  Status post THR (total hip replacement) bilateral  Status cardiac pacemaker       Allergies    statins (Unknown)  Zocor (Unknown)  Originally Entered as [Unknown] reaction to [RS] (Unknown)  LAI-2 inhibitors (Unknown)  Lipitor (Unknown)  sulfa drugs (Unknown)    Intolerances      FAMILY HISTORY:  Family history of CHF (congestive heart failure)  mother      Social history: Social History:  Lives alone (27 Oct 2023 16:00)       Smoking:     Drinking:     Drug use:    Review of Systems: as stated above    CONSTITUTIONAL: No fever, No chills, No fatigue  EYES: No eye pain, No visual disturbances, No discharge  ENMT:  No difficulty hearing, No tinnitus, No vertigo; No sinus or throat pain  NECK: No pain, No stiffness  RESPIRATORY: No Cough, No SOB, No Secretions  CARDIOVASCULAR: No chest pain, No palpitations, No dizziness, or No leg swelling  GASTROINTESTINAL: No abdominal or epigastric pain. No nausea, No vomiting, No hematemesis; No diarrhea, No constipation. No melena, No hematochezia.  GENITOURINARY: No dysuria, No frequency, No hematuria, No incontinence  NEUROLOGICAL: No headaches, No memory loss, No loss of strength, No numbness, No tremors  SKIN: No itching, No burning, No rashes, No lesions   MUSCULOSKELETAL: No joint pain or swelling; No muscle, back, No extremity pain  PSYCHIATRIC: No depression, No anxiety, No mood swings, No difficulty sleeping      Medications:  MEDICATIONS  (STANDING):  aspirin  chewable 81 milliGRAM(s) Oral daily  enoxaparin Injectable 30 milliGRAM(s) SubCutaneous every 24 hours    MEDICATIONS  (PRN):  acetaminophen     Tablet .. 650 milliGRAM(s) Oral every 6 hours PRN Temp greater or equal to 38C (100.4F), Mild Pain (1 - 3)  ondansetron Injectable 4 milliGRAM(s) IV Push every 8 hours PRN Nausea and/or Vomiting      Antibiotics History      Heme Medications   aspirin  chewable 81 milliGRAM(s) Oral daily, 10-27-23 @ 16:27  enoxaparin Injectable 30 milliGRAM(s) SubCutaneous every 24 hours, 10-27-23 @ 15:59      GI Medications        Home Medications:  Last Order Reconciliation Date: 10-27-23 @ 16:52 (Admission Reconciliation)  amLODIPine 2.5 mg oral tablet: 1 tab(s) orally once a day (10-27-23 @ 16:51)  ascorbic acid 500 mg oral tablet: (vitamin C) 1 tab(s) orally once a day (10-27-23 @ 16:51)  Breo Ellipta 200 mcg-25 mcg/inh inhalation powder: 1 puff(s) inhaled once a day (10-27-23 @ 16:51)  calcium (as calcium citrate) 500 mg oral tablet, effervescent: 2 tab(s) orally once a day (10-27-23 @ 16:51)  Co Q-10 100 mg oral capsule: 1 cap(s) orally once a day (10-27-23 @ 16:51)  ferrous sulfate 75 mg/mL (15 mg/mL elemental iron) oral liquid: 5 milliliter(s) orally once a day (10-27-23 @ 16:51)  Fulvestrant 50 mg/mL intramuscular solution: monthly   (10-27-23 @ 16:51)  lisinopril 10 mg oral tablet: 1 tab(s) orally once a day (10-27-23 @ 16:51)  melatonin 3 mg oral tablet: 1 tab(s) orally once a day (at bedtime) (10-27-23 @ 16:51)  Multiple Vitamins oral tablet: 1 tab(s) orally once a day (10-27-23 @ 16:51)  senna oral tablet: 2 tab(s) orally once a day (at bedtime) (10-27-23 @ 16:51)  Vitamin D3 125 mcg (5000 intl units) oral tablet: 1 tab(s) orally on Thursday and Sunday (10-27-23 @ 16:51)      LABS:                        8.4    6.37  )-----------( 151      ( 28 Oct 2023 07:49 )             27.1     10-28    142  |  111<H>  |  66<H>  ----------------------------<  78  5.0   |  16<L>  |  1.11    Ca    10.1      28 Oct 2023 07:49    TPro  6.0  /  Alb  2.8<L>  /  TBili  0.9  /  DBili  x   /  AST  57<H>  /  ALT  113<H>  /  AlkPhos  163<H>  10-27    HIT ab -- 10-27 @ 11:55  HIT ab EIA --  D Dimer -[id number]       CULTURES: (if applicable)    Rapid RVP Result: NotDetec (10-27-23 @ 10:35)           RADIOLOGY  CXR:      CT:    ECHO:      VITALS:  T(C): 36.6 (10-28-23 @ 04:53), Max: 36.7 (10-27-23 @ 17:39)  T(F): 97.9 (10-28-23 @ 04:53), Max: 98 (10-27-23 @ 17:39)  HR: 72 (10-28-23 @ 10:15) (64 - 72)  BP: 180/74 (10-28-23 @ 10:15) (129/36 - 180/74)  BP(mean): --  ABP: --  ABP(mean): --  RR: 18 (10-28-23 @ 10:15) (18 - 22)  SpO2: 92% (10-28-23 @ 10:15) (92% - 98%)  CVP(mm Hg): --  CVP(cm H2O): --    Ins and Outs     10-27-23 @ 07:01  -  10-28-23 @ 07:00  --------------------------------------------------------  IN: 0 mL / OUT: 150 mL / NET: -150 mL    10-28-23 @ 07:01  -  10-28-23 @ 11:44  --------------------------------------------------------  IN: 120 mL / OUT: 0 mL / NET: 120 mL        Height (cm): 154.9 (10-27-23 @ 17:39)  Weight (kg): 51.4 (10-28-23 @ 04:53)  BMI (kg/m2): 21.4 (10-28-23 @ 04:53)        I&O's Detail    27 Oct 2023 07:01  -  28 Oct 2023 07:00  --------------------------------------------------------  IN:  Total IN: 0 mL    OUT:    Voided (mL): 150 mL  Total OUT: 150 mL    Total NET: -150 mL      28 Oct 2023 07:01  -  28 Oct 2023 11:44  --------------------------------------------------------  IN:    Oral Fluid: 120 mL  Total IN: 120 mL    OUT:  Total OUT: 0 mL    Total NET: 120 mL          Physical Examination:  GENERAL:               Alert, Oriented, No acute distress.    HEENT:                    Pupils equal, reactive to light.  Symmetric. No JVD, Moist MM  PULM:                     Bilateral air entry, Clear to auscultation bilaterally, no significant sputum production, No Rales, No Rhonchi, No Wheezing  CVS:                         S1, S2,  No Murmur  ABD:                        Soft, nondistended, nontender, normoactive bowel sounds,   EXT:                         No edema, nontender, No Cyanosis or Clubbing   Vascular:                Warm Extremities, Normal Capillary refill, Normal Distal Pulses  SKIN:                       Warm and well perfused, no rashes noted.   NEURO:                  Alert, oriented, interactive, nonfocal, follows commands  PSYC:                      Calm, + Insight.     PULMONARY CONSULT  Location of Patient : [facility] E[facility] W1 ([facility])      Initial HPI on admission:  HPI:  89 year old female with hx of Breast cancer s/p mastectomy, CHF, HTN, A FIB sp PPM, Hyperthyroidism presented to the ED w/ complaints of frequent falls in the past few days.  on 10/27/2023.  She lives at home alone and has an aide for a few hrs during the day. She has noted the past few days to have LE weakness leading to falling in different places in the house. Endorses hitting her head but no LOC or dizziness prior to falling. She also endorses difficulty word finding for the past few days as well. Today she felt worsening of her symptoms including mild confusion and pain in her back for which reason she decided to come to the ED. Denies cp, palpitations, sob, abd pain, N/V, fever, chills.  (27 Oct 2023 16:00)  Today pulmonary consult called for episodes of SOB.    BRIEF HOSPITAL COURSE:   when seen patent complain of difficulty breathing at times, states with her hyperthryroidism, she needs to be on PTU but has caused lft issues and cant tolerate methimazole and states gets SOB when ever off hyperthyroid medication.  she states is ex smoker and denies having copd    patient feels well  no n/v, cough, secretions.     PAST MEDICAL & SURGICAL HISTORY:  CHF (congestive heart failure)  Malignant neoplasm of female breast, unspecified estrogen receptor status, unspecified laterality, unspecified site of breast  Atrial fibrillation, unspecified type history of ablation in 2016; a fib gone a per patient  Kidney stone  History of spinal stenosis cervical and lumbar  H/O lithotripsy  History of appendectomy age 18 as per patient  Status post THR (total hip replacement) bilateral  Status cardiac pacemaker       Allergies    statins (Unknown)  Zocor (Unknown)  Originally Entered as [Unknown] reaction to [RS] (Unknown)  LAI-2 inhibitors (Unknown)  Lipitor (Unknown)  sulfa drugs (Unknown)    Intolerances      FAMILY HISTORY:  Family history of CHF (congestive heart failure)  mother      Social history: Social History:  Lives alone (27 Oct 2023 16:00)       Smoking: Quit 1975    Review of Systems: as stated above    CONSTITUTIONAL: No fever, No chills, + fatigue  EYES: No eye pain, No visual disturbances, No discharge  ENMT:  No difficulty hearing, No tinnitus, No vertigo; No sinus or throat pain  NECK: No pain, No stiffness  RESPIRATORY: No Cough, + SOB, No Secretions  CARDIOVASCULAR: No chest pain, No palpitations, No dizziness, or No leg swelling  GASTROINTESTINAL: No abdominal or epigastric pain. No nausea, No vomiting, No hematemesis; No diarrhea, No constipation. No melena, No hematochezia.  GENITOURINARY: No dysuria, No frequency, No hematuria, No incontinence  NEUROLOGICAL: No headaches, No memory loss, No loss of strength, No numbness, No tremors  SKIN: No itching, No burning, No rashes, No lesions   MUSCULOSKELETAL: No joint pain or swelling; No muscle, back, No extremity pain  PSYCHIATRIC: No depression, No anxiety, No mood swings, No difficulty sleeping      Medications:  MEDICATIONS  (STANDING):  aspirin  chewable 81 milliGRAM(s) Oral daily  enoxaparin Injectable 30 milliGRAM(s) SubCutaneous every 24 hours    MEDICATIONS  (PRN):  acetaminophen     Tablet .. 650 milliGRAM(s) Oral every 6 hours PRN Temp greater or equal to 38C (100.4F), Mild Pain (1 - 3)  ondansetron Injectable 4 milliGRAM(s) IV Push every 8 hours PRN Nausea and/or Vomiting      Antibiotics History      Heme Medications   aspirin  chewable 81 milliGRAM(s) Oral daily, 10-27-23 @ 16:27  enoxaparin Injectable 30 milliGRAM(s) SubCutaneous every 24 hours, 10-27-23 @ 15:59       Home Medications:  Last Order Reconciliation Date: 10-27-23 @ 16:52 (Admission Reconciliation)  amLODIPine 2.5 mg oral tablet: 1 tab(s) orally once a day (10-27-23 @ 16:51)  ascorbic acid 500 mg oral tablet: (vitamin C) 1 tab(s) orally once a day (10-27-23 @ 16:51)  Breo Ellipta 200 mcg-25 mcg/inh inhalation powder: 1 puff(s) inhaled once a day (10-27-23 @ 16:51)  calcium (as calcium citrate) 500 mg oral tablet, effervescent: 2 tab(s) orally once a day (10-27-23 @ 16:51)  Co Q-10 100 mg oral capsule: 1 cap(s) orally once a day (10-27-23 @ 16:51)  ferrous sulfate 75 mg/mL (15 mg/mL elemental iron) oral liquid: 5 milliliter(s) orally once a day (10-27-23 @ 16:51)  Fulvestrant 50 mg/mL intramuscular solution: monthly   (10-27-23 @ 16:51)  lisinopril 10 mg oral tablet: 1 tab(s) orally once a day (10-27-23 @ 16:51)  melatonin 3 mg oral tablet: 1 tab(s) orally once a day (at bedtime) (10-27-23 @ 16:51)  Multiple Vitamins oral tablet: 1 tab(s) orally once a day (10-27-23 @ 16:51)  senna oral tablet: 2 tab(s) orally once a day (at bedtime) (10-27-23 @ 16:51)  Vitamin D3 125 mcg (5000 intl units) oral tablet: 1 tab(s) orally on Thursday and Sunday (10-27-23 @ 16:51)      LABS:                        8.4    6.37  )-----------( 151      ( 28 Oct 2023 07:49 )             27.1     10-28    142  |  111<H>  |  66<H>  ----------------------------<  78  5.0   |  16<L>  |  1.11    Ca    10.1      28 Oct 2023 07:49    TPro  6.0  /  Alb  2.8<L>  /  TBili  0.9  /  DBili  x   /  AST  57<H>  /  ALT  113<H>  /  AlkPhos  163<H>  10-27    HIT ab -- 10-27 @ 11:55  HIT ab EIA --  D Dimer -854       CULTURES: (if applicable)    Rapid RVP Result: NotDetec (10-27-23 @ 10:35)           RADIOLOGY  CXR:      CT:  < from: CT Head No Cont (10.28.23 @ 07:34) >    IMPRESSION: No significant change when allowing for differences in   technique.    < end of copied text >  US  < from: US Duplex Venous Lower Ext Complete, Bilateral (10.27.23 @ 19:52) >  IMPRESSION:    No acute DVT of the lower extremities.    < end of copied text >    ECHO:  < from: TTE Echo Complete w/o Contrast w/ Doppler (10.27.23 @ 14:23) >    Summary:   1. Left ventricular ejection fraction, by visual estimation, is 60 to 65%.   2. Normal global left ventricular systolic function.   3. Severely enlarged left atrium.   4. Increased LV wall thickness.   5. Normal left ventricular internal cavity size.   6. Spectral Doppler shows restrictive pattern of left ventricular myocardial filling (Grade III diastolic dysfunction).   7. There is mild concentric left ventricular hypertrophy.   8. Moderately enlarged right ventricle.   9. Lipomatous hypertrophy of the interatrial septum.  10. Right atrial enlargement.  11. Mildmitral valve regurgitation.  12. Mild thickening and calcification of the anterior and posterior mitral valve leaflets.  13. Moderate-severe tricuspid regurgitation.  14. Mild aortic regurgitation.  15. Mild aortic valve stenosis.  16. Mild pulmonic valve regurgitation.  17. Estimated pulmonary artery systolic pressure is 86.8 mmHg assuming a right atrial pressure of 15 mmHg, which is consistent with severe pulmonary hypertension.  18. An RVS pressure of 86.8 represents an increase from a reported value of 60 from an outside institution.  19. The Dimesionless Index value is 0.47.  20. There is mild aortic root calcification.       < end of copied text >      VITALS:  T(C): 36.6 (10-28-23 @ 04:53), Max: 36.7 (10-27-23 @ 17:39)  T(F): 97.9 (10-28-23 @ 04:53), Max: 98 (10-27-23 @ 17:39)  HR: 72 (10-28-23 @ 10:15) (64 - 72)  BP: 180/74 (10-28-23 @ 10:15) (129/36 - 180/74)  BP(mean): --  ABP: --  ABP(mean): --  RR: 18 (10-28-23 @ 10:15) (18 - 22)  SpO2: 92% (10-28-23 @ 10:15) (92% - 98%)  CVP(mm Hg): --  CVP(cm H2O): --    Ins and Outs     10-27-23 @ 07:01  -  10-28-23 @ 07:00  --------------------------------------------------------  IN: 0 mL / OUT: 150 mL / NET: -150 mL    10-28-23 @ 07:01  -  10-28-23 @ 11:44  --------------------------------------------------------  IN: 120 mL / OUT: 0 mL / NET: 120 mL        Height (cm): 154.9 (10-27-23 @ 17:39)  Weight (kg): 51.4 (10-28-23 @ 04:53)  BMI (kg/m2): 21.4 (10-28-23 @ 04:53)        I&O's Detail    27 Oct 2023 07:01  -  28 Oct 2023 07:00  --------------------------------------------------------  IN:  Total IN: 0 mL    OUT:    Voided (mL): 150 mL  Total OUT: 150 mL    Total NET: -150 mL      28 Oct 2023 07:01  -  28 Oct 2023 11:44  --------------------------------------------------------  IN:    Oral Fluid: 120 mL  Total IN: 120 mL    OUT:  Total OUT: 0 mL    Total NET: 120 mL          Physical Examination:  GENERAL:               Alert, Oriented, No acute distress.    HEENT:                      No JVD, Moist MM  PULM:                     Bilateral air entry, Clear to auscultation bilaterally, no significant sputum production, No Rales, No Rhonchi, No Wheezing  CVS:                         S1, S2,  +Murmur  ABD:                        Soft, nondistended, nontender, normoactive bowel sounds,   EXT:                         No edema, nontender,   NEURO:                  Alert, oriented, interactive, nonfocal, follows commands  PSYC:                      Calm, + Insight.

## 2023-10-28 NOTE — OCCUPATIONAL THERAPY INITIAL EVALUATION ADULT - GENERAL OBSERVATIONS, REHAB EVAL
Pt received in bed in no pain, +cardiac monitor, +primafit, pt tolerated OT initial evaluation fair, treatment plan and goals discussed and agreed upon. Pt left semifowlers in 8/10 headache, all lines intact, and call bell in reach, +alarm, handoff given to RN.

## 2023-10-28 NOTE — OCCUPATIONAL THERAPY INITIAL EVALUATION ADULT - PERTINENT HX OF CURRENT PROBLEM, REHAB EVAL
from H&P: 88yo female with hx of Breast cancer s/p mastectomy, CHF, HTN, A FIB sp PPM, presented to the ED w/ complaints of frequent falls in the past few days. Pt states she lives at home alone and has an aide for a few hrs during the day. She has noted the past few days to have LE weakness leading to falling in different places in the house. Endorses hitting her head but no LOC or dizziness prior to falling. She also endorses difficulty word finding for the past few days as well. Today she felt worsening of her symptoms including mild confusion and pain in her back for which reason she decided to come to the ED. Denies cp, palpitations, sob, abd pain, N/V, fever, chills.

## 2023-10-28 NOTE — CONSULT NOTE ADULT - SUBJECTIVE AND OBJECTIVE BOX
Patient is a 89y old  Female who presents with a chief complaint of Falls (27 Oct 2023 16:00)      CC: falls, word finding difficulty    HPI:  88yo female with hx of Breast cancer s/p mastectomy, CHF, HTN, A FIB sp PPM, presented to the ED w/ complaints of frequent falls in the past few days. Pt states she lives at home alone and has an aide for a few hrs during the day. She has noted the past few days to have LE weakness leading to falling in different places in the house. Endorses hitting her head but no LOC or dizziness prior to falling. She also endorses difficulty word finding for the past few days as well. Today she felt worsening of her symptoms including mild confusion and pain in her back for which reason she decided to come to the ED. Denies cp, palpitations, sob, abd pain, N/V, fever, chills.  (27 Oct 2023 16:00)     Today, patient feels her speech is at baseline, no complaints.  Reports imbalance while walking due to right hip pain.    Head CT: no acute pathology    BUN/Cr = 81/1.32    UA - negative    PAST MEDICAL & SURGICAL HISTORY:  CHF (congestive heart failure)      Malignant neoplasm of female breast, unspecified estrogen receptor status, unspecified laterality, unspecified site of breast      Atrial fibrillation, unspecified type  history of ablation in 2016; a fib gone a per patient      Kidney stone      History of spinal stenosis  cervical and lumbar      H/O lithotripsy      History of appendectomy  age 18 as per patient      Status post THR (total hip replacement)  bilateral      Status cardiac pacemaker          FAMILY HISTORY:  Family history of CHF (congestive heart failure)  mother        Social Hx:  Nonsmoker, no drug or alcohol use    MEDICATIONS  (STANDING):  aspirin  chewable 81 milliGRAM(s) Oral daily  bisacodyl Suppository 10 milliGRAM(s) Rectal once  enoxaparin Injectable 30 milliGRAM(s) SubCutaneous every 24 hours       Allergies    statins (Unknown)  Zocor (Unknown)  Originally Entered as [Unknown] reaction to [RS] (Unknown)  LAI-2 inhibitors (Unknown)  Lipitor (Unknown)  sulfa drugs (Unknown)    Intolerances        ROS: Pertinent positives in HPI, all other ROS were reviewed and are negative.      Vital Signs Last 24 Hrs  T(C): 36.6 (28 Oct 2023 04:53), Max: 37.8 (27 Oct 2023 10:07)  T(F): 97.9 (28 Oct 2023 04:53), Max: 100 (27 Oct 2023 10:07)  HR: 71 (28 Oct 2023 04:53) (64 - 76)  BP: 165/65 (28 Oct 2023 04:53) (120/68 - 168/61)  BP(mean): --  RR: 18 (28 Oct 2023 04:53) (17 - 22)  SpO2: 93% (28 Oct 2023 04:53) (93% - 98%)    Parameters below as of 28 Oct 2023 04:53  Patient On (Oxygen Delivery Method): room air          Neurological exam:  HF: A x O x 3. Appropriately interactive, normal affect. Speech fluent, No Aphasia or paraphasic errors. Naming /repetition intact   CN: TOBY, EOMI, VFF, facial sensation normal, no NLFD, tongue midline, Palate moves equally, SCM equal bilaterally  Motor: No pronator drift, Strength 5/5 in all 4 ext, normal bulk and tone, no tremor, rigidity or bradykinesia.    Sens: Intact to light touch   Reflexes: Symmetric. BJ 1+, BR 1+, KJ 1+, AJ 1+, downgoing toes b/l  Coord:  No FNFA, dysmetria, MEMO intact     NIHSS: 0    Labs:   10-27    138  |  110<H>  |  81<H>  ----------------------------<  123<H>  5.1   |  16<L>  |  1.32<H>    Ca    9.6      27 Oct 2023 10:35    TPro  6.0  /  Alb  2.8<L>  /  TBili  0.9  /  DBili  x   /  AST  57<H>  /  ALT  113<H>  /  AlkPhos  163<H>  10-27                              8.3    5.88  )-----------( 131      ( 27 Oct 2023 10:35 )             26.5           A: 88 yo woman reporting transient word finding difficulty, speech now at baseline, no signs of aphasia.  Imbalance, with complaint of right hip pain.  Nonfocal neurological exam.    Recommend:  1. Agree with Aspirin 81mg daily (Eliquis stopped due to falls)  2. Optimize BP control  3. consider right hip xray  4. PT/OT eval  5. Stroke education    Duc Henson MD  Neurology Attending Physician

## 2023-10-28 NOTE — DIETITIAN INITIAL EVALUATION ADULT - PERSON TAUGHT/METHOD
heart failure for undernourished/verbal instruction/written material/teach back - (Patient repeats in own words)/patient instructed

## 2023-10-28 NOTE — CONSULT NOTE ADULT - ASSESSMENT
Assessment  89 year old female with hx of Breast cancer s/p mastectomy, CHF, HTN, A FIB sp PPM, Hyperthyroidism presented to the ED w/ complaints of frequent falls in the past few days.  on 10/27/2023.    Problem List  Dyspnea   Elevated DDimer in patient with breast cancer  Hyperthyroidism   Ex Smoker      Plan  Agree to check CTA  would get thyroid fuction test  Get Endo eval, patient sees Dr. Jin Schrader   supportive care  d/w Dr Crowell

## 2023-10-29 LAB
ALBUMIN SERPL ELPH-MCNC: 2.6 G/DL — LOW (ref 3.3–5)
ALBUMIN SERPL ELPH-MCNC: 2.6 G/DL — LOW (ref 3.3–5)
ALP SERPL-CCNC: 149 U/L — HIGH (ref 40–120)
ALP SERPL-CCNC: 149 U/L — HIGH (ref 40–120)
ALT FLD-CCNC: 86 U/L — HIGH (ref 10–45)
ALT FLD-CCNC: 86 U/L — HIGH (ref 10–45)
ANION GAP SERPL CALC-SCNC: 10 MMOL/L — SIGNIFICANT CHANGE UP (ref 5–17)
ANION GAP SERPL CALC-SCNC: 10 MMOL/L — SIGNIFICANT CHANGE UP (ref 5–17)
AST SERPL-CCNC: 32 U/L — SIGNIFICANT CHANGE UP (ref 10–40)
AST SERPL-CCNC: 32 U/L — SIGNIFICANT CHANGE UP (ref 10–40)
BILIRUB SERPL-MCNC: 0.9 MG/DL — SIGNIFICANT CHANGE UP (ref 0.2–1.2)
BILIRUB SERPL-MCNC: 0.9 MG/DL — SIGNIFICANT CHANGE UP (ref 0.2–1.2)
BUN SERPL-MCNC: 59 MG/DL — HIGH (ref 7–23)
BUN SERPL-MCNC: 59 MG/DL — HIGH (ref 7–23)
CALCIUM SERPL-MCNC: 9.9 MG/DL — SIGNIFICANT CHANGE UP (ref 8.4–10.5)
CALCIUM SERPL-MCNC: 9.9 MG/DL — SIGNIFICANT CHANGE UP (ref 8.4–10.5)
CHLORIDE SERPL-SCNC: 111 MMOL/L — HIGH (ref 96–108)
CHLORIDE SERPL-SCNC: 111 MMOL/L — HIGH (ref 96–108)
CO2 SERPL-SCNC: 20 MMOL/L — LOW (ref 22–31)
CO2 SERPL-SCNC: 20 MMOL/L — LOW (ref 22–31)
CREAT SERPL-MCNC: 1.14 MG/DL — SIGNIFICANT CHANGE UP (ref 0.5–1.3)
CREAT SERPL-MCNC: 1.14 MG/DL — SIGNIFICANT CHANGE UP (ref 0.5–1.3)
EGFR: 46 ML/MIN/1.73M2 — LOW
EGFR: 46 ML/MIN/1.73M2 — LOW
GLUCOSE SERPL-MCNC: 106 MG/DL — HIGH (ref 70–99)
GLUCOSE SERPL-MCNC: 106 MG/DL — HIGH (ref 70–99)
HCT VFR BLD CALC: 26.6 % — LOW (ref 34.5–45)
HCT VFR BLD CALC: 26.6 % — LOW (ref 34.5–45)
HGB BLD-MCNC: 8.4 G/DL — LOW (ref 11.5–15.5)
HGB BLD-MCNC: 8.4 G/DL — LOW (ref 11.5–15.5)
MCHC RBC-ENTMCNC: 26.6 PG — LOW (ref 27–34)
MCHC RBC-ENTMCNC: 26.6 PG — LOW (ref 27–34)
MCHC RBC-ENTMCNC: 31.6 GM/DL — LOW (ref 32–36)
MCHC RBC-ENTMCNC: 31.6 GM/DL — LOW (ref 32–36)
MCV RBC AUTO: 84.2 FL — SIGNIFICANT CHANGE UP (ref 80–100)
MCV RBC AUTO: 84.2 FL — SIGNIFICANT CHANGE UP (ref 80–100)
NRBC # BLD: 0 /100 WBCS — SIGNIFICANT CHANGE UP (ref 0–0)
NRBC # BLD: 0 /100 WBCS — SIGNIFICANT CHANGE UP (ref 0–0)
PLATELET # BLD AUTO: 141 K/UL — LOW (ref 150–400)
PLATELET # BLD AUTO: 141 K/UL — LOW (ref 150–400)
POTASSIUM SERPL-MCNC: 4.7 MMOL/L — SIGNIFICANT CHANGE UP (ref 3.5–5.3)
POTASSIUM SERPL-MCNC: 4.7 MMOL/L — SIGNIFICANT CHANGE UP (ref 3.5–5.3)
POTASSIUM SERPL-SCNC: 4.7 MMOL/L — SIGNIFICANT CHANGE UP (ref 3.5–5.3)
POTASSIUM SERPL-SCNC: 4.7 MMOL/L — SIGNIFICANT CHANGE UP (ref 3.5–5.3)
PROT SERPL-MCNC: 5.9 G/DL — LOW (ref 6–8.3)
PROT SERPL-MCNC: 5.9 G/DL — LOW (ref 6–8.3)
RBC # BLD: 3.16 M/UL — LOW (ref 3.8–5.2)
RBC # BLD: 3.16 M/UL — LOW (ref 3.8–5.2)
RBC # FLD: 14.2 % — SIGNIFICANT CHANGE UP (ref 10.3–14.5)
RBC # FLD: 14.2 % — SIGNIFICANT CHANGE UP (ref 10.3–14.5)
SODIUM SERPL-SCNC: 141 MMOL/L — SIGNIFICANT CHANGE UP (ref 135–145)
SODIUM SERPL-SCNC: 141 MMOL/L — SIGNIFICANT CHANGE UP (ref 135–145)
T3 SERPL-MCNC: 530 NG/DL — HIGH (ref 80–200)
T3 SERPL-MCNC: 530 NG/DL — HIGH (ref 80–200)
T4 AB SER-ACNC: 16.8 UG/DL — HIGH (ref 4.6–12)
T4 AB SER-ACNC: 16.8 UG/DL — HIGH (ref 4.6–12)
T4 FREE SERPL-MCNC: >7.8 NG/DL — HIGH (ref 0.9–1.8)
T4 FREE SERPL-MCNC: >7.8 NG/DL — HIGH (ref 0.9–1.8)
T4 FREE+ TSH PNL SERPL: <0.01 UIU/ML — LOW (ref 0.27–4.2)
T4 FREE+ TSH PNL SERPL: <0.01 UIU/ML — LOW (ref 0.27–4.2)
WBC # BLD: 6.1 K/UL — SIGNIFICANT CHANGE UP (ref 3.8–10.5)
WBC # BLD: 6.1 K/UL — SIGNIFICANT CHANGE UP (ref 3.8–10.5)
WBC # FLD AUTO: 6.1 K/UL — SIGNIFICANT CHANGE UP (ref 3.8–10.5)
WBC # FLD AUTO: 6.1 K/UL — SIGNIFICANT CHANGE UP (ref 3.8–10.5)

## 2023-10-29 PROCEDURE — 99232 SBSQ HOSP IP/OBS MODERATE 35: CPT

## 2023-10-29 PROCEDURE — 71275 CT ANGIOGRAPHY CHEST: CPT | Mod: 26

## 2023-10-29 PROCEDURE — 99233 SBSQ HOSP IP/OBS HIGH 50: CPT

## 2023-10-29 RX ORDER — ASCORBIC ACID 60 MG
500 TABLET,CHEWABLE ORAL DAILY
Refills: 0 | Status: DISCONTINUED | OUTPATIENT
Start: 2023-10-29 | End: 2023-10-30

## 2023-10-29 RX ORDER — GABAPENTIN 400 MG/1
100 CAPSULE ORAL THREE TIMES A DAY
Refills: 0 | Status: DISCONTINUED | OUTPATIENT
Start: 2023-10-29 | End: 2023-10-30

## 2023-10-29 RX ORDER — SENNA PLUS 8.6 MG/1
2 TABLET ORAL AT BEDTIME
Refills: 0 | Status: DISCONTINUED | OUTPATIENT
Start: 2023-10-29 | End: 2023-10-30

## 2023-10-29 RX ORDER — SACUBITRIL AND VALSARTAN 24; 26 MG/1; MG/1
1 TABLET, FILM COATED ORAL
Refills: 0 | Status: DISCONTINUED | OUTPATIENT
Start: 2023-10-29 | End: 2023-10-30

## 2023-10-29 RX ORDER — CHOLECALCIFEROL (VITAMIN D3) 125 MCG
1000 CAPSULE ORAL DAILY
Refills: 0 | Status: DISCONTINUED | OUTPATIENT
Start: 2023-10-29 | End: 2023-10-30

## 2023-10-29 RX ADMIN — AMLODIPINE BESYLATE 5 MILLIGRAM(S): 2.5 TABLET ORAL at 06:13

## 2023-10-29 RX ADMIN — SENNA PLUS 2 TABLET(S): 8.6 TABLET ORAL at 22:49

## 2023-10-29 RX ADMIN — ENOXAPARIN SODIUM 30 MILLIGRAM(S): 100 INJECTION SUBCUTANEOUS at 22:11

## 2023-10-29 RX ADMIN — Medication 81 MILLIGRAM(S): at 13:44

## 2023-10-29 RX ADMIN — SACUBITRIL AND VALSARTAN 1 TABLET(S): 24; 26 TABLET, FILM COATED ORAL at 22:13

## 2023-10-29 NOTE — PROGRESS NOTE ADULT - NUTRITIONAL ASSESSMENT
This patient has been assessed with a concern for Malnutrition and has been determined to have a diagnosis/diagnoses of Severe protein-calorie malnutrition and Underweight (BMI < 19).    This patient is being managed with:   Diet Regular-  Low Sodium  Entered: Oct 28 2023 11:44AM    Diet Regular-  DASH/TLC {Sodium & Cholesterol Restricted}  Entered: Oct 27 2023  3:56PM    The following pending diet order is being considered for treatment of Severe protein-calorie malnutrition and Underweight (BMI < 19):null
This patient has been assessed with a concern for Malnutrition and has been determined to have a diagnosis/diagnoses of Severe protein-calorie malnutrition and Underweight (BMI < 19).    This patient is being managed with:   Diet Regular-  Low Sodium  Entered: Oct 28 2023 11:44AM    Diet Regular-  DASH/TLC {Sodium & Cholesterol Restricted}  Entered: Oct 27 2023  3:56PM    The following pending diet order is being considered for treatment of Severe protein-calorie malnutrition and Underweight (BMI < 19):null

## 2023-10-29 NOTE — PHYSICAL THERAPY INITIAL EVALUATION ADULT - PERTINENT HX OF CURRENT PROBLEM, REHAB EVAL
patient is an 89 year old female with hx of Breast cancer s/p mastectomy, CHF, HTN, A FIB sp PPM, presented to the ED w/ complaints of frequent falls in the past few days. Pt lives at home alone and has an aide for a few hrs during the day. She has noted the past few days to have LE weakness leading to falling in different places in the house. Endorses hitting her head but no LOC or dizziness prior to falling. She also endorses difficulty word finding for the past few days as well. She felt worsening of her symptoms including mild confusion and pain in her back for which reason she decided to come to the ED. Admitted to r/o TIA/CVA, CT head & neck (-) on 10/27, CT angio chest (-) on 10/29, xrays bilateral hips, sacrum & coccyx (-)

## 2023-10-29 NOTE — PHYSICAL THERAPY INITIAL EVALUATION ADULT - ADDITIONAL COMMENTS
patient reports she lives alone, single story home, has HHA 4 hours x 5 days, has a rollator, cane and commode, pt reports family and friends are nearby and supportive, reports aide comes in the morning assists with ADLs and sets her up for the rest of the day, pt states she is independent with cane but reports limited mobility around the house, (-)

## 2023-10-29 NOTE — PHYSICAL THERAPY INITIAL EVALUATION ADULT - NSPTDISCHREC_GEN_A_CORE
pt reports she is able to increase hours of HHA to as many as needed, also reports family can provided additional support, pt does not want rehab/Home PT

## 2023-10-30 ENCOUNTER — TRANSCRIPTION ENCOUNTER (OUTPATIENT)
Age: 88
End: 2023-10-30

## 2023-10-30 VITALS
OXYGEN SATURATION: 95 % | HEART RATE: 61 BPM | RESPIRATION RATE: 16 BRPM | SYSTOLIC BLOOD PRESSURE: 116 MMHG | DIASTOLIC BLOOD PRESSURE: 51 MMHG | TEMPERATURE: 98 F

## 2023-10-30 LAB
ANION GAP SERPL CALC-SCNC: 12 MMOL/L — SIGNIFICANT CHANGE UP (ref 5–17)
ANION GAP SERPL CALC-SCNC: 12 MMOL/L — SIGNIFICANT CHANGE UP (ref 5–17)
BUN SERPL-MCNC: 47 MG/DL — HIGH (ref 7–23)
BUN SERPL-MCNC: 47 MG/DL — HIGH (ref 7–23)
CALCIUM SERPL-MCNC: 9.9 MG/DL — SIGNIFICANT CHANGE UP (ref 8.4–10.5)
CALCIUM SERPL-MCNC: 9.9 MG/DL — SIGNIFICANT CHANGE UP (ref 8.4–10.5)
CHLORIDE SERPL-SCNC: 109 MMOL/L — HIGH (ref 96–108)
CHLORIDE SERPL-SCNC: 109 MMOL/L — HIGH (ref 96–108)
CO2 SERPL-SCNC: 18 MMOL/L — LOW (ref 22–31)
CO2 SERPL-SCNC: 18 MMOL/L — LOW (ref 22–31)
CREAT SERPL-MCNC: 1.1 MG/DL — SIGNIFICANT CHANGE UP (ref 0.5–1.3)
CREAT SERPL-MCNC: 1.1 MG/DL — SIGNIFICANT CHANGE UP (ref 0.5–1.3)
EGFR: 48 ML/MIN/1.73M2 — LOW
EGFR: 48 ML/MIN/1.73M2 — LOW
GLUCOSE SERPL-MCNC: 97 MG/DL — SIGNIFICANT CHANGE UP (ref 70–99)
GLUCOSE SERPL-MCNC: 97 MG/DL — SIGNIFICANT CHANGE UP (ref 70–99)
HCT VFR BLD CALC: 26.2 % — LOW (ref 34.5–45)
HCT VFR BLD CALC: 26.2 % — LOW (ref 34.5–45)
HGB BLD-MCNC: 8.3 G/DL — LOW (ref 11.5–15.5)
HGB BLD-MCNC: 8.3 G/DL — LOW (ref 11.5–15.5)
MCHC RBC-ENTMCNC: 26.4 PG — LOW (ref 27–34)
MCHC RBC-ENTMCNC: 26.4 PG — LOW (ref 27–34)
MCHC RBC-ENTMCNC: 31.7 GM/DL — LOW (ref 32–36)
MCHC RBC-ENTMCNC: 31.7 GM/DL — LOW (ref 32–36)
MCV RBC AUTO: 83.4 FL — SIGNIFICANT CHANGE UP (ref 80–100)
MCV RBC AUTO: 83.4 FL — SIGNIFICANT CHANGE UP (ref 80–100)
NRBC # BLD: 0 /100 WBCS — SIGNIFICANT CHANGE UP (ref 0–0)
NRBC # BLD: 0 /100 WBCS — SIGNIFICANT CHANGE UP (ref 0–0)
PLATELET # BLD AUTO: 178 K/UL — SIGNIFICANT CHANGE UP (ref 150–400)
PLATELET # BLD AUTO: 178 K/UL — SIGNIFICANT CHANGE UP (ref 150–400)
POTASSIUM SERPL-MCNC: 4.9 MMOL/L — SIGNIFICANT CHANGE UP (ref 3.5–5.3)
POTASSIUM SERPL-MCNC: 4.9 MMOL/L — SIGNIFICANT CHANGE UP (ref 3.5–5.3)
POTASSIUM SERPL-SCNC: 4.9 MMOL/L — SIGNIFICANT CHANGE UP (ref 3.5–5.3)
POTASSIUM SERPL-SCNC: 4.9 MMOL/L — SIGNIFICANT CHANGE UP (ref 3.5–5.3)
RBC # BLD: 3.14 M/UL — LOW (ref 3.8–5.2)
RBC # BLD: 3.14 M/UL — LOW (ref 3.8–5.2)
RBC # FLD: 14.1 % — SIGNIFICANT CHANGE UP (ref 10.3–14.5)
RBC # FLD: 14.1 % — SIGNIFICANT CHANGE UP (ref 10.3–14.5)
SODIUM SERPL-SCNC: 139 MMOL/L — SIGNIFICANT CHANGE UP (ref 135–145)
SODIUM SERPL-SCNC: 139 MMOL/L — SIGNIFICANT CHANGE UP (ref 135–145)
THYROPEROXIDASE AB SERPL-ACNC: 12 IU/ML — SIGNIFICANT CHANGE UP
THYROPEROXIDASE AB SERPL-ACNC: 12 IU/ML — SIGNIFICANT CHANGE UP
WBC # BLD: 5.99 K/UL — SIGNIFICANT CHANGE UP (ref 3.8–10.5)
WBC # BLD: 5.99 K/UL — SIGNIFICANT CHANGE UP (ref 3.8–10.5)
WBC # FLD AUTO: 5.99 K/UL — SIGNIFICANT CHANGE UP (ref 3.8–10.5)
WBC # FLD AUTO: 5.99 K/UL — SIGNIFICANT CHANGE UP (ref 3.8–10.5)

## 2023-10-30 PROCEDURE — 99239 HOSP IP/OBS DSCHRG MGMT >30: CPT

## 2023-10-30 PROCEDURE — 99232 SBSQ HOSP IP/OBS MODERATE 35: CPT

## 2023-10-30 RX ORDER — LISINOPRIL 2.5 MG/1
1 TABLET ORAL
Qty: 0 | Refills: 0 | DISCHARGE

## 2023-10-30 RX ORDER — SACUBITRIL AND VALSARTAN 24; 26 MG/1; MG/1
1 TABLET, FILM COATED ORAL
Qty: 0 | Refills: 0 | DISCHARGE
Start: 2023-10-30

## 2023-10-30 RX ORDER — GABAPENTIN 400 MG/1
1 CAPSULE ORAL
Qty: 0 | Refills: 0 | DISCHARGE
Start: 2023-10-30

## 2023-10-30 RX ORDER — ASPIRIN/CALCIUM CARB/MAGNESIUM 324 MG
1 TABLET ORAL
Qty: 0 | Refills: 0 | DISCHARGE
Start: 2023-10-30

## 2023-10-30 RX ADMIN — GABAPENTIN 100 MILLIGRAM(S): 400 CAPSULE ORAL at 14:15

## 2023-10-30 RX ADMIN — Medication 1000 UNIT(S): at 14:16

## 2023-10-30 RX ADMIN — Medication 5 MILLIGRAM(S): at 06:04

## 2023-10-30 RX ADMIN — SACUBITRIL AND VALSARTAN 1 TABLET(S): 24; 26 TABLET, FILM COATED ORAL at 06:03

## 2023-10-30 RX ADMIN — AMLODIPINE BESYLATE 5 MILLIGRAM(S): 2.5 TABLET ORAL at 06:01

## 2023-10-30 RX ADMIN — Medication 81 MILLIGRAM(S): at 12:02

## 2023-10-30 RX ADMIN — Medication 500 MILLIGRAM(S): at 14:15

## 2023-10-30 NOTE — DISCHARGE NOTE PROVIDER - CARE PROVIDER_API CALL
Angelo Pang.  [address] [address]  Phone: [phone]  Fax: [phone]  Follow Up Time:    Angelo Pang.  [facility]  [address] [place] [id number]  Phone: [phone]  Fax: [phone]  Follow Up Time:     Francisco Kramer  Critical Care Medicine  [address] [place] [address] [address]  [place] [address]  Phone: [phone]  Fax: [phone]  Follow Up Time:

## 2023-10-30 NOTE — DISCHARGE NOTE PROVIDER - HOSPITAL COURSE
Hospital Course:  Per admitting physician:   HPI:  89yo female with hx of Breast cancer s/p mastectomy, CHF, HTN, A FIB sp PPM, presented to the ED w/ complaints of frequent falls in the past few days. Pt states she lives at home alone and has an aide for a few hrs during the day. She has noted the past few days to have LE weakness leading to falling in different places in the house. Endorses hitting her head but no LOC or dizziness prior to falling. She also endorses difficulty word finding for the past few days as well. Today she felt worsening of her symptoms including mild confusion and pain in her back for which reason she decided to come to the ED. Denies cp, palpitations, sob, abd pain, N/V, fever, chills.  (27 Oct 2023 16:00)    Medical Floor Course:  ROGE SAINI was admitted to medical floor under the impression of fall, confusion, shortness of breath. Workup revealed elevated troponin, hyperthyroidism. Evaluated by Cardiology, Pulmonology. Trop elevated due to demend ischemia. CT Angio negative for PE.   Patient prefers outpatient follow up with her own Endocrinologist.     Palliative Care / Advanced Care Planning  Code Status: DNR/DNI    Discharging Provider:  Zbigniew Tobar MD  Contact Info: Cell [phone] - Please call with any questions or concerns.    Outpatient Provider: Dr. Zavala Hospital Course:  Per admitting physician:   HPI:  88yo female with hx of Breast cancer s/p mastectomy, CHF, HTN, A FIB sp PPM, presented to the ED w/ complaints of frequent falls in the past few days. Pt states she lives at home alone and has an aide for a few hrs during the day. She has noted the past few days to have LE weakness leading to falling in different places in the house. Endorses hitting her head but no LOC or dizziness prior to falling. She also endorses difficulty word finding for the past few days as well. Today she felt worsening of her symptoms including mild confusion and pain in her back for which reason she decided to come to the ED. Denies cp, palpitations, sob, abd pain, N/V, fever, chills.  (27 Oct 2023 16:00)    Medical Floor Course:  ROGE SAINI was admitted to medical floor under the impression of fall, confusion, shortness of breath. Workup revealed elevated troponin, hyperthyroidism. Evaluated by Cardiology, Pulmonology. Trop elevated due to demend ischemia. CT Angio negative for PE.   Patient prefers outpatient follow up with her own Endocrinologist.     Reviewed CT scan showing lung nodule with patient. Recommended follow up outpatient - referred to Dr. Kramer's office   Patient verbalized understanding     Palliative Care / Advanced Care Planning  Code Status: DNR/DNI    Discharging Provider:  Zbigniew Tobar MD  Contact Info: Cell [phone] - Please call with any questions or concerns.    Outpatient Provider: Dr. Zavala

## 2023-10-30 NOTE — PROGRESS NOTE ADULT - SUBJECTIVE AND OBJECTIVE BOX
Follow-up Pulmonary Progress Note  Chief Complaint : Non-ST elevation myocardial infarction (NSTEMI)      patient seen and examined  no sob,  no cough, palp  had cta when seen results not up.        Allergies :statins (Unknown)  Zocor (Unknown)  Originally Entered as [Unknown] reaction to [RS] (Unknown)  LAI-2 inhibitors (Unknown)  Lipitor (Unknown)  sulfa drugs (Unknown)      PAST MEDICAL & SURGICAL HISTORY:  CHF (congestive heart failure)    Malignant neoplasm of female breast, unspecified estrogen receptor status, unspecified laterality, unspecified site of breast    Atrial fibrillation, unspecified type  history of ablation in 2016; a fib gone a per patient    Kidney stone    History of spinal stenosis  cervical and lumbar    H/O lithotripsy    History of appendectomy  age 18 as per patient    Status post THR (total hip replacement)  bilateral    Status cardiac pacemaker        Medications:  MEDICATIONS  (STANDING):  amLODIPine   Tablet 5 milliGRAM(s) Oral daily  aspirin  chewable 81 milliGRAM(s) Oral daily  enoxaparin Injectable 30 milliGRAM(s) SubCutaneous every 24 hours    MEDICATIONS  (PRN):  acetaminophen     Tablet .. 650 milliGRAM(s) Oral every 6 hours PRN Temp greater or equal to 38C (100.4F), Mild Pain (1 - 3)  ondansetron Injectable 4 milliGRAM(s) IV Push every 8 hours PRN Nausea and/or Vomiting      Antibiotics History      Heme Medications   aspirin  chewable 81 milliGRAM(s) Oral daily, 10-27-23 @ 16:27  enoxaparin Injectable 30 milliGRAM(s) SubCutaneous every 24 hours, 10-27-23 @ 15:59      GI Medications        LABS:                        8.4    6.10  )-----------( 141      ( 29 Oct 2023 07:05 )             26.6     10-29    141  |  111<H>  |  59<H>  ----------------------------<  106<H>  4.7   |  20<L>  |  1.14    Ca    9.9      29 Oct 2023 07:05    TPro  5.9<L>  /  Alb  2.6<L>  /  TBili  0.9  /  DBili  x   /  AST  32  /  ALT  86<H>  /  AlkPhos  149<H>  10-29    HIT ab -- 10-27 @ 11:55  HIT ab EIA --  D Dimer -854            Urinalysis Basic - ( 29 Oct 2023 07:05 )    Color: x / Appearance: x / SG: x / pH: x  Gluc: 106 mg/dL / Ketone: x  / Bili: x / Urobili: x   Blood: x / Protein: x / Nitrite: x   Leuk Esterase: x / RBC: x / WBC x   Sq Epi: x / Non Sq Epi: x / Bacteria: x              CULTURES: (if applicable)    Culture - Blood (collected 10-27-23 @ 10:35)  Source: .Blood Blood-Peripheral  Preliminary Report (10-28-23 @ 17:01):    No growth at 24 hours    Culture - Blood (collected 10-27-23 @ 10:35)  Source: .Blood Blood-Peripheral  Preliminary Report (10-28-23 @ 17:01):    No growth at 24 hours      Rapid RVP Result: NotDetec (10-27-23 @ 10:35)      < from: CT Angio Chest PE Protocol w/ IV Cont (10.29.23 @ 11:06) >    FINDINGS:  Lines and tubes: There is a pacemaker in left anterior chest wall with leads extending to right atrial appendage and right ventricle.    Pulmonary arteries: There is a good bolus of contrast in the pulmonary arterial system. There is opacification through the distal subsegmental level. There is no respiratory motion artifact. No filling defects are   identified to suggest pulmonary embolism. The main pulmonary artery is dilated measuring up to 3.4 cm. There is cardiomegaly. The right heart is not enlarged.    Mediastinum: The thyroid and thoracic inlet are normal. There is no evidence of enlarged mediastinal, hilar, or axillary lymph nodes.  No pericardial effusion is present. The esophagus is normal.    Lung/pleura: Central tracheobronchial tree is patent. Moderate emphysema in bilateral lungs are noted. There is a 1.0 x 1.5 cm pleural-based nodule with spiculations along left third intercostal space. There is no  focal consolidation. There is mild peribronchial wall thickening. Trace bibasilar pleural effusion is noted. There is no pneumothorax.    Abdomen: There is 1.2 cm nonobstructing renal calculus in left renal pelvis. There are bilateral renal cysts with the largest measuring up to 1.7 cm in the left kidney. Limited evaluation of the spleen, pancreas, liver, and adrenal glands is unremarkable.    Bone and soft tissue: Patient is status post bilateral mastectomy and implant placement. There are multilevel degenerative changes of thoracic spine with disc space narrowing and anterior bridging osteophytosis. There is loss of height of vertebral bodies most significant at the level of T8 vertebral body.    IMPRESSION:  No pulmonary embolism.  1.0 x 1.5 cm spiculated pleural-based nodule adjacent to left third intercostal space concerning for malignancy. Short-term follow-up is, that.  Smoking related lung injury.  Peribronchial wall thickening, likely inflammatory    --- End of Report ---    < end of copied text >     VITALS:  T(C): 36.4 (10-29-23 @ 04:38), Max: 36.5 (10-28-23 @ 12:59)  T(F): 97.6 (10-29-23 @ 04:38), Max: 97.7 (10-28-23 @ 12:59)  HR: 73 (10-29-23 @ 04:38) (63 - 73)  BP: 117/53 (10-29-23 @ 04:38) (117/53 - 142/61)  BP(mean): --  ABP: --  ABP(mean): --  RR: 16 (10-29-23 @ 04:38) (16 - 18)  SpO2: 94% (10-29-23 @ 04:38) (94% - 95%)  CVP(mm Hg): --  CVP(cm H2O): --    Ins and Outs     10-28-23 @ 07:01  -  10-29-23 @ 07:00  --------------------------------------------------------  IN: 240 mL / OUT: 100 mL / NET: 140 mL        Height (cm): 154.9 (10-27-23 @ 17:39)  Weight (kg): 51.4 (10-28-23 @ 04:53)  BMI (kg/m2): 21.4 (10-28-23 @ 04:53)        I&O's Detail    28 Oct 2023 07:01  -  29 Oct 2023 07:00  --------------------------------------------------------  IN:    Oral Fluid: 240 mL  Total IN: 240 mL    OUT:    Voided (mL): 100 mL  Total OUT: 100 mL    Total NET: 140 mL     
HPI:    Patient feels well this morning, eager to go home    Vital Signs Last 24 Hrs  T(C): 36.4 (29 Oct 2023 04:38), Max: 36.5 (28 Oct 2023 12:59)  T(F): 97.6 (29 Oct 2023 04:38), Max: 97.7 (28 Oct 2023 12:59)  HR: 73 (29 Oct 2023 04:38) (63 - 73)  BP: 117/53 (29 Oct 2023 04:38) (117/53 - 180/74)  BP(mean): --  RR: 16 (29 Oct 2023 04:38) (16 - 18)  SpO2: 94% (29 Oct 2023 04:38) (92% - 95%)    Parameters below as of 29 Oct 2023 04:38  Patient On (Oxygen Delivery Method): room air        MEDICATIONS  (STANDING):  amLODIPine   Tablet 5 milliGRAM(s) Oral daily  aspirin  chewable 81 milliGRAM(s) Oral daily  enoxaparin Injectable 30 milliGRAM(s) SubCutaneous every 24 hours    MEDICATIONS  (PRN):  acetaminophen     Tablet .. 650 milliGRAM(s) Oral every 6 hours PRN Temp greater or equal to 38C (100.4F), Mild Pain (1 - 3)  ondansetron Injectable 4 milliGRAM(s) IV Push every 8 hours PRN Nausea and/or Vomiting      ROS: pertinent positives in HPI, all other ROS were reviewed and are negative     Neurological exam:  MS: A x O x 3. Appropriately interactive, normal affect. Speech fluent, No Aphasia or paraphasic errors. Naming /repetition intact   CN: TOBY, EOMI, VFF, facial sensation normal, no NLFD, tongue midline, Palate moves equally, SCM equal bilaterally  Motor: No pronator drift, Strength 5/5 in all 4 ext, normal bulk and tone, no tremor, rigidity or bradykinesia.    Sens: Intact to light touch   Reflexes: Symmetric. BJ 1+, BR 1+, KJ 1+, AJ 1+, downgoing toes b/l  Coord:  No FNFA, dysmetria, MEMO intact     NIHSS: 0    LABS:                         8.4    6.10  )-----------( 141      ( 29 Oct 2023 07:05 )             26.6     10-29    141  |  111<H>  |  59<H>  ----------------------------<  106<H>  4.7   |  20<L>  |  1.14    Ca    9.9      29 Oct 2023 07:05    TPro  5.9<L>  /  Alb  2.6<L>  /  TBili  0.9  /  DBili  x   /  AST  32  /  ALT  86<H>  /  AlkPhos  149<H>  10-29    LIVER FUNCTIONS - ( 29 Oct 2023 07:05 )  Alb: 2.6 g/dL / Pro: 5.9 g/dL / ALK PHOS: 149 U/L / ALT: 86 U/L / AST: 32 U/L / GGT: x           10-28 Chol 107 LDL -- HDL 43<L> Trig 85          A: 89 yo woman reporting transient word finding difficulty, speech now at baseline, no signs of aphasia.  Imbalance, likely multifactorial (in part due to osteoarthritis).  Nonfocal neurological exam.    Recommend:  1. Agree with Aspirin 81mg daily (Eliquis stopped due to falls)  2. Optimize BP control  3. Carotid US (can be done as outpatient)  4. PT/OT eval  5. Stroke education    Duc Henson MD  Neurology Attending Physician    
Patient is a 89y old  Female who presents with a chief complaint of Falls (29 Oct 2023 10:41)    Patient seen and examined at bedside. No acute overnight events. States she feels better  Denies pain/discomfort.     ALLERGIES:  statins (Unknown)  Zocor (Unknown)  Originally Entered as [Unknown] reaction to [RS] (Unknown)  LAI-2 inhibitors (Unknown)  Lipitor (Unknown)  sulfa drugs (Unknown)    MEDICATIONS  (STANDING):  amLODIPine   Tablet 5 milliGRAM(s) Oral daily  aspirin  chewable 81 milliGRAM(s) Oral daily  enoxaparin Injectable 30 milliGRAM(s) SubCutaneous every 24 hours    MEDICATIONS  (PRN):  acetaminophen     Tablet .. 650 milliGRAM(s) Oral every 6 hours PRN Temp greater or equal to 38C (100.4F), Mild Pain (1 - 3)  ondansetron Injectable 4 milliGRAM(s) IV Push every 8 hours PRN Nausea and/or Vomiting    Vital Signs Last 24 Hrs  T(F): 97.6 (29 Oct 2023 04:38), Max: 97.7 (28 Oct 2023 12:59)  HR: 73 (29 Oct 2023 04:38) (63 - 73)  BP: 117/53 (29 Oct 2023 04:38) (117/53 - 142/61)  RR: 16 (29 Oct 2023 04:38) (16 - 18)  SpO2: 94% (29 Oct 2023 04:38) (94% - 95%)  I&O's Summary    28 Oct 2023 07:01  -  29 Oct 2023 07:00  --------------------------------------------------------  IN: 240 mL / OUT: 100 mL / NET: 140 mL    BMI (kg/m2): 21.4 (10-28-23 @ 04:53)    PHYSICAL EXAM:  General: NAD, awake, alert pleasant elderly F  ENT: MMM, no tonsilar exudate  Neck: Supple, No JVD  Lungs: Clear to auscultation bilaterally, no wheezes. Good air entry bilaterally   Cardio: RRR, S1/S2, No murmurs  Abdomen: Soft, Nontender, Nondistended; Bowel sounds present  Extremities: No calf tenderness, No pitting edema    LABS:                        8.4    6.10  )-----------( 141      ( 29 Oct 2023 07:05 )             26.6       10-29    141  |  111  |  59  ----------------------------<  106  4.7   |  20  |  1.14    Ca    9.9      29 Oct 2023 07:05    TPro  5.9  /  Alb  2.6  /  TBili  0.9  /  DBili  x   /  AST  32  /  ALT  86  /  AlkPhos  149  10-29       PT/INR - ( 27 Oct 2023 10:35 )   PT: 12.6 sec;   INR: 1.08 ratio         PTT - ( 27 Oct 2023 10:35 )  PTT:30.9 sec   Lactate, Blood: 0.9 mmol/L (10-27 @ 10:35)    CARDIAC MARKERS ( 28 Oct 2023 06:18 )  x     / 496.6 ng/L / x     / x     / x      CARDIAC MARKERS ( 27 Oct 2023 11:55 )  x     / 405.2 ng/L / x     / x     / x      CARDIAC MARKERS ( 27 Oct 2023 10:35 )  x     / 462.9 ng/L / 80 U/L / x     / x          10-28 Chol 107 mg/dL LDL -- HDL 43 mg/dL Trig 85 mg/dL  TSH --   TSH with FT4 reflex <0.01  Total T3 530    Urinalysis Basic - ( 29 Oct 2023 07:05 )    Color: x / Appearance: x / SG: x / pH: x  Gluc: 106 mg/dL / Ketone: x  / Bili: x / Urobili: x   Blood: x / Protein: x / Nitrite: x   Leuk Esterase: x / RBC: x / WBC x   Sq Epi: x / Non Sq Epi: x / Bacteria: x    Culture - Blood (collected 27 Oct 2023 10:35)  Source: .Blood Blood-Peripheral  Preliminary Report (28 Oct 2023 17:01):    No growth at 24 hours    Culture - Blood (collected 27 Oct 2023 10:35)  Source: .Blood Blood-Peripheral  Preliminary Report (28 Oct 2023 17:01):    No growth at 24 hours    RADIOLOGY & ADDITIONAL TESTS:     Care Discussed with Consultants/Other Providers:   
SUBJ:  Patient is a 89y old  Female who presents with a chief complaint of Falls (28 Oct 2023 07:46)  Patient is seen and examined.  The chart is reviewed.  Case discussed with Dr. Lim.  Patient lying in bed complaining of headache.  No current complaints of chest pain or shortness of breath.      PAST MEDICAL & SURGICAL HISTORY:  CHF (congestive heart failure)      Malignant neoplasm of female breast, unspecified estrogen receptor status, unspecified laterality, unspecified site of breast      Atrial fibrillation, unspecified type  history of ablation in 2016; a fib gone a per patient      Kidney stone      History of spinal stenosis  cervical and lumbar      H/O lithotripsy      History of appendectomy  age 18 as per patient      Status post THR (total hip replacement)  bilateral      Status cardiac pacemaker      Home Medications:  amLODIPine 2.5 mg oral tablet: 1 tab(s) orally once a day (27 Oct 2023 16:51)  ascorbic acid 500 mg oral tablet: (vitamin C) 1 tab(s) orally once a day (27 Oct 2023 16:51)  Breo Ellipta 200 mcg-25 mcg/inh inhalation powder: 1 puff(s) inhaled once a day (27 Oct 2023 16:51)  calcium (as calcium citrate) 500 mg oral tablet, effervescent: 2 tab(s) orally once a day (27 Oct 2023 16:51)  Co Q-10 100 mg oral capsule: 1 cap(s) orally once a day (27 Oct 2023 16:51)  ferrous sulfate 75 mg/mL (15 mg/mL elemental iron) oral liquid: 5 milliliter(s) orally once a day (27 Oct 2023 16:51)  Fulvestrant 50 mg/mL intramuscular solution: monthly   (27 Oct 2023 16:51)  lisinopril 10 mg oral tablet: 1 tab(s) orally once a day (27 Oct 2023 16:51)  melatonin 3 mg oral tablet: 1 tab(s) orally once a day (at bedtime) (27 Oct 2023 16:51)  Multiple Vitamins oral tablet: 1 tab(s) orally once a day (27 Oct 2023 16:51)  senna oral tablet: 2 tab(s) orally once a day (at bedtime) (27 Oct 2023 16:51)  Vitamin D3 125 mcg (5000 intl units) oral tablet: 1 tab(s) orally on Thursday and Sunday (27 Oct 2023 16:51)      MEDICATIONS  (STANDING):  aspirin  chewable 81 milliGRAM(s) Oral daily  bisacodyl Suppository 10 milliGRAM(s) Rectal once  enoxaparin Injectable 30 milliGRAM(s) SubCutaneous every 24 hours    MEDICATIONS  (PRN):  acetaminophen     Tablet .. 650 milliGRAM(s) Oral every 6 hours PRN Temp greater or equal to 38C (100.4F), Mild Pain (1 - 3)  ondansetron Injectable 4 milliGRAM(s) IV Push every 8 hours PRN Nausea and/or Vomiting          Vital Signs Last 24 Hrs  T(C): 36.6 (28 Oct 2023 04:53), Max: 36.7 (27 Oct 2023 17:39)  T(F): 97.9 (28 Oct 2023 04:53), Max: 98 (27 Oct 2023 17:39)  HR: 71 (28 Oct 2023 04:53) (64 - 71)  BP: 165/65 (28 Oct 2023 04:53) (129/36 - 168/61)  BP(mean): --  RR: 18 (28 Oct 2023 04:53) (18 - 22)  SpO2: 93% (28 Oct 2023 04:53) (93% - 98%)    Parameters below as of 28 Oct 2023 04:53  Patient On (Oxygen Delivery Method): room air        REVIEW OF SYSTEMS:  CONSTITUTIONAL: fatigue and headache   RESPIRATORY: No cough, wheezing, chills or hemoptysis; No shortness of breath  CARDIOVASCULAR:  No shortness of breath or dyspnea on exertion.  No palpitations, dizziness, light headedness, syncope or near syncope.  No edema, no orthopnea.   NEUROLOGICAL: No headaches, memory loss, loss of strength, numbness, or tremors      PHYSICAL EXAM  Constitutional:  frail elderly woman complaining of headache   HEENT: normocephalic, atraumatic.  PERRLA. EOMI  Neck : No JVD. no carotid bruits  Lungs:  decreased breath sounds bilaterally   Heart:  S1 and S2. No S3, S4. I/VI systolic murmur.  Abdomen:  soft, non tender.  Extremities: No clubbing, cyanoisis or edema  Nuerologic:  A+O x 3. No focal deficits  Skin:  no rashes        LABS:                        8.4    6.37  )-----------( 151      ( 28 Oct 2023 07:49 )             27.1     10-28    142  |  111<H>  |  66<H>  ----------------------------<  78  5.0   |  16<L>  |  1.11    Ca    10.1      28 Oct 2023 07:49    TPro  6.0  /  Alb  2.8<L>  /  TBili  0.9  /  DBili  x   /  AST  57<H>  /  ALT  113<H>  /  AlkPhos  163<H>  10-27    CARDIAC MARKERS ( 27 Oct 2023 10:35 )  x     / x     / 80 U/L / x     / x          CARDIAC MARKERS ( 27 Oct 2023 10:35 )  x     / x     / 80 U/L / x     / x          I&O's Summary    27 Oct 2023 07:01  -  28 Oct 2023 07:00  --------------------------------------------------------  IN: 0 mL / OUT: 150 mL / NET: -150 mL    28 Oct 2023 07:01  -  28 Oct 2023 10:11  --------------------------------------------------------  IN: 120 mL / OUT: 0 mL / NET: 120 mL    < from: 12 Lead ECG (10.28.23 @ 06:09) >    Diagnosis Line Atrial-paced rhythm  Left axis deviation  Abnormal ECG  When compared with ECG of 27-OCT-2023 11:49,  there is no significant interval change     < end of copied text >  < from: CT Head No Cont (10.28.23 @ 07:34) >  IMPRESSION: No significant change when allowing for differences in   technique.    < end of copied text >  < from: US Duplex Venous Lower Ext Complete, Bilateral (10.27.23 @ 19:52) >    No acute DVT of the lower extremities.    < end of copied text >  < from: Xray Chest 1 View- PORTABLE-Urgent (10.27.23 @ 11:10) >  Bilateral breast prostheses  limits evaluation of the lung bases  slightly. No  definite focal infiltrate. No pleural collection or   pneumothorax. No change heart mediastinum. Left ICD reidentified in situ.    IMPRESSION: Grossly clear lungs.      < end of copied text >  < from: TTE Echo Complete w/o Contrast w/ Doppler (10.27.23 @ 14:23) >   1. Left ventricular ejection fraction, by visual estimation, is 60 to   65%.   2. Normal global left ventricular systolic function.   3. Severely enlarged left atrium.   4. Increased LV wall thickness.   5. Normal left ventricular internal cavity size.   6. Spectral Doppler shows restrictive pattern of left ventricular   myocardial filling (Grade III diastolic dysfunction).   7. There is mild concentric left ventricular hypertrophy.   8. Moderately enlarged right ventricle.   9. Lipomatous hypertrophy of the interatrial septum.  10. Right atrial enlargement.  11. Mildmitral valve regurgitation.  12. Mild thickening and calcification of the anterior and posterior   mitral valve leaflets.  13. Moderate-severe tricuspid regurgitation.  14. Mild aortic regurgitation.  15. Mild aortic valve stenosis.  16. Mild pulmonic valve regurgitation.  17. Estimated pulmonary artery systolic pressure is 86.8 mmHg assuming a   right atrial pressure of 15 mmHg, which is consistent with severe   pulmonary hypertension.  18. An RVS pressure of 86.8 represents an increase from a reported value   of 60 from an outside institution.  19. The Dimesionless Index value is 0.47.  20. There is mild aortic root calcification.    < end of copied text >                  
SUBJ:  Patient is a 89y old  Female who presents with a chief complaint of Falls (29 Oct 2023 08:00)  patient seen and examined  chart is reviewed  patient feeling slightly better .. sill mild dyspnea and feeling overall weak       PAST MEDICAL & SURGICAL HISTORY:  CHF (congestive heart failure)      Malignant neoplasm of female breast, unspecified estrogen receptor status, unspecified laterality, unspecified site of breast      Atrial fibrillation, unspecified type  history of ablation in 2016; a fib gone a per patient      Kidney stone      History of spinal stenosis  cervical and lumbar      H/O lithotripsy      History of appendectomy  age 18 as per patient      Status post THR (total hip replacement)  bilateral      Status cardiac pacemaker      Home Medications:  amLODIPine 2.5 mg oral tablet: 1 tab(s) orally once a day (27 Oct 2023 16:51)  ascorbic acid 500 mg oral tablet: (vitamin C) 1 tab(s) orally once a day (27 Oct 2023 16:51)  Breo Ellipta 200 mcg-25 mcg/inh inhalation powder: 1 puff(s) inhaled once a day (27 Oct 2023 16:51)  calcium (as calcium citrate) 500 mg oral tablet, effervescent: 2 tab(s) orally once a day (27 Oct 2023 16:51)  Co Q-10 100 mg oral capsule: 1 cap(s) orally once a day (27 Oct 2023 16:51)  ferrous sulfate 75 mg/mL (15 mg/mL elemental iron) oral liquid: 5 milliliter(s) orally once a day (27 Oct 2023 16:51)  Fulvestrant 50 mg/mL intramuscular solution: monthly   (27 Oct 2023 16:51)  lisinopril 10 mg oral tablet: 1 tab(s) orally once a day (27 Oct 2023 16:51)  melatonin 3 mg oral tablet: 1 tab(s) orally once a day (at bedtime) (27 Oct 2023 16:51)  Multiple Vitamins oral tablet: 1 tab(s) orally once a day (27 Oct 2023 16:51)  senna oral tablet: 2 tab(s) orally once a day (at bedtime) (27 Oct 2023 16:51)  Vitamin D3 125 mcg (5000 intl units) oral tablet: 1 tab(s) orally on Thursday and Sunday (27 Oct 2023 16:51)      MEDICATIONS  (STANDING):  amLODIPine   Tablet 5 milliGRAM(s) Oral daily  aspirin  chewable 81 milliGRAM(s) Oral daily  enoxaparin Injectable 30 milliGRAM(s) SubCutaneous every 24 hours    MEDICATIONS  (PRN):  acetaminophen     Tablet .. 650 milliGRAM(s) Oral every 6 hours PRN Temp greater or equal to 38C (100.4F), Mild Pain (1 - 3)  ondansetron Injectable 4 milliGRAM(s) IV Push every 8 hours PRN Nausea and/or Vomiting          Vital Signs Last 24 Hrs  T(C): 36.4 (29 Oct 2023 04:38), Max: 36.5 (28 Oct 2023 12:59)  T(F): 97.6 (29 Oct 2023 04:38), Max: 97.7 (28 Oct 2023 12:59)  HR: 73 (29 Oct 2023 04:38) (63 - 73)  BP: 117/53 (29 Oct 2023 04:38) (117/53 - 142/61)  BP(mean): --  RR: 16 (29 Oct 2023 04:38) (16 - 18)  SpO2: 94% (29 Oct 2023 04:38) (94% - 95%)    Parameters below as of 29 Oct 2023 04:38  Patient On (Oxygen Delivery Method): room air        REVIEW OF SYSTEMS:  CONSTITUTIONAL: weakness and fatigue   RESPIRATORY: No cough, wheezing, chills or hemoptysis;   CARDIOVASCULAR: No chest pain or chest pressure.   No palpitations, dizziness, light headedness, syncope or near syncope.  No edema, no orthopnea.   NEUROLOGICAL: No headaches, memory loss, loss of strength, numbness, or tremors      PHYSICAL EXAM  Constitutional:  frail elderly woman in NAD   HEENT: normocephalic, atraumatic.  PERRLA. EOMI  Neck : No JVD. no carotid bruits  Lungs:  decreased breath sounds bilaterally   Heart:  S1 and S2. No S3, S4. I/VI systolic murmur.  Abdomen:  soft, non tender.  Extremities: No clubbing, cyanoisis or edema  Nuerologic:  A+O x 3. No focal deficits  Skin:  no rashes        LABS:                        8.4    6.10  )-----------( 141      ( 29 Oct 2023 07:05 )             26.6     10-29    141  |  111<H>  |  59<H>  ----------------------------<  106<H>  4.7   |  20<L>  |  1.14    Ca    9.9      29 Oct 2023 07:05    TPro  5.9<L>  /  Alb  2.6<L>  /  TBili  0.9  /  DBili  x   /  AST  32  /  ALT  86<H>  /  AlkPhos  149<H>  10-29      I&O's Summary    28 Oct 2023 07:01  -  29 Oct 2023 07:00  --------------------------------------------------------  IN: 240 mL / OUT: 100 mL / NET: 140 mL    < from: 12 Lead ECG (10.28.23 @ 06:09) >  Diagnosis Line Atrial-paced rhythm  Left axis deviation  Abnormal ECG  When compared with ECG of 27-OCT-2023 11:49,  there is no significant interval change     < end of copied text >  < from: TTE Echo Complete w/o Contrast w/ Doppler (10.27.23 @ 14:23) >   1. Left ventricular ejection fraction, by visual estimation, is 60 to   65%.   2. Normal global left ventricular systolic function.   3. Severely enlarged left atrium.   4. Increased LV wall thickness.   5. Normal left ventricular internal cavity size.   6. Spectral Doppler shows restrictive pattern of left ventricular   myocardial filling (Grade III diastolic dysfunction).   7. There is mild concentric left ventricular hypertrophy.   8. Moderately enlarged right ventricle.   9. Lipomatous hypertrophy of the interatrial septum.  10. Right atrial enlargement.  11. Mildmitral valve regurgitation.  12. Mild thickening and calcification of the anterior and posterior   mitral valve leaflets.  13. Moderate-severe tricuspid regurgitation.  14. Mild aortic regurgitation.  15. Mild aortic valve stenosis.  16. Mild pulmonic valve regurgitation.  17. Estimated pulmonary artery systolic pressure is 86.8 mmHg assuming a   right atrial pressure of 15 mmHg, which is consistent with severe   pulmonary hypertension.  18. An RVS pressure of 86.8 represents an increase from a reported value   of 60 from an outside institution.  19. The Dimesionless Index value is 0.47.  20. There is mild aortic root calcification.    < end of copied text >                    
Follow-up Pulmonary Progress Note  Chief Complaint : Non-ST elevation myocardial infarction (NSTEMI)      patient seen and examined  comfortable  discussed CT results noted pleural based nodule  discussed need for out patient follow up   states f/u with Dr. Valentin as out patient.       Allergies :statins (Unknown)  Zocor (Unknown)  Originally Entered as [Unknown] reaction to [RS] (Unknown)  LAI-2 inhibitors (Unknown)  Lipitor (Unknown)  sulfa drugs (Unknown)      PAST MEDICAL & SURGICAL HISTORY:  CHF (congestive heart failure)    Malignant neoplasm of female breast, unspecified estrogen receptor status, unspecified laterality, unspecified site of breast    Atrial fibrillation, unspecified type  history of ablation in 2016; a fib gone a per patient    Kidney stone    History of spinal stenosis  cervical and lumbar    H/O lithotripsy    History of appendectomy  age 18 as per patient    Status post THR (total hip replacement)  bilateral    Status cardiac pacemaker        Medications:  MEDICATIONS  (STANDING):  amLODIPine   Tablet 5 milliGRAM(s) Oral daily  ascorbic acid 500 milliGRAM(s) Oral daily  aspirin  chewable 81 milliGRAM(s) Oral daily  cholecalciferol 1000 Unit(s) Oral daily  enoxaparin Injectable 30 milliGRAM(s) SubCutaneous every 24 hours  gabapentin 100 milliGRAM(s) Oral three times a day  predniSONE   Tablet 5 milliGRAM(s) Oral daily  sacubitril 24 mG/valsartan 26 mG 1 Tablet(s) Oral two times a day  senna 2 Tablet(s) Oral at bedtime    MEDICATIONS  (PRN):  acetaminophen     Tablet .. 650 milliGRAM(s) Oral every 6 hours PRN Temp greater or equal to 38C (100.4F), Mild Pain (1 - 3)  ondansetron Injectable 4 milliGRAM(s) IV Push every 8 hours PRN Nausea and/or Vomiting      Antibiotics History      Heme Medications   aspirin  chewable 81 milliGRAM(s) Oral daily, 10-27-23 @ 16:27  enoxaparin Injectable 30 milliGRAM(s) SubCutaneous every 24 hours, 10-27-23 @ 15:59      GI Medications  senna 2 Tablet(s) Oral at bedtime, 10-29-23 @ 22:20, Now        LABS:                        8.3    5.99  )-----------( 178      ( 30 Oct 2023 06:59 )             26.2     10-30    139  |  109<H>  |  47<H>  ----------------------------<  97  4.9   |  18<L>  |  1.10    Ca    9.9      30 Oct 2023 06:59    TPro  5.9<L>  /  Alb  2.6<L>  /  TBili  0.9  /  DBili  x   /  AST  32  /  ALT  86<H>  /  AlkPhos  149<H>  10-29    HIT ab -- 10-27 @ 11:55  HIT ab EIA --  D Dimer -854           < from: CT Angio Chest PE Protocol w/ IV Cont (10.29.23 @ 11:06) >  No pulmonary embolism.  1.0 x 1.5 cm spiculated pleural-based nodule adjacent to left third   intercostal space concerning for malignancy. Short-term follow-up is,   that.  Smoking related lung injury.  Peribronchial wall thickening, likely inflammatory    --- End of Report ---    < end of copied text >      Rapid RVP Result: NotDetec (10-27-23 @ 10:35)     VITALS:  T(C): 36.6 (10-30-23 @ 13:35), Max: 36.6 (10-30-23 @ 13:35)  T(F): 97.8 (10-30-23 @ 13:35), Max: 97.8 (10-30-23 @ 13:35)  HR: 61 (10-30-23 @ 13:35) (61 - 81)  BP: 116/51 (10-30-23 @ 13:35) (116/51 - 160/62)  BP(mean): --  ABP: --  ABP(mean): --  RR: 16 (10-30-23 @ 13:35) (16 - 18)  SpO2: 95% (10-30-23 @ 13:35) (95% - 97%)  CVP(mm Hg): --  CVP(cm H2O): --    Ins and Outs     10-29-23 @ 07:01  -  10-30-23 @ 07:00  --------------------------------------------------------  IN: 200 mL / OUT: 0 mL / NET: 200 mL        Height (cm): 154.9 (10-27-23 @ 17:39)  Weight (kg): 51.4 (10-28-23 @ 04:53)  BMI (kg/m2): 21.4 (10-28-23 @ 04:53)        I&O's Detail    29 Oct 2023 07:01  -  30 Oct 2023 07:00  --------------------------------------------------------  IN:    Oral Fluid: 200 mL  Total IN: 200 mL    OUT:  Total OUT: 0 mL    Total NET: 200 mL       
Hospitalist: Brady Crowell DO    CHIEF COMPLAINT: Patient is a 89y old  female who presents with a chief complaint of Falls (28 Oct 2023 11:43)      SUBJECTIVE / OVERNIGHT EVENTS: Patient seen and examined. No acute events overnight. Pain well controlled and patient without any complaints.    MEDICATIONS  (STANDING):  amLODIPine   Tablet 5 milliGRAM(s) Oral daily  aspirin  chewable 81 milliGRAM(s) Oral daily  enoxaparin Injectable 30 milliGRAM(s) SubCutaneous every 24 hours    MEDICATIONS  (PRN):  acetaminophen     Tablet .. 650 milliGRAM(s) Oral every 6 hours PRN Temp greater or equal to 38C (100.4F), Mild Pain (1 - 3)  ondansetron Injectable 4 milliGRAM(s) IV Push every 8 hours PRN Nausea and/or Vomiting      VITALS:  T(F): 97.7 (10-28-23 @ 12:59), Max: 98 (10-27-23 @ 17:39)  HR: 63 (10-28-23 @ 12:59) (63 - 72)  BP: 142/61 (10-28-23 @ 12:59) (129/36 - 180/74)  RR: 18 (10-28-23 @ 12:59) (18 - 22)  SpO2: 95% (10-28-23 @ 12:59)  Height (cm): 154.9 (17:39)  Weight (kg): 51.4 (04:53)  BMI (kg/m2): 21.4 (04:53)    REVIEW OF SYSTEMS:  For ROV please refer back to H&P     PHYSICAL EXAM:    · Constitutional Comments	NAD thin elderly female  · Eyes	PERRL; EOMI; conjunctiva clear  · ENMT	no gross abnormalities  · Respiratory	clear to auscultation bilaterally; no wheezes; no rales; no rhonchi  · Cardiovascular	S1 S2 present; Irregularly irregular rhythm  · Gastrointestinal	soft; nontender; nondistended; normal active bowel sounds  · Neurological	cranial nerves II-XII intact; sensation intact  · Mental Status	AAOx3 nonfocal  · Skin	warm and dry; color normal; no rashes; no ulcers  · Musculoskeletal	normal; normal gait; ROM intact; strength 5/5 bilateral upper extremities; strength 5/5 bilateral lower extremities  · Psychiatric	normal affect; alert and oriented x3; normal behavior          LABS:              8.4                  142  | 16   | 66           6.37  >-----------< 151     ------------------------< 78                    27.1                 5.0  | 111  | 1.11                                         Ca 10.1  Mg x     Ph x           TPro  6.0  /  Alb  2.8      TBili  0.9  /  DBili  x         AST  57  /  ALT  113            AlkPhos  163      INR: 1.08 ratio;    PT: 12.6 sec;    PTT: 30.9 sec    CARDIAC MARKERS  x     / 80 U/L / x          MICROBIOLOGY:  Urinalysis Basic - ( 28 Oct 2023 07:49 )    Color: x / Appearance: x / SG: x / pH: x  Gluc: 78 mg/dL / Ketone: x  / Bili: x / Urobili: x   Blood: x / Protein: x / Nitrite: x   Leuk Esterase: x / RBC: x / WBC x   Sq Epi: x / Non Sq Epi: x / Bacteria: x      RADIOLOGY & ADDITIONAL TESTS:    Imaging Personally Reviewed:    [X] Consultant(s) Notes Reviewed:  [X] Care Discussed with Consultants/Other Providers:
ROGE SAINI  [id number]      Chief Complaint: Mechanical fall/Possible TIA/Hyperthyroidism/Elevated troponin    Interval History: The patient reports that she had a mechanical fall at home, denies syncope. Denies chest pain or shortness of breath.    Tele: atrial fibrillation 60s BPM, brief NSVT, now sinus rhythm       Current meds:   acetaminophen     Tablet .. 650 milliGRAM(s) Oral every 6 hours PRN  amLODIPine   Tablet 5 milliGRAM(s) Oral daily  ascorbic acid 500 milliGRAM(s) Oral daily  aspirin  chewable 81 milliGRAM(s) Oral daily  cholecalciferol 1000 Unit(s) Oral daily  enoxaparin Injectable 30 milliGRAM(s) SubCutaneous every 24 hours  gabapentin 100 milliGRAM(s) Oral three times a day  ondansetron Injectable 4 milliGRAM(s) IV Push every 8 hours PRN  predniSONE   Tablet 5 milliGRAM(s) Oral daily  sacubitril 24 mG/valsartan 26 mG 1 Tablet(s) Oral two times a day  senna 2 Tablet(s) Oral at bedtime      Objective:     Vital Signs:   T(C): 36.5 (10-30-23 @ 05:58), Max: 36.5 (10-29-23 @ 22:06)  HR: 81 (10-30-23 @ 05:58) (65 - 83)  BP: 160/62 (10-30-23 @ 05:58) (124/65 - 160/62)  RR: 18 (10-30-23 @ 05:58) (18 - 18)  SpO2: 97% (10-30-23 @ 05:58) (95% - 97%)  Wt(kg): --    Physical Exam:   General: no acute distress  Neck: supple   CVS: JVP ~ 7 cm H20, RRR, s1, s2  Pulm: unlabored respirations, mostly clear to ausculation   Ext: no lower extremity edema   Neuro: awake, alert and oriented   Psych: Normal affect      Labs:   30 Oct 2023 06:59    139    |  109    |  47     ----------------------------<  97     4.9     |  18     |  1.10     Ca    9.9        30 Oct 2023 06:59    TPro  5.9    /  Alb  2.6    /  TBili  0.9    /  DBili  x      /  AST  32     /  ALT  86     /  AlkPhos  149    29 Oct 2023 07:05                          8.3    5.99  )-----------( 178      ( 30 Oct 2023 06:59 )             26.2         ECG (10/27/23): sinus rhythm, first degree AV block, LAFB, nonspecific ST abnormalities (similar to prior ECG)      TTE (10/27/23):  LVEF 60-65%, mild LVH  Dilated right ventricle  Mild MR, Moderate-severe TR, Mild OK  Mild AR, Mild aortic valve stenosis   Severe pulmonary hypertension     CT Angio Chest (10/29/23):  No pulmonary embolism.  1.0 x 1.5 cm spiculated pleural-based nodule adjacent to left third   intercostal space concerning for malignancy. Short-term follow-up is,   that.Smoking related lung injury. Peribronchial wall thickening, likely inflammatory

## 2023-10-30 NOTE — DISCHARGE NOTE PROVIDER - CARE PROVIDERS DIRECT ADDRESSES
,[email or web address]

## 2023-10-30 NOTE — PROGRESS NOTE ADULT - ASSESSMENT
89-year-old woman admitted with complaints of frequent falls over the past few days, difficulty word finding, concern for possible TIA/CVA.  Cardiology consulted for elevated troponin.  Cardiac history includes paroxysmal atrial fibrillation, not on anticoagulation due to increased risk, status post pacemaker, CHF, pulmonary hypertension, hypertension, nonobstructive CAD (based on cardiac catheterization done in 2016), negative stress test 2020  Medical history includes breast cancer.  ABBE has improved  Elevated troponin likely secondary to demand ischemia, ACS appears to be less likely.  TSH is suppressed.   Echocardiogram done on this admission demonstrating normal left ventricular systolic function, biatrial enlargement, moderate to severe tricuspid regurgitation, severe pulmonary hypertension.  Elevated D-dimer, lower extremity venous Doppler study negative for DVT.    Recommendations  -Consider CTA to evaluate for possible pulmonary embolism - patient had been refusing, but is now agreeable   -Continue  amlodipine.  -Add moderate dose statin.  -Neurology follow up   -Defer ischemia evaluation for now.   -Pacemaker interrogation requested ... discussed with telemetry tech  -Guarded prognosis given advanced age and multiple comorbidities.  -Cardiology will continue to follow along.  -Discussed with patient and primary team     
Assessment:  Madeline Reyes is an 89 year old woman with past medical history of Breast cancer (s/p mastectomy), Cardiomyopathy, Atrial fibrillation (on Eliquis), Permanent pacemaker who presented with frequent falls, concerning for possible TIA.    ECG on admission consistent with sinus rhythm, first degree AV block, LAFB and nonspecific ST abnormalities, similar to prior ECG, no acute ischemic ST changes. Echo report consistent with normal LVEF 60-65%, mild LVH, dilated right ventricle with moderate-to-severe tricuspid regurgitation and severe pulmonary hypertension. Troponins elevated which may be demand ischemia from possible TIA and also due to acute renal injury. TSH undetectable with markedly elevated T3 and T4, unclear if this is contributing to current presentation. D-dimer elevated, however CTPA negative for pulmonary embolism but consistent with pleural based nodule adjacent to left third intercostal space concerning for malignancy. CT head with no acute findings.    Recommendations:  [] Elevated troponins: Possible demand ischemia as per above, agree with holding off on ischemic workup at this time in setting of possible TIA. In addition, patient with what appears to be malignancy noted on CT, will need to evaluate goals of care and overall prognosis prior to ischemic workup. Continue medical management for now; Aspirin 81 mg daily. Follow up PPM interrogation  [] Falls, possible TIA: Follow up Neurology, currently receiving Aspirin 81 mg daily   [] Atrial fibrillation: Chart review indicates Eliquis was discontinued due to fall and bleeding risk   [] Hyperthyroidism: Consider Endocrinology evaluation    We will continue to follow along.    Kae Garrison MD  Cardiology   
88yo female with hx of Breast cancer s/p mastectomy, CHF, HTN, A FIB sp PPM, presented to the ED w/ complaints of frequent falls in the past few days, noted to have difficulty word finding, concern for TIA/CVA, also noted to have abnormal blood work findings    #Rule out TIA/CVA  #Hx of CVA   -NIHSS score of 1   -Not a candidate for TNK  -CT Head results noted  -Start ASA. No Statin due to allergies  -Permissive HTN for now  -Not a candidate for MRI due to PPM  -Dysphagia screen passed. Start Diet  -ECHO results noted   -PT/OT consult  -Monitor tele  -Recommend repeat CT Head 24hrs from initial   -Neurology consult     #Diastolic CHF  #HTN  #AFIB  #Elevated Troponin  #Severe pulmonary HTN  -Euvolemic appearing however elevated proBNP and Elevated Troponin. Likely demand ischemia in the setting of possible CVA  -Cardiology Recs appreciated  -ECHO results noted; Grade III DD  -Not on any rate control medications  -Continue to monitor vitals, I/Os and daily weights    #ABBE on CKD III   -Likely prerenal   -ABBE has improved   -Encourage oral intake  -Avoid nephrotoxic agents    #Elevated D-Dimer  -In the setting of hx of Cancer   -Pt could possibly have VTE as she is high risk, however, risks outweigh benefits regarding management w/ AC as pt was taken off of AC for AFIB  -negative LE doppler   - order CTA of chest to rule out PE; pt is refusing     # undetectable TSH  - concern for Grave disease  - will order TSi antibody for bryant  - Endo consult     #Breast cancer  #Anemia  -H/H stable  -[facility]patient [email or web address]    #VTE ppx  -Lovenox 
88yo female with hx of Breast cancer s/p mastectomy, CHF, HTN, A FIB sp PPM, presented to the ED w/ complaints of frequent falls in the past few days, noted to have difficulty word finding, concern for TIA/CVA, also noted to have abnormal blood work findings    #Rule out TIA/CVA  #Hx of CVA   -NIHSS score of 1   -Not a candidate for TNK  -CT Head results noted  -Start ASA. No Statin due to allergies  -Permissive HTN for now  -Not a candidate for MRI due to PPM  -Dysphagia screen passed. Start Diet  -ECHO results noted   -PT/OT consult  -Monitor tele  -Neurology consult noted     #Diastolic CHF  #HTN  #AFIB  #Elevated Troponin  #Severe pulmonary HTN  -Euvolemic appearing however elevated proBNP and Elevated Troponin. Likely demand ischemia in the setting of possible CVA  -Cardiology Recs appreciated  -ECHO results noted; Grade III DD  -Not on any rate control medications  -Continue to monitor vitals, I/Os and daily weights    #ABBE on CKD III   -Likely prerenal   -ABBE has improved   -Encourage oral intake  -Avoid nephrotoxic agents    #Elevated D-Dimer  -In the setting of hx of Cancer   -Pt could possibly have VTE as she is high risk, however, risks outweigh benefits regarding management w/ AC as pt was taken off of AC for AFIB  -negative LE doppler   -patient now amenable for CTA chest     # Graves disease   - known graves disease - started on methimazole recently outpatient     #Breast cancer  #Anemia  -H/H stable  -Outpatient follow up at [facility]    #VTE ppx  -Lovenox     PT/OT evals. likely dc tomorrow   Unsuccessful attempt to reach son Chris [phone]
Physical Examination:  GENERAL:               Alert, Oriented, No acute distress.    HEENT:                      No JVD, Moist MM  PULM:                     Bilateral air entry, Clear to auscultation bilaterally, no significant sputum production, No Rales, No Rhonchi, No Wheezing  CVS:                         S1, S2,  +Murmur  ABD:                        Soft, nondistended, nontender, normoactive bowel sounds,   EXT:                         No edema, nontender,   NEURO:                  Alert, oriented, interactive, nonfocal, follows commands  PSYC:                      Calm, + Insight.        Assessment  89 year old female with hx of Breast cancer s/p mastectomy, CHF, HTN, A FIB sp PPM, Hyperthyroidism presented to the ED w/ complaints of frequent falls in the past few days.  on 10/27/2023.    Problem List  Dyspnea  suspect from hyperthyroidism  Elevated ddimer with no evidence of VTE on CTA chest and LE Duplex  New left upper lobe 1.0 x 1.5 cm spiculated pleural-based nodule unsure if IR drainable  h/o breast cancer  Hyperthyroidism   Ex Smoker      Plan  Will need out pateint pulmonary f/u with PET scan and possible thoracic surgery eval, unsure if IR guided biopsy able to be done based on location.   Endo eval  Get Endo eval, patient sees Dr. Jin Schrader   Nebs prn can be considered  supportive care
Physical Examination:  GENERAL:               Alert, Oriented, No acute distress.    HEENT:                      No JVD, Moist MM  PULM:                     Bilateral air entry, Clear to auscultation bilaterally, no significant sputum production, No Rales, No Rhonchi, No Wheezing  CVS:                         S1, S2,  +Murmur  ABD:                        Soft, nondistended, nontender, normoactive bowel sounds,   EXT:                         No edema, nontender,   NEURO:                  Alert, oriented, interactive, nonfocal, follows commands  PSYC:                      Calm, + Insight.        Assessment  89 year old female with hx of Breast cancer s/p mastectomy, CHF, HTN, A FIB sp PPM, Hyperthyroidism presented to the ED w/ complaints of frequent falls in the past few days.  on 10/27/2023.    Problem List  Dyspnea  suspect from hyperthyroidism  Elevated ddimer with no evidence of VTE on CTA chest and LE Duplex  New left upper lobe 1.0 x 1.5 cm spiculated pleural-based nodule unsure if IR drainable  h/o breast cancer  Hyperthyroidism   Ex Smoker      Plan  Will need out patient pulmonary f/u with PET scan and possible thoracic surgery eval, unsure if IR guided biopsy able to be done based on location. discussed patient to have out patient follow up   states will follow up with Dr. Valentin -   Dr. Valentin office called [phone] Dr. Valentin paged.     Endo eval  Get Endo eval, patient sees Dr. Jin Schrader   Nebs prn can be considered  out patient follow up .
89-year-old woman admitted with complaints of frequent falls over the past few days, difficulty word finding, concern for possible TIA/CVA.  Cardiology consulted for elevated troponin.  Cardiac history includes paroxysmal atrial fibrillation, not on anticoagulation due to increased risk, status post pacemaker, CHF, pulmonary hypertension, hypertension, nonobstructive CAD (based on cardiac catheterization done in 2016), negative stress test 2020  Medical history includes breast cancer.  ABBE is improved.   Elevated troponin likely secondary to demand ischemia, ACS appears to be less likely.  Echocardiogram done on this admission demonstrating normal left ventricular systolic function, biatrial enlargement, moderate to severe tricuspid regurgitation, severe pulmonary hypertension.  Elevated D-dimer, lower extremity venous Doppler study negative for DVT.    Recommendations  -Consider CTA to evaluate for possible pulmonary embolism   -Resume home amlodipine.  -Add moderate dose statin.  -Neurology follow up   -Defer ischemia evaluation for now.   -Pacemaker interrogation requested ... discussed with telemetry tech  -Guarded prognosis given advanced age and multiple comorbidities.  -Cardiology will continue to follow along.  -Discussed with patient and message left for primary team

## 2023-10-30 NOTE — DISCHARGE NOTE PROVIDER - NSDCCPCAREPLAN_GEN_ALL_CORE_FT
PRINCIPAL DISCHARGE DIAGNOSIS  Diagnosis: Shortness of breath  Assessment and Plan of Treatment:       SECONDARY DISCHARGE DIAGNOSES  Diagnosis: Fall  Assessment and Plan of Treatment:      PRINCIPAL DISCHARGE DIAGNOSIS  Diagnosis: Shortness of breath  Assessment and Plan of Treatment:       SECONDARY DISCHARGE DIAGNOSES  Diagnosis: Fall  Assessment and Plan of Treatment:     Diagnosis: Lung nodule  Assessment and Plan of Treatment: You were found to have a lung nodule on your CT scan. Please follow up with pulmonologist Dr. Kramer or any pulmonologist via PCP referral for follow up of this nodule

## 2023-10-30 NOTE — DISCHARGE NOTE PROVIDER - DETAILS OF MALNUTRITION DIAGNOSIS/DIAGNOSES
This patient has been assessed with a concern for Malnutrition and was treated during this hospitalization for the following Nutrition diagnosis/diagnoses:     -  10/28/2023: Severe protein-calorie malnutrition   -  10/28/2023: Underweight (BMI < 19)

## 2023-10-30 NOTE — DISCHARGE NOTE PROVIDER - NSDCFUSCHEDAPPT_GEN_ALL_CORE_FT
Angelo Pang  [facility]  [facility] [address]  Scheduled Appointment: 11/07/2023    Jin Schrader  [facility] Physician [facility] [facility] [address]  Scheduled Appointment: 11/17/2023

## 2023-10-30 NOTE — DISCHARGE NOTE NURSING/CASE MANAGEMENT/SOCIAL WORK - NSDCPEFALRISK_GEN_ALL_CORE
For information on Fall & Injury Prevention, visit: [email or web address] OR  [email or web address] OR  https://www.cdc.gov/steadi/patient.html

## 2023-10-30 NOTE — DISCHARGE NOTE PROVIDER - PROVIDER TOKENS
PROVIDER:[TOKEN:[[id number]]] PROVIDER:[TOKEN:[[id number]]],PROVIDER:[TOKEN:[[id number]]]

## 2023-10-30 NOTE — DISCHARGE NOTE PROVIDER - NSDCMRMEDTOKEN_GEN_ALL_CORE_FT
amLODIPine 2.5 mg oral tablet: 1 tab(s) orally once a day  ascorbic acid 500 mg oral tablet: (vitamin C) 1 tab(s) orally once a day  aspirin 81 mg oral tablet, chewable: 1 tab(s) orally once a day  Breo Ellipta 200 mcg-25 mcg/inh inhalation powder: 1 puff(s) inhaled once a day  calcium (as calcium citrate) 500 mg oral tablet, effervescent: 2 tab(s) orally once a day  Co Q-10 100 mg oral capsule: 1 cap(s) orally once a day  ferrous sulfate 75 mg/mL (15 mg/mL elemental iron) oral liquid: 5 milliliter(s) orally once a day  Fulvestrant 50 mg/mL intramuscular solution: monthly    gabapentin 100 mg oral capsule: 1 cap(s) orally 3 times a day  melatonin 3 mg oral tablet: 1 tab(s) orally once a day (at bedtime)  Multiple Vitamins oral tablet: 1 tab(s) orally once a day  predniSONE 5 mg oral tablet: 1 tab(s) orally once a day  sacubitril-valsartan 24 mg-26 mg oral tablet: 1 tab(s) orally 2 times a day  senna oral tablet: 2 tab(s) orally once a day (at bedtime)  Vitamin D3 125 mcg (5000 intl units) oral tablet: 1 tab(s) orally on Thursday and Sunday

## 2023-10-30 NOTE — DISCHARGE NOTE NURSING/CASE MANAGEMENT/SOCIAL WORK - PATIENT PORTAL LINK FT
You can access the FollowMyHealth Patient Portal offered by [facility] by registering at the following website: [email or web address]. By joining [facility]’s FollowMyHealth portal, you will also be able to view your health information using other applications (apps) compatible with our system.

## 2023-10-31 LAB
TSI ACT/NOR SER: 4.39 IU/L — HIGH (ref 0–0.55)
TSI ACT/NOR SER: 4.39 IU/L — HIGH (ref 0–0.55)

## 2023-11-01 ENCOUNTER — TRANSCRIPTION ENCOUNTER (OUTPATIENT)
Age: 88
End: 2023-11-01

## 2023-11-01 LAB
CULTURE RESULTS: SIGNIFICANT CHANGE UP
SPECIMEN SOURCE: SIGNIFICANT CHANGE UP

## 2023-11-02 RX ORDER — CHLORHEXIDINE GLUCONATE, 0.12% ORAL RINSE 1.2 MG/ML
0.12 SOLUTION DENTAL
Qty: 473 | Refills: 0 | Status: COMPLETED | COMMUNITY
Start: 2022-07-11 | End: 2023-11-02

## 2023-11-02 RX ORDER — ERYTHROMYCIN 5 MG/G
5 OINTMENT OPHTHALMIC
Qty: 4 | Refills: 0 | Status: COMPLETED | COMMUNITY
Start: 2022-06-24 | End: 2023-11-02

## 2023-11-07 ENCOUNTER — APPOINTMENT (OUTPATIENT)
Dept: FAMILY MEDICINE | Facility: CLINIC | Age: 88
End: 2023-11-07
Payer: MEDICARE

## 2023-11-07 VITALS
DIASTOLIC BLOOD PRESSURE: 60 MMHG | HEART RATE: 68 BPM | SYSTOLIC BLOOD PRESSURE: 105 MMHG | WEIGHT: 103 LBS | RESPIRATION RATE: 20 BRPM | HEIGHT: 61 IN | BODY MASS INDEX: 19.45 KG/M2

## 2023-11-07 PROCEDURE — 99496 TRANSJ CARE MGMT HIGH F2F 7D: CPT

## 2023-11-09 ENCOUNTER — NON-APPOINTMENT (OUTPATIENT)
Age: 88
End: 2023-11-09

## 2023-11-13 PROCEDURE — 84480 ASSAY TRIIODOTHYRONINE (T3): CPT

## 2023-11-13 PROCEDURE — 84439 ASSAY OF FREE THYROXINE: CPT

## 2023-11-13 PROCEDURE — 97166 OT EVAL MOD COMPLEX 45 MIN: CPT

## 2023-11-13 PROCEDURE — 72220 X-RAY EXAM SACRUM TAILBONE: CPT

## 2023-11-13 PROCEDURE — 73522 X-RAY EXAM HIPS BI 3-4 VIEWS: CPT

## 2023-11-13 PROCEDURE — 84436 ASSAY OF TOTAL THYROXINE: CPT

## 2023-11-13 PROCEDURE — 83036 HEMOGLOBIN GLYCOSYLATED A1C: CPT

## 2023-11-13 PROCEDURE — 93306 TTE W/DOPPLER COMPLETE: CPT

## 2023-11-13 PROCEDURE — 80048 BASIC METABOLIC PNL TOTAL CA: CPT

## 2023-11-13 PROCEDURE — 70450 CT HEAD/BRAIN W/O DYE: CPT | Mod: MA

## 2023-11-13 PROCEDURE — 86376 MICROSOMAL ANTIBODY EACH: CPT

## 2023-11-13 PROCEDURE — 0225U NFCT DS DNA&RNA 21 SARSCOV2: CPT

## 2023-11-13 PROCEDURE — 72125 CT NECK SPINE W/O DYE: CPT | Mod: MA

## 2023-11-13 PROCEDURE — 85730 THROMBOPLASTIN TIME PARTIAL: CPT

## 2023-11-13 PROCEDURE — 83605 ASSAY OF LACTIC ACID: CPT

## 2023-11-13 PROCEDURE — 85610 PROTHROMBIN TIME: CPT

## 2023-11-13 PROCEDURE — 81001 URINALYSIS AUTO W/SCOPE: CPT

## 2023-11-13 PROCEDURE — 84445 ASSAY OF TSI GLOBULIN: CPT

## 2023-11-13 PROCEDURE — 97162 PT EVAL MOD COMPLEX 30 MIN: CPT

## 2023-11-13 PROCEDURE — 83880 ASSAY OF NATRIURETIC PEPTIDE: CPT

## 2023-11-13 PROCEDURE — 85027 COMPLETE CBC AUTOMATED: CPT

## 2023-11-13 PROCEDURE — 84443 ASSAY THYROID STIM HORMONE: CPT

## 2023-11-13 PROCEDURE — 82550 ASSAY OF CK (CPK): CPT

## 2023-11-13 PROCEDURE — 87040 BLOOD CULTURE FOR BACTERIA: CPT

## 2023-11-13 PROCEDURE — 85379 FIBRIN DEGRADATION QUANT: CPT

## 2023-11-13 PROCEDURE — 99285 EMERGENCY DEPT VISIT HI MDM: CPT

## 2023-11-13 PROCEDURE — 80053 COMPREHEN METABOLIC PANEL: CPT

## 2023-11-13 PROCEDURE — 71045 X-RAY EXAM CHEST 1 VIEW: CPT

## 2023-11-13 PROCEDURE — 84484 ASSAY OF TROPONIN QUANT: CPT

## 2023-11-13 PROCEDURE — 36415 COLL VENOUS BLD VENIPUNCTURE: CPT

## 2023-11-13 PROCEDURE — 93970 EXTREMITY STUDY: CPT

## 2023-11-13 PROCEDURE — 71275 CT ANGIOGRAPHY CHEST: CPT

## 2023-11-13 PROCEDURE — 80061 LIPID PANEL: CPT

## 2023-11-13 PROCEDURE — 85025 COMPLETE CBC W/AUTO DIFF WBC: CPT

## 2023-11-13 PROCEDURE — 93005 ELECTROCARDIOGRAM TRACING: CPT

## 2023-11-17 ENCOUNTER — APPOINTMENT (OUTPATIENT)
Dept: ENDOCRINOLOGY | Facility: CLINIC | Age: 88
End: 2023-11-17

## 2023-12-02 ENCOUNTER — RX RENEWAL (OUTPATIENT)
Age: 88
End: 2023-12-02

## 2024-01-05 ENCOUNTER — APPOINTMENT (OUTPATIENT)
Dept: FAMILY MEDICINE | Facility: CLINIC | Age: 89
End: 2024-01-05

## 2024-01-09 ENCOUNTER — NON-APPOINTMENT (OUTPATIENT)
Age: 89
End: 2024-01-09

## 2024-01-11 ENCOUNTER — APPOINTMENT (OUTPATIENT)
Dept: FAMILY MEDICINE | Facility: CLINIC | Age: 89
End: 2024-01-11

## 2024-01-25 ENCOUNTER — APPOINTMENT (OUTPATIENT)
Dept: FAMILY MEDICINE | Facility: CLINIC | Age: 89
End: 2024-01-25
Payer: MEDICARE

## 2024-01-25 VITALS
WEIGHT: 115 LBS | HEART RATE: 76 BPM | BODY MASS INDEX: 21.71 KG/M2 | OXYGEN SATURATION: 97 % | SYSTOLIC BLOOD PRESSURE: 140 MMHG | HEIGHT: 61 IN | DIASTOLIC BLOOD PRESSURE: 78 MMHG | RESPIRATION RATE: 20 BRPM

## 2024-01-25 PROCEDURE — 36415 COLL VENOUS BLD VENIPUNCTURE: CPT

## 2024-01-25 PROCEDURE — 99214 OFFICE O/P EST MOD 30 MIN: CPT | Mod: 25

## 2024-01-26 LAB
ALBUMIN SERPL ELPH-MCNC: 3.3 G/DL
ALP BLD-CCNC: 234 U/L
ALT SERPL-CCNC: 39 U/L
ANION GAP SERPL CALC-SCNC: 14 MMOL/L
AST SERPL-CCNC: 48 U/L
BILIRUB SERPL-MCNC: 1.2 MG/DL
BUN SERPL-MCNC: 32 MG/DL
CALCIUM SERPL-MCNC: 9.2 MG/DL
CHLORIDE SERPL-SCNC: 109 MMOL/L
CO2 SERPL-SCNC: 21 MMOL/L
CREAT SERPL-MCNC: 0.99 MG/DL
EGFR: 54 ML/MIN/1.73M2
GLUCOSE SERPL-MCNC: 94 MG/DL
HCT VFR BLD CALC: 31.9 %
HGB BLD-MCNC: 9.6 G/DL
MCHC RBC-ENTMCNC: 28.8 PG
MCHC RBC-ENTMCNC: 30.1 GM/DL
MCV RBC AUTO: 95.8 FL
PLATELET # BLD AUTO: 239 K/UL
POTASSIUM SERPL-SCNC: 4 MMOL/L
PROT SERPL-MCNC: 6.1 G/DL
RBC # BLD: 3.33 M/UL
RBC # FLD: 24.6 %
SODIUM SERPL-SCNC: 143 MMOL/L
T3FREE SERPL-MCNC: 7.95 PG/ML
T4 FREE SERPL-MCNC: 3.7 NG/DL
TSH SERPL-ACNC: <0.01 UIU/ML
WBC # FLD AUTO: 4.68 K/UL

## 2024-01-26 RX ORDER — SACUBITRIL AND VALSARTAN 24; 26 MG/1; MG/1
24-26 TABLET, FILM COATED ORAL DAILY
Refills: 0 | Status: DISCONTINUED | COMMUNITY
Start: 2023-07-25 | End: 2024-01-26

## 2024-02-01 DIAGNOSIS — L29.9 PRURITUS, UNSPECIFIED: ICD-10-CM

## 2024-02-06 ENCOUNTER — RX RENEWAL (OUTPATIENT)
Age: 89
End: 2024-02-06

## 2024-02-06 RX ORDER — LIDOCAINE 5% 700 MG/1
5 PATCH TOPICAL
Qty: 30 | Refills: 3 | Status: ACTIVE | COMMUNITY
Start: 2020-12-04 | End: 1900-01-01

## 2024-02-23 ENCOUNTER — APPOINTMENT (OUTPATIENT)
Dept: FAMILY MEDICINE | Facility: CLINIC | Age: 89
End: 2024-02-23

## 2024-02-23 RX ORDER — PROPRANOLOL HYDROCHLORIDE 10 MG/1
10 TABLET ORAL TWICE DAILY
Qty: 60 | Refills: 3 | Status: DISCONTINUED | COMMUNITY
Start: 2024-01-26 | End: 2024-02-23

## 2024-02-23 RX ORDER — FLUTICASONE FUROATE AND VILANTEROL TRIFENATATE 200; 25 UG/1; UG/1
200-25 POWDER RESPIRATORY (INHALATION) DAILY
Qty: 3 | Refills: 3 | Status: ACTIVE | COMMUNITY
Start: 2022-03-18 | End: 1900-01-01

## 2024-02-26 ENCOUNTER — NON-APPOINTMENT (OUTPATIENT)
Age: 89
End: 2024-02-26

## 2024-03-08 NOTE — ED PROVIDER NOTE - CPE EDP [facility] NORM
normal...
Patient presents with drainage from her right knee. Sent by Dr. Timi birmingham for admission. Dr. Reyes spoke directly with orthopedic attending Dr. timi birmingham who recommends preop labs, no antibiotics and admission to his service for further management. labs, xray done. Patient admitted for further management.

## 2024-03-12 ENCOUNTER — RX RENEWAL (OUTPATIENT)
Age: 89
End: 2024-03-12

## 2024-03-13 ENCOUNTER — APPOINTMENT (OUTPATIENT)
Dept: FAMILY MEDICINE | Facility: CLINIC | Age: 89
End: 2024-03-13
Payer: MEDICARE

## 2024-03-13 VITALS
RESPIRATION RATE: 20 BRPM | DIASTOLIC BLOOD PRESSURE: 60 MMHG | BODY MASS INDEX: 22.66 KG/M2 | WEIGHT: 120 LBS | HEART RATE: 76 BPM | HEIGHT: 61 IN | OXYGEN SATURATION: 99 % | SYSTOLIC BLOOD PRESSURE: 125 MMHG

## 2024-03-13 DIAGNOSIS — I50.9 HEART FAILURE, UNSPECIFIED: ICD-10-CM

## 2024-03-13 DIAGNOSIS — R60.0 LOCALIZED EDEMA: ICD-10-CM

## 2024-03-13 PROCEDURE — 36415 COLL VENOUS BLD VENIPUNCTURE: CPT

## 2024-03-13 PROCEDURE — 99214 OFFICE O/P EST MOD 30 MIN: CPT

## 2024-03-13 NOTE — HISTORY OF PRESENT ILLNESS
[de-identified] : Presents with son for BP check and general follow-up with attention to LE edema.  Pt states she has been experiencing persistent edema both legs with "weeping;" note diuretic was recently increased per Cardiology.  Denies SOB at present.

## 2024-03-13 NOTE — PHYSICAL EXAM
[No Acute Distress] : no acute distress [Normal] : no jugular venous distention, supple, no lymphadenopathy and the thyroid was normal and there were no nodules present [Normal Rate] : normal rate  [Normal S1, S2] : normal S1 and S2 [No Murmur] : no murmur heard [Soft] : abdomen soft [Non Tender] : non-tender [Normal Posterior Cervical Nodes] : no posterior cervical lymphadenopathy [Normal Anterior Cervical Nodes] : no anterior cervical lymphadenopathy [No Focal Deficits] : no focal deficits [Alert and Oriented x3] : oriented to person, place, and time [de-identified] : irregular rhythm [de-identified] : 1+ edema bilaterally LEs; several small open areas noted; no drainage; no erythema

## 2024-03-13 NOTE — ASSESSMENT
[FreeTextEntry1] : Hemodynamically stable with acceptable BP LE edema with no clinical evidence of cardiac decompensation - advised pt she can take 1 1/2 tabs diuretic daily for the next 3 days and observe; encourage leg elevation; also advised Vascular evaluation - gave Dr. Montilla's information Lab profiles drawn in office and sent

## 2024-03-14 LAB
ALBUMIN SERPL ELPH-MCNC: 3.5 G/DL
ALP BLD-CCNC: 213 U/L
ALT SERPL-CCNC: 37 U/L
ANION GAP SERPL CALC-SCNC: 13 MMOL/L
AST SERPL-CCNC: 32 U/L
BILIRUB SERPL-MCNC: 1 MG/DL
BUN SERPL-MCNC: 67 MG/DL
CALCIUM SERPL-MCNC: 9.2 MG/DL
CHLORIDE SERPL-SCNC: 109 MMOL/L
CO2 SERPL-SCNC: 21 MMOL/L
CREAT SERPL-MCNC: 1.09 MG/DL
EGFR: 49 ML/MIN/1.73M2
GLUCOSE SERPL-MCNC: 87 MG/DL
HCT VFR BLD CALC: 34.5 %
HGB BLD-MCNC: 10.9 G/DL
MCHC RBC-ENTMCNC: 28.9 PG
MCHC RBC-ENTMCNC: 31.6 GM/DL
MCV RBC AUTO: 91.5 FL
PLATELET # BLD AUTO: 146 K/UL
POTASSIUM SERPL-SCNC: 4.6 MMOL/L
PROT SERPL-MCNC: 6.4 G/DL
RBC # BLD: 3.77 M/UL
RBC # FLD: 20.2 %
SODIUM SERPL-SCNC: 143 MMOL/L
T3FREE SERPL-MCNC: 4.6 PG/ML
T4 FREE SERPL-MCNC: 2.3 NG/DL
TSH SERPL-ACNC: <0.01 UIU/ML
WBC # FLD AUTO: 6.1 K/UL

## 2024-03-19 RX ORDER — HYDROXYZINE HYDROCHLORIDE 25 MG/1
25 TABLET ORAL TWICE DAILY
Qty: 20 | Refills: 1 | Status: ACTIVE | COMMUNITY
Start: 2024-02-01 | End: 1900-01-01

## 2024-03-26 ENCOUNTER — NON-APPOINTMENT (OUTPATIENT)
Age: 89
End: 2024-03-26

## 2024-03-30 NOTE — HISTORY OF PRESENT ILLNESS
03/30/24 0938   Discharge Planning   Home or Post Acute Services In home services   Type of Home Care Services Home OT;Home PT   Patient expects to be discharged to: [facility]   Does the patient need discharge transport arranged? No       Patient will discharge to home with home care services. Per note, [facility] has accepted and start of care is confirmed for 3/31. Family will provide transport home.   [Post-hospitalization from ___ Hospital] : Post-hospitalization from [unfilled] Hospital [Admitted on: ___] : The patient was admitted on [unfilled] [Discharged on ___] : discharged on [unfilled] [Discharge Summary] : discharge summary [Pertinent Labs] : pertinent labs [Radiology Findings] : radiology findings [Discharge Med List] : discharge medication list [Other: ____] : [unfilled] [Med Reconciliation] : medication reconciliation has been completed [Patient Contacted By: ____] : and contacted by [unfilled] [FreeTextEntry2] : Presented to the ER with "shakes" and increasing SOB; diagnosed with exacerbation of CHF; responded to IV diuresis and was transitioned to oral.  Of note pt had been advised to remain in the hospital for one more day of IV but wished to be discharged.  Of note also on a fluid restriction to 1 litre per day.  Pt states feels fatigued, but denies SOB; has an appointment with her Cardiologist (Dr. Mcgee) early next week.

## 2024-04-01 ENCOUNTER — APPOINTMENT (OUTPATIENT)
Dept: FAMILY MEDICINE | Facility: CLINIC | Age: 89
End: 2024-04-01

## 2024-04-16 NOTE — ED ADULT NURSE NOTE - NSPATIENTFLAG_GEN_A_ER
Purple DH (Discharge Huddle; Vulnerable Patient)
Xray Image(s) - see wet read section for interpretation

## 2024-04-22 ENCOUNTER — APPOINTMENT (OUTPATIENT)
Dept: FAMILY MEDICINE | Facility: CLINIC | Age: 89
End: 2024-04-22
Payer: MEDICARE

## 2024-04-22 VITALS
DIASTOLIC BLOOD PRESSURE: 60 MMHG | BODY MASS INDEX: 21.52 KG/M2 | WEIGHT: 114 LBS | SYSTOLIC BLOOD PRESSURE: 122 MMHG | HEART RATE: 68 BPM | HEIGHT: 61 IN | RESPIRATION RATE: 20 BRPM

## 2024-04-22 PROCEDURE — 99214 OFFICE O/P EST MOD 30 MIN: CPT

## 2024-04-22 PROCEDURE — 36415 COLL VENOUS BLD VENIPUNCTURE: CPT

## 2024-04-22 NOTE — PHYSICAL EXAM
[No Acute Distress] : no acute distress [Normal] : no respiratory distress, lungs were clear to auscultation bilaterally and no accessory muscle use [Normal Rate] : normal rate  [Normal S1, S2] : normal S1 and S2 [No Murmur] : no murmur heard [No Edema] : there was no peripheral edema [Soft] : abdomen soft [Non Tender] : non-tender [Normal Posterior Cervical Nodes] : no posterior cervical lymphadenopathy [Normal Anterior Cervical Nodes] : no anterior cervical lymphadenopathy [No Focal Deficits] : no focal deficits [Alert and Oriented x3] : oriented to person, place, and time [de-identified] : irregular rhythm [de-identified] : note healing area over R achilles

## 2024-04-22 NOTE — HISTORY OF PRESENT ILLNESS
[de-identified] : Presents for BP check, labs, and general follow-up.  States feeling better - "more energy."  Following with Endocrine.

## 2024-04-23 LAB
25(OH)D3 SERPL-MCNC: 25.7 NG/ML
ALBUMIN SERPL ELPH-MCNC: 3.7 G/DL
ALP BLD-CCNC: 278 U/L
ALT SERPL-CCNC: 31 U/L
ANION GAP SERPL CALC-SCNC: 14 MMOL/L
AST SERPL-CCNC: 30 U/L
BILIRUB SERPL-MCNC: 1 MG/DL
BUN SERPL-MCNC: 50 MG/DL
CALCIUM SERPL-MCNC: 8.9 MG/DL
CHLORIDE SERPL-SCNC: 106 MMOL/L
CHOLEST SERPL-MCNC: 162 MG/DL
CO2 SERPL-SCNC: 22 MMOL/L
CREAT SERPL-MCNC: 1.17 MG/DL
EGFR: 44 ML/MIN/1.73M2
ESTIMATED AVERAGE GLUCOSE: 114 MG/DL
GLUCOSE SERPL-MCNC: 128 MG/DL
HBA1C MFR BLD HPLC: 5.6 %
HCT VFR BLD CALC: 34.6 %
HDLC SERPL-MCNC: 79 MG/DL
HGB BLD-MCNC: 10.9 G/DL
LDLC SERPL CALC-MCNC: 65 MG/DL
MCHC RBC-ENTMCNC: 28.8 PG
MCHC RBC-ENTMCNC: 31.5 GM/DL
MCV RBC AUTO: 91.5 FL
NONHDLC SERPL-MCNC: 83 MG/DL
PLATELET # BLD AUTO: 189 K/UL
POTASSIUM SERPL-SCNC: 3.8 MMOL/L
PROT SERPL-MCNC: 6.6 G/DL
RBC # BLD: 3.78 M/UL
RBC # FLD: 19.5 %
SODIUM SERPL-SCNC: 143 MMOL/L
T3FREE SERPL-MCNC: 3.72 PG/ML
T4 FREE SERPL-MCNC: 1.6 NG/DL
TRIGL SERPL-MCNC: 101 MG/DL
TSH SERPL-ACNC: <0.01 UIU/ML
WBC # FLD AUTO: 7.08 K/UL

## 2024-05-01 DIAGNOSIS — R11.0 NAUSEA: ICD-10-CM

## 2024-05-01 RX ORDER — BUMETANIDE 1 MG/1
1 TABLET ORAL
Qty: 90 | Refills: 0 | Status: ACTIVE | COMMUNITY
Start: 2023-06-15 | End: 1900-01-01

## 2024-05-01 RX ORDER — TRETINOIN 0.25 MG/G
0.03 CREAM TOPICAL
Qty: 45 | Refills: 3 | Status: ACTIVE | COMMUNITY
Start: 2020-12-07 | End: 1900-01-01

## 2024-05-01 RX ORDER — ONDANSETRON 4 MG/1
4 TABLET ORAL EVERY 6 HOURS
Qty: 16 | Refills: 1 | Status: ACTIVE | COMMUNITY
Start: 2024-05-01 | End: 1900-01-01

## 2024-05-02 ENCOUNTER — NON-APPOINTMENT (OUTPATIENT)
Age: 89
End: 2024-05-02

## 2024-05-10 RX ORDER — PREDNISONE 5 MG/1
5 TABLET ORAL
Qty: 30 | Refills: 2 | Status: ACTIVE | COMMUNITY
Start: 2021-03-23 | End: 1900-01-01

## 2024-05-13 NOTE — ED ADULT NURSE NOTE - NSSUHOSCREENINGYN_ED_ALL_ED
Detail Level: Generalized
Detail Level: Detailed
When Should The Patient Follow-Up For Their Next Full-Body Skin Exam?: 6 Months
Quality 137: Melanoma: Continuity Of Care - Recall System: Patient information entered into a recall system that includes: target date for the next exam specified AND a process to follow up with patients regarding missed or unscheduled appointments
No - the patient is unable to be screened due to medical condition

## 2024-05-15 NOTE — PHYSICAL THERAPY INITIAL EVALUATION ADULT - REFERRING PHYSICIAN, REHAB EVAL
Dr. Lai Alert-The patient is alert, awake and responds to voice. The patient is oriented to time, place, and person. The triage nurse is able to obtain subjective information.

## 2024-05-22 ENCOUNTER — APPOINTMENT (OUTPATIENT)
Dept: PAIN MANAGEMENT | Facility: CLINIC | Age: 89
End: 2024-05-22
Payer: MEDICARE

## 2024-05-22 VITALS — BODY MASS INDEX: 21.52 KG/M2 | HEIGHT: 61 IN | WEIGHT: 114 LBS

## 2024-05-22 DIAGNOSIS — M54.16 RADICULOPATHY, LUMBAR REGION: ICD-10-CM

## 2024-05-22 PROCEDURE — 99213 OFFICE O/P EST LOW 20 MIN: CPT

## 2024-05-22 RX ORDER — GABAPENTIN 100 MG/1
100 CAPSULE ORAL 3 TIMES DAILY
Qty: 90 | Refills: 5 | Status: ACTIVE | COMMUNITY
Start: 2024-05-22 | End: 1900-01-01

## 2024-05-22 NOTE — HISTORY OF PRESENT ILLNESS
[FreeTextEntry1] : THIS PLEASANT PATIENT IS 89 year OLD  female  WHO PRESENT TODAY IN OFFICE WITH LSPINE RADATING TOP BTH LEG AND THE GROIN, WOULD LIKE DISS PLAN OF CARE FOR      DATE OF INJURY/ONSET   5 YEAR THAT PROGRESSING TO WORSE THE PAST 1 YRS      PAIN LEVEL: 7-10/10     MECHANISM OF INJURY: UNKOWN INJURAY        QUALITY OF SYMPTOMS:  DULL/ACHE, STABBING, SHOOTING      IMPROVES WITH:  NOTHING      WORSE WITH:  SITTING, STANDING,      PRIOR TREATMENT: PT HAS DONE PT FOR THE BACK MANY YEAR THAT MADE PAIN WORSE UNKWN DATE AS PER PT   PATIENT IS PRESENTING WITH ACUTE/SUB-ACUTE RADICULAR PAIN WITH IMPAIRMENT IN ADLs AND FUNCTIONALITY. THE PATIENT HAS NOT RESPONDED TO CONSERVATIVE CARE INCLUDING NSAID THERAPY AND/OR PHYSICAL THERAPY 2-3X A WEEK FOR 6 WEEK.            PRIOR IMAGING: NO RECENT IMAGE         SCHOOL/SPORT/POSITION/OCCUPATION:       ADDITIONAL INFORMATION

## 2024-05-22 NOTE — ASSESSMENT
[FreeTextEntry1] : Subjective findings This 89-year-old female presents to us with pain in the back with a radiating component down both legs to the heels.  Patient says that this been a chronic condition for the patient.  Recently the pain has become significantly worse.  Patient denies any bowel or bladder incontinence any progressive weakness but states that her activity levels had to decrease secondary to this discomfort.  The patient has had epidural steroid injections in the distant past with good response and presents to us for options for care.  The CV/Pulm/GI/Heme/Renal/Hepatic//Psych/Endo systems are reviewed. A full ROS was performed and reviewed with the patient today, see intake form.  Average pain score for the month is 6 out of ten. The patient's current medications are documented to the best of their ability. The patient has failed conservative therapies to include medical management, and physical activity to treat the pain. The patient has decreased function secondary to the pain. Objective findings There are no recent imaging studies of the lumbar spine.  Patient is able to get to a standing position with minimal difficulty.  While standing patient ambulates slowly.  Motor and sensory exams appear grossly intact. Assessment Lumbar radiculopathy Plan Prior to any intervention the patient will obtain an MRI of the lumbar spine to try to elucidate the exact etiology of the pain.  Spoke to patient about epidural steroid injections. Explained risks, benefits and alternatives including but not limited to the risk of infection, bleeding, headache, syncopal episode, failure to resolve issues, allergic reaction, symptom recurrence, allergic reaction, nerve injury, and increased pain. The patient understands the risks. All questions were answered. The patient is willing to proceed. The importance of increasing exercise after the injection is also stressed. The use of the injection as a jump start so that the patient can do more exercise, but that the exercise is the long-term answer for the patient is reviewed. The patient states understanding.  Patient will be restarted on gabapentin which was effective in the past in treating this pain.  She says she stopped taking about 8 months ago but does not know why.  She will be contacting her primary care provider to see if there was a specific reason which would preclude her from restarting the gabapentin.  This note was generated by using Dragon medical dictation software.  A reasonable effort has been made for proofreading its contents, but typos may still remain.  If there are any questions or points of clarification needed, please notify my office.

## 2024-05-28 ENCOUNTER — APPOINTMENT (OUTPATIENT)
Dept: MRI IMAGING | Facility: HOSPITAL | Age: 89
End: 2024-05-28

## 2024-06-05 ENCOUNTER — NON-APPOINTMENT (OUTPATIENT)
Age: 89
End: 2024-06-05

## 2024-06-05 RX ORDER — LISINOPRIL 10 MG/1
10 TABLET ORAL
Qty: 90 | Refills: 0 | Status: ACTIVE | COMMUNITY
Start: 2024-06-05 | End: 1900-01-01

## 2024-06-05 RX ORDER — OXYCODONE AND ACETAMINOPHEN 7.5; 325 MG/1; MG/1
7.5-325 TABLET ORAL TWICE DAILY
Qty: 60 | Refills: 0 | Status: ACTIVE | COMMUNITY
Start: 2020-12-07 | End: 1900-01-01

## 2024-06-18 ENCOUNTER — OUTPATIENT (OUTPATIENT)
Dept: OUTPATIENT SERVICES | Facility: HOSPITAL | Age: 89
LOS: 1 days | End: 2024-06-18
Payer: MEDICARE

## 2024-06-18 ENCOUNTER — RESULT REVIEW (OUTPATIENT)
Age: 89
End: 2024-06-18

## 2024-06-18 DIAGNOSIS — Z90.49 ACQUIRED ABSENCE OF OTHER SPECIFIED PARTS OF DIGESTIVE TRACT: Chronic | ICD-10-CM

## 2024-06-18 DIAGNOSIS — Z98.890 OTHER SPECIFIED POSTPROCEDURAL STATES: Chronic | ICD-10-CM

## 2024-06-18 DIAGNOSIS — Z96.649 PRESENCE OF UNSPECIFIED ARTIFICIAL HIP JOINT: Chronic | ICD-10-CM

## 2024-06-18 DIAGNOSIS — Z87.39 PERSONAL HISTORY OF OTHER DISEASES OF THE MUSCULOSKELETAL SYSTEM AND CONNECTIVE TISSUE: Chronic | ICD-10-CM

## 2024-06-18 DIAGNOSIS — Z95.0 PRESENCE OF CARDIAC PACEMAKER: Chronic | ICD-10-CM

## 2024-06-18 DIAGNOSIS — M54.16 RADICULOPATHY, LUMBAR REGION: ICD-10-CM

## 2024-06-18 PROCEDURE — 71046 X-RAY EXAM CHEST 2 VIEWS: CPT | Mod: 26

## 2024-06-18 PROCEDURE — 72148 MRI LUMBAR SPINE W/O DYE: CPT | Mod: MH

## 2024-06-18 PROCEDURE — 71046 X-RAY EXAM CHEST 2 VIEWS: CPT

## 2024-06-18 PROCEDURE — 72148 MRI LUMBAR SPINE W/O DYE: CPT | Mod: 26,MH

## 2024-06-19 DIAGNOSIS — M54.16 RADICULOPATHY, LUMBAR REGION: ICD-10-CM

## 2024-06-26 ENCOUNTER — TRANSCRIPTION ENCOUNTER (OUTPATIENT)
Age: 89
End: 2024-06-26

## 2024-06-27 ENCOUNTER — OUTPATIENT (OUTPATIENT)
Dept: OUTPATIENT SERVICES | Facility: HOSPITAL | Age: 89
LOS: 1 days | End: 2024-06-27
Payer: MEDICARE

## 2024-06-27 ENCOUNTER — APPOINTMENT (OUTPATIENT)
Dept: ORTHOPEDIC SURGERY | Facility: HOSPITAL | Age: 89
End: 2024-06-27
Payer: MEDICARE

## 2024-06-27 VITALS
OXYGEN SATURATION: 96 % | RESPIRATION RATE: 20 BRPM | DIASTOLIC BLOOD PRESSURE: 65 MMHG | HEART RATE: 63 BPM | TEMPERATURE: 98 F | SYSTOLIC BLOOD PRESSURE: 130 MMHG

## 2024-06-27 VITALS
SYSTOLIC BLOOD PRESSURE: 170 MMHG | RESPIRATION RATE: 16 BRPM | WEIGHT: 117.95 LBS | HEART RATE: 60 BPM | DIASTOLIC BLOOD PRESSURE: 86 MMHG | TEMPERATURE: 98 F | OXYGEN SATURATION: 97 % | HEIGHT: 61 IN

## 2024-06-27 DIAGNOSIS — M54.16 RADICULOPATHY, LUMBAR REGION: ICD-10-CM

## 2024-06-27 DIAGNOSIS — Z98.890 OTHER SPECIFIED POSTPROCEDURAL STATES: Chronic | ICD-10-CM

## 2024-06-27 DIAGNOSIS — Z95.0 PRESENCE OF CARDIAC PACEMAKER: Chronic | ICD-10-CM

## 2024-06-27 DIAGNOSIS — Z90.49 ACQUIRED ABSENCE OF OTHER SPECIFIED PARTS OF DIGESTIVE TRACT: Chronic | ICD-10-CM

## 2024-06-27 DIAGNOSIS — Z96.649 PRESENCE OF UNSPECIFIED ARTIFICIAL HIP JOINT: Chronic | ICD-10-CM

## 2024-06-27 DIAGNOSIS — Z87.39 PERSONAL HISTORY OF OTHER DISEASES OF THE MUSCULOSKELETAL SYSTEM AND CONNECTIVE TISSUE: Chronic | ICD-10-CM

## 2024-06-27 PROCEDURE — 62323 NJX INTERLAMINAR LMBR/SAC: CPT

## 2024-06-27 RX ORDER — UBIDECARENONE 100 MG
1 CAPSULE ORAL
Qty: 0 | Refills: 0 | DISCHARGE

## 2024-06-27 RX ORDER — FULVESTRANT 50 MG/ML
0 INJECTION INTRAMUSCULAR
Qty: 0 | Refills: 0 | DISCHARGE

## 2024-06-27 RX ORDER — FERROUS SULFATE 325(65) MG
5 TABLET ORAL
Qty: 0 | Refills: 0 | DISCHARGE

## 2024-06-27 RX ORDER — OXYCODONE HYDROCHLORIDE 100 MG/5ML
0.5 SOLUTION ORAL
Refills: 0 | DISCHARGE

## 2024-06-27 RX ORDER — CHOLECALCIFEROL (VITAMIN D3) 125 MCG
1 CAPSULE ORAL
Qty: 0 | Refills: 0 | DISCHARGE

## 2024-06-27 RX ORDER — FLUTICASONE FUROATE AND VILANTEROL TRIFENATATE 100; 25 UG/1; UG/1
1 POWDER RESPIRATORY (INHALATION)
Qty: 0 | Refills: 0 | DISCHARGE

## 2024-06-27 RX ORDER — METOPROLOL TARTRATE 50 MG
1 TABLET ORAL
Refills: 0 | DISCHARGE

## 2024-06-27 RX ORDER — AMLODIPINE BESYLATE 2.5 MG/1
1 TABLET ORAL
Qty: 0 | Refills: 0 | DISCHARGE

## 2024-07-01 ENCOUNTER — APPOINTMENT (OUTPATIENT)
Dept: FAMILY MEDICINE | Facility: CLINIC | Age: 89
End: 2024-07-01
Payer: MEDICARE

## 2024-07-01 VITALS
DIASTOLIC BLOOD PRESSURE: 85 MMHG | HEART RATE: 64 BPM | SYSTOLIC BLOOD PRESSURE: 148 MMHG | BODY MASS INDEX: 22.47 KG/M2 | RESPIRATION RATE: 20 BRPM | WEIGHT: 119 LBS | HEIGHT: 61 IN

## 2024-07-01 DIAGNOSIS — E05.90 THYROTOXICOSIS, UNSPECIFIED W/OUT THYROTOXIC CRISIS OR STORM: ICD-10-CM

## 2024-07-01 DIAGNOSIS — I48.91 UNSPECIFIED ATRIAL FIBRILLATION: ICD-10-CM

## 2024-07-01 DIAGNOSIS — R73.09 OTHER ABNORMAL GLUCOSE: ICD-10-CM

## 2024-07-01 DIAGNOSIS — E78.5 HYPERLIPIDEMIA, UNSPECIFIED: ICD-10-CM

## 2024-07-01 DIAGNOSIS — D64.9 ANEMIA, UNSPECIFIED: ICD-10-CM

## 2024-07-01 DIAGNOSIS — I10 ESSENTIAL (PRIMARY) HYPERTENSION: ICD-10-CM

## 2024-07-01 PROCEDURE — G2211 COMPLEX E/M VISIT ADD ON: CPT

## 2024-07-01 PROCEDURE — 99214 OFFICE O/P EST MOD 30 MIN: CPT

## 2024-07-01 PROCEDURE — 36415 COLL VENOUS BLD VENIPUNCTURE: CPT

## 2024-07-02 LAB
ALBUMIN SERPL ELPH-MCNC: 3.9 G/DL
ALP BLD-CCNC: 276 U/L
ALT SERPL-CCNC: 48 U/L
ANION GAP SERPL CALC-SCNC: 16 MMOL/L
AST SERPL-CCNC: 63 U/L
BILIRUB SERPL-MCNC: 1.5 MG/DL
BUN SERPL-MCNC: 40 MG/DL
CALCIUM SERPL-MCNC: 9.1 MG/DL
CHLORIDE SERPL-SCNC: 106 MMOL/L
CHOLEST SERPL-MCNC: 198 MG/DL
CO2 SERPL-SCNC: 20 MMOL/L
CREAT SERPL-MCNC: 1.03 MG/DL
EGFR: 52 ML/MIN/1.73M2
ESTIMATED AVERAGE GLUCOSE: 114 MG/DL
GLUCOSE SERPL-MCNC: 127 MG/DL
HBA1C MFR BLD HPLC: 5.6 %
HCT VFR BLD CALC: 36.8 %
HDLC SERPL-MCNC: 92 MG/DL
HGB BLD-MCNC: 12 G/DL
LDLC SERPL CALC-MCNC: 93 MG/DL
MCHC RBC-ENTMCNC: 29.8 PG
MCHC RBC-ENTMCNC: 32.6 GM/DL
MCV RBC AUTO: 91.3 FL
NONHDLC SERPL-MCNC: 106 MG/DL
PLATELET # BLD AUTO: 192 K/UL
POTASSIUM SERPL-SCNC: 5.3 MMOL/L
PROT SERPL-MCNC: 6.7 G/DL
RBC # BLD: 4.03 M/UL
RBC # FLD: 20 %
SODIUM SERPL-SCNC: 141 MMOL/L
T4 FREE SERPL-MCNC: 1.1 NG/DL
TRIGL SERPL-MCNC: 70 MG/DL
TSH SERPL-ACNC: 0.01 UIU/ML
WBC # FLD AUTO: 6.59 K/UL

## 2024-07-08 ENCOUNTER — NON-APPOINTMENT (OUTPATIENT)
Age: 89
End: 2024-07-08

## 2024-07-08 RX ORDER — NALOXONE HYDROCHLORIDE 4 MG/.1ML
4 SPRAY NASAL
Qty: 1 | Refills: 1 | Status: ACTIVE | COMMUNITY
Start: 2024-07-08 | End: 1900-01-01

## 2024-07-27 ENCOUNTER — RX RENEWAL (OUTPATIENT)
Age: 89
End: 2024-07-27

## 2024-08-05 ENCOUNTER — APPOINTMENT (OUTPATIENT)
Dept: FAMILY MEDICINE | Facility: CLINIC | Age: 89
End: 2024-08-05

## 2024-08-05 PROCEDURE — 99213 OFFICE O/P EST LOW 20 MIN: CPT

## 2024-08-05 PROCEDURE — G2211 COMPLEX E/M VISIT ADD ON: CPT

## 2024-08-05 NOTE — HISTORY OF PRESENT ILLNESS
[de-identified] : Presents with aide for BP re-check.  Pt states BPs continue to be elevated first thing in the morning and in the evening - up to the 160s to 170 systolic.  Otherwise feeling generally well.

## 2024-08-05 NOTE — PHYSICAL EXAM
[No Acute Distress] : no acute distress [Supple] : supple [Normal] : no respiratory distress, lungs were clear to auscultation bilaterally and no accessory muscle use [Normal Rate] : normal rate  [Normal S1, S2] : normal S1 and S2 [No Edema] : there was no peripheral edema [Soft] : abdomen soft [Non Tender] : non-tender [No Focal Deficits] : no focal deficits [Alert and Oriented x3] : oriented to person, place, and time [de-identified] : irregular rhythm; murmur unchanged

## 2024-08-05 NOTE — PLAN
[FreeTextEntry1] : Will have patient add 1/2 Lisinopril in the evening Plan F/U one month for BP check and lab profiles

## 2024-08-12 ENCOUNTER — NON-APPOINTMENT (OUTPATIENT)
Age: 89
End: 2024-08-12

## 2024-08-14 ENCOUNTER — RX RENEWAL (OUTPATIENT)
Age: 89
End: 2024-08-14

## 2024-09-09 NOTE — PATIENT PROFILE ADULT - FALL HARM RISK - FACTORS
[FreeTextEntry1] : I had a discussion regarding today's visit, the diagnosis and treatment recommendations and options.  We also discussed changes since the last visit.  At this time, I told her that I need to review the EMG results.  I will request them from Dr. Butler and I will speak to her after I have had a chance to review them.  In the meantime, she will continue to work at 75% duty.  The patient has agreed to the above plan of management and has expressed full understanding.  All questions were fully answered to the patient's satisfaction.  My cumulative time spent on today's visit was greater than 30 minutes and included: Preparation for the visit, review of the medical records, review of pertinent diagnostic studies, examination and counseling of the patient on the above diagnosis, treatment plan and prognosis, orders of diagnostic tests, medications and/or appropriate procedures and documentation in the medical records of today's visit. [FreeTextEntry1] : I had a discussion regarding today's visit, the diagnosis and treatment recommendations and options.  We also discussed changes since the last visit.  At this time, I told her that I need to review the EMG results.  I will request them from Dr. Butler and I will speak to her after I have had a chance to review them.  In the meantime, she will continue to work at 75% duty.  The patient has agreed to the above plan of management and has expressed full understanding.  All questions were fully answered to the patient's satisfaction.  My cumulative time spent on today's visit was greater than 30 minutes and included: Preparation for the visit, review of the medical records, review of pertinent diagnostic studies, examination and counseling of the patient on the above diagnosis, treatment plan and prognosis, orders of diagnostic tests, medications and/or appropriate procedures and documentation in the medical records of today's visit. poor inspiratory efforts blt Dizziness/Weakness/Other

## 2024-09-11 ENCOUNTER — NON-APPOINTMENT (OUTPATIENT)
Age: 89
End: 2024-09-11

## 2024-09-17 NOTE — PHYSICAL EXAM
"ED Provider Note    CHIEF COMPLAINT  Chief Complaint   Patient presents with    Suicidal Ideation     Patient recently was kicked out of his motel and is feeling hopeless. Patient states he has a plan to \"drink alcohol and overdose on benadryl\". Patient states he attempted this way in [place] in 2021 and was admitted to the ICU. Previous attempt in [place] in 2010           EXTERNAL RECORDS REVIEWED  Other none germane to today's visit    HPI/ROS  LIMITATION TO HISTORY   Select: : None  OUTSIDE HISTORIAN(S):  None    Dom Hickey is a 29 y.o. male who presents with a chief complaint of suicidal ideation.  Patient was living in a [employer] and today his stay ended which made him feel suicidal.  He had a plan to take 45 pills of Benadryl and drink alcohol in a suicide attempt as he states he would rather die than live on the streets.  He called [phone] and was brought to the ER for evaluation.  He denies daily alcohol use.  He smokes marijuana for pain management of his fibromyalgia but otherwise denies illicit drug use.  He has no current medical complaints.  He reports a history of suicide attempt during which time he was ventilated in an ICU 14 years ago.  He was hospitalized in a psychiatric facility at that time.  He denies any HI and denies any auditory or visual hallucinations.  He remains suicidal because he does not want to live on the street.    PAST MEDICAL HISTORY   has a past medical history of ADD (attention deficit disorder), ADHD (attention deficit hyperactivity disorder), Bipolar affective (HCC), Psychiatric disorder, and Psychiatric disorder.    SURGICAL HISTORY  patient denies any surgical history    FAMILY HISTORY  No family history on file.    SOCIAL HISTORY  Social History     Tobacco Use    Smoking status: Never    Smokeless tobacco: Not on file   Substance and Sexual Activity    Alcohol use: No    Drug use: No    Sexual activity: Not on file       CURRENT MEDICATIONS  Home Medications    **Home " "medications have not yet been reviewed for this encounter**         ALLERGIES  Allergies   Allergen Reactions    Morphine Hives     Pt states allergic to all opiates        PHYSICAL EXAM  VITAL SIGNS: /89   Pulse 76   Temp 36.3 °C (97.4 °F) (Temporal)   Resp 16   Ht 1.727 m (5' 8\")   Wt 90.7 kg (200 lb)   SpO2 96%   BMI 30.41 kg/m²    Physical Exam  Vitals and nursing note reviewed.   Constitutional:       Appearance: Normal appearance.   HENT:      Head: Normocephalic and atraumatic.      Right Ear: External ear normal.      Left Ear: External ear normal.      Nose: Nose normal.      Mouth/Throat:      Mouth: Mucous membranes are moist.      Pharynx: Oropharynx is clear.   Eyes:      Extraocular Movements: Extraocular movements intact.      Conjunctiva/sclera: Conjunctivae normal.      Pupils: Pupils are equal, round, and reactive to light.   Cardiovascular:      Rate and Rhythm: Normal rate and regular rhythm.   Pulmonary:      Effort: Pulmonary effort is normal.      Breath sounds: Normal breath sounds.   Abdominal:      Palpations: Abdomen is soft.      Tenderness: There is no abdominal tenderness.   Musculoskeletal:         General: Normal range of motion.      Cervical back: Normal range of motion and neck supple.   Skin:     General: Skin is warm and dry.   Neurological:      General: No focal deficit present.      Mental Status: He is alert and oriented to person, place, and time.   Psychiatric:         Behavior: Behavior normal.      Comments: Withdrawn       EKG/LABS  Results for orders placed or performed during the hospital encounter of 09/17/24   POC BREATHALIZER   Result Value Ref Range    POC Breathalizer 0.00 0.00 - 0.01 Percent   POC BREATHALIZER   Result Value Ref Range    POC Breathalizer 0.00 0.00 - 0.01 Percent     I have independently interpreted this EKG    RADIOLOGY/PROCEDURES   I have independently interpreted the diagnostic imaging associated with this visit and am waiting the " final reading from the radiologist.   My preliminary interpretation is as follows: N/A    Radiologist interpretation:  No orders to display     COURSE & MEDICAL DECISION MAKING    ASSESSMENT, COURSE AND PLAN  Care Narrative: This is a 29-year-old male with a history of extensive psychiatric disease and remote suicide attempt requiring inpatient psychiatric stabilization who is here with suicidal ideation due to his current living situation and plan to kill himself with OD on pills and alcohol.    Arrives afebrile with normal vital signs.  Appears well-hydrated and nontoxic.  He has no physical complaints.  He has fibromyalgia but other than that denies any underlying medical disease.  His physical exam is reassuring.    UDS is positive for cannabinoids.  Breathalyzer is negative.  Patient was seen by behavioral health/alert team and after evaluation it was felt that the patient did not meet criteria for legal hold.  Once he was given outpatient resources for the [facility] and outpatient mental health providers he was no longer suicidal and felt comfortable with the plan to go to the [facility] and follow-up as an outpatient.  I reevaluated him at bedside.  He has no auditory or visual hallucinations and denies SI or HI.  He is satisfied with the plan of care today and feels good knowing that he has a place to stay tonight.  We went over strict return precautions.  He was discharged in good and stable condition.    ADDITIONAL PROBLEMS MANAGED  None    DISPOSITION AND DISCUSSIONS  I have discussed management of the patient with the following physicians and BEBO's: None    Discussion of management with other QHP or appropriate source(s): Behavioral Health assisted with legal hold evaluation      Escalation of care considered, and ultimately not performed:Laboratory analysis and diagnostic imaging    Barriers to care at this time, including but not limited to:  None .     Decision tools and prescription drugs  considered including, but not limited to:  None .    FINAL DIAGNOSIS  1.  Situational depression     Electronically signed by: Carlos Eduardo Yousif M.D., 9/17/2024 7:16 AM       [Restricted in physically strenuous activity but ambulatory and able to carry out work of a light or sedentary nature] : Status 1- Restricted in physically strenuous activity but ambulatory and able to carry out work of a light or sedentary nature, e.g., light house work, office work [Normal] : affect appropriate [de-identified] : B/L breasts s/p MRM with reconstruction, Rt; no palpable masses in breast or axilla. Left: no palpable masses in breast, however ,vague "puffines" in the LOQ without any discrete palpable masses. L axilla - ~ 1.5 cm mobile, palpable nodule

## 2024-09-19 ENCOUNTER — RX RENEWAL (OUTPATIENT)
Age: 89
End: 2024-09-19

## 2024-09-23 ENCOUNTER — APPOINTMENT (OUTPATIENT)
Dept: FAMILY MEDICINE | Facility: CLINIC | Age: 89
End: 2024-09-23
Payer: MEDICARE

## 2024-09-23 VITALS
SYSTOLIC BLOOD PRESSURE: 135 MMHG | WEIGHT: 130 LBS | BODY MASS INDEX: 24.55 KG/M2 | RESPIRATION RATE: 20 BRPM | HEIGHT: 61 IN | DIASTOLIC BLOOD PRESSURE: 60 MMHG | HEART RATE: 68 BPM

## 2024-09-23 DIAGNOSIS — R73.09 OTHER ABNORMAL GLUCOSE: ICD-10-CM

## 2024-09-23 DIAGNOSIS — E05.90 THYROTOXICOSIS, UNSPECIFIED W/OUT THYROTOXIC CRISIS OR STORM: ICD-10-CM

## 2024-09-23 DIAGNOSIS — I10 ESSENTIAL (PRIMARY) HYPERTENSION: ICD-10-CM

## 2024-09-23 DIAGNOSIS — Z23 ENCOUNTER FOR IMMUNIZATION: ICD-10-CM

## 2024-09-23 DIAGNOSIS — E78.5 HYPERLIPIDEMIA, UNSPECIFIED: ICD-10-CM

## 2024-09-23 PROCEDURE — G0009: CPT

## 2024-09-23 PROCEDURE — 90662 IIV NO PRSV INCREASED AG IM: CPT

## 2024-09-23 PROCEDURE — 99214 OFFICE O/P EST MOD 30 MIN: CPT

## 2024-09-23 PROCEDURE — G2211 COMPLEX E/M VISIT ADD ON: CPT

## 2024-09-23 PROCEDURE — 90677 PCV20 VACCINE IM: CPT

## 2024-09-23 PROCEDURE — G0008: CPT

## 2024-09-23 PROCEDURE — 36415 COLL VENOUS BLD VENIPUNCTURE: CPT

## 2024-09-23 NOTE — PHYSICAL EXAM
[No Acute Distress] : no acute distress [Normal] : no respiratory distress, lungs were clear to auscultation bilaterally and no accessory muscle use [Normal Rate] : normal rate  [Normal S1, S2] : normal S1 and S2 [No Murmur] : no murmur heard [No Edema] : there was no peripheral edema [Soft] : abdomen soft [Non Tender] : non-tender [Normal Posterior Cervical Nodes] : no posterior cervical lymphadenopathy [Normal Anterior Cervical Nodes] : no anterior cervical lymphadenopathy [No Focal Deficits] : no focal deficits [Alert and Oriented x3] : oriented to person, place, and time [de-identified] : irregular rhythm [de-identified] : tender L lateral rib area - no crepitance noted; small ecchymosis noted

## 2024-09-23 NOTE — HISTORY OF PRESENT ILLNESS
[de-identified] : Presents for BP check, labs, and general follow-up; also due for current flu vaccine and Prevnar 20 - pt in agreement.  States BPs have been in the 130s to 150s on current medication.  Note recent mechanical fall with L rib bruising (did not go to the ER).

## 2024-09-23 NOTE — ASSESSMENT
[FreeTextEntry1] : Hemodynamically stable with acceptable BP Lab profiles drawn in office and sent High dose flu vaccine given L deltoid Prevnar 20 given L deltoid

## 2024-09-24 LAB
ALBUMIN SERPL ELPH-MCNC: 3.9 G/DL
ALP BLD-CCNC: 186 U/L
ALT SERPL-CCNC: 22 U/L
ANION GAP SERPL CALC-SCNC: 13 MMOL/L
AST SERPL-CCNC: 28 U/L
BILIRUB SERPL-MCNC: 0.8 MG/DL
BUN SERPL-MCNC: 50 MG/DL
CALCIUM SERPL-MCNC: 8.5 MG/DL
CHLORIDE SERPL-SCNC: 107 MMOL/L
CHOLEST SERPL-MCNC: 195 MG/DL
CO2 SERPL-SCNC: 19 MMOL/L
CREAT SERPL-MCNC: 1.39 MG/DL
EGFR: 36 ML/MIN/1.73M2
ESTIMATED AVERAGE GLUCOSE: 111 MG/DL
GLUCOSE SERPL-MCNC: 152 MG/DL
HBA1C MFR BLD HPLC: 5.5 %
HCT VFR BLD CALC: 26.9 %
HDLC SERPL-MCNC: 105 MG/DL
HGB BLD-MCNC: 8.2 G/DL
LDLC SERPL CALC-MCNC: 78 MG/DL
MCHC RBC-ENTMCNC: 29.4 PG
MCHC RBC-ENTMCNC: 30.5 GM/DL
MCV RBC AUTO: 96.4 FL
NONHDLC SERPL-MCNC: 90 MG/DL
PLATELET # BLD AUTO: 197 K/UL
POTASSIUM SERPL-SCNC: 4.9 MMOL/L
PROT SERPL-MCNC: 6.5 G/DL
RBC # BLD: 2.79 M/UL
RBC # FLD: 17.8 %
SODIUM SERPL-SCNC: 139 MMOL/L
T4 FREE SERPL-MCNC: 1 NG/DL
TRIGL SERPL-MCNC: 63 MG/DL
TSH SERPL-ACNC: 0.01 UIU/ML
WBC # FLD AUTO: 6.71 K/UL

## 2024-10-07 ENCOUNTER — APPOINTMENT (OUTPATIENT)
Dept: FAMILY MEDICINE | Facility: CLINIC | Age: 89
End: 2024-10-07
Payer: MEDICARE

## 2024-10-07 VITALS — RESPIRATION RATE: 20 BRPM | SYSTOLIC BLOOD PRESSURE: 135 MMHG | HEART RATE: 68 BPM | DIASTOLIC BLOOD PRESSURE: 60 MMHG

## 2024-10-07 DIAGNOSIS — D62 ACUTE POSTHEMORRHAGIC ANEMIA: ICD-10-CM

## 2024-10-07 DIAGNOSIS — R79.89 OTHER SPECIFIED ABNORMAL FINDINGS OF BLOOD CHEMISTRY: ICD-10-CM

## 2024-10-07 PROCEDURE — 36415 COLL VENOUS BLD VENIPUNCTURE: CPT

## 2024-10-07 PROCEDURE — 99213 OFFICE O/P EST LOW 20 MIN: CPT

## 2024-10-08 LAB
ALBUMIN SERPL ELPH-MCNC: 4.2 G/DL
ALP BLD-CCNC: 202 U/L
ALT SERPL-CCNC: 26 U/L
ANION GAP SERPL CALC-SCNC: 15 MMOL/L
AST SERPL-CCNC: 30 U/L
BILIRUB SERPL-MCNC: 0.6 MG/DL
BUN SERPL-MCNC: 64 MG/DL
CALCIUM SERPL-MCNC: 9.3 MG/DL
CHLORIDE SERPL-SCNC: 109 MMOL/L
CO2 SERPL-SCNC: 17 MMOL/L
CREAT SERPL-MCNC: 1.39 MG/DL
EGFR: 36 ML/MIN/1.73M2
GLUCOSE SERPL-MCNC: 136 MG/DL
HCT VFR BLD CALC: 27.8 %
HGB BLD-MCNC: 8.8 G/DL
MCHC RBC-ENTMCNC: 30.4 PG
MCHC RBC-ENTMCNC: 31.7 GM/DL
MCV RBC AUTO: 96.2 FL
PLATELET # BLD AUTO: 204 K/UL
POTASSIUM SERPL-SCNC: 5.4 MMOL/L
PROT SERPL-MCNC: 7.1 G/DL
RBC # BLD: 2.89 M/UL
RBC # FLD: 17.4 %
SODIUM SERPL-SCNC: 141 MMOL/L
WBC # FLD AUTO: 6.38 K/UL

## 2024-10-17 ENCOUNTER — NON-APPOINTMENT (OUTPATIENT)
Age: 89
End: 2024-10-17

## 2024-10-22 ENCOUNTER — RX RENEWAL (OUTPATIENT)
Age: 89
End: 2024-10-22

## 2024-11-04 ENCOUNTER — APPOINTMENT (OUTPATIENT)
Dept: FAMILY MEDICINE | Facility: CLINIC | Age: 89
End: 2024-11-04

## 2024-11-05 ENCOUNTER — APPOINTMENT (OUTPATIENT)
Dept: FAMILY MEDICINE | Facility: CLINIC | Age: 89
End: 2024-11-05
Payer: MEDICARE

## 2024-11-05 VITALS — HEART RATE: 68 BPM | SYSTOLIC BLOOD PRESSURE: 122 MMHG | RESPIRATION RATE: 20 BRPM | DIASTOLIC BLOOD PRESSURE: 62 MMHG

## 2024-11-05 DIAGNOSIS — I10 ESSENTIAL (PRIMARY) HYPERTENSION: ICD-10-CM

## 2024-11-05 PROCEDURE — 99213 OFFICE O/P EST LOW 20 MIN: CPT

## 2024-11-15 ENCOUNTER — NON-APPOINTMENT (OUTPATIENT)
Age: 89
End: 2024-11-15

## 2024-11-18 ENCOUNTER — NON-APPOINTMENT (OUTPATIENT)
Age: 89
End: 2024-11-18

## 2024-11-18 NOTE — ED PROVIDER NOTE - CLINICAL SUMMARY MEDICAL DECISION MAKING FREE TEXT BOX
pt with spontaneous epistaxis in the setting of AC.  Direct pressure with afrin seems to be stopping bleeding.  Will escalate to rhino rocket if bleeds again.  Initial CBC with HgB at 7.7 a few months before was close to 10.  Will need repeat 3 hours to ensure no further drop,
English

## 2024-11-25 ENCOUNTER — APPOINTMENT (OUTPATIENT)
Dept: FAMILY MEDICINE | Facility: CLINIC | Age: 89
End: 2024-11-25
Payer: MEDICARE

## 2024-11-25 VITALS — RESPIRATION RATE: 20 BRPM | HEART RATE: 68 BPM | SYSTOLIC BLOOD PRESSURE: 132 MMHG | DIASTOLIC BLOOD PRESSURE: 65 MMHG

## 2024-11-25 DIAGNOSIS — I50.9 HEART FAILURE, UNSPECIFIED: ICD-10-CM

## 2024-11-25 DIAGNOSIS — I10 ESSENTIAL (PRIMARY) HYPERTENSION: ICD-10-CM

## 2024-11-25 DIAGNOSIS — D64.9 ANEMIA, UNSPECIFIED: ICD-10-CM

## 2024-11-25 DIAGNOSIS — E05.90 THYROTOXICOSIS, UNSPECIFIED W/OUT THYROTOXIC CRISIS OR STORM: ICD-10-CM

## 2024-11-25 PROCEDURE — 36415 COLL VENOUS BLD VENIPUNCTURE: CPT

## 2024-11-25 PROCEDURE — 99214 OFFICE O/P EST MOD 30 MIN: CPT

## 2024-11-25 PROCEDURE — G2211 COMPLEX E/M VISIT ADD ON: CPT

## 2024-11-26 LAB
ALBUMIN SERPL ELPH-MCNC: 3.9 G/DL
ALP BLD-CCNC: 167 U/L
ALT SERPL-CCNC: 23 U/L
ANION GAP SERPL CALC-SCNC: 16 MMOL/L
AST SERPL-CCNC: 25 U/L
BILIRUB SERPL-MCNC: 0.6 MG/DL
BUN SERPL-MCNC: 35 MG/DL
CALCIUM SERPL-MCNC: 9.2 MG/DL
CHLORIDE SERPL-SCNC: 108 MMOL/L
CO2 SERPL-SCNC: 19 MMOL/L
CREAT SERPL-MCNC: 1.21 MG/DL
EGFR: 43 ML/MIN/1.73M2
GLUCOSE SERPL-MCNC: 140 MG/DL
HCT VFR BLD CALC: 29.4 %
HGB BLD-MCNC: 9.1 G/DL
IRON SATN MFR SERPL: 49 %
IRON SERPL-MCNC: 134 UG/DL
MCHC RBC-ENTMCNC: 29.5 PG
MCHC RBC-ENTMCNC: 31 G/DL
MCV RBC AUTO: 95.5 FL
PLATELET # BLD AUTO: 242 K/UL
POTASSIUM SERPL-SCNC: 4 MMOL/L
PROT SERPL-MCNC: 6.7 G/DL
RBC # BLD: 3.08 M/UL
RBC # FLD: 18.8 %
SODIUM SERPL-SCNC: 144 MMOL/L
T3FREE SERPL-MCNC: 2.89 PG/ML
T4 FREE SERPL-MCNC: 1 NG/DL
TIBC SERPL-MCNC: 272 UG/DL
TSH SERPL-ACNC: 0.01 UIU/ML
UIBC SERPL-MCNC: 138 UG/DL
WBC # FLD AUTO: 6.11 K/UL

## 2024-12-02 ENCOUNTER — RX RENEWAL (OUTPATIENT)
Age: 88
End: 2024-12-02

## 2024-12-20 ENCOUNTER — NON-APPOINTMENT (OUTPATIENT)
Age: 88
End: 2024-12-20

## 2025-01-23 ENCOUNTER — RX RENEWAL (OUTPATIENT)
Age: 89
End: 2025-01-23

## 2025-01-23 ENCOUNTER — NON-APPOINTMENT (OUTPATIENT)
Age: 89
End: 2025-01-23

## 2025-02-21 ENCOUNTER — NON-APPOINTMENT (OUTPATIENT)
Age: 89
End: 2025-02-21

## 2025-03-03 ENCOUNTER — RX RENEWAL (OUTPATIENT)
Age: 89
End: 2025-03-03

## 2025-03-28 ENCOUNTER — NON-APPOINTMENT (OUTPATIENT)
Age: 89
End: 2025-03-28

## 2025-04-14 NOTE — PHYSICAL THERAPY INITIAL EVALUATION ADULT - PHYSICAL ASSIST/NONPHYSICAL ASSIST: STAND/SIT, REHAB EVAL
"Chief Complaint  Follow-up (3 month ) and Diabetes    Subjective    History of Present Illness      Patient presents to [facility] for   History of Present Illness  Patient is here today Follow-up (3 month ) and Diabetes  A1C in January was 6.6. He had to be on Trulicity because his insurance wouldn't cover Mounjaro. Sugar went up to 338. He had blurry vision.   He has been taking 2 tablets of Metformin once a day instead of twice a day. He is on Farxiga. Trulicity was stopped, and Tirzepatide was resumed.     He takes 10 mg of melatonin and Trazodone to help him sleep.     Diabetes check up  Onset was more than 5 years.   Pertinent negative symptoms include no abdominal pain, no anorexia, no joint pain, no change in stool, no chest pain, no chills, no congestion, no cough, no diaphoresis, no fatigue, no fever, no headaches, no joint swelling, no myalgias, no nausea, no neck pain, no numbness, no rash, no sore throat, no swollen glands, no dysuria, no vertigo, no visual change, no vomiting and no weakness.        History of Present Illness      Review of Systems   Constitutional:  Negative for chills, diaphoresis, fatigue and fever.   HENT:  Negative for congestion, sore throat and swollen glands.    Respiratory:  Negative for cough.    Cardiovascular:  Negative for chest pain.   Gastrointestinal:  Negative for abdominal pain, anorexia, nausea and vomiting.   Genitourinary:  Negative for dysuria.   Musculoskeletal:  Negative for joint pain, myalgias and neck pain.   Skin:  Negative for rash.   Neurological:  Negative for vertigo, weakness and numbness.       I have reviewed and agree with the HPI and ROS information as above.  Sultana Holliday MD     Objective   Vital Signs:   /84 (BP Location: Right arm, Patient Position: Sitting, Cuff Size: Adult)   Pulse 82   Temp 98.6 °F (37 °C) (Infrared)   Ht 188 cm (74\")   Wt 82.6 kg (182 lb)   SpO2 99%   BMI 23.37 kg/m²     BMI is " within normal parameters. No other follow-up for BMI required.      Physical Exam  Constitutional:       General: He is not in acute distress.     Appearance: Normal appearance. He is normal weight. He is not ill-appearing or toxic-appearing.   Cardiovascular:      Rate and Rhythm: Normal rate and regular rhythm.      Heart sounds: Normal heart sounds. No murmur heard.     No friction rub. No gallop.   Pulmonary:      Effort: Pulmonary effort is normal. No respiratory distress.      Breath sounds: Normal breath sounds. No stridor. No wheezing, rhonchi or rales.   Skin:     Comments: Tattoos bilateral arms   Neurological:      Mental Status: He is alert.                    Result Review  Data Reviewed:                   Assessment and Plan      Diagnoses and all orders for this visit:    1. Hyperlipidemia associated with type 2 diabetes mellitus (Primary)  Comments:  continue Metformin, Mounjaro, Farxiga, Lipitor, and Fenofibrate.  Orders:  -     Hemoglobin A1c; Future  -     Lipid Panel; Future  -     Comprehensive Metabolic Panel; Future  -     CBC & Differential; Future  -     Microalbumin / Creatinine Urine Ratio - Urine, Clean Catch; Future    2. Primary insomnia  Comments:  continue Melatonin and Trazodone.  Orders:  -     traZODone (DESYREL) 50 MG tablet; Take 1 tablet by mouth Daily As Needed for Sleep.  Dispense: 90 tablet; Refill: 1        Assessment & Plan          Follow Up   Return in about 3 months (around 7/14/2025).  Patient was given instructions and counseling regarding his condition or for health maintenance advice. Please see specific information pulled into the AVS if appropriate.          verbal cues/1 person assist

## 2025-04-23 ENCOUNTER — NON-APPOINTMENT (OUTPATIENT)
Age: 89
End: 2025-04-23

## 2025-04-24 ENCOUNTER — APPOINTMENT (OUTPATIENT)
Dept: FAMILY MEDICINE | Facility: CLINIC | Age: 89
End: 2025-04-24
Payer: MEDICARE

## 2025-04-24 VITALS
WEIGHT: 131 LBS | SYSTOLIC BLOOD PRESSURE: 126 MMHG | BODY MASS INDEX: 24.73 KG/M2 | HEIGHT: 61 IN | RESPIRATION RATE: 20 BRPM | HEART RATE: 68 BPM | DIASTOLIC BLOOD PRESSURE: 70 MMHG

## 2025-04-24 DIAGNOSIS — E05.90 THYROTOXICOSIS, UNSPECIFIED W/OUT THYROTOXIC CRISIS OR STORM: ICD-10-CM

## 2025-04-24 DIAGNOSIS — E78.5 HYPERLIPIDEMIA, UNSPECIFIED: ICD-10-CM

## 2025-04-24 DIAGNOSIS — R73.09 OTHER ABNORMAL GLUCOSE: ICD-10-CM

## 2025-04-24 DIAGNOSIS — D64.9 ANEMIA, UNSPECIFIED: ICD-10-CM

## 2025-04-24 DIAGNOSIS — I10 ESSENTIAL (PRIMARY) HYPERTENSION: ICD-10-CM

## 2025-04-24 DIAGNOSIS — I50.9 HEART FAILURE, UNSPECIFIED: ICD-10-CM

## 2025-04-24 PROCEDURE — 99214 OFFICE O/P EST MOD 30 MIN: CPT

## 2025-04-24 PROCEDURE — 36415 COLL VENOUS BLD VENIPUNCTURE: CPT

## 2025-04-24 PROCEDURE — G2211 COMPLEX E/M VISIT ADD ON: CPT

## 2025-04-25 LAB
ALBUMIN SERPL ELPH-MCNC: 3.9 G/DL
ALP BLD-CCNC: 121 U/L
ALT SERPL-CCNC: 32 U/L
ANION GAP SERPL CALC-SCNC: 15 MMOL/L
AST SERPL-CCNC: 32 U/L
BILIRUB SERPL-MCNC: 0.5 MG/DL
BUN SERPL-MCNC: 39 MG/DL
CALCIUM SERPL-MCNC: 9.1 MG/DL
CHLORIDE SERPL-SCNC: 107 MMOL/L
CHOLEST SERPL-MCNC: 201 MG/DL
CO2 SERPL-SCNC: 19 MMOL/L
CREAT SERPL-MCNC: 1.28 MG/DL
EGFRCR SERPLBLD CKD-EPI 2021: 40 ML/MIN/1.73M2
ESTIMATED AVERAGE GLUCOSE: 117 MG/DL
GLUCOSE SERPL-MCNC: 89 MG/DL
HBA1C MFR BLD HPLC: 5.7 %
HCT VFR BLD CALC: 29 %
HDLC SERPL-MCNC: 89 MG/DL
HGB BLD-MCNC: 9.3 G/DL
LDLC SERPL-MCNC: 100 MG/DL
MCHC RBC-ENTMCNC: 29.4 PG
MCHC RBC-ENTMCNC: 32.1 G/DL
MCV RBC AUTO: 91.8 FL
NONHDLC SERPL-MCNC: 111 MG/DL
PLATELET # BLD AUTO: 221 K/UL
POTASSIUM SERPL-SCNC: 4.1 MMOL/L
PROT SERPL-MCNC: 6.8 G/DL
RBC # BLD: 3.16 M/UL
RBC # FLD: 21 %
SODIUM SERPL-SCNC: 142 MMOL/L
T3FREE SERPL-MCNC: 2.53 PG/ML
T4 FREE SERPL-MCNC: 0.8 NG/DL
TRIGL SERPL-MCNC: 60 MG/DL
TSH SERPL-ACNC: 0.33 UIU/ML
WBC # FLD AUTO: 5.41 K/UL

## 2025-04-29 DIAGNOSIS — J40 BRONCHITIS, NOT SPECIFIED AS ACUTE OR CHRONIC: ICD-10-CM

## 2025-04-29 RX ORDER — AMOXICILLIN AND CLAVULANATE POTASSIUM 500; 125 MG/1; MG/1
500-125 TABLET, FILM COATED ORAL
Qty: 14 | Refills: 0 | Status: ACTIVE | COMMUNITY
Start: 2025-04-29 | End: 1900-01-01

## 2025-05-02 ENCOUNTER — NON-APPOINTMENT (OUTPATIENT)
Age: 89
End: 2025-05-02

## 2025-05-11 NOTE — ED ADULT NURSE NOTE - CAS DISCH BELONGINGS RETURNED
Please rest and remain well hydrated with plenty of fluids.  You can take motrin 600-800mg and tylenol 650mg every 3 hours, switching between the two for pain/bodyaches or fevers (>100.4F/>38C)    Please take full course of antibiotics as prescribed- CEFPODOXIME    Please call to arrange follow up with primary care doctor within one week    Urinary Tract Infection    A urinary tract infection (UTI) is an infection of any part of the urinary tract, which includes the kidneys, ureters, bladder, and urethra. Risk factors include ignoring your need to urinate, wiping back to front if female, being an uncircumcised male, and having diabetes or a weak immune system. Symptoms include frequent urination, pain or burning with urination, foul smelling urine, cloudy urine, pain in the lower abdomen, blood in the urine, and fever. If you were prescribed an antibiotic medicine, take it as told by your health care provider. Do not stop taking the antibiotic even if you start to feel better.    SEEK IMMEDIATE MEDICAL CARE IF YOU HAVE ANY OF THE FOLLOWING SYMPTOMS: severe back or abdominal pain, fever, inability to keep fluids or medicine down, dizziness/lightheadedness, or a change in mental status. Not applicable

## 2025-05-30 ENCOUNTER — APPOINTMENT (OUTPATIENT)
Dept: FAMILY MEDICINE | Facility: CLINIC | Age: 89
End: 2025-05-30
Payer: MEDICARE

## 2025-05-30 VITALS
OXYGEN SATURATION: 95 % | HEIGHT: 61 IN | RESPIRATION RATE: 20 BRPM | BODY MASS INDEX: 24.73 KG/M2 | SYSTOLIC BLOOD PRESSURE: 120 MMHG | HEART RATE: 68 BPM | WEIGHT: 131 LBS | DIASTOLIC BLOOD PRESSURE: 70 MMHG

## 2025-05-30 DIAGNOSIS — H26.9 UNSPECIFIED CATARACT: ICD-10-CM

## 2025-05-30 DIAGNOSIS — Z01.818 ENCOUNTER FOR OTHER PREPROCEDURAL EXAMINATION: ICD-10-CM

## 2025-05-30 PROCEDURE — 99214 OFFICE O/P EST MOD 30 MIN: CPT

## 2025-06-01 RX ORDER — SODIUM CHLORIDE 9 G/1000ML
1000 INJECTION, SOLUTION INTRAVENOUS
Refills: 0 | Status: DISCONTINUED | OUTPATIENT
Start: 2025-06-04 | End: 2025-06-18

## 2025-06-02 ENCOUNTER — NON-APPOINTMENT (OUTPATIENT)
Age: 89
End: 2025-06-02

## 2025-06-02 RX ORDER — ACETAMINOPHEN 500 MG/5ML
2 LIQUID (ML) ORAL
Refills: 0 | DISCHARGE

## 2025-06-02 NOTE — ASU PATIENT PROFILE, ADULT - NSICDXPASTMEDICALHX_GEN_ALL_CORE_FT
PAST MEDICAL HISTORY:  Anemia     Atrial fibrillation, unspecified type history of ablation in 2016; a fib gone as per patient    Atrial flutter     Azotemia     Breast cancer     CHF (congestive heart failure)     Dyspnea on exertion     Edema of both lower extremities     Essential hypertension     H/O CHF     H/O dizziness     H/O hearing loss     H/O hyperlipidemia     H/O hyperthyroidism     H/O neuropathy     History of cellulitis     Kidney stone     Malignant neoplasm of female breast, unspecified estrogen receptor status, unspecified laterality, unspecified site of breast      PAST MEDICAL HISTORY:  Anemia     Atrial fibrillation, unspecified type history of ablation in 2016; a fib gone as per patient    Atrial flutter     Azotemia     Breast cancer     CHF (congestive heart failure)     Dyspnea on exertion     Edema of both lower extremities     Essential hypertension     H/O CHF     H/O dizziness     H/O hearing loss     H/O hyperlipidemia     H/O hyperthyroidism     H/O neuropathy     History of cellulitis     Kidney stone     Malignant neoplasm of female breast, unspecified estrogen receptor status, unspecified laterality, unspecified site of breast     MI (myocardial infarction)

## 2025-06-02 NOTE — ASU PATIENT PROFILE, ADULT - NSICDXPASTSURGICALHX_GEN_ALL_CORE_FT
PAST SURGICAL HISTORY:  H/O lithotripsy     History of appendectomy age 18 as per patient    History of spinal stenosis cervical and lumbar    Status cardiac pacemaker     Status post THR (total hip replacement) bilateral     PAST SURGICAL HISTORY:  H/O lithotripsy     History of appendectomy age 18 as per patient    History of spinal stenosis cervical and lumbar    History of total left knee replacement     Status cardiac pacemaker     Status post THR (total hip replacement) bilateral

## 2025-06-04 ENCOUNTER — TRANSCRIPTION ENCOUNTER (OUTPATIENT)
Age: 89
End: 2025-06-04

## 2025-06-04 ENCOUNTER — OUTPATIENT (OUTPATIENT)
Dept: OUTPATIENT SERVICES | Facility: HOSPITAL | Age: 89
LOS: 1 days | End: 2025-06-04
Payer: MEDICARE

## 2025-06-04 VITALS
HEIGHT: 61 IN | WEIGHT: 130.95 LBS | RESPIRATION RATE: 16 BRPM | OXYGEN SATURATION: 96 % | TEMPERATURE: 98 F | HEART RATE: 60 BPM | SYSTOLIC BLOOD PRESSURE: 150 MMHG | DIASTOLIC BLOOD PRESSURE: 74 MMHG

## 2025-06-04 VITALS
TEMPERATURE: 97 F | DIASTOLIC BLOOD PRESSURE: 71 MMHG | SYSTOLIC BLOOD PRESSURE: 152 MMHG | HEART RATE: 60 BPM | OXYGEN SATURATION: 99 % | RESPIRATION RATE: 16 BRPM

## 2025-06-04 DIAGNOSIS — Z96.652 PRESENCE OF LEFT ARTIFICIAL KNEE JOINT: Chronic | ICD-10-CM

## 2025-06-04 DIAGNOSIS — Z98.890 OTHER SPECIFIED POSTPROCEDURAL STATES: Chronic | ICD-10-CM

## 2025-06-04 DIAGNOSIS — H25.11 AGE-RELATED NUCLEAR CATARACT, RIGHT EYE: ICD-10-CM

## 2025-06-04 DIAGNOSIS — Z87.39 PERSONAL HISTORY OF OTHER DISEASES OF THE MUSCULOSKELETAL SYSTEM AND CONNECTIVE TISSUE: Chronic | ICD-10-CM

## 2025-06-04 DIAGNOSIS — Z96.649 PRESENCE OF UNSPECIFIED ARTIFICIAL HIP JOINT: Chronic | ICD-10-CM

## 2025-06-04 DIAGNOSIS — H25.13 AGE-RELATED NUCLEAR CATARACT, BILATERAL: ICD-10-CM

## 2025-06-04 DIAGNOSIS — Z95.0 PRESENCE OF CARDIAC PACEMAKER: Chronic | ICD-10-CM

## 2025-06-04 DIAGNOSIS — Z90.49 ACQUIRED ABSENCE OF OTHER SPECIFIED PARTS OF DIGESTIVE TRACT: Chronic | ICD-10-CM

## 2025-06-04 PROCEDURE — 66982 XCAPSL CTRC RMVL CPLX WO ECP: CPT | Mod: RT

## 2025-06-04 PROCEDURE — V2632: CPT

## 2025-06-04 DEVICE — IMPLANTABLE DEVICE
Type: IMPLANTABLE DEVICE | Site: RIGHT | Status: NON-FUNCTIONAL
Removed: 2025-06-04

## 2025-06-04 RX ORDER — AMLODIPINE BESYLATE 10 MG/1
1 TABLET ORAL
Refills: 0 | DISCHARGE

## 2025-06-04 RX ORDER — KETOROLAC TROMETHAMINE 5 MG/ML
1 SOLUTION/ DROPS OPHTHALMIC
Refills: 0 | Status: COMPLETED | OUTPATIENT
Start: 2025-06-04 | End: 2025-06-04

## 2025-06-04 RX ORDER — BUMETANIDE 1 MG/1
1 TABLET ORAL
Refills: 0 | DISCHARGE

## 2025-06-04 RX ORDER — PREDNISONE 20 MG/1
1 TABLET ORAL
Refills: 0 | DISCHARGE

## 2025-06-04 RX ORDER — TROPICAMIDE
1 POWDER (GRAM) MISCELLANEOUS
Refills: 0 | Status: COMPLETED | OUTPATIENT
Start: 2025-06-04 | End: 2025-06-04

## 2025-06-04 RX ORDER — HYDROXYZINE HYDROCHLORIDE 25 MG/1
1 TABLET, FILM COATED ORAL
Refills: 0 | DISCHARGE

## 2025-06-04 RX ORDER — GABAPENTIN 400 MG/1
1 CAPSULE ORAL
Refills: 0 | DISCHARGE

## 2025-06-04 RX ORDER — ACETAMINOPHEN 500 MG/5ML
2 LIQUID (ML) ORAL
Refills: 0 | DISCHARGE

## 2025-06-04 RX ORDER — CHLOROQUINE PHOSPHATE
1 POWDER (GRAM) MISCELLANEOUS
Refills: 0 | Status: COMPLETED | OUTPATIENT
Start: 2025-06-04 | End: 2025-06-04

## 2025-06-04 RX ORDER — AMOXICILLIN AND CLAVULANATE POTASSIUM 500; 125 MG/1; MG/1
1 TABLET, FILM COATED ORAL
Refills: 0 | DISCHARGE

## 2025-06-04 RX ORDER — B1/B2/B3/B5/B6/B12/VIT C/FOLIC 500-0.5 MG
1 TABLET ORAL
Refills: 0 | DISCHARGE

## 2025-06-04 RX ORDER — ACETAMINOPHEN 500 MG/5ML
650 LIQUID (ML) ORAL EVERY 6 HOURS
Refills: 0 | Status: DISCONTINUED | OUTPATIENT
Start: 2025-06-04 | End: 2025-06-18

## 2025-06-04 RX ORDER — LETROZOLE 2.5 MG/1
1 TABLET, FILM COATED ORAL
Refills: 0 | DISCHARGE

## 2025-06-04 RX ORDER — METHIMAZOLE 5 MG
1 TABLET ORAL
Refills: 0 | DISCHARGE

## 2025-06-04 RX ORDER — NITROGLYCERIN 20 MG/G
1 OINTMENT TOPICAL
Refills: 0 | DISCHARGE

## 2025-06-04 RX ORDER — PHENYLEPHRINE HYDROCHLORIDE 25 MG/ML
1 SOLUTION OPHTHALMIC
Refills: 0 | Status: COMPLETED | OUTPATIENT
Start: 2025-06-04 | End: 2025-06-04

## 2025-06-04 RX ADMIN — PHENYLEPHRINE HYDROCHLORIDE 1 DROP(S): 25 SOLUTION OPHTHALMIC at 09:23

## 2025-06-04 RX ADMIN — KETOROLAC TROMETHAMINE 1 DROP(S): 5 SOLUTION/ DROPS OPHTHALMIC at 09:18

## 2025-06-04 RX ADMIN — KETOROLAC TROMETHAMINE 1 DROP(S): 5 SOLUTION/ DROPS OPHTHALMIC at 09:23

## 2025-06-04 RX ADMIN — PHENYLEPHRINE HYDROCHLORIDE 1 DROP(S): 25 SOLUTION OPHTHALMIC at 09:13

## 2025-06-04 RX ADMIN — Medication 1 DROP(S): at 09:13

## 2025-06-04 RX ADMIN — PHENYLEPHRINE HYDROCHLORIDE 1 DROP(S): 25 SOLUTION OPHTHALMIC at 09:18

## 2025-06-04 RX ADMIN — Medication 1 DROP(S): at 09:23

## 2025-06-04 RX ADMIN — SODIUM CHLORIDE 40 MILLILITER(S): 9 INJECTION, SOLUTION INTRAVENOUS at 09:20

## 2025-06-04 RX ADMIN — KETOROLAC TROMETHAMINE 1 DROP(S): 5 SOLUTION/ DROPS OPHTHALMIC at 09:13

## 2025-06-04 RX ADMIN — Medication 1 DROP(S): at 09:18

## 2025-06-04 NOTE — ASU DISCHARGE PLAN (ADULT/PEDIATRIC) - ASU DC SPECIAL INSTRUCTIONSFT
Keep shield on eye until office visit today at 230 pm  Any problems call office at [phone]    OFFICE VISIT TODAY  pm  Bring Drops

## 2025-06-04 NOTE — ASU PREOP CHECKLIST - AICD PRESENT
Patient states that she had a laproscopic hysterectomy on the 10th, patient c/o SOB and was told to come to the ED for a CTA by dr. way.
yes

## 2025-06-04 NOTE — BRIEF OPERATIVE NOTE - NSICDXBRIEFPROCEDURE_GEN_ALL_CORE_FT
PROCEDURES:  Single stage extracapsular removal of cataract with insertion of intraocular lens prosthesis by phacoemulsification 04-Jun-2025 11:03:42  Kurt Mckeon

## 2025-06-04 NOTE — ASU DISCHARGE PLAN (ADULT/PEDIATRIC) - FINANCIAL ASSISTANCE
[facility] provides services at a reduced cost to those who are determined to be eligible through [facility]’s financial assistance program. Information regarding [facility]’s financial assistance program can be found by going to [email or web address] or by calling [phone].

## 2025-06-05 ENCOUNTER — RX RENEWAL (OUTPATIENT)
Age: 89
End: 2025-06-05

## 2025-07-03 ENCOUNTER — NON-APPOINTMENT (OUTPATIENT)
Age: 89
End: 2025-07-03

## 2025-07-09 NOTE — ED PROVIDER NOTE - CHIEF COMPLAINT
[facility] Progress Note    Date: 2025    Name: Heather Smalls    MRN: [id number]         : 1958      Ms. Smalls was assessed and education was provided. She denies new problems. Denies adverse effect from prior Prolia injections.    Ms. Smalls's vitals were reviewed and patient was observed for 5 minutes prior to treatment.   Vitals:    25 1400   BP: (!) 122/57   Pulse: 76   Resp: 16   Temp: 97.9 °F (36.6 °C)   SpO2: 97%       Lab results were obtained and reviewed from 25.    Prolia 60 mg was administered subcutaneous in  left upper arm and site intact.     Ms. Smalls tolerated well, and had no complaints.  Patient armband removed and shredded    [facility] at 1425. She is to return on  at 1:30 for her next appointment.    Mark Pace RN  2025  2:40 PM  
The patient is a 87y Female complaining of groin pain.

## 2025-07-11 ENCOUNTER — NON-APPOINTMENT (OUTPATIENT)
Age: 89
End: 2025-07-11

## 2025-07-16 ENCOUNTER — APPOINTMENT (OUTPATIENT)
Dept: PULMONOLOGY | Facility: CLINIC | Age: 89
End: 2025-07-16
Payer: MEDICARE

## 2025-07-16 VITALS
HEIGHT: 60.8 IN | WEIGHT: 133 LBS | DIASTOLIC BLOOD PRESSURE: 72 MMHG | OXYGEN SATURATION: 96 % | RESPIRATION RATE: 20 BRPM | HEART RATE: 62 BPM | BODY MASS INDEX: 25.44 KG/M2 | SYSTOLIC BLOOD PRESSURE: 136 MMHG | TEMPERATURE: 97.4 F

## 2025-07-16 VITALS — HEIGHT: 60.8 IN | WEIGHT: 131 LBS | OXYGEN SATURATION: 97 % | BODY MASS INDEX: 25.05 KG/M2

## 2025-07-16 PROBLEM — R93.89 ABNORMAL CAT SCAN: Status: ACTIVE | Noted: 2025-07-16

## 2025-07-16 PROBLEM — R06.09 DYSPNEA ON MINIMAL EXERTION: Status: ACTIVE | Noted: 2022-09-08

## 2025-07-16 PROBLEM — J43.2 CENTRILOBULAR EMPHYSEMA: Status: ACTIVE | Noted: 2025-07-16

## 2025-07-16 PROCEDURE — 94727 GAS DIL/WSHOT DETER LNG VOL: CPT

## 2025-07-16 PROCEDURE — 94664 DEMO&/EVAL PT USE INHALER: CPT | Mod: 59

## 2025-07-16 PROCEDURE — 99205 OFFICE O/P NEW HI 60 MIN: CPT | Mod: 25

## 2025-07-16 PROCEDURE — 94640 AIRWAY INHALATION TREATMENT: CPT | Mod: 59

## 2025-07-16 PROCEDURE — ZZZZZ: CPT

## 2025-07-16 PROCEDURE — 94010 BREATHING CAPACITY TEST: CPT

## 2025-07-16 PROCEDURE — 94729 DIFFUSING CAPACITY: CPT

## 2025-07-16 RX ORDER — ALBUTEROL SULFATE 2.5 MG/3ML
(2.5 MG/3ML) SOLUTION RESPIRATORY (INHALATION)
Qty: 0 | Refills: 0 | Status: COMPLETED | OUTPATIENT
Start: 2025-07-16

## 2025-07-16 RX ORDER — IPRATROPIUM BROMIDE AND ALBUTEROL SULFATE 2.5; .5 MG/3ML; MG/3ML
0.5-2.5 (3) SOLUTION RESPIRATORY (INHALATION)
Qty: 1080 | Refills: 5 | Status: ACTIVE | COMMUNITY
Start: 2025-07-16 | End: 1900-01-01

## 2025-07-16 RX ADMIN — ALBUTEROL SULFATE 0 0.083%: 2.5 SOLUTION RESPIRATORY (INHALATION) at 00:00

## 2025-07-17 ENCOUNTER — RX RENEWAL (OUTPATIENT)
Age: 89
End: 2025-07-17

## 2025-07-28 ENCOUNTER — APPOINTMENT (OUTPATIENT)
Dept: FAMILY MEDICINE | Facility: CLINIC | Age: 89
End: 2025-07-28
Payer: MEDICARE

## 2025-07-28 VITALS
DIASTOLIC BLOOD PRESSURE: 78 MMHG | HEART RATE: 68 BPM | RESPIRATION RATE: 20 BRPM | OXYGEN SATURATION: 97 % | BODY MASS INDEX: 25.91 KG/M2 | SYSTOLIC BLOOD PRESSURE: 124 MMHG | HEIGHT: 60 IN | WEIGHT: 132 LBS

## 2025-07-28 DIAGNOSIS — D64.9 ANEMIA, UNSPECIFIED: ICD-10-CM

## 2025-07-28 DIAGNOSIS — E78.5 HYPERLIPIDEMIA, UNSPECIFIED: ICD-10-CM

## 2025-07-28 DIAGNOSIS — E05.90 THYROTOXICOSIS, UNSPECIFIED W/OUT THYROTOXIC CRISIS OR STORM: ICD-10-CM

## 2025-07-28 DIAGNOSIS — I10 ESSENTIAL (PRIMARY) HYPERTENSION: ICD-10-CM

## 2025-07-28 DIAGNOSIS — I50.9 HEART FAILURE, UNSPECIFIED: ICD-10-CM

## 2025-07-28 DIAGNOSIS — R73.09 OTHER ABNORMAL GLUCOSE: ICD-10-CM

## 2025-07-28 PROCEDURE — 36415 COLL VENOUS BLD VENIPUNCTURE: CPT

## 2025-07-28 PROCEDURE — G2211 COMPLEX E/M VISIT ADD ON: CPT

## 2025-07-28 PROCEDURE — 99214 OFFICE O/P EST MOD 30 MIN: CPT

## 2025-07-29 ENCOUNTER — NON-APPOINTMENT (OUTPATIENT)
Age: 89
End: 2025-07-29

## 2025-07-29 LAB
25(OH)D3 SERPL-MCNC: 21.3 NG/ML
ALBUMIN SERPL ELPH-MCNC: 4 G/DL
ALP BLD-CCNC: 95 U/L
ALT SERPL-CCNC: 32 U/L
ANION GAP SERPL CALC-SCNC: 13 MMOL/L
AST SERPL-CCNC: 33 U/L
BILIRUB SERPL-MCNC: 0.6 MG/DL
BUN SERPL-MCNC: 38 MG/DL
CALCIUM SERPL-MCNC: 9.2 MG/DL
CHLORIDE SERPL-SCNC: 109 MMOL/L
CHOLEST SERPL-MCNC: 222 MG/DL
CO2 SERPL-SCNC: 19 MMOL/L
CREAT SERPL-MCNC: 1.13 MG/DL
EGFRCR SERPLBLD CKD-EPI 2021: 46 ML/MIN/1.73M2
ESTIMATED AVERAGE GLUCOSE: 114 MG/DL
FOLATE SERPL-MCNC: >20 NG/ML
GLUCOSE SERPL-MCNC: 86 MG/DL
HBA1C MFR BLD HPLC: 5.6 %
HCT VFR BLD CALC: 31.6 %
HDLC SERPL-MCNC: 106 MG/DL
HGB BLD-MCNC: 9.9 G/DL
LDLC SERPL-MCNC: 101 MG/DL
MCHC RBC-ENTMCNC: 29.6 PG
MCHC RBC-ENTMCNC: 31.3 G/DL
MCV RBC AUTO: 94.6 FL
NONHDLC SERPL-MCNC: 116 MG/DL
PLATELET # BLD AUTO: 204 K/UL
POTASSIUM SERPL-SCNC: 3.8 MMOL/L
PROT SERPL-MCNC: 6.9 G/DL
RBC # BLD: 3.34 M/UL
RBC # FLD: 21.2 %
SODIUM SERPL-SCNC: 140 MMOL/L
T3FREE SERPL-MCNC: 2.12 PG/ML
T4 FREE SERPL-MCNC: 0.6 NG/DL
TRIGL SERPL-MCNC: 89 MG/DL
TSH SERPL-ACNC: 8.03 UIU/ML
VIT B12 SERPL-MCNC: 1558 PG/ML
WBC # FLD AUTO: 6.91 K/UL

## 2025-08-05 ENCOUNTER — RX RENEWAL (OUTPATIENT)
Age: 89
End: 2025-08-05

## 2025-08-29 ENCOUNTER — RX RENEWAL (OUTPATIENT)
Age: 89
End: 2025-08-29

## 2025-09-18 ENCOUNTER — APPOINTMENT (OUTPATIENT)
Dept: HEMATOLOGY ONCOLOGY | Facility: CLINIC | Age: 89
End: 2025-09-18
Payer: MEDICARE

## 2025-09-18 VITALS
BODY MASS INDEX: 25.52 KG/M2 | TEMPERATURE: 97.5 F | WEIGHT: 130 LBS | HEIGHT: 60 IN | RESPIRATION RATE: 17 BRPM | DIASTOLIC BLOOD PRESSURE: 74 MMHG | HEART RATE: 62 BPM | SYSTOLIC BLOOD PRESSURE: 141 MMHG | OXYGEN SATURATION: 96 %

## 2025-09-18 DIAGNOSIS — N63.0 UNSPECIFIED LUMP IN UNSPECIFIED BREAST: ICD-10-CM

## 2025-09-18 DIAGNOSIS — R59.0 LOCALIZED ENLARGED LYMPH NODES: ICD-10-CM

## 2025-09-18 DIAGNOSIS — C50.919 MALIGNANT NEOPLASM OF UNSPECIFIED SITE OF UNSPECIFIED FEMALE BREAST: ICD-10-CM

## 2025-09-18 PROCEDURE — 99204 OFFICE O/P NEW MOD 45 MIN: CPT

## 2025-09-18 PROCEDURE — G2211 COMPLEX E/M VISIT ADD ON: CPT

## 2025-09-19 ENCOUNTER — NON-APPOINTMENT (OUTPATIENT)
Age: 89
End: 2025-09-19

## 2025-09-21 PROBLEM — N63.0 SUBCUTANEOUS NODULE OF BREAST: Status: ACTIVE | Noted: 2025-09-21

## (undated) DEVICE — TRAY EPIDURAL SINGLE DOSE

## (undated) DEVICE — STYLET  ENDOTRACH 7.5MM X 10MM

## (undated) DEVICE — DRSG TEGADERM 2.5 X 3"

## (undated) DEVICE — CATH IV SAFE BC 22G X 1" (BLUE)

## (undated) DEVICE — SOL IRR POUR H2O 1000ML

## (undated) DEVICE — SOL INJ LR 500ML

## (undated) DEVICE — NDL SPINAL 22G X 3.5" QUINCKE

## (undated) DEVICE — TUBING IV EXTENSION MACRO W CLAVE 7"

## (undated) DEVICE — Device

## (undated) DEVICE — NDL HYPO SAFE 18G X 1.5" (PINK)

## (undated) DEVICE — CAPSULE POLISHER 23GX7/8"

## (undated) DEVICE — NUCLEUS HYDRODISSECTOR SAUTER 27G X 22MM

## (undated) DEVICE — CARTRIDGE ALCON MONARCH II "B"

## (undated) DEVICE — PACK IV START WITH CHG

## (undated) DEVICE — GLV 8.5 PROTEXIS (WHITE)

## (undated) DEVICE — SYR IV FLUSH SALINE 10ML 30/TY

## (undated) DEVICE — CANNULA BD & CO SUBTENONS

## (undated) DEVICE — DRSG STERISTRIPS 0.5 X 4"

## (undated) DEVICE — SYR LUER LOK 20CC

## (undated) DEVICE — GLV 6.5 PROTEXIS (WHITE)

## (undated) DEVICE — TUBING ALARIS PUMP MODULE NON-DEHP

## (undated) DEVICE — KNIFE SIDEPORT ANG W/ SAFETY 20G

## (undated) DEVICE — SUT VICRYL 10-0 12" CS160-8 DA

## (undated) DEVICE — AID SURG OPTH OCUCOAT 20MG/ML

## (undated) DEVICE — KNIFE FULL HANDLE ANGLE 2.75MM